# Patient Record
Sex: FEMALE | Race: WHITE | Employment: OTHER | URBAN - METROPOLITAN AREA
[De-identification: names, ages, dates, MRNs, and addresses within clinical notes are randomized per-mention and may not be internally consistent; named-entity substitution may affect disease eponyms.]

---

## 2017-04-08 ENCOUNTER — ED HISTORICAL/CONVERTED ENCOUNTER (OUTPATIENT)
Dept: OTHER | Age: 67
End: 2017-04-08

## 2017-07-03 ENCOUNTER — ED HISTORICAL/CONVERTED ENCOUNTER (OUTPATIENT)
Dept: OTHER | Age: 67
End: 2017-07-03

## 2017-07-03 ENCOUNTER — TELEPHONE (OUTPATIENT)
Dept: CARDIOLOGY CLINIC | Age: 67
End: 2017-07-03

## 2019-12-19 ENCOUNTER — IP HISTORICAL/CONVERTED ENCOUNTER (OUTPATIENT)
Dept: OTHER | Age: 69
End: 2019-12-19

## 2020-09-24 ENCOUNTER — APPOINTMENT (OUTPATIENT)
Dept: CT IMAGING | Age: 70
DRG: 871 | End: 2020-09-24
Attending: FAMILY MEDICINE
Payer: MEDICARE

## 2020-09-24 ENCOUNTER — HOSPITAL ENCOUNTER (INPATIENT)
Age: 70
LOS: 9 days | Discharge: HOME HEALTH | DRG: 871 | End: 2020-10-03
Attending: FAMILY MEDICINE | Admitting: HOSPITALIST
Payer: MEDICARE

## 2020-09-24 ENCOUNTER — APPOINTMENT (OUTPATIENT)
Dept: GENERAL RADIOLOGY | Age: 70
DRG: 871 | End: 2020-09-24
Attending: FAMILY MEDICINE
Payer: MEDICARE

## 2020-09-24 PROBLEM — N17.9 ACUTE ON CHRONIC RENAL FAILURE (HCC): Status: ACTIVE | Noted: 2020-09-24

## 2020-09-24 PROBLEM — N18.9 ACUTE ON CHRONIC RENAL FAILURE (HCC): Status: ACTIVE | Noted: 2020-09-24

## 2020-09-24 PROBLEM — J18.9 RLL PNEUMONIA: Status: ACTIVE | Noted: 2020-09-24

## 2020-09-24 PROBLEM — A41.9 SEPSIS (HCC): Status: ACTIVE | Noted: 2020-09-24

## 2020-09-24 PROBLEM — M31.30 GRANULOMATOSIS WITH POLYANGIITIS (HCC): Status: ACTIVE | Noted: 2020-09-24

## 2020-09-24 PROBLEM — D72.829 LEUKOCYTOSIS: Status: ACTIVE | Noted: 2020-09-24

## 2020-09-24 PROBLEM — E88.09 HYPOALBUMINEMIA: Status: ACTIVE | Noted: 2020-09-24

## 2020-09-24 PROBLEM — E87.1 HYPONATREMIA: Status: ACTIVE | Noted: 2020-09-24

## 2020-09-24 PROBLEM — J18.9 PNA (PNEUMONIA): Status: ACTIVE | Noted: 2020-09-24

## 2020-09-24 LAB
ALBUMIN SERPL-MCNC: 2.7 G/DL (ref 3.5–5)
ALBUMIN/GLOB SERPL: 0.7 {RATIO} (ref 1.1–2.2)
ALP SERPL-CCNC: 122 U/L (ref 45–117)
ALT SERPL-CCNC: 11 U/L (ref 12–78)
ANION GAP SERPL CALC-SCNC: 15 MMOL/L (ref 5–15)
APTT PPP: 43.7 SEC (ref 24.2–31.1)
AST SERPL W P-5'-P-CCNC: 20 U/L (ref 15–37)
ATRIAL RATE: 416 BPM
BASOPHILS # BLD: 0 K/UL (ref 0–0.1)
BASOPHILS NFR BLD: 0 % (ref 0–1)
BILIRUB SERPL-MCNC: 0.6 MG/DL (ref 0.2–1)
BUN SERPL-MCNC: 63 MG/DL (ref 6–20)
BUN/CREAT SERPL: 17 (ref 12–20)
CA-I BLD-MCNC: 9.2 MG/DL (ref 8.5–10.1)
CALCULATED R AXIS, ECG10: 34 DEGREES
CALCULATED T AXIS, ECG11: 50 DEGREES
CHLORIDE SERPL-SCNC: 96 MMOL/L (ref 97–108)
CO2 SERPL-SCNC: 21 MMOL/L (ref 21–32)
CREAT SERPL-MCNC: 3.77 MG/DL (ref 0.55–1.02)
DIAGNOSIS, 93000: NORMAL
EOSINOPHIL # BLD: 0 K/UL (ref 0–0.4)
EOSINOPHIL NFR BLD: 0 % (ref 0–7)
ERYTHROCYTE [DISTWIDTH] IN BLOOD BY AUTOMATED COUNT: 15.5 % (ref 11.5–14.5)
GLOBULIN SER CALC-MCNC: 4.1 G/DL (ref 2–4)
GLUCOSE SERPL-MCNC: 95 MG/DL (ref 65–100)
HCT VFR BLD AUTO: 40.5 % (ref 35–47)
HGB BLD-MCNC: 13.4 % (ref 11.5–16)
IMM GRANULOCYTES # BLD AUTO: 0 K/UL
IMM GRANULOCYTES NFR BLD AUTO: 0 %
INR PPP: 1.4 (ref 0.9–1.1)
LACTATE SERPL-SCNC: 1 MMOL/L (ref 0.4–2)
LYMPHOCYTES # BLD: 1.1 K/UL (ref 0.8–3.5)
LYMPHOCYTES NFR BLD: 3 % (ref 12–49)
MCH RBC QN AUTO: 31.6 PG (ref 26–34)
MCHC RBC AUTO-ENTMCNC: 33.1 G/DL (ref 30–36.5)
MCV RBC AUTO: 95.5 FL (ref 80–99)
MONOCYTES # BLD: 1.1 K/UL (ref 0–1)
MONOCYTES NFR BLD: 3 % (ref 5–13)
NEUTS BAND NFR BLD MANUAL: 10 % (ref 0–6)
NEUTS SEG # BLD: 34.5 K/UL (ref 1.8–8)
NEUTS SEG NFR BLD: 84 % (ref 32–75)
PLATELET # BLD AUTO: 276 K/UL (ref 150–400)
PLATELET COMMENTS,PCOM: ABNORMAL
PMV BLD AUTO: 11 FL (ref 8.9–12.9)
POTASSIUM SERPL-SCNC: 4 MMOL/L (ref 3.5–5.1)
PROT SERPL-MCNC: 6.8 G/DL (ref 6.4–8.2)
PROTHROMBIN TIME: 14.1 SEC (ref 9–11.1)
Q-T INTERVAL, ECG07: 368 MS
QRS DURATION, ECG06: 86 MS
QTC CALCULATION (BEZET), ECG08: 440 MS
RBC # BLD AUTO: 4.24 M/UL (ref 3.8–5.2)
RBC MORPH BLD: ABNORMAL
SODIUM SERPL-SCNC: 132 MMOL/L (ref 136–145)
THERAPEUTIC RANGE,PTTT: ABNORMAL SEC (ref 68–109)
TROPONIN I SERPL-MCNC: <0.05 NG/ML
VENTRICULAR RATE, ECG03: 86 BPM
WBC # BLD AUTO: 36.7 K/UL (ref 3.6–11)
WBC MORPH BLD: ABNORMAL

## 2020-09-24 PROCEDURE — 83605 ASSAY OF LACTIC ACID: CPT

## 2020-09-24 PROCEDURE — 65660000000 HC RM CCU STEPDOWN

## 2020-09-24 PROCEDURE — 84484 ASSAY OF TROPONIN QUANT: CPT

## 2020-09-24 PROCEDURE — 74011250636 HC RX REV CODE- 250/636: Performed by: HOSPITALIST

## 2020-09-24 PROCEDURE — 71045 X-RAY EXAM CHEST 1 VIEW: CPT

## 2020-09-24 PROCEDURE — 85610 PROTHROMBIN TIME: CPT

## 2020-09-24 PROCEDURE — 99284 EMERGENCY DEPT VISIT MOD MDM: CPT

## 2020-09-24 PROCEDURE — 82803 BLOOD GASES ANY COMBINATION: CPT

## 2020-09-24 PROCEDURE — 36415 COLL VENOUS BLD VENIPUNCTURE: CPT

## 2020-09-24 PROCEDURE — 80053 COMPREHEN METABOLIC PANEL: CPT

## 2020-09-24 PROCEDURE — 74011000258 HC RX REV CODE- 258: Performed by: FAMILY MEDICINE

## 2020-09-24 PROCEDURE — 87040 BLOOD CULTURE FOR BACTERIA: CPT

## 2020-09-24 PROCEDURE — 93005 ELECTROCARDIOGRAM TRACING: CPT

## 2020-09-24 PROCEDURE — 74011250636 HC RX REV CODE- 250/636: Performed by: FAMILY MEDICINE

## 2020-09-24 PROCEDURE — 65270000029 HC RM PRIVATE

## 2020-09-24 PROCEDURE — 85025 COMPLETE CBC W/AUTO DIFF WBC: CPT

## 2020-09-24 PROCEDURE — 74176 CT ABD & PELVIS W/O CONTRAST: CPT

## 2020-09-24 PROCEDURE — 85730 THROMBOPLASTIN TIME PARTIAL: CPT

## 2020-09-24 PROCEDURE — 74011250637 HC RX REV CODE- 250/637: Performed by: HOSPITALIST

## 2020-09-24 RX ORDER — BISMUTH SUBSALICYLATE 262 MG
1 TABLET,CHEWABLE ORAL DAILY
Status: DISCONTINUED | OUTPATIENT
Start: 2020-09-25 | End: 2020-10-03 | Stop reason: HOSPADM

## 2020-09-24 RX ORDER — POLYETHYLENE GLYCOL 3350 17 G/17G
17 POWDER, FOR SOLUTION ORAL DAILY PRN
Status: DISCONTINUED | OUTPATIENT
Start: 2020-09-24 | End: 2020-10-03 | Stop reason: HOSPADM

## 2020-09-24 RX ORDER — DIGOXIN 125 MCG
0.12 TABLET ORAL DAILY
Status: DISCONTINUED | OUTPATIENT
Start: 2020-09-25 | End: 2020-10-03 | Stop reason: HOSPADM

## 2020-09-24 RX ORDER — PROMETHAZINE HYDROCHLORIDE 25 MG/1
12.5 TABLET ORAL
Status: DISCONTINUED | OUTPATIENT
Start: 2020-09-24 | End: 2020-10-03 | Stop reason: HOSPADM

## 2020-09-24 RX ORDER — ACETAMINOPHEN 650 MG/1
650 SUPPOSITORY RECTAL
Status: DISCONTINUED | OUTPATIENT
Start: 2020-09-24 | End: 2020-10-03 | Stop reason: HOSPADM

## 2020-09-24 RX ORDER — CHOLECALCIFEROL (VITAMIN D3) 125 MCG
10 CAPSULE ORAL
Status: DISCONTINUED | OUTPATIENT
Start: 2020-09-25 | End: 2020-10-03 | Stop reason: HOSPADM

## 2020-09-24 RX ORDER — SODIUM CHLORIDE 0.9 % (FLUSH) 0.9 %
5-40 SYRINGE (ML) INJECTION EVERY 8 HOURS
Status: DISCONTINUED | OUTPATIENT
Start: 2020-09-25 | End: 2020-10-03 | Stop reason: HOSPADM

## 2020-09-24 RX ORDER — LEVOFLOXACIN 5 MG/ML
750 INJECTION, SOLUTION INTRAVENOUS
Status: COMPLETED | OUTPATIENT
Start: 2020-09-24 | End: 2020-09-24

## 2020-09-24 RX ORDER — SODIUM CHLORIDE 0.9 % (FLUSH) 0.9 %
5-40 SYRINGE (ML) INJECTION AS NEEDED
Status: DISCONTINUED | OUTPATIENT
Start: 2020-09-24 | End: 2020-10-03 | Stop reason: HOSPADM

## 2020-09-24 RX ORDER — FAMOTIDINE 20 MG/1
20 TABLET, FILM COATED ORAL 2 TIMES DAILY
Status: DISCONTINUED | OUTPATIENT
Start: 2020-09-25 | End: 2020-09-25

## 2020-09-24 RX ORDER — SODIUM CHLORIDE 9 MG/ML
125 INJECTION, SOLUTION INTRAVENOUS CONTINUOUS
Status: DISCONTINUED | OUTPATIENT
Start: 2020-09-25 | End: 2020-09-25

## 2020-09-24 RX ORDER — SODIUM CHLORIDE 0.9 % (FLUSH) 0.9 %
5-10 SYRINGE (ML) INJECTION AS NEEDED
Status: DISCONTINUED | OUTPATIENT
Start: 2020-09-24 | End: 2020-10-03 | Stop reason: HOSPADM

## 2020-09-24 RX ORDER — LISINOPRIL 10 MG/1
TABLET ORAL
COMMUNITY
Start: 2020-08-28 | End: 2020-10-03

## 2020-09-24 RX ORDER — ONDANSETRON 2 MG/ML
4 INJECTION INTRAMUSCULAR; INTRAVENOUS
Status: DISCONTINUED | OUTPATIENT
Start: 2020-09-24 | End: 2020-10-03 | Stop reason: HOSPADM

## 2020-09-24 RX ORDER — ASPIRIN 81 MG/1
81 TABLET ORAL DAILY
Status: DISCONTINUED | OUTPATIENT
Start: 2020-09-25 | End: 2020-10-03 | Stop reason: HOSPADM

## 2020-09-24 RX ORDER — PROPAFENONE HYDROCHLORIDE 225 MG/1
225 TABLET, FILM COATED ORAL EVERY 8 HOURS
Status: DISCONTINUED | OUTPATIENT
Start: 2020-09-25 | End: 2020-09-26

## 2020-09-24 RX ORDER — OMEPRAZOLE 20 MG/1
20 CAPSULE, DELAYED RELEASE ORAL DAILY
Status: DISCONTINUED | OUTPATIENT
Start: 2020-09-25 | End: 2020-09-28

## 2020-09-24 RX ORDER — ACETAMINOPHEN 325 MG/1
650 TABLET ORAL
Status: DISCONTINUED | OUTPATIENT
Start: 2020-09-24 | End: 2020-10-03 | Stop reason: HOSPADM

## 2020-09-24 RX ORDER — ATORVASTATIN CALCIUM 40 MG/1
40 TABLET, FILM COATED ORAL
Status: DISCONTINUED | OUTPATIENT
Start: 2020-09-25 | End: 2020-10-03 | Stop reason: HOSPADM

## 2020-09-24 RX ADMIN — SODIUM CHLORIDE 125 ML/HR: 9 INJECTION, SOLUTION INTRAVENOUS at 23:49

## 2020-09-24 RX ADMIN — SODIUM CHLORIDE 500 ML: 9 INJECTION, SOLUTION INTRAVENOUS at 11:00

## 2020-09-24 RX ADMIN — MELATONIN TAB 5 MG 10 MG: 5 TAB at 23:49

## 2020-09-24 RX ADMIN — SODIUM CHLORIDE 1000 ML: 9 INJECTION, SOLUTION INTRAVENOUS at 23:50

## 2020-09-24 RX ADMIN — CEFTRIAXONE SODIUM 1 G: 1 INJECTION, POWDER, FOR SOLUTION INTRAMUSCULAR; INTRAVENOUS at 17:20

## 2020-09-24 RX ADMIN — LEVOFLOXACIN 750 MG: 5 INJECTION, SOLUTION INTRAVENOUS at 17:21

## 2020-09-24 RX ADMIN — ATORVASTATIN CALCIUM 40 MG: 40 TABLET, FILM COATED ORAL at 23:49

## 2020-09-24 NOTE — Clinical Note
10ml cloudy serous pleural fluid aspirated and collected for specimen, specimens taken to lab. One step catheter removed, manual pressure held at site, bandaid applied, dry and intact.

## 2020-09-24 NOTE — ED PROVIDER NOTES
[facility] EMERGENCY CARE CENTER    HISTORY AND PHYSICAL EXAM      Date: 9/24/2020  Patient Name: Malachi Chang  Room:  520/01    History of Presenting Illness     Chief Complaint:  Shortness of Breath       History Provided By: Patient  HPI/ROS Limits: None    HPI: Shanna Sorto, 79 y.o. female presents to the ED with cc of Shortness of Breath  with initial onset 1 week ago. The patient states that her dyspnea has been worsening coupled with a nonproductive cough. The patient states that exertion exacerbates her dyspnea and routine medications have been ineffective. Currently, the patient denies F/C, hemoptysis, chest pain, palpitations, NVDC, abdominal pain, or urinary c/o. There are no other complaints, changes, or physical findings at this time. PCP: None    No current facility-administered medications on file prior to encounter. Current Outpatient Medications on File Prior to Encounter   Medication Sig Dispense Refill    atorvastatin (LIPITOR) 40 mg tablet Take  by mouth daily.  MELATONIN (MELATIN PO) 10 mg. Take 1-3 tablets every bedtime.  furosemide (LASIX) 20 mg tablet Take 1 tab by mouth daily 30 Tab 4    multivitamin (ONE A DAY) tablet Take 1 Tab by mouth daily.  rivaroxaban (XARELTO) 20 mg tab tablet Take 1 Tab by mouth daily. 90 Tab 3    digoxin (LANOXIN) 0.125 mg tablet Take 1 Tab by mouth daily. 90 Tab 3    aspirin delayed-release 81 mg tablet Take 81 mg by mouth daily.  Omeprazole delayed release (PRILOSEC D/R) 20 mg tablet Take 20 mg by mouth daily. Past History     PAST MEDICAL   has a past medical history of AF (paroxysmal atrial fibrillation) ([address] Anemia, Anxiety, Chronic obstructive pulmonary disease ([address] Frequent urination, Heart failure ([address] HTN (hypertension), Irregular heart beat, Obesity, HORACE (obstructive sleep apnea), SOB (shortness of breath) on exertion, Stress, Stroke ([address] and Wheezing.  She also has no past medical history of Chronic pain. PAST SURGICAL   has a past surgical history that includes echocardiogram (11/23/2009); nm cardiac spect w strs/rest mult (08/2006); hx gastric bypass; and ir thoracentesis cath w image (10/1/2020). SOCIAL HISTORY   reports that she has quit smoking. She has never used smokeless tobacco. She reports previous alcohol use. She reports that she does not use drugs. Allergies:  No Known Allergies    Review of Systems     Review of Systems   Constitutional: Negative. HENT: Negative. Eyes: Negative. Negative for visual disturbance. Respiratory: Positive for cough and shortness of breath. Cardiovascular: Negative for chest pain and palpitations. Gastrointestinal: Negative for abdominal pain, constipation, nausea and vomiting. Endocrine: Negative. Genitourinary: Negative for dysuria, flank pain, frequency, urgency, vaginal bleeding and vaginal discharge. Musculoskeletal: Negative for back pain and neck pain. Skin: Negative. Allergic/Immunologic: Negative. Neurological: Negative. Hematological: Negative. Psychiatric/Behavioral: Negative. Physical Exam     Vital Signs-Reviewed the patient's vital signs. Patient Vitals for the past 12 hrs:   Temp Pulse Resp BP SpO2   09/24/20 1425  82 16 110/69 96 %   09/24/20 1200  86 16 (!) 97/56    09/24/20 1110 97.6 °F (36.4 °C) 88 16 (!) 92/50 96 %     Physical Exam  Vitals signs and nursing note reviewed. Constitutional:       Appearance: Normal appearance. She is well-developed. HENT:      Head: Normocephalic and atraumatic. Mouth/Throat:      Mouth: Mucous membranes are moist.      Pharynx: Oropharynx is clear. Eyes:      Extraocular Movements: Extraocular movements intact. Conjunctiva/sclera: Conjunctivae normal.      Pupils: Pupils are equal, round, and reactive to light. Neck:      Musculoskeletal: Normal range of motion and neck supple.    Cardiovascular:      Rate and Rhythm: Normal rate and regular rhythm. Pulmonary:      Effort: Pulmonary effort is normal.      Breath sounds: Normal breath sounds. Abdominal:      General: Abdomen is flat. Palpations: Abdomen is soft. Musculoskeletal: Normal range of motion. Skin:     General: Skin is warm and dry. Neurological:      General: No focal deficit present. Mental Status: She is alert and oriented to person, place, and time. Psychiatric:         Mood and Affect: Mood normal.         Behavior: Behavior normal.       Diagnostic Study Results     Labs -     Recent Results (from the past 12 hour(s))   EKG, 12 LEAD, INITIAL    Collection Time: 09/24/20 10:49 AM   Result Value Ref Range    Ventricular Rate 86 BPM    Atrial Rate 416 BPM    QRS Duration 86 ms    Q-T Interval 368 ms    QTC Calculation (Bezet) 440 ms    Calculated R Axis 34 degrees    Calculated T Axis 50 degrees    Diagnosis       Atrial fibrillation  Abnormal ECG  No previous ECGs available  Confirmed by Jem Ruth ([id number]) on 9/24/2020 2:01:31 PM     CBC WITH AUTOMATED DIFF    Collection Time: 09/24/20 11:30 AM   Result Value Ref Range    WBC 36.7 (H) 3.6 - 11.0 K/uL    RBC 4.24 3.80 - 5.20 M/uL    HGB 13.4 11.5 - 16.0 %    HCT 40.5 35.0 - 47.0 %    MCV 95.5 80.0 - 99.0 FL    MCH 31.6 26.0 - 34.0 PG    MCHC 33.1 30.0 - 36.5 g/dL    RDW 15.5 (H) 11.5 - 14.5 %    PLATELET 512 853 - 383 K/uL    MPV 11.0 8.9 - 12.9 FL    NEUTROPHILS 84 (H) 32 - 75 %    BAND NEUTROPHILS 10 (H) 0 - 6 %    LYMPHOCYTES 3 (L) 12 - 49 %    MONOCYTES 3 (L) 5 - 13 %    EOSINOPHILS 0 0 - 7 %    BASOPHILS 0 0 - 1 %    IMMATURE GRANULOCYTES 0 %    ABS. NEUTROPHILS 34.5 (H) 1.8 - 8.0 K/UL    ABS. LYMPHOCYTES 1.1 0.8 - 3.5 K/UL    ABS. MONOCYTES 1.1 (H) 0.0 - 1.0 K/UL    ABS. EOSINOPHILS 0.0 0.0 - 0.4 K/UL    ABS. BASOPHILS 0.0 0.0 - 0.1 K/UL    ABS. IMM.  GRANS. 0.0 K/UL    PLATELET COMMENTS Large Platelets      RBC COMMENTS Basophilic Stippling  1+        RBC COMMENTS [place] cells  2+        RBC COMMENTS Target Cells  1+        RBC COMMENTS Poikilocytosis  1+        RBC COMMENTS Anisocytosis  1+        RBC COMMENTS Ovalocytes  1+        WBC COMMENTS Toxic Granulation     METABOLIC PANEL, COMPREHENSIVE    Collection Time: 09/24/20 11:41 AM   Result Value Ref Range    Sodium 132 (L) 136 - 145 mmol/L    Potassium 4.0 3.5 - 5.1 mmol/L    Chloride 96 (L) 97 - 108 mmol/L    CO2 21 21 - 32 mmol/L    Anion gap 15 5 - 15 mmol/L    Glucose 95 65 - 100 mg/dL    BUN 63 (H) 6 - 20 mg/dL    Creatinine 3.77 (H) 0.55 - 1.02 mg/dL    BUN/Creatinine ratio 17 12 - 20      GFR est AA 14 (L) >60 ml/min/1.73m2    GFR est non-AA 12 (L) >60 ml/min/1.73m2    Calcium 9.2 8.5 - 10.1 mg/dL    Bilirubin, total 0.6 0.2 - 1.0 mg/dL    AST (SGOT) 20 15 - 37 U/L    ALT (SGPT) 11 (L) 12 - 78 U/L    Alk. phosphatase 122 (H) 45 - 117 U/L    Protein, total 6.8 6.4 - 8.2 g/dL    Albumin 2.7 (L) 3.5 - 5.0 g/dL    Globulin 4.1 (H) 2.0 - 4.0 g/dL    A-G Ratio 0.7 (L) 1.1 - 2.2     TROPONIN I    Collection Time: 09/24/20 11:41 AM   Result Value Ref Range    Troponin-I, Qt. <0.05 <0.05 ng/mL   PROTHROMBIN TIME + INR    Collection Time: 09/24/20 12:45 PM   Result Value Ref Range    Prothrombin time 14.1 (H) 9.0 - 11.1 sec    INR 1.4 (H) 0.9 - 1.1     PTT    Collection Time: 09/24/20 12:45 PM   Result Value Ref Range    aPTT 43.7 (H) 24.2 - 31.1 sec    aPTT, therapeutic range   68 - 109 sec   LACTIC ACID    Collection Time: 09/24/20 12:45 PM   Result Value Ref Range    Lactic acid 1.0 0.4 - 2.0 mmol/L       Radiologic Studies -   CT ABD PELV WO CONT   Final Result   IMPRESSION:   Loculated right pleural effusion with atelectasis versus airspace disease in the   base of right lung. No acute finding the abdomen and pelvis. XR CHEST SNGL V   Final Result   IMPRESSION: Cardiomegaly. New right pleural effusion. No change in right lower   lobe granuloma. Dextroscoliosis.         CT Results  (Last 48 hours)               09/24/20 1353  CT ABD PELV WO CONT Final result    Impression:  IMPRESSION:   Loculated right pleural effusion with atelectasis versus airspace disease in the   base of right lung. No acute finding the abdomen and pelvis. Narrative:  EXAM: CT ABD PELV WO CONT       INDICATION: WBC 30+K with diarrhea       COMPARISON: None. CONTRAST: None. TECHNIQUE:    Unenhanced multislice helical CT was performed from the diaphragm to the   symphysis pubis without oral or intravenous contrast administration. Contiguous   5 mm axial images were reconstructed and lung and soft tissue windows were   generated. Coronal and sagittal reformations were generated. CT dose reduction   was achieved through use of a standardized protocol tailored for this   examination and automatic exposure control for dose modulation. FINDINGS:   Lower chest: Partially imaged loculated pleural effusion. Atelectasis versus   airspace disease in the right lung base. Left lung base is clear. Liver, biliary tree, and gallbladder: No mass. Gallbladder is within normal   limits. CBD is not dilated. Spleen: Absent. Pancreas: No mass or ductal dilatation. Kidneys and adrenals: No mass, calculus, or hydronephrosis. Unremarkable   adrenals. Bowels: No bowel dilatation or wall thickening. Peritoneum and retroperitoneum: No ascites or pneumoperitoneum. No aortic   aneurysm. Scattered subcentimeter retroperitoneal lymph nodes. Pelvis: No mass or calculus. Bones and abdominal wall: Levocurvature centered in the upper lumbar spine, with   degenerative changes. No destructive bone lesion. No abdominal mass or hernia. CXR Results  (Last 48 hours)               09/24/20 1150  XR CHEST SNGL V Final result    Impression:  IMPRESSION: Cardiomegaly. New right pleural effusion. No change in right lower   lobe granuloma. Dextroscoliosis. Narrative:  History is shortness of breath.        Comparison Is with the chest x-ray of 12/18/2019. An AP portable erect view of the chest demonstrate cardiac monitor leads. There   is now a small to moderate right pleural effusion. There is a small calcified   right lower lobe granuloma. The heart is enlarged. There is approximately 27   degrees dextroscoliosis of the thoracic spine. There is degenerative change   present in the spine as well. The results of the diagnostic studies, performed during the time frame I've seen and evaluated the patient, have been REVIEWED BY MYSELF. Procedures/Critical Care     PROCEDURES  None    Medical Decision Making   I am the first provider for this patient. I reviewed the vital signs, available nursing notes, past medical history, past surgical history, family history and social history. Records Reviewed: Nursing Notes    ED Course:   Initial assessment performed. The patients presenting problems have been discussed, and they are in agreement with the care plan formulated and outlined with them. I have encouraged them to ask questions as they arise throughout their visit. ED Course as of Oct 25 2228   Thu Sep 24, 2020   1537 XR CHEST [facility] [RH]      ED Course User Index  [RH] Alisa Baugh MD       Medications   sodium chloride 0.9 % bolus infusion 500 mL (500 mL IntraVENous New Bag 9/24/20 1100)   levoFLOXacin (LEVAQUIN) 750 mg in D5W IVPB (750 mg IntraVENous New Bag 9/24/20 1721)   cefTRIAXone (ROCEPHIN) 1 g in 0.9% sodium chloride (MBP/ADV) 50 mL MBP (1 g IntraVENous New Bag 9/24/20 1720)        Provider Notes (Medical Decision Making): The patient presented to the emergency department with complaint of worsening dyspnea. Evaluation of the patient is significant for a non-contributory physical exam but diagnostic (laboratory/radiographic) evaluation revealed a significant leukocytosis with acute on CRF & hypoalbuminemia which prompted a CXR and CT revealed a RLL CAP and confirmed .   Initial management included IV fluids & antibiotics. It was felt that it was in the best interest of the patient to manage the patient's condition in an inpatient admission status. Results of lab/radiology tests were reviewed and discussed with the patient and/or family. Diagnostic impression and plan were discussed and agreed upon with the patient and/or family. The patient has received maximum benefit from this visit and felt stable for transfer [facility]. All questions were answered and concerns addressed. Patient transferred in stable condition. Diagnosis/Plan/Follow Up     CLINICAL IMPRESSION:      ICD-10-CM ICD-9-CM   1. Leukocytosis D72.829 288.60   2. Acute on chronic renal failure (HCC) N17,9, N18.9 584.9,585.9     3. RLL Pneumonia J18.9 486   4. Hypoalbuminemia E88.09 273.8       DISPOSITION:  Admitted to Inpatient in stable condition. Chelo Oconnor M.D.   [facility]  Emergency Department Physician

## 2020-09-25 ENCOUNTER — APPOINTMENT (OUTPATIENT)
Dept: CT IMAGING | Age: 70
DRG: 871 | End: 2020-09-25
Attending: INTERNAL MEDICINE
Payer: MEDICARE

## 2020-09-25 ENCOUNTER — HOSPITAL ENCOUNTER (OUTPATIENT)
Dept: LAB | Age: 70
Discharge: HOME | End: 2020-09-25

## 2020-09-25 ENCOUNTER — APPOINTMENT (OUTPATIENT)
Dept: CT IMAGING | Age: 70
DRG: 871 | End: 2020-09-25
Attending: HOSPITALIST
Payer: MEDICARE

## 2020-09-25 LAB
ALBUMIN SERPL-MCNC: 2.4 G/DL (ref 3.5–5)
ALBUMIN/GLOB SERPL: 0.6 {RATIO} (ref 1.1–2.2)
ALP SERPL-CCNC: 152 U/L (ref 45–117)
ALT SERPL-CCNC: 15 U/L (ref 12–78)
ANION GAP SERPL CALC-SCNC: 10 MMOL/L (ref 5–15)
ANION GAP SERPL CALC-SCNC: 13 MMOL/L (ref 5–15)
APTT PPP: 46.4 SEC (ref 23–35.7)
ARTERIAL PATENCY WRIST A: POSITIVE
AST SERPL W P-5'-P-CCNC: 23 U/L (ref 15–37)
ATRIAL RATE: 89 BPM
BASE DEFICIT BLDA-SCNC: 8.2 MMOL/L (ref 0–2)
BASOPHILS # BLD: 0 K/UL (ref 0–0.1)
BASOPHILS # BLD: 0 K/UL (ref 0–0.1)
BASOPHILS NFR BLD: 0 % (ref 0–1)
BASOPHILS NFR BLD: 0 % (ref 0–1)
BDY SITE: ABNORMAL
BILIRUB SERPL-MCNC: 0.5 MG/DL (ref 0.2–1)
BUN SERPL-MCNC: 66 MG/DL (ref 6–20)
BUN SERPL-MCNC: 81 MG/DL (ref 6–20)
BUN/CREAT SERPL: 18 (ref 12–20)
BUN/CREAT SERPL: 21 (ref 12–20)
CA-I BLD-MCNC: 8.3 MG/DL (ref 8.5–10.1)
CA-I BLD-MCNC: 8.7 MG/DL (ref 8.5–10.1)
CALCULATED R AXIS, ECG10: 0 DEGREES
CALCULATED T AXIS, ECG11: 41 DEGREES
CHLORIDE SERPL-SCNC: 100 MMOL/L (ref 97–108)
CHLORIDE SERPL-SCNC: 99 MMOL/L (ref 97–108)
CO2 SERPL-SCNC: 20 MMOL/L (ref 21–32)
CO2 SERPL-SCNC: 20 MMOL/L (ref 21–32)
CREAT SERPL-MCNC: 3.69 MG/DL (ref 0.55–1.02)
CREAT SERPL-MCNC: 3.81 MG/DL (ref 0.55–1.02)
CRP SERPL HS-MCNC: >9.5 MG/L
DIAGNOSIS, 93000: NORMAL
DIFFERENTIAL METHOD BLD: ABNORMAL
DIFFERENTIAL METHOD BLD: ABNORMAL
DIGOXIN SERPL-MCNC: 1.7 NG/ML (ref 0.9–2)
EOSINOPHIL # BLD: 0 K/UL (ref 0–0.4)
EOSINOPHIL # BLD: 0 K/UL (ref 0–0.4)
EOSINOPHIL NFR BLD: 0 % (ref 0–7)
EOSINOPHIL NFR BLD: 0 % (ref 0–7)
ERYTHROCYTE [DISTWIDTH] IN BLOOD BY AUTOMATED COUNT: 16.2 % (ref 11.5–14.5)
ERYTHROCYTE [DISTWIDTH] IN BLOOD BY AUTOMATED COUNT: 16.2 % (ref 11.5–14.5)
ERYTHROCYTE [SEDIMENTATION RATE] IN BLOOD: 56 MM/HR
FIO2 ON VENT: 21 %
GLOBULIN SER CALC-MCNC: 4.2 G/DL (ref 2–4)
GLUCOSE SERPL-MCNC: 156 MG/DL (ref 65–100)
GLUCOSE SERPL-MCNC: 81 MG/DL (ref 65–100)
HCO3 BLDA-SCNC: 18 MMOL/L (ref 22–26)
HCT VFR BLD AUTO: 39.7 % (ref 35–47)
HCT VFR BLD AUTO: 39.8 % (ref 35–47)
HGB BLD-MCNC: 13.3 % (ref 11.5–16)
HGB BLD-MCNC: 13.5 % (ref 11.5–16)
IMM GRANULOCYTES # BLD AUTO: 0.4 K/UL (ref 0–0.04)
IMM GRANULOCYTES # BLD AUTO: 0.5 K/UL (ref 0–0.04)
IMM GRANULOCYTES NFR BLD AUTO: 1 % (ref 0–0.5)
IMM GRANULOCYTES NFR BLD AUTO: 2 % (ref 0–0.5)
INR PPP: 1.9 (ref 0.9–1.1)
LACTATE SERPL-SCNC: 0.8 MMOL/L (ref 0.4–2)
LACTATE SERPL-SCNC: 1.2 MMOL/L (ref 0.4–2)
LYMPHOCYTES # BLD: 1.3 K/UL (ref 0.8–3.5)
LYMPHOCYTES # BLD: 1.5 K/UL (ref 0.8–3.5)
LYMPHOCYTES NFR BLD: 4 % (ref 12–49)
LYMPHOCYTES NFR BLD: 4 % (ref 12–49)
MAGNESIUM SERPL-MCNC: 1.5 MG/DL (ref 1.6–2.4)
MCH RBC QN AUTO: 31 PG (ref 26–34)
MCH RBC QN AUTO: 31.3 PG (ref 26–34)
MCHC RBC AUTO-ENTMCNC: 33.4 G/DL (ref 30–36.5)
MCHC RBC AUTO-ENTMCNC: 34 G/DL (ref 30–36.5)
MCV RBC AUTO: 91.9 FL (ref 80–99)
MCV RBC AUTO: 92.8 FL (ref 80–99)
MONOCYTES # BLD: 1.2 K/UL (ref 0–1)
MONOCYTES # BLD: 3.6 K/UL (ref 0–1)
MONOCYTES NFR BLD: 10 % (ref 5–13)
MONOCYTES NFR BLD: 4 % (ref 5–13)
NEUTS SEG # BLD: 28.3 K/UL (ref 1.8–8)
NEUTS SEG # BLD: 30.3 K/UL (ref 1.8–8)
NEUTS SEG NFR BLD: 86 % (ref 32–75)
NEUTS SEG NFR BLD: 90 % (ref 32–75)
NRBC # BLD: 0.07 K/UL (ref 0–0.01)
NRBC # BLD: 0.07 K/UL (ref 0–0.01)
NRBC BLD-RTO: 0.2 PER 100 WBC
NRBC BLD-RTO: 0.2 PER 100 WBC
PCO2 BLDA: 40 MMHG (ref 35–45)
PH BLDA: 7.26 [PH] (ref 7.35–7.45)
PHOSPHATE SERPL-MCNC: 4.9 MG/DL (ref 2.6–4.7)
PLATELET # BLD AUTO: 286 K/UL (ref 150–400)
PLATELET # BLD AUTO: 304 K/UL (ref 150–400)
PMV BLD AUTO: 11 FL (ref 8.9–12.9)
PMV BLD AUTO: 11.4 FL (ref 8.9–12.9)
PO2 BLDA: 65 MMHG (ref 75–100)
POTASSIUM SERPL-SCNC: 4 MMOL/L (ref 3.5–5.1)
POTASSIUM SERPL-SCNC: 4.3 MMOL/L (ref 3.5–5.1)
PROT SERPL-MCNC: 6.6 G/DL (ref 6.4–8.2)
PROTHROMBIN TIME: 21.9 SEC (ref 11.9–14.7)
Q-T INTERVAL, ECG07: 374 MS
QRS DURATION, ECG06: 90 MS
QTC CALCULATION (BEZET), ECG08: 450 MS
RBC # BLD AUTO: 4.29 M/UL (ref 3.8–5.2)
RBC # BLD AUTO: 4.32 M/UL (ref 3.8–5.2)
SAO2 % BLD: 92 %
SODIUM SERPL-SCNC: 130 MMOL/L (ref 136–145)
SODIUM SERPL-SCNC: 132 MMOL/L (ref 136–145)
THERAPEUTIC RANGE,PTTT: ABNORMAL SEC (ref 68–109)
VENTRICULAR RATE, ECG03: 87 BPM
WBC # BLD AUTO: 30.8 K/UL (ref 3.6–11)
WBC # BLD AUTO: 35.4 K/UL (ref 3.6–11)

## 2020-09-25 PROCEDURE — 85652 RBC SED RATE AUTOMATED: CPT

## 2020-09-25 PROCEDURE — 97161 PT EVAL LOW COMPLEX 20 MIN: CPT

## 2020-09-25 PROCEDURE — 87804 INFLUENZA ASSAY W/OPTIC: CPT

## 2020-09-25 PROCEDURE — 74011250636 HC RX REV CODE- 250/636: Performed by: PHYSICIAN ASSISTANT

## 2020-09-25 PROCEDURE — 71250 CT THORAX DX C-: CPT

## 2020-09-25 PROCEDURE — 85025 COMPLETE CBC W/AUTO DIFF WBC: CPT

## 2020-09-25 PROCEDURE — 93005 ELECTROCARDIOGRAM TRACING: CPT

## 2020-09-25 PROCEDURE — P9047 ALBUMIN (HUMAN), 25%, 50ML: HCPCS | Performed by: INTERNAL MEDICINE

## 2020-09-25 PROCEDURE — 74011250636 HC RX REV CODE- 250/636: Performed by: INTERNAL MEDICINE

## 2020-09-25 PROCEDURE — 92610 EVALUATE SWALLOWING FUNCTION: CPT

## 2020-09-25 PROCEDURE — 36415 COLL VENOUS BLD VENIPUNCTURE: CPT

## 2020-09-25 PROCEDURE — 84100 ASSAY OF PHOSPHORUS: CPT

## 2020-09-25 PROCEDURE — 85730 THROMBOPLASTIN TIME PARTIAL: CPT

## 2020-09-25 PROCEDURE — 83735 ASSAY OF MAGNESIUM: CPT

## 2020-09-25 PROCEDURE — 97530 THERAPEUTIC ACTIVITIES: CPT

## 2020-09-25 PROCEDURE — 83520 IMMUNOASSAY QUANT NOS NONAB: CPT

## 2020-09-25 PROCEDURE — 74011000258 HC RX REV CODE- 258: Performed by: HOSPITALIST

## 2020-09-25 PROCEDURE — 97116 GAIT TRAINING THERAPY: CPT

## 2020-09-25 PROCEDURE — 74011250637 HC RX REV CODE- 250/637: Performed by: HOSPITALIST

## 2020-09-25 PROCEDURE — 74011250636 HC RX REV CODE- 250/636: Performed by: HOSPITALIST

## 2020-09-25 PROCEDURE — 85610 PROTHROMBIN TIME: CPT

## 2020-09-25 PROCEDURE — 65270000029 HC RM PRIVATE

## 2020-09-25 PROCEDURE — 77010033678 HC OXYGEN DAILY

## 2020-09-25 PROCEDURE — 94760 N-INVAS EAR/PLS OXIMETRY 1: CPT

## 2020-09-25 PROCEDURE — 74011000258 HC RX REV CODE- 258: Performed by: PHYSICIAN ASSISTANT

## 2020-09-25 PROCEDURE — 86141 C-REACTIVE PROTEIN HS: CPT

## 2020-09-25 PROCEDURE — 80053 COMPREHEN METABOLIC PANEL: CPT

## 2020-09-25 PROCEDURE — 87086 URINE CULTURE/COLONY COUNT: CPT

## 2020-09-25 PROCEDURE — 80048 BASIC METABOLIC PNL TOTAL CA: CPT

## 2020-09-25 PROCEDURE — 83605 ASSAY OF LACTIC ACID: CPT

## 2020-09-25 PROCEDURE — 80162 ASSAY OF DIGOXIN TOTAL: CPT

## 2020-09-25 PROCEDURE — 97165 OT EVAL LOW COMPLEX 30 MIN: CPT

## 2020-09-25 RX ORDER — FUROSEMIDE 10 MG/ML
40 INJECTION INTRAMUSCULAR; INTRAVENOUS DAILY
Status: DISCONTINUED | OUTPATIENT
Start: 2020-09-25 | End: 2020-09-26

## 2020-09-25 RX ORDER — IPRATROPIUM BROMIDE AND ALBUTEROL SULFATE 2.5; .5 MG/3ML; MG/3ML
3 SOLUTION RESPIRATORY (INHALATION)
Status: DISCONTINUED | OUTPATIENT
Start: 2020-09-25 | End: 2020-10-03 | Stop reason: HOSPADM

## 2020-09-25 RX ORDER — FAMOTIDINE 20 MG/1
20 TABLET, FILM COATED ORAL
Status: DISCONTINUED | OUTPATIENT
Start: 2020-09-25 | End: 2020-10-03 | Stop reason: HOSPADM

## 2020-09-25 RX ORDER — ALBUMIN HUMAN 250 G/1000ML
25 SOLUTION INTRAVENOUS ONCE
Status: COMPLETED | OUTPATIENT
Start: 2020-09-25 | End: 2020-09-25

## 2020-09-25 RX ADMIN — ASPIRIN 81 MG: 81 TABLET, COATED ORAL at 10:44

## 2020-09-25 RX ADMIN — SODIUM CHLORIDE 1750 MG: 9 INJECTION, SOLUTION INTRAVENOUS at 01:54

## 2020-09-25 RX ADMIN — DIGOXIN 0.12 MG: 125 TABLET ORAL at 10:45

## 2020-09-25 RX ADMIN — PIPERACILLIN SODIUM AND TAZOBACTAM SODIUM 3.38 G: 3; .375 INJECTION, POWDER, LYOPHILIZED, FOR SOLUTION INTRAVENOUS at 17:11

## 2020-09-25 RX ADMIN — ATORVASTATIN CALCIUM 40 MG: 40 TABLET, FILM COATED ORAL at 22:59

## 2020-09-25 RX ADMIN — Medication 10 ML: at 14:00

## 2020-09-25 RX ADMIN — SODIUM CHLORIDE 500 ML: 9 INJECTION, SOLUTION INTRAVENOUS at 01:02

## 2020-09-25 RX ADMIN — MELATONIN TAB 5 MG 10 MG: 5 TAB at 22:59

## 2020-09-25 RX ADMIN — PIPERACILLIN AND TAZOBACTAM 3.38 G: 3; .375 INJECTION, POWDER, LYOPHILIZED, FOR SOLUTION INTRAVENOUS at 12:34

## 2020-09-25 RX ADMIN — MULTIVITAMIN TABLET 1 TABLET: TABLET at 10:45

## 2020-09-25 RX ADMIN — ALBUMIN (HUMAN) 25 G: 25 SOLUTION INTRAVENOUS at 22:50

## 2020-09-25 RX ADMIN — SODIUM CHLORIDE 600 MG: 0.9 INJECTION INTRAVENOUS at 01:50

## 2020-09-25 RX ADMIN — Medication 10 ML: at 23:01

## 2020-09-25 RX ADMIN — POLYETHYLENE GLYCOL 3350 17 G: 17 POWDER, FOR SOLUTION ORAL at 10:44

## 2020-09-25 RX ADMIN — RIVAROXABAN 20 MG: 20 TABLET, FILM COATED ORAL at 10:46

## 2020-09-25 RX ADMIN — FAMOTIDINE 20 MG: 20 TABLET ORAL at 00:41

## 2020-09-25 RX ADMIN — SODIUM CHLORIDE 125 ML/HR: 9 INJECTION, SOLUTION INTRAVENOUS at 17:24

## 2020-09-25 RX ADMIN — Medication 10 ML: at 01:01

## 2020-09-25 RX ADMIN — OMEPRAZOLE 20 MG: 20 CAPSULE, DELAYED RELEASE ORAL at 10:45

## 2020-09-25 RX ADMIN — PIPERACILLIN AND TAZOBACTAM 3.38 G: 3; .375 INJECTION, POWDER, LYOPHILIZED, FOR SOLUTION INTRAVENOUS at 00:42

## 2020-09-25 NOTE — PROGRESS NOTES
SPEECH LANGUAGE PATHOLOGY BEDSIDE SWALLOW EVALUATIONS  Patient: aSrah Tipton  (57 y.o. )  Date: 9/25/2020  Primary Diagnosis: Granulomatosis with polyangiitis ([facility]   Precautions:  Aspiration    ASSESSMENT :  Patient presents w/ wfl oropharyngeal swallow. Oral phase c/b adequate mastication and A-P transit w/o evidence of oral residue. Pharyngeal phase c/b timely swallow and HLE is wfl upon palpation. Novert s/s of pen/asp. Patient was noted to have congested cough prior to PO intake. PLAN :  Recommendations and Planned Interventions:  Rec cont cardiac diet w/ strict asp/GERD precautions. No further ST intervention at this time. SUBJECTIVE:   Patient reports cough x2 weeks w/ sore throat and works as . She denies dysphagia and hx of dysphagia. OBJECTIVE:     Past Medical History:   Diagnosis Date    AF (paroxysmal atrial fibrillation) ([facility]     d/c cardioversion 2006, rythmol started 2007    Anemia     Anxiety     Chronic obstructive pulmonary disease (HCC)     Frequent urination     [facility]     HTN (hypertension)     Irregular heart beat     Obesity     gastric bypass    HORACE (obstructive sleep apnea)     non compliant with cpap    SOB (shortness of breath) on exertion     Stress     Stroke (HCC)     Wheezing        CXR Results  (Last 48 hours)               09/24/20 1150  XR CHEST SNGL V Final result    Impression:  IMPRESSION: Cardiomegaly. New right pleural effusion. No change in right lower   lobe granuloma. Dextroscoliosis. Narrative:  History is shortness of breath. Comparison Is with the chest x-ray of 12/18/2019. An AP portable erect view of the chest demonstrate cardiac monitor leads. There   is now a small to moderate right pleural effusion. There is a small calcified   right lower lobe granuloma. The heart is enlarged. There is approximately 27   degrees dextroscoliosis of the thoracic spine.  There is degenerative change present in the spine as well. Diet prior to admission: Regular (patient reports did not follow cardiac diet eats high sodium diet)  Current Diet:  DIET CARDIAC     Cognitive and Communication Status:  Neurologic State: Alert  Orientation Level: Oriented X4  Cognition: Appropriate decision making, Appropriate for age attention/concentration           Swallowing Evaluation:   Oral Assessment:  Oral Assessment  Labial: No impairment  Oral Hygiene: wfl  Lingual: No impairment  Velum: No impairment  Mandible: No impairment  P.O. Trials:  Patient Position: upright in chair  Vocal quality prior to P.O.: Hoarse  Consistency Presented: Mixed consistency; Solid; Thin liquid  How Presented: Self-fed/presented     Bolus Acceptance: No impairment  Bolus Formation/Control: No impairment     Propulsion: No impairment  Oral Residue: None  Initiation of Swallow: No impairment     Aspiration Signs/Symptoms: None       Oral Phase Severity: No impairment  Pharyngeal Phase Severity : No impairment  Voice:  Vocal Quality: Hoarse     Pain:  Pain Scale 1: Numeric (0 - 10)  Pain Intensity 1: 0     After treatment:   Patient left in no apparent distress sitting up in chair and Call bell within reach    COMMUNICATION/EDUCATION:   Patient receptive of education regarding s/s aspiration and aspiration precautions. The patient's plan of care including recommendations, planned interventions, and recommended diet changes were discussed with: Registered nurse.      Thank you for this referral.  Donavon Vazquez M.S., M.Ed., CCC-SLP  Time Calculation: 12 mins

## 2020-09-25 NOTE — H&P
Internal Medicine  History and Physical    Harshal Nuñez  [id number]  1950     PCP:None  Other Specialists:    Chief Complaint   Patient presents with    Shortness of Breath       HPI     79 y. o.female with an extensive past medical history including A. fib on Xarelto, status post cardioversion, hypertension, HORACE, COPD that presented to the ED complaining of worsening dyspnea for the last week or so. She states that she generally has some component of dyspnea chronically due to her COPD along with persistent cough is nonproductive, however she is also over the last week dyspnea continues to worsen, particularly on exertion. Symptoms have not been associated with any chest pain, palpitations, or lightheadedness. Her usual inhaler regimen has not improved her symptoms. States that prior to that she has been doing relatively well her usual state of health, denies any recent fevers, chills, chest pain, abdominal pain, nausea, vomiting, diarrhea, constipation, lightheadedness dizziness urinary symptoms headache. Past Medical History:   Diagnosis Date    AF (paroxysmal atrial fibrillation) ([facility] [address]     d/c cardioversion 2006, rythmol started 2007    Anemia     Anxiety     Chronic headaches     sometimes associated with dry heaves    Frequent urination     HTN (hypertension)     Irregular heart beat     Obesity     gastric bypass    HORACE (obstructive sleep apnea)     non compliant with cpap    SOB (shortness of breath) on exertion     Stress     Wheezing         Past Surgical History:   Procedure Laterality Date    ECHOCARDIOGRAM  11/23/2009    normal LV wall motion and EF 60-65%, LVH, left atrial enlargement, mild tricuspid regurg, RVSP 36mmhg , no change from jan 2008    HX GASTRIC BYPASS      about 30 years ago.     NM CARDIAC SPECT W STRS/REST MULT  08/2006    no fixed or reversible defects          Current Facility-Administered Medications:     [START ON 9/25/2020] aspirin delayed-release tablet 81 mg, 81 mg, Oral, DAILY, Kathy Otero MD    [START ON 9/25/2020] omeprazole (PRILOSEC) capsule 20 mg, 20 mg, Oral, DAILY, Kathy Otero MD    [START ON 9/25/2020] multivitamin (ONE A DAY) tablet 1 Tab, 1 Tab, Oral, DAILY, Kathy Otero MD    [START ON 9/25/2020] propafenone (RYTHMOL) tablet 225 mg, 225 mg, Oral, Q8H, Kathy Otero MD    [START ON 9/25/2020] digoxin (LANOXIN) tablet 0.125 mg, 0.125 mg, Oral, DAILY, Kathy Otero MD    [START ON 9/25/2020] atorvastatin (LIPITOR) tablet 40 mg, 40 mg, Oral, QHS, Kathy Otero MD    [START ON 9/25/2020] melatonin tablet 10 mg, 10 mg, Oral, QHS, Kathy Otero MD    [START ON 9/25/2020] rivaroxaban (XARELTO) tablet 20 mg, 20 mg, Oral, DAILY, Kathy Otero MD    sodium chloride (NS) flush 5-10 mL, 5-10 mL, IntraVENous, PRN, Kathy Otero MD    [START ON 9/25/2020] sodium chloride 0.9 % bolus infusion 2,586 mL, 30 mL/kg, IntraVENous, ONCE, Kathy Otero MD  [facility]  [START ON 9/25/2020] sodium chloride (NS) flush 5-40 mL, 5-40 mL, IntraVENous, Q8H, Kathy Otero MD    sodium chloride (NS) flush 5-40 mL, 5-40 mL, IntraVENous, PRN, Kathy Otero MD    acetaminophen (TYLENOL) tablet 650 mg, 650 mg, Oral, Q6H PRN **OR** acetaminophen (TYLENOL) suppository 650 mg, 650 mg, Rectal, Q6H PRN, Kathy Otero MD    polyethylene glycol (MIRALAX) packet 17 g, 17 g, Oral, DAILY PRN, Kathy Otero MD    promethazine (PHENERGAN) tablet 12.5 mg, 12.5 mg, Oral, Q6H PRN **OR** ondansetron (ZOFRAN) injection 4 mg, 4 mg, IntraVENous, Q6H PRN, Kathy Otero MD  [facility]  [START ON 9/25/2020] piperacillin-tazobactam (ZOSYN) 3.375 g in 0.9% sodium chloride (MBP/ADV) 100 mL MBP, 3.375 g, IntraVENous, Q8H, Kathy Otero MD    [START ON 9/25/2020] vancomycin (VANCOCIN) 2,155 mg in 0.9% sodium chloride 250 mL IVPB, 25 mg/kg, IntraVENous, ONCE, Kathy Otero MD    [START ON 9/25/2020] 0.9% sodium chloride infusion, 125 mL/hr, IntraVENous, Celina Mueller MD    [facility] ON 9/25/2020] sodium chloride 0.9 % bolus infusion 500 mL, 500 mL, IntraVENous, ONCE, Melody Bailey MD    [START ON 9/25/2020] famotidine (PEPCID) tablet 20 mg, 20 mg, Oral, BID, Melody Bailey MD  [address] [place] [address] Irineo  [facility] ON 9/25/2020] methylPREDNISolone (PF) (Solu-MEDROL) 600 mg in 0.9% sodium chloride 100 mL IVPB, 600 mg, IntraVENous, DAILY, Melody Bailey MD     [unfilled]    Social History     Tobacco Use    Smoking status: Never Smoker    Smokeless tobacco: Never Used   Substance Use Topics    Alcohol use: No       Family History   Problem Relation Age of Onset    Diabetes Mother     COPD Mother     Colon Cancer Father     Stroke Brother        No Known Allergies     Review of Systems :     10 point ROS performed and negative except where otherwise noted in the HPI. Physical Exam :       Patient Vitals for the past 8 hrs:   BP Temp Pulse Resp SpO2   09/24/20 2029 (!) 90/45 98.1 °F (36.7 °C) 81 16 95 %   09/24/20 1739 112/72  78 14 96 %     General: awake, alert, oriented x 3; no acute distress. HEENT: perrla, eomi, sclera anicteric, oropharynx clear without lesions, mucous membranes moist  Neck: supple, no lymphadenopathy, no thyromegaly, no JVD  Cardiovascular: regular rate and rhythm, no murmurs, rubs or gallops  Lungs: clear to auscultation bilaterally, without wheezing, rhonchi, or rales  Abdomen: soft, non-tender, non-distended; no palpable masses, normoactive bowel sounds, no rebound or guarding  Extremities: no clubbing, cyanosis, or edema  Neuro:  A+O x 3, cranial nerves grossly intact, strength 5/5 in upper and lower extremities, sensation intact  Skin: no rashes or lesions noted  Heme/Lymph - no cervical, supraclavicular LAD  Other: tissue perfusion assessment performed - no signs hypoperfusion    Results :     Laboratory:   Recent Results (from the past 24 hour(s))   EKG, 12 LEAD, INITIAL    Collection Time: 09/24/20 10:49 AM   Result Value Ref Range Ventricular Rate 86 BPM    Atrial Rate 416 BPM    QRS Duration 86 ms    Q-T Interval 368 ms    QTC Calculation (Bezet) 440 ms    Calculated R Axis 34 degrees    Calculated T Axis 50 degrees    Diagnosis       Atrial fibrillation  Abnormal ECG  No previous ECGs available  Confirmed by Tamie Chambers ([id number]) on 9/24/2020 2:01:31 PM     CBC WITH AUTOMATED DIFF    Collection Time: 09/24/20 11:30 AM   Result Value Ref Range    WBC 36.7 (H) 3.6 - 11.0 K/uL    RBC 4.24 3.80 - 5.20 M/uL    HGB 13.4 11.5 - 16.0 %    HCT 40.5 35.0 - 47.0 %    MCV 95.5 80.0 - 99.0 FL    MCH 31.6 26.0 - 34.0 PG    MCHC 33.1 30.0 - 36.5 g/dL    RDW 15.5 (H) 11.5 - 14.5 %    PLATELET 105 647 - 239 K/uL    MPV 11.0 8.9 - 12.9 FL    NEUTROPHILS 84 (H) 32 - 75 %    BAND NEUTROPHILS 10 (H) 0 - 6 %    LYMPHOCYTES 3 (L) 12 - 49 %    MONOCYTES 3 (L) 5 - 13 %    EOSINOPHILS 0 0 - 7 %    BASOPHILS 0 0 - 1 %    IMMATURE GRANULOCYTES 0 %    ABS. NEUTROPHILS 34.5 (H) 1.8 - 8.0 K/UL    ABS. LYMPHOCYTES 1.1 0.8 - 3.5 K/UL    ABS. MONOCYTES 1.1 (H) 0.0 - 1.0 K/UL    ABS. EOSINOPHILS 0.0 0.0 - 0.4 K/UL    ABS. BASOPHILS 0.0 0.0 - 0.1 K/UL    ABS. IMM.  GRANS. 0.0 K/UL    PLATELET COMMENTS Large Platelets      RBC COMMENTS Basophilic Stippling  1+        RBC COMMENTS Darien cells  2+        RBC COMMENTS Target Cells  1+        RBC COMMENTS Poikilocytosis  1+        RBC COMMENTS Anisocytosis  1+        RBC COMMENTS Ovalocytes  1+        WBC COMMENTS Toxic Granulation     METABOLIC PANEL, COMPREHENSIVE    Collection Time: 09/24/20 11:41 AM   Result Value Ref Range    Sodium 132 (L) 136 - 145 mmol/L    Potassium 4.0 3.5 - 5.1 mmol/L    Chloride 96 (L) 97 - 108 mmol/L    CO2 21 21 - 32 mmol/L    Anion gap 15 5 - 15 mmol/L    Glucose 95 65 - 100 mg/dL    BUN 63 (H) 6 - 20 mg/dL    Creatinine 3.77 (H) 0.55 - 1.02 mg/dL    BUN/Creatinine ratio 17 12 - 20      GFR est AA 14 (L) >60 ml/min/1.73m2    GFR est non-AA 12 (L) >60 ml/min/1.73m2    Calcium 9.2 8.5 - 10.1 mg/dL Bilirubin, total 0.6 0.2 - 1.0 mg/dL    AST (SGOT) 20 15 - 37 U/L    ALT (SGPT) 11 (L) 12 - 78 U/L    Alk. phosphatase 122 (H) 45 - 117 U/L    Protein, total 6.8 6.4 - 8.2 g/dL    Albumin 2.7 (L) 3.5 - 5.0 g/dL    Globulin 4.1 (H) 2.0 - 4.0 g/dL    A-G Ratio 0.7 (L) 1.1 - 2.2     TROPONIN I    Collection Time: 09/24/20 11:41 AM   Result Value Ref Range    Troponin-I, Qt. <0.05 <0.05 ng/mL   PROTHROMBIN TIME + INR    Collection Time: 09/24/20 12:45 PM   Result Value Ref Range    Prothrombin time 14.1 (H) 9.0 - 11.1 sec    INR 1.4 (H) 0.9 - 1.1     PTT    Collection Time: 09/24/20 12:45 PM   Result Value Ref Range    aPTT 43.7 (H) 24.2 - 31.1 sec    aPTT, therapeutic range   68 - 109 sec   LACTIC ACID    Collection Time: 09/24/20 12:45 PM   Result Value Ref Range    Lactic acid 1.0 0.4 - 2.0 mmol/L       Lab results reviewed and personally interpreted by myself    Imaging/Studies:  CT ABD PELV WO CONT  Narrative: EXAM: CT ABD PELV WO CONT    INDICATION: WBC 30+K with diarrhea    COMPARISON: None. CONTRAST: None. TECHNIQUE:   Unenhanced multislice helical CT was performed from the diaphragm to the  symphysis pubis without oral or intravenous contrast administration. Contiguous  5 mm axial images were reconstructed and lung and soft tissue windows were  generated. Coronal and sagittal reformations were generated. CT dose reduction  was achieved through use of a standardized protocol tailored for this  examination and automatic exposure control for dose modulation. FINDINGS:  Lower chest: Partially imaged loculated pleural effusion. Atelectasis versus  airspace disease in the right lung base. Left lung base is clear. Liver, biliary tree, and gallbladder: No mass. Gallbladder is within normal  limits. CBD is not dilated. Spleen: Absent. Pancreas: No mass or ductal dilatation. Kidneys and adrenals: No mass, calculus, or hydronephrosis. Unremarkable  adrenals.     Bowels: No bowel dilatation or wall thickening. Peritoneum and retroperitoneum: No ascites or pneumoperitoneum. No aortic  aneurysm. Scattered subcentimeter retroperitoneal lymph nodes. Pelvis: No mass or calculus. Bones and abdominal wall: Levocurvature centered in the upper lumbar spine, with  degenerative changes. No destructive bone lesion. No abdominal mass or hernia. Impression: IMPRESSION:  Loculated right pleural effusion with atelectasis versus airspace disease in the  base of right lung. No acute finding the abdomen and pelvis. XR CHEST SNGL V  Narrative: History is shortness of breath. Comparison Is with the chest x-ray of 12/18/2019. An AP portable erect view of the chest demonstrate cardiac monitor leads. There  is now a small to moderate right pleural effusion. There is a small calcified  right lower lobe granuloma. The heart is enlarged. There is approximately 27  degrees dextroscoliosis of the thoracic spine. There is degenerative change  present in the spine as well. Impression: IMPRESSION: Cardiomegaly. New right pleural effusion. No change in right lower  lobe granuloma. Dextroscoliosis.        Chest x-ray personally reviewed and interpreted by myself    Hospital Problems  Reviewed: 12/24/2015 11:26 AM by Caron Urban          Codes Priority Class Noted - Resolved POA    * (Principal) Sepsis [facility]) ICD-10-CM: A41.9  ICD-9-CM: 038.9, 995.91 1 - One  9/24/2020 - Present Yes     Entered by Dhiraj Fontanez MD    Hyponatremia ICD-10-CM: E87.1  ICD-9-CM: 276.1   9/24/2020 - Present Yes     Entered by Leslie Rosa MD    Leukocytosis ICD-10-CM: W67.544  ICD-9-CM: 288.60   9/24/2020 - Present Yes     Entered by Leslie Rosa MD    Hypoalbuminemia ICD-10-CM: I67.40  ICD-9-CM: 273.8   9/24/2020 - Present Unknown     Entered by Leslie Rosa MD    Acute on chronic renal failure [facility]) ICD-10-CM: N17.9, N18.9  ICD-9-CM: 584.9, 585.9   9/24/2020 - Present Yes     Entered by Leslie Rosa MD    RLL pneumonia ICD-10-CM: J18.9  ICD-9-CM: 438   9/24/2020 - Present Yes     Entered by Monserrat Santiago MD    PNA (pneumonia) ICD-10-CM: V90.9  ICD-9-CM: 046   9/24/2020 - Present Unknown     Entered by Fransico Ndiaye MD      Non-Hospital Problems  Reviewed: 12/24/2015 11:26 AM by Henrry No          Codes Priority Class Noted - Resolved    Hypertension, benign ICD-10-CM: I10  ICD-9-CM: 401.1   Unknown - Present     Entered by Leia Pabon    Paroxysmal atrial fibrillation ([facility] [address] ICD-10-CM: I48.0  ICD-9-CM: 427.31   Unknown - Present     Entered by Leia Pabon    Constitutional obesity ICD-10-CM: E66.8  ICD-9-CM: 278.00   Unknown - Present     Entered by Leia Pabon    Sleep apnea ICD-10-CM: G47.30  ICD-9-CM: 780.57   Unknown - Present     Entered by Leia Pabon    Encounter for long-term (current) use of other medications ICD-10-CM: Z79.899  ICD-9-CM: V58.69   Unknown - Present     Entered by Leia Pabon    Venous stasis ICD-10-CM: I87.8  ICD-9-CM: 459.81   8/11/2015 - Present     Entered by Henrry No    Post-menopausal ICD-10-CM: Z78.0  ICD-9-CM: V49.81   8/17/2015 - Present     Entered by Henrry No    Overweight (BMI 25.0-29. 9) ICD-10-CM: IYF7282  ICD-9-CM: KJL9901   8/17/2015 - Present     Entered by Henrry No    Chronic atrial fibrillation [facility]) ICD-10-CM: V26.41  ICD-9-CM: 427.31   8/18/2015 - Present     Entered by Rose Marie Quiñonez MD    RESOLVED: Mitral regurgitation ICD-10-CM: I34.0  ICD-9-CM: 424.0   Unknown - 7/23/2012     Entered by Leia Pabon     Resolved by  Tawana Kelly MD    RESOLVED: Fatigue ICD-10-CM: R53.83  ICD-9-CM: 780.79   Unknown - 7/23/2012     Entered by Leia Pabon     Resolved by  Tawana Kelly MD    RESOLVED: Irregular cardiac rhythm ICD-10-CM: I49.9  ICD-9-CM: 427.9   8/17/2015 - 8/18/2015     Entered by Henrry No     Resolved by  Khai Henson #Sepsis  Since admission, she has met sepsis criteria. Has been on the hypotensive side throughout, with systolic blood pressure in the 80s to 90s over 71B to 56Q diastolic. WBC 36,000. Potentially secondary to septicemia, however source is unclear. Lactic acid normal.  Chest x-ray and CT abdomen pelvis negative for any findings suggestive of anything infectious. Will obtain blood cultures x2, UA, urine cultures, influenza panel, started empirically on vancomycin and Zosyn for broad-spectrum coverage. #Acute on chronic hypoxic respiratory failure  Requiring 2 to 3 L of O2 via nasal cannula to maintain O2 sats greater than 90%. Potentially due to new right-sided pleural effusion with underlying calcified granuloma found on chest x-ray. CT abdomen pelvis was unremarkable plan similar pulmonary findings of a loculated pleural effusion with an underlying airspace disease area versus atelectasis. Pulmonology consulted    #MARILUZ  Cr 3.6, baseline unclear, however upon review of old charts it was relatively normal to the lower end of CKD. I suspect however that her loss of renal function is potentially secondary to glomerulonephritis although UA is still pending. Nephrology consulted    #Granulomatosis with polyangiitis  Given her constellation of symptoms, appearance of severe sepsis with with WBC count of 36K without any apparent source of obvious infection along with findings of granulomatous disease in the lung along with a loculated pleural effusion, and in addition findings of a severely decreased renal function, will point to systemic inflammatory vascular disorder such as granulomatosis with polyangiitis otherwise known as Wegener's disease. Will obtain CRP, ESR, ANCA panel. Started on high-dose IV glucocorticoids, 600 mg pulse dose Solu-Medrol. Rheumatology consulted, will potentially need to be started on Rituxan if does not improve.     DVT prophylaxis: VTE:  Xarelto    Nutrition: DIET CARDIAC Code status: Full Code     Disposition:   1) Patient will be: admitted as an inpatient. I certify that the patient requires inpatient hospital services that is expected to include at least 2 midnights due to the following diagnoses: Granulomatosis w Polyangitis,  2) Patient will be admitted to a medical/surgical unit. 3) Evaluation/management as per above. 4) Disposition will be adjusted throughout the clinical encounter pending progression of clinical symptoms and of aforementioned evaluation. Patient's care discussed with:  Patient Consultant ED Attending             Electronic Signature:     Total time spent caring for patient:     >50 minutes       Signed:  Tamie Alexander MD, MD  Service: Hospitalist Service  9/24/2020  11:33 PM

## 2020-09-25 NOTE — PROGRESS NOTES
CM met with patient at the bedside for DCP assessment. Patient states she lives alone with her cat in a 2 floor hours. Patient states she does not use the second floor, but has everything she needs on the 1st floor. Patient does not use any DME, no home oxygen, and is independent with all ADLs. Patient states she is able to drive herself. Patient states she has a written POA. Patient states her 2 nieces are her POAs:  Kay Masters [phone]  Reinaldo Hurtado [phone]    Patient gave preference for [id number] [facility] [facility] at discharge. Choice letter obtained and on the chart. CM will send referral.    Anticipate discharge to SNF when medically ready.

## 2020-09-25 NOTE — PROGRESS NOTES
Problem: Airway Clearance - Ineffective  Goal: *Patent airway  Outcome: Progressing Towards Goal  Goal: *Absence of airway secretions  Outcome: Progressing Towards Goal  Goal: *Able to cough effectively  Outcome: Progressing Towards Goal  Goal: *PALLIATIVE CARE:  Alleviation of secretions, cough and/or nasal congestion  Outcome: Progressing Towards Goal     Problem: Patient Education: Go to Patient Education Activity  Goal: Patient/Family Education  Outcome: Progressing Towards Goal

## 2020-09-25 NOTE — PROGRESS NOTES
Problem: Self Care Deficits Care Plan (Adult)  Goal: *Acute Goals and Plan of Care (Insert Text)  Description: Pt will be modified independence sup<->sit in prep for EOB ADL's  Pt will be supervision/set-up  LB dressing EOB level  Pt will be modified independence  sit EOB 10 minutes in prep for EOB ADL's  Pt will be modified independence  grooming EOB level  Pt will be modified independence  sit<-> prep for toilet transfer  Pt will be modified independence  BSC/toilet transfer with LRAD  Pt will be modified independence  toileting/cloth mgmt LRAD  Pt will be modified independence  grooming standing sink  Pt will be supervision/set-up bathing sitting/standing sink LRAD  Pt will be MI katelynn UE HEP in prep for self care tasks      Outcome: Not Met     Problem: Patient Education: Go to Patient Education Activity  Goal: Patient/Family Education  Description: Pt safe/IND w/ her self care and functional transfers  Outcome: Not Met   OCCUPATIONAL THERAPY EVALUATION  Patient: Savanna Arango (21 y.o. female)  Date: 9/25/2020  Primary Diagnosis: RLL pneumonia [J18.9]  Leukocytosis [D72.829]  Acute on chronic renal failure (HCC) [N17.9, N18.9]  Hypoalbuminemia [E88.09]  Hyponatremia [E87.1]  Sepsis ([facility] [address] [A41.9]  PNA (pneumonia) [J18.9]        Precautions:  Fall    ASSESSMENT  Based on the objective data described below, the patient presents with decreased strength, endurance, general deconditioning limiting her IND PLOF. Pt ox4, up in chair. Using 4 liters NCO2. Pt BUE AROM WFL, strength grossly 4/5. Pt IND from chair feeding, grooming and mod A socks. Pt CGA sit<>stand xfer from chair and to simulate toilet xfer. Pt declined going to toilet d/t wanting to eat lunch. Pt lives alone, was IND PTA, works as a hairdresser 4d/wk. Current Level of Function Impacting Discharge (ADLs/self-care):general decreased endurance    .   Other factors to consider for discharge living alone     Patient will benefit from skilled therapy intervention to address the above noted impairments. PLAN :  Recommendations and Planned Interventions: self care training, functional mobility training, therapeutic exercise, therapeutic activities, endurance activities, patient education, and home safety training    Frequency/Duration: Patient will be followed by occupational therapy 3 times a week to address goals. Recommendation for discharge: (in order for the patient to meet his/her long term goals)  SNF     This discharge recommendation:  Has been made in collaboration with the attending provider and/or case management           SUBJECTIVE:   Patient stated \"I was more swollen when I got here\"    OBJECTIVE DATA SUMMARY:   HISTORY:   Past Medical History:   Diagnosis Date    AF (paroxysmal atrial fibrillation) ([address]     d/c cardioversion 2006, rythmol started 2007    Anemia     Anxiety     Chronic obstructive pulmonary disease (HCC)     Frequent urination     Heart failure ([address]     HTN (hypertension)     Irregular heart beat     Obesity     gastric bypass    HORACE (obstructive sleep apnea)     non compliant with cpap    SOB (shortness of breath) on exertion     Stress     Stroke ([facility] [address]     Wheezing      Past Surgical History:   Procedure Laterality Date    ECHOCARDIOGRAM  11/23/2009    normal LV wall motion and EF 60-65%, LVH, left atrial enlargement, mild tricuspid regurg, RVSP 36mmhg , no change from jan 2008    HX GASTRIC BYPASS      about 30 years ago.     NM CARDIAC SPECT W STRS/REST MULT  08/2006    no fixed or reversible defects       Expanded or extensive additional review of patient history:     Home Situation  Home Environment: Private residence  # Steps to Enter: 3  Rails to Enter: Yes  Hand Rails : Bilateral  One/Two Story Residence: (2 story, stays first floor)  Living Alone: Yes  Support Systems: Family member(s), Friends \ neighbors  Patient Expects to be Discharged to[de-identified] Skilled nursing facility  Current DME Used/Available at Home: Cane, straight    PLOF: Pt IND for ADLS/IADLS, IND with mobility prior to admission. EXAMINATION OF PERFORMANCE DEFICITS:  Cognitive/Behavioral Status:  Neurologic State: Alert  Orientation Level: Oriented X4  Cognition: Appropriate decision making; Appropriate for age attention/concentration           Hearing: Auditory  Auditory Impairment: None                         Range of Motion:    AROM: Within functional limits               Strength:    Strength: Generally decreased, functional                  Balance:  Sitting: Intact  Standing: Intact; With support(pt up in chair when entered)    Functional Mobility and Transfers for ADLs:  Bed Mobility:       Transfers:  Sit to Stand: Contact guard assistance  Stand to Sit: Contact guard assistance  Toilet Transfer : (CGS simulated from chair transfer)    ADL Assessment:  Feeding: Independent    Oral Facial Hygiene/Grooming: Independent          ADL Intervention and task modifications:  Feeding  Feeding Assistance: Independent    Grooming  Grooming Assistance: Independent  Position Performed: Seated in chair            Lower Body Dressing Assistance  Socks:  Moderate assistance  Position Performed: Seated edge of bed  Cues: Physical assistance;Verbal cues provided;Visual cues provided                Occupational Therapy Evaluation Charge Determination   History Examination Decision-Making   LOW Complexity : Brief history review  LOW Complexity : 1-3 performance deficits relating to physical, cognitive , or psychosocial skils that result in activity limitations and / or participation restrictions  LOW Complexity : No comorbidities that affect functional and no verbal or physical assistance needed to complete eval tasks       Based on the above components, the patient evaluation is determined to be of the following complexity level: LOW   Pain Rating:  None reported    Activity Tolerance:   Fair and requires rest breaks  Please refer to the flowsheet for vital signs taken during this treatment. After treatment patient left in no apparent distress:    Sitting in chair and Call bell within reach    COMMUNICATION/EDUCATION:        Home safety education was provided and the patient/caregiver indicated understanding. and Patient/family agree to work toward stated goals and plan of care. This patients plan of care is appropriate for delegation to [facility].     Thank you for this referral.  Tomas Burden  Time Calculation: 20 mins

## 2020-09-25 NOTE — PROGRESS NOTES
Problem: Mobility Impaired (Adult and Pediatric)  Goal: *Acute Goals and Plan of Care (Insert Text)  Description: Physical Therapy Goals  Initiated 9/25/2020  1)  I with HEP in 7 days to improve overall functional mobility. 2)  Bed mobility and transfers I in 7 days to prevent skin breakdown. 3)  Pt to amb 200ft with LRAD and sup in 7 days    Pt. Goal:  Pt to be able to walk safely without falls. Outcome: Not Met  PHYSICAL THERAPY EVALUATION  Patient: Carissa Og (80 y.o. female)  Date: 9/25/2020  Primary Diagnosis: RLL pneumonia [J18.9]  Leukocytosis [D72.829]  Acute on chronic renal failure (HCC) [N17.9, N18.9]  Hypoalbuminemia [E88.09]  Hyponatremia [E87.1]  Sepsis ([facility] [address] [A41.9]  PNA (pneumonia) [J18.9]        Precautions: lives alone,fall       ASSESSMENT  Based on the objective data described below, the patient presents with decreased LE strength, difficulty with transfers requiring CGA, difficulty with gait requiring CGA for safety. Pt reports she lives alone in a 2 story home but stays on the 1st floor. Pt reports she can do ADL's independently and amb without AD but has a walking stick that she never uses. Pt reports she has not had a fall in the past 3 months. Pt was fatigued with ambulation but O2 sats were at 98% on 3 Lo2. Pt is not on O2 at home. Other factors to consider for discharge: impaired mobility, decreased endurance, family support     Patient will benefit from skilled therapy intervention to address the above noted impairments. PLAN :  Recommendations and Planned Interventions: bed mobility training, transfer training, gait training, therapeutic exercises, patient and family training/education, and therapeutic activities      Frequency/Duration: Patient will be followed by physical therapy:  5 times a week to address goals.     Recommendation for discharge: (in order for the patient to meet his/her long term goals)  SNF due to pt not having family support during the day and do not feel she would be safe on her own. This discharge recommendation:  Has been made in collaboration with the attending provider and/or case management    IF patient discharges home will need the following DME: none         SUBJECTIVE:   Patient stated I am feeling good. My nurse helped me in the chair.     OBJECTIVE DATA SUMMARY:   HISTORY:    Past Medical History:   Diagnosis Date    AF (paroxysmal atrial fibrillation) ([facility] [address]     d/c cardioversion 2006, rythmol started 2007    Anemia     Anxiety     Chronic obstructive pulmonary disease (HCC)     Frequent urination     Heart failure (HCC)     HTN (hypertension)     Irregular heart beat     Obesity     gastric bypass    HORACE (obstructive sleep apnea)     non compliant with cpap    SOB (shortness of breath) on exertion     Stress     Stroke ([facility] [address]     Wheezing      Past Surgical History:   Procedure Laterality Date    ECHOCARDIOGRAM  11/23/2009    normal LV wall motion and EF 60-65%, LVH, left atrial enlargement, mild tricuspid regurg, RVSP 36mmhg , no change from jan 2008    HX GASTRIC BYPASS      about 30 years ago. NM CARDIAC SPECT W STRS/REST MULT  08/2006    no fixed or reversible defects       Personal factors and/or comorbidities impacting plan of care: lives alone, decreased endurance    Home Situation  Home Environment: Private residence  # Steps to Enter: 3  Rails to Enter: Yes  Hand Rails : Bilateral  One/Two Story Residence: Two story, live on 1st floor  Living Alone: Yes  Support Systems: Family member(s)  Patient Expects to be Discharged to[de-identified] Skilled nursing facility  Current DME Used/Available at Home: Cane, straight, Shower chair    PLOF: Pt I for ADLS/IADLS, I with mobility without AD prior to admission.      EXAMINATION/PRESENTATION/DECISION MAKING:   Critical Behavior:  Neurologic State: Alert  Orientation Level: Oriented X4  Cognition: Appropriate decision making, Appropriate for age attention/concentration     Hearing:  Range Of Motion:  AROM: Within functional limits                       Strength:    Strength: Generally decreased, functional      Grossly 4/5 on both LE's                 Functional Mobility:  Bed Mobility:  NT due to pt being in chair              Transfers:  Sit to Stand: Contact guard assistance  Stand to Sit: Contact guard assistance                       Balance:   Sitting: Intact  Standing: Intact; With support  Ambulation/Gait Training:  Distance (ft): 45 Feet (ft)  Assistive Device: Gait belt  Ambulation - Level of Assistance: Contact guard assistance;Assist x1     Gait Description (WDL): Exceptions to WDL                 Speed/Barbara: Slow  Step Length: Right shortened;Left shortened                  Functional Measure:  [address] [place] [id number] AM-PAC 6 Clicks         Basic Mobility Inpatient Short Form  How much difficulty does the patient currently have. .. Unable A Lot A Little None   1. Turning over in bed (including adjusting bedclothes, sheets and blankets)? [] 1   [] 2   [] 3   [x] 4   2. Sitting down on and standing up from a chair with arms ( e.g., wheelchair, bedside commode, etc.)   [] 1   [] 2   [] 3   [x] 4   3. Moving from lying on back to sitting on the side of the bed? [] 1   [] 2   [] 3   [x] 4          How much help from another person does the patient currently need. .. Total A Lot A Little None   4. Moving to and from a bed to a chair (including a wheelchair)? [] 1   [] 2   [x] 3   [] 4   5. Need to walk in hospital room? [] 1   [] 2   [x] 3   [] 4   6. Climbing 3-5 steps with a railing? [] 1   [] 2   [x] 3   [] 4   © 2007, Trustees of [address] [place] [address] [address] [id number], under license to [employer]. All rights reserved     Score:  Initial: 21 Most Recent: X (Date: 09/25/20 )   Interpretation of Tool:  Represents activities that are increasingly more difficult (i.e. Bed mobility, Transfers, Gait).   Score 24 23 22-20 19-15 14-10 9-7 6   Modifier CH CI CJ CK CL CM CN           Physical Therapy Evaluation Charge Determination   History Examination Presentation Decision-Making   MEDIUM  Complexity : 1-2 comorbidities / personal factors will impact the outcome/ POC  MEDIUM Complexity : 3 Standardized tests and measures addressing body structure, function, activity limitation and / or participation in recreation  LOW Complexity : Stable, uncomplicated  LOW Complexity : FOTO score of       Based on the above components, the patient evaluation is determined to be of the following complexity level: LOW     Pain Ratin/10    Activity Tolerance:   Fair, requires rest breaks, and observed SOB with activity  Please refer to the flowsheet for vital signs taken during this treatment. After treatment patient left in no apparent distress:   Sitting in chair and Call bell within reach    COMMUNICATION/EDUCATION:   The patients plan of care was discussed with: Physical therapy assistant, Registered nurse, and Case management. Patient/family agree to work toward stated goals and plan of care.     Thank you for this referral.  Estefany Landaverde   Time Calculation: 30 mins

## 2020-09-25 NOTE — CONSULTS
PULMONARY CONSULT  [facility]    Name: Niya Castillo MRN: [id number]   : 1950 Hospital: [address] [place] [address]   Date: 2020  Admission date: 2020 Hospital Day: 2       HPI:     Hospital Problems  Date Reviewed: 2015          Codes Class Noted POA    Hyponatremia ICD-10-CM: E87.1  ICD-9-CM: 276.1  2020 Yes        Leukocytosis ICD-10-CM: D72.829  ICD-9-CM: 288.60  2020 Yes        Hypoalbuminemia ICD-10-CM: E88.09  ICD-9-CM: 273.8  2020 Unknown        Acute on chronic renal failure ([facility] ICD-10-CM: N17.9, N18.9  ICD-9-CM: 584.9, 585.9  2020 Yes        RLL pneumonia ICD-10-CM: J18.9  ICD-9-CM: 100  2020 Yes        Sepsis ([facility] ICD-10-CM: A41.9  ICD-9-CM: 038.9, 995.91  2020 Yes        PNA (pneumonia) ICD-10-CM: J18.9  ICD-9-CM: 857  2020 Yes        * (Principal) Granulomatosis with polyangiitis ([facility] ICD-10-CM: M31.30  ICD-9-CM: 446.4  2020 Yes                   [x] High complexity decision making was performed  [x] See my orders for details      Subjective/Initial History:     I was asked by Sandeep Barnes MD to see Niya Castillo  a 79 y.o.  female in consultation     Excerpts from admission 2020 or consult notes as follows:   79-year-old lady came in because of shortness of breath generalized weakness and also she was in atrial fibrillation past medical history of obstructive sleep apnea syndrome chronic A. fib cardioversion in the past hypertension in fact her blood pressure was low also she has COPD nebulizer treatment not on any oxygen at home complaining about cough without any productive sputum and she was feeling lightheaded dizzy no history of passing out so admitted and pulmonary consult was called for further evaluation.       No Known Allergies     MAR reviewed and pertinent medications noted or modified as needed     Current Facility-Administered Medications   Medication    famotidine (PEPCID) tablet 20 mg  [START ON 9/26/2020] VANCOMYCIN INFORMATION NOTE    aspirin delayed-release tablet 81 mg    omeprazole (PRILOSEC) capsule 20 mg    multivitamin (ONE A DAY) tablet 1 Tab    propafenone (RYTHMOL) tablet 225 mg    digoxin (LANOXIN) tablet 0.125 mg    atorvastatin (LIPITOR) tablet 40 mg    melatonin tablet 10 mg    rivaroxaban (XARELTO) tablet 20 mg    sodium chloride (NS) flush 5-10 mL    sodium chloride (NS) flush 5-40 mL    sodium chloride (NS) flush 5-40 mL    acetaminophen (TYLENOL) tablet 650 mg    Or    acetaminophen (TYLENOL) suppository 650 mg    polyethylene glycol (MIRALAX) packet 17 g    promethazine (PHENERGAN) tablet 12.5 mg    Or    ondansetron (ZOFRAN) injection 4 mg    0.9% sodium chloride infusion    methylPREDNISolone (PF) (Solu-MEDROL) 600 mg in 0.9% sodium chloride 100 mL IVPB    piperacillin-tazobactam (ZOSYN) 3.375 g in 0.9% sodium chloride (MBP/ADV) 100 mL MBP    VANCOMYCIN INFORMATION NOTE 1 Each      Patient PCP: None  PMH:  has a past medical history of AF (paroxysmal atrial fibrillation) ([facility] Anemia, Anxiety, Chronic obstructive pulmonary disease ([address] Frequent urination, Heart failure ([address] HTN (hypertension), Irregular heart beat, Obesity, HORACE (obstructive sleep apnea), SOB (shortness of breath) on exertion, Stress, Stroke ([address] and Wheezing. She also has no past medical history of Chronic pain. PSH:   has a past surgical history that includes echocardiogram (11/23/2009); nm cardiac spect w strs/rest mult (08/2006); and hx gastric bypass. FHX: family history includes COPD in her mother; Colon Cancer in her father; Diabetes in her mother; Stroke in her brother. SHX:  reports that she has quit smoking. She has never used smokeless tobacco. She reports previous alcohol use. She reports that she does not use drugs. ROS:    Review of Systems   Constitutional: Positive for diaphoresis and malaise/fatigue. Negative for weight loss. HENT: Negative.     Eyes: Negative. Respiratory: Positive for cough and shortness of breath. Cardiovascular: Positive for orthopnea. Gastrointestinal: Negative. Genitourinary: Negative. Musculoskeletal: Negative. Skin: Negative. Neurological: Positive for dizziness and weakness. Endo/Heme/Allergies: Negative. Psychiatric/Behavioral: Negative. Objective:     Vital Signs: Telemetry:    normal sinus rhythm Intake/Output:   Visit Vitals  /60 (BP 1 Location: Right arm)   Pulse 78   Temp 97.8 °F (36.6 °C)   Resp 18   Ht 5' 5\" (1.651 m)   Wt 86.2 kg (190 lb)   SpO2 96%   BMI 31.62 kg/m²       Temp (24hrs), Av.9 °F (36.6 °C), Min:97.6 °F (36.4 °C), Max:98.1 °F (36.7 °C)        O2 Device: Nasal cannula O2 Flow Rate (L/min): 3 l/min       Wt Readings from Last 4 Encounters:   20 86.2 kg (190 lb)   10/18/16 83.6 kg (184 lb 3.2 oz)   12/24/15 79.8 kg (176 lb)   08/18/15 79.8 kg (176 lb)        No intake or output data in the 24 hours ending 20 1058    Last shift:      No intake/output data recorded. Last 3 shifts: No intake/output data recorded. Physical Exam:     Physical Exam   Constitutional: She is oriented to person, place, and time. She appears well-developed. HENT:   Head: Normocephalic and atraumatic. Eyes: Pupils are equal, round, and reactive to light. Conjunctivae and EOM are normal.   Neck: Normal range of motion. Neck supple. Cardiovascular: Regular rhythm. irregular   Pulmonary/Chest: Breath sounds normal. She is in respiratory distress. Abdominal: Soft. Bowel sounds are normal.   Neurological: She is alert and oriented to person, place, and time. Skin: Skin is warm. Psychiatric: She has a normal mood and affect.  Her behavior is normal. Judgment and thought content normal.        Labs:    Recent Labs     20  0303 20  0001 20  1245 20  1130   WBC 35.4*  --   --  36.7*   HGB 13.3  --   --  13.4     --   --  276   INR  --  1.9* 1.4*  -- APTT  --  46.4* 43.7*  --      Recent Labs     09/25/20  0303 09/25/20  0001 09/24/20  1245 09/24/20  1141   *  --   --  132*   K 4.0  --   --  4.0   CL 99  --   --  96*   CO2 20*  --   --  21   GLU 81  --   --  95   BUN 66*  --   --  63*   CREA 3.69*  --   --  3.77*   CA 8.3*  --   --  9.2   MG  --  1.5*  --   --    PHOS  --  4.9*  --   --    LAC 0.8 1.2 1.0  --    ALB 2.4*  --   --  2.7*   ALT 15  --   --  11*     Recent Labs     09/24/20  2315   PH 7.26*   PCO2 40   PO2 65*   HCO3 18*   FIO2 21     Recent Labs     09/24/20  1141   TROIQ <0.05     No results found for: BNPP, BNP   Lab Results   Component Value Date/Time    Culture result: No growth after 18 hours 09/24/2020 12:45 PM   No results found for: TSH, TSHEXT    Imaging:    CXR Results  (Last 48 hours)               09/24/20 1150  XR CHEST SNGL V Final result    Impression:  IMPRESSION: Cardiomegaly. New right pleural effusion. No change in right lower   lobe granuloma. Dextroscoliosis. Narrative:  History is shortness of breath. Comparison Is with the chest x-ray of 12/18/2019. An AP portable erect view of the chest demonstrate cardiac monitor leads. There   is now a small to moderate right pleural effusion. There is a small calcified   right lower lobe granuloma. The heart is enlarged. There is approximately 27   degrees dextroscoliosis of the thoracic spine. There is degenerative change   present in the spine as well. Results from [place] encounter on 09/24/20   XR CHEST SNGL V    Narrative History is shortness of breath. Comparison Is with the chest x-ray of 12/18/2019. An AP portable erect view of the chest demonstrate cardiac monitor leads. There  is now a small to moderate right pleural effusion. There is a small calcified  right lower lobe granuloma. The heart is enlarged. There is approximately 27  degrees dextroscoliosis of the thoracic spine.  There is degenerative change  present in the spine as well. Impression IMPRESSION: Cardiomegaly. New right pleural effusion. No change in right lower  lobe granuloma. Dextroscoliosis. Results from [place] encounter on 09/24/20   CT ABD PELV WO CONT    Narrative EXAM: CT ABD PELV WO CONT    INDICATION: WBC 30+K with diarrhea    COMPARISON: None. CONTRAST: None. TECHNIQUE:   Unenhanced multislice helical CT was performed from the diaphragm to the  symphysis pubis without oral or intravenous contrast administration. Contiguous  5 mm axial images were reconstructed and lung and soft tissue windows were  generated. Coronal and sagittal reformations were generated. CT dose reduction  was achieved through use of a standardized protocol tailored for this  examination and automatic exposure control for dose modulation. FINDINGS:  Lower chest: Partially imaged loculated pleural effusion. Atelectasis versus  airspace disease in the right lung base. Left lung base is clear. Liver, biliary tree, and gallbladder: No mass. Gallbladder is within normal  limits. CBD is not dilated. Spleen: Absent. Pancreas: No mass or ductal dilatation. Kidneys and adrenals: No mass, calculus, or hydronephrosis. Unremarkable  adrenals. Bowels: No bowel dilatation or wall thickening. Peritoneum and retroperitoneum: No ascites or pneumoperitoneum. No aortic  aneurysm. Scattered subcentimeter retroperitoneal lymph nodes. Pelvis: No mass or calculus. Bones and abdominal wall: Levocurvature centered in the upper lumbar spine, with  degenerative changes. No destructive bone lesion. No abdominal mass or hernia. Impression IMPRESSION:  Loculated right pleural effusion with atelectasis versus airspace disease in the  base of right lung. No acute finding the abdomen and pelvis. IMPRESSION:   1. Acute hypoxic Respiratory failure  2.  Chronic Obstructive Pulmonary Disease with Severe Acute Exacerbation requiring inpatient hospitalization and management; has very poor airway clearance. Increased work of breathing  3. Body mass index is 31.62 kg/m². 4. Chronic A. Fib  5. Right lower lobe granuloma  6. Right pleural effusion  7. Obstructive sleep apnea  8. Pt is requiring Drug therapy requiring intensive monitoring for toxicity  9. Pt is unstable, unpredictable needing inpatient monitoring; is acutely ill and at high risk of sudden decline and decompensation with severe consequenses and continued end organ dysfunction and failure  10. Prognosis guarded       RECOMMENDATIONS/PLAN:     1. Patient is on oxygen via nasal cannula 2 L we will continue to wean she is not on any oxygen at home agree with IV Solu-Medrol  2. Agree with Empiric IV antibiotics pending culture results   3. Follow culture results  4. For loculated pleural effusion will get CAT scan of the chest  5. Supplemental O2 to keep sats > 93%  6. Aspiration precautions  7. Labs to follow electrolytes, renal function and and blood counts  8. Glucose monitoring and SSI  9. Bronchial hygiene with respiratory therapy techniques, bronchodilators  10. DVT, SUP prophylaxis         This care involved high complexity medical decision making: I personally:  · Reviewed the flowsheet and previous days notes  · Reviewed and summarized records or history from previous days note or discussions with staff, family  · High Risk Drug therapy requiring intensive monitoring for toxicity: eg steroids, pressors, antibiotics  · Reviewed and/or ordered Clinical lab tests  · Reviewed images and/or ordered Radiology tests  · Reviewed the patients ECG / Telemetry  · Reviewed and/or adjusted NiPPV settings  · Called and arranged for Radiologic procedures or interventions  · performed or ordered Diagnostic endoscopies with identified risk factors.   · discussed my assessment/management with : Nursing, Hospitalist and Family for coordination of care          Gala Bernal MD

## 2020-09-25 NOTE — PROGRESS NOTES
Vancomycin Note    Admission date [id number]   Attending Aviva Ulloa   Indication         Height Ht Readings from Last 1 Encounters:   09/24/20 165.1 cm (65\")       Weight Wt Readings from Last 1 Encounters:   09/24/20 86.2 kg (190 lb)      IBW Ideal body weight: 57 kg (125 lb 10.6 oz)    SCr Creatinine   Date Value Ref Range Status   09/24/2020 3.77 (H) 0.55 - 1.02 mg/dL Final      CrCl (based on IBW) 12.5 ml/min       Load/ER dose Vancomycin 1750 mg x 1 dose   Current regimen Dosing per Random level   Trough Scheduled for N/A         Current Antimicrobial Therapy (168h ago, onward)      Ordered     Start Stop    09/24/20 2335  piperacillin-tazobactam (ZOSYN) 3.375 g in 0.9% sodium chloride (MBP/ADV) 100 mL MBP  3.375 g,   IntraVENous,   EVERY 12 HOURS      09/25/20 0000 --    09/24/20 2353  VANCOMYCIN INFORMATION NOTE 1 Each  1 Each,   Other,   RX DOSING/MONITORING      09/24/20 2100 --          Give an initial Vancomycin LD of 1750 mg IV x 1 dose. Will order a random level for tomorrow and redose if level remains < 20 mcg/ml.     Submitted by: [address] [facility], [address] [facility]

## 2020-09-25 NOTE — PROGRESS NOTES
Hospitalist Progress Note    Subjective:   Daily Progress Note: 9/25/2020 1:58 PM    Shortness of breath slightly improved overnight. Complains of just generalized weakness. Denies chest pain.     Current Facility-Administered Medications   Medication Dose Route Frequency    famotidine (PEPCID) tablet 20 mg  20 mg Oral Q48H    [START ON 9/26/2020] VANCOMYCIN INFORMATION NOTE   Other ONCE    aspirin delayed-release tablet 81 mg  81 mg Oral DAILY    omeprazole (PRILOSEC) capsule 20 mg  20 mg Oral DAILY    multivitamin (ONE A DAY) tablet 1 Tab  1 Tab Oral DAILY    propafenone (RYTHMOL) tablet 225 mg  225 mg Oral Q8H    digoxin (LANOXIN) tablet 0.125 mg  0.125 mg Oral DAILY    atorvastatin (LIPITOR) tablet 40 mg  40 mg Oral QHS    melatonin tablet 10 mg  10 mg Oral QHS    rivaroxaban (XARELTO) tablet 20 mg  20 mg Oral DAILY    sodium chloride (NS) flush 5-10 mL  5-10 mL IntraVENous PRN    sodium chloride (NS) flush 5-40 mL  5-40 mL IntraVENous Q8H    sodium chloride (NS) flush 5-40 mL  5-40 mL IntraVENous PRN    acetaminophen (TYLENOL) tablet 650 mg  650 mg Oral Q6H PRN    Or    acetaminophen (TYLENOL) suppository 650 mg  650 mg Rectal Q6H PRN    polyethylene glycol (MIRALAX) packet 17 g  17 g Oral DAILY PRN    promethazine (PHENERGAN) tablet 12.5 mg  12.5 mg Oral Q6H PRN    Or    ondansetron (ZOFRAN) injection 4 mg  4 mg IntraVENous Q6H PRN    0.9% sodium chloride infusion  125 mL/hr IntraVENous CONTINUOUS    methylPREDNISolone (PF) (Solu-MEDROL) 600 mg in 0.9% sodium chloride 100 mL IVPB  600 mg IntraVENous DAILY    piperacillin-tazobactam (ZOSYN) 3.375 g in 0.9% sodium chloride (MBP/ADV) 100 mL MBP  3.375 g IntraVENous Q12H    VANCOMYCIN INFORMATION NOTE 1 Each  1 Each Other Rx Dosing/Monitoring        Review of Systems  Constitutional: negative for fevers, chills, sweats, and fatigue  Eyes: negative for redness and icterus  Ears, nose, mouth, throat, and face: negative for ear drainage, nasal congestion, sore throat, and voice change  Respiratory:+dyspnea on exertion, mild shortness of breath  Cardiovascular: negative for chest pain, dyspnea, palpitations, lower extremity edema  Gastrointestinal: negative for nausea, vomiting, diarrhea, and abdominal pain  Genitourinary:negative for frequency, dysuria, and hematuria  Integument/breast: negative for skin lesion(s) and dryness  Hematologic/lymphatic: negative for easy bruising, bleeding, and lymphadenopathy  Musculoskeletal:negative for neck pain, back pain, and muscle weakness  Neurological: negative for headaches, dizziness, and weakness  Behavioral/Psych: negative for anxiety and depression  Allergic/Immunologic: negative for urticaria and angioedema        Objective:     Visit Vitals  /60 (BP 1 Location: Right arm)   Pulse 78   Temp 97.8 °F (36.6 °C)   Resp 18   Ht 5' 5\" (1.651 m)   Wt 86.2 kg (190 lb)   SpO2 98%   BMI 31.62 kg/m²    O2 Flow Rate (L/min): 3 l/min O2 Device: Nasal cannula    Temp (24hrs), Av °F (36.7 °C), Min:97.8 °F (36.6 °C), Max:98.1 °F (36.7 °C)      No intake/output data recorded. No intake/output data recorded. PHYSICAL EXAM:  Constitutional: Alert in no acute distress   HEENT: Sclerae anicteric, The neck is supple. Cardiovascular: Regular rate and rhythm. No murmurs, gallops, or rubs. Andujar Blank Respiratory: Diminished breath sounds in the left, rhonchi on the right side  GI: Abdomen nondistended, soft, and nontender. Normal active bowel sounds. Rectal: Deferred   Musculoskeletal: No pitting edema of the lower legs. Extremities have good range of motion. Neurological:  Patient is alert and oriented. Cranial nerves II-XII intact  Psychiatric: Mood appears appropriate with judgement intact. Lymphatic: No cervical or supraclavicular adenopathy.    Skin: No rashes or breakdown of the skin    Data Review    Recent Results (from the past 24 hour(s))   BLOOD GAS, ARTERIAL    Collection Time: 20 11:15 PM   Result Value Ref Range    pH 7.26 (L) 7.35 - 7.45      PCO2 40 35 - 45 mmHg    PO2 65 (L) 75 - 100 mmHg    O2 SAT 92 (L) >95 %    BICARBONATE 18 (L) 22 - 26 mmol/L    BASE DEFICIT 8.2 (H) 0 - 2 mmol/L    FIO2 21 %    SITE Right Radial      PADMAJA'S TEST Positive     LACTIC ACID    Collection Time: 09/25/20 12:01 AM   Result Value Ref Range    Lactic acid 1.2 0.4 - 2.0 mmol/L   MAGNESIUM    Collection Time: 09/25/20 12:01 AM   Result Value Ref Range    Magnesium 1.5 (L) 1.6 - 2.4 mg/dL   PHOSPHORUS    Collection Time: 09/25/20 12:01 AM   Result Value Ref Range    Phosphorus 4.9 (H) 2.6 - 4.7 mg/dL   PROTHROMBIN TIME + INR    Collection Time: 09/25/20 12:01 AM   Result Value Ref Range    Prothrombin time 21.9 (H) 11.9 - 14.7 sec    INR 1.9 (H) 0.9 - 1.1     PTT    Collection Time: 09/25/20 12:01 AM   Result Value Ref Range    aPTT 46.4 (H) 23.0 - 35.7 sec    aPTT, therapeutic range   68 - 109 sec   LACTIC ACID    Collection Time: 09/25/20  3:03 AM   Result Value Ref Range    Lactic acid 0.8 0.4 - 2.0 mmol/L   METABOLIC PANEL, COMPREHENSIVE    Collection Time: 09/25/20  3:03 AM   Result Value Ref Range    Sodium 132 (L) 136 - 145 mmol/L    Potassium 4.0 3.5 - 5.1 mmol/L    Chloride 99 97 - 108 mmol/L    CO2 20 (L) 21 - 32 mmol/L    Anion gap 13 5 - 15 mmol/L    Glucose 81 65 - 100 mg/dL    BUN 66 (H) 6 - 20 mg/dL    Creatinine 3.69 (H) 0.55 - 1.02 mg/dL    BUN/Creatinine ratio 18 12 - 20      GFR est AA 15 (L) >60 ml/min/1.73m2    GFR est non-AA 12 (L) >60 ml/min/1.73m2    Calcium 8.3 (L) 8.5 - 10.1 mg/dL    Bilirubin, total 0.5 0.2 - 1.0 mg/dL    AST (SGOT) 23 15 - 37 U/L    ALT (SGPT) 15 12 - 78 U/L    Alk.  phosphatase 152 (H) 45 - 117 U/L    Protein, total 6.6 6.4 - 8.2 g/dL    Albumin 2.4 (L) 3.5 - 5.0 g/dL    Globulin 4.2 (H) 2.0 - 4.0 g/dL    A-G Ratio 0.6 (L) 1.1 - 2.2     CBC WITH AUTOMATED DIFF    Collection Time: 09/25/20  3:03 AM   Result Value Ref Range    WBC 35.4 (H) 3.6 - 11.0 K/uL    RBC 4.29 3.80 - 5.20 M/uL HGB 13.3 11.5 - 16.0 %    HCT 39.8 35.0 - 47.0 %    MCV 92.8 80.0 - 99.0 FL    MCH 31.0 26.0 - 34.0 PG    MCHC 33.4 30.0 - 36.5 g/dL    RDW 16.2 (H) 11.5 - 14.5 %    PLATELET 132 526 - 472 K/uL    MPV 11.4 8.9 - 12.9 FL    NRBC 0.2 (H) 0  WBC    ABSOLUTE NRBC 0.07 (H) 0.00 - 0.01 K/uL    NEUTROPHILS 86 (H) 32 - 75 %    LYMPHOCYTES 4 (L) 12 - 49 %    MONOCYTES 10 5 - 13 %    EOSINOPHILS 0 0 - 7 %    BASOPHILS 0 0 - 1 %    IMMATURE GRANULOCYTES 1 (H) 0.0 - 0.5 %    ABS. NEUTROPHILS 30.3 (H) 1.8 - 8.0 K/UL    ABS. LYMPHOCYTES 1.5 0.8 - 3.5 K/UL    ABS. MONOCYTES 3.6 (H) 0.0 - 1.0 K/UL    ABS. EOSINOPHILS 0.0 0.0 - 0.4 K/UL    ABS. BASOPHILS 0.0 0.0 - 0.1 K/UL    ABS. IMM. GRANS. 0.4 (H) 0.00 - 0.04 K/UL    DF AUTOMATED     EKG, 12 LEAD, INITIAL    Collection Time: 09/25/20  9:47 AM   Result Value Ref Range    Ventricular Rate 87 BPM    Atrial Rate 89 BPM    QRS Duration 90 ms    Q-T Interval 374 ms    QTC Calculation (Bezet) 450 ms    Calculated R Axis 0 degrees    Calculated T Axis 41 degrees    Diagnosis       Atrial fibrillation  Abnormal ECG  When compared with ECG of 24-SEP-2020 10:49,  No significant change was found  Confirmed by Earle Lesches ([id number]) on 9/25/2020 12:14:05 PM        Hospital course: This is a 58-year-old female admitted on September 24, 2020 with acute respiratory failure and findings suspicious for right lower lobe pneumonia and severe sepsis with an acute kidney injury. Patient was started on Vancomycin and Zosyn. Due to a history of granulomatosis with poly-angitis patient was started on high-dose steroids. Patient had a white count of 43,950 of uncertain etiology. Blood cultures are currently pending. Will change Rocephin to Zosyn for better anaerobic coverage. Urine culture still pending.     Assessment/Plan:     Principal Problem:    Granulomatosis with polyangiitis ([address] (9/24/2020)    Active Problems:    Hyponatremia (9/24/2020)      Leukocytosis (9/24/2020) Hypoalbuminemia (9/24/2020)      Acute on chronic renal failure ([facility] [address] (9/24/2020)      RLL pneumonia (9/24/2020)      Sepsis ([address] (9/24/2020)      PNA (pneumonia) (9/24/2020)      Impression\plan:    1. Severe sepsis suspect pulmonary source   White count 36,000 with acute kidney injury  Change Rocephin to Zosyn and vancomycin  Urine cultures pending  Blood cultures pending  Suspicious for right lower lobe pneumonia  Pulmonary consultation, Dr. Lore Diaz  Questionable COPD exacerbation  Continue high-dose steroids  Minor improvement overnight  Acute respiratory failure with hypoxia remains on 3 L of nasal cannula  Hypotensive overnight  CT of the chest pending    2. Granulomatosis with polyangiitis history  Continue high-dose steroids  Continue to monitor closely  Consult rheumatology    3. Acute kidney injury  By report patient patient states she has normal kidney function  We will continue to monitor closely  Nephrology consultation    4. COPD  Continue steroids  Albuterol nebulizer PRN  Pulmonary consultation Dr. Lore Diaz    5. History of congestive heart failure  Digoxin level pending  Echocardiogram pending  Holding Lasix due to acute kidney injury although will give PRN for worsening hypoxia    6. Atrial fibrillation with good rate control  Continue digoxin  Continue Xarelto    CODE STATUS: Full    DVT prophylaxis: Xarelto  Ulcer prophylaxis: Famotidine    Care Plan discussed with: Patient/Family    Total time spent with patient: 40 minutes.

## 2020-09-26 ENCOUNTER — HOSPITAL ENCOUNTER (OUTPATIENT)
Dept: LAB | Age: 70
Discharge: HOME | End: 2020-09-26

## 2020-09-26 LAB
ALBUMIN SERPL-MCNC: 3 G/DL (ref 3.5–5)
ANION GAP SERPL CALC-SCNC: 13 MMOL/L (ref 5–15)
APPEARANCE UR: ABNORMAL
BACTERIA URNS QL MICRO: NEGATIVE /HPF
BASOPHILS # BLD: 0 K/UL (ref 0–0.1)
BASOPHILS NFR BLD: 0 % (ref 0–1)
BILIRUB UR QL: NEGATIVE
BUN SERPL-MCNC: 79 MG/DL (ref 6–20)
BUN/CREAT SERPL: 21 (ref 12–20)
CA-I BLD-MCNC: 8.5 MG/DL (ref 8.5–10.1)
CHLORIDE SERPL-SCNC: 101 MMOL/L (ref 97–108)
CK SERPL-CCNC: 46 U/L (ref 26–192)
CO2 SERPL-SCNC: 19 MMOL/L (ref 21–32)
COLOR UR: ABNORMAL
CREAT SERPL-MCNC: 3.81 MG/DL (ref 0.55–1.02)
CRP SERPL-MCNC: 36.3 MG/DL (ref 0–0.6)
CRP SERPL-MCNC: 41.2 MG/DL (ref 0–0.6)
DIFFERENTIAL METHOD BLD: ABNORMAL
EOSINOPHIL # BLD: 0 K/UL (ref 0–0.4)
EOSINOPHIL NFR BLD: 0 % (ref 0–7)
ERYTHROCYTE [DISTWIDTH] IN BLOOD BY AUTOMATED COUNT: 16.7 % (ref 11.5–14.5)
ERYTHROCYTE [SEDIMENTATION RATE] IN BLOOD: 57 MM/HR
GLUCOSE SERPL-MCNC: 128 MG/DL (ref 65–100)
GLUCOSE UR STRIP.AUTO-MCNC: NEGATIVE MG/DL
HCT VFR BLD AUTO: 39.9 % (ref 35–47)
HGB BLD-MCNC: 13.4 % (ref 11.5–16)
HGB UR QL STRIP: ABNORMAL
HYALINE CASTS URNS QL MICRO: ABNORMAL /LPF (ref 0–5)
IMM GRANULOCYTES # BLD AUTO: 0.7 K/UL (ref 0–0.04)
IMM GRANULOCYTES NFR BLD AUTO: 2 % (ref 0–0.5)
KETONES UR QL STRIP.AUTO: NEGATIVE MG/DL
LEUKOCYTE ESTERASE UR QL STRIP.AUTO: NEGATIVE
LYMPHOCYTES # BLD: 1.5 K/UL (ref 0.8–3.5)
LYMPHOCYTES NFR BLD: 6 % (ref 12–49)
MCH RBC QN AUTO: 31.2 PG (ref 26–34)
MCHC RBC AUTO-ENTMCNC: 33.6 G/DL (ref 30–36.5)
MCV RBC AUTO: 92.8 FL (ref 80–99)
MONOCYTES # BLD: 1.3 K/UL (ref 0–1)
MONOCYTES NFR BLD: 5 % (ref 5–13)
NEUTS SEG # BLD: 24.4 K/UL (ref 1.8–8)
NEUTS SEG NFR BLD: 90 % (ref 32–75)
NITRITE UR QL STRIP.AUTO: NEGATIVE
NRBC # BLD: 0.07 K/UL (ref 0–0.01)
NRBC BLD-RTO: 0.3 PER 100 WBC
PH UR STRIP: 5 [PH] (ref 5–8)
PHOSPHATE SERPL-MCNC: 6.5 MG/DL (ref 2.6–4.7)
PLATELET # BLD AUTO: 329 K/UL (ref 150–400)
PMV BLD AUTO: 11.2 FL (ref 8.9–12.9)
POTASSIUM SERPL-SCNC: 4.4 MMOL/L (ref 3.5–5.1)
PROCALCITONIN SERPL-MCNC: 7.64 NG/ML
PROT UR STRIP-MCNC: NEGATIVE MG/DL
RBC # BLD AUTO: 4.3 M/UL (ref 3.8–5.2)
RBC #/AREA URNS HPF: ABNORMAL /HPF (ref 0–5)
SODIUM SERPL-SCNC: 133 MMOL/L (ref 136–145)
SP GR UR REFRACTOMETRY: 1.01 (ref 1–1.03)
UROBILINOGEN UR QL STRIP.AUTO: 0.1 EU/DL (ref 0.1–1)
VANCOMYCIN SERPL-MCNC: 14.7 UG/ML
WBC # BLD AUTO: 27.3 K/UL (ref 3.6–11)
WBC URNS QL MICRO: ABNORMAL /HPF (ref 0–4)

## 2020-09-26 PROCEDURE — 86235 NUCLEAR ANTIGEN ANTIBODY: CPT

## 2020-09-26 PROCEDURE — 65270000029 HC RM PRIVATE

## 2020-09-26 PROCEDURE — 74011250636 HC RX REV CODE- 250/636: Performed by: HOSPITALIST

## 2020-09-26 PROCEDURE — 86162 COMPLEMENT TOTAL (CH50): CPT

## 2020-09-26 PROCEDURE — 81001 URINALYSIS AUTO W/SCOPE: CPT

## 2020-09-26 PROCEDURE — 86480 TB TEST CELL IMMUN MEASURE: CPT

## 2020-09-26 PROCEDURE — 84145 PROCALCITONIN (PCT): CPT

## 2020-09-26 PROCEDURE — 87340 HEPATITIS B SURFACE AG IA: CPT

## 2020-09-26 PROCEDURE — 82550 ASSAY OF CK (CPK): CPT

## 2020-09-26 PROCEDURE — 83520 IMMUNOASSAY QUANT NOS NONAB: CPT

## 2020-09-26 PROCEDURE — 86038 ANTINUCLEAR ANTIBODIES: CPT

## 2020-09-26 PROCEDURE — 86160 COMPLEMENT ANTIGEN: CPT

## 2020-09-26 PROCEDURE — 85652 RBC SED RATE AUTOMATED: CPT

## 2020-09-26 PROCEDURE — 74011250636 HC RX REV CODE- 250/636: Performed by: PHYSICIAN ASSISTANT

## 2020-09-26 PROCEDURE — 80202 ASSAY OF VANCOMYCIN: CPT

## 2020-09-26 PROCEDURE — 74011250636 HC RX REV CODE- 250/636: Performed by: INTERNAL MEDICINE

## 2020-09-26 PROCEDURE — 74011250637 HC RX REV CODE- 250/637: Performed by: HOSPITALIST

## 2020-09-26 PROCEDURE — 80069 RENAL FUNCTION PANEL: CPT

## 2020-09-26 PROCEDURE — 74011000258 HC RX REV CODE- 258: Performed by: PHYSICIAN ASSISTANT

## 2020-09-26 PROCEDURE — 85025 COMPLETE CBC W/AUTO DIFF WBC: CPT

## 2020-09-26 PROCEDURE — 86705 HEP B CORE ANTIBODY IGM: CPT

## 2020-09-26 PROCEDURE — 86225 DNA ANTIBODY NATIVE: CPT

## 2020-09-26 PROCEDURE — 77010033678 HC OXYGEN DAILY

## 2020-09-26 PROCEDURE — 80074 ACUTE HEPATITIS PANEL: CPT

## 2020-09-26 PROCEDURE — 94760 N-INVAS EAR/PLS OXIMETRY 1: CPT

## 2020-09-26 PROCEDURE — 74011250637 HC RX REV CODE- 250/637: Performed by: INTERNAL MEDICINE

## 2020-09-26 PROCEDURE — P9047 ALBUMIN (HUMAN), 25%, 50ML: HCPCS | Performed by: INTERNAL MEDICINE

## 2020-09-26 PROCEDURE — 74011000258 HC RX REV CODE- 258: Performed by: HOSPITALIST

## 2020-09-26 PROCEDURE — 99222 1ST HOSP IP/OBS MODERATE 55: CPT | Performed by: INTERNAL MEDICINE

## 2020-09-26 PROCEDURE — 86140 C-REACTIVE PROTEIN: CPT

## 2020-09-26 RX ORDER — ALBUMIN HUMAN 250 G/1000ML
25 SOLUTION INTRAVENOUS ONCE
Status: COMPLETED | OUTPATIENT
Start: 2020-09-26 | End: 2020-09-26

## 2020-09-26 RX ORDER — MIDODRINE HYDROCHLORIDE 5 MG/1
10 TABLET ORAL 2 TIMES DAILY
Status: DISCONTINUED | OUTPATIENT
Start: 2020-09-26 | End: 2020-09-29

## 2020-09-26 RX ORDER — FUROSEMIDE 10 MG/ML
40 INJECTION INTRAMUSCULAR; INTRAVENOUS ONCE
Status: COMPLETED | OUTPATIENT
Start: 2020-09-26 | End: 2020-09-26

## 2020-09-26 RX ADMIN — MULTIVITAMIN TABLET 1 TABLET: TABLET at 08:57

## 2020-09-26 RX ADMIN — VANCOMYCIN HYDROCHLORIDE 1250 MG: 1.25 INJECTION, POWDER, LYOPHILIZED, FOR SOLUTION INTRAVENOUS at 14:59

## 2020-09-26 RX ADMIN — PIPERACILLIN SODIUM AND TAZOBACTAM SODIUM 3.38 G: 3; .375 INJECTION, POWDER, LYOPHILIZED, FOR SOLUTION INTRAVENOUS at 23:17

## 2020-09-26 RX ADMIN — FUROSEMIDE 40 MG: 10 INJECTION, SOLUTION INTRAMUSCULAR; INTRAVENOUS at 20:30

## 2020-09-26 RX ADMIN — OMEPRAZOLE 20 MG: 20 CAPSULE, DELAYED RELEASE ORAL at 14:02

## 2020-09-26 RX ADMIN — PIPERACILLIN SODIUM AND TAZOBACTAM SODIUM 3.38 G: 3; .375 INJECTION, POWDER, LYOPHILIZED, FOR SOLUTION INTRAVENOUS at 16:21

## 2020-09-26 RX ADMIN — Medication 10 ML: at 21:26

## 2020-09-26 RX ADMIN — PIPERACILLIN SODIUM AND TAZOBACTAM SODIUM 3.38 G: 3; .375 INJECTION, POWDER, LYOPHILIZED, FOR SOLUTION INTRAVENOUS at 01:26

## 2020-09-26 RX ADMIN — DIGOXIN 0.12 MG: 125 TABLET ORAL at 08:57

## 2020-09-26 RX ADMIN — ALBUMIN (HUMAN) 25 G: 25 SOLUTION INTRAVENOUS at 21:25

## 2020-09-26 RX ADMIN — ATORVASTATIN CALCIUM 40 MG: 40 TABLET, FILM COATED ORAL at 21:26

## 2020-09-26 RX ADMIN — ASPIRIN 81 MG: 81 TABLET, COATED ORAL at 08:57

## 2020-09-26 RX ADMIN — RIVAROXABAN 20 MG: 20 TABLET, FILM COATED ORAL at 17:59

## 2020-09-26 RX ADMIN — PIPERACILLIN SODIUM AND TAZOBACTAM SODIUM 3.38 G: 3; .375 INJECTION, POWDER, LYOPHILIZED, FOR SOLUTION INTRAVENOUS at 06:58

## 2020-09-26 RX ADMIN — FUROSEMIDE 40 MG: 10 INJECTION, SOLUTION INTRAMUSCULAR; INTRAVENOUS at 01:32

## 2020-09-26 RX ADMIN — Medication 10 ML: at 14:07

## 2020-09-26 RX ADMIN — SODIUM CHLORIDE 600 MG: 0.9 INJECTION INTRAVENOUS at 12:00

## 2020-09-26 RX ADMIN — MIDODRINE HYDROCHLORIDE 10 MG: 5 TABLET ORAL at 20:30

## 2020-09-26 RX ADMIN — MELATONIN TAB 5 MG 10 MG: 5 TAB at 21:26

## 2020-09-26 RX ADMIN — Medication 10 ML: at 07:00

## 2020-09-26 NOTE — PROGRESS NOTES
Renal Progress Note    Patient: David Phillips MRN: [id number]  SSN: [id number]    YOB: 1950  Age: 79 y.o. Sex: female      Admit Date: 9/24/2020    LOS: 2 days     Subjective:   Patient seen at bedside.  Alert and awake, sitting in a chair, feeling better, SOB improved  LE edema+  Creatinine at 3.8        Current Facility-Administered Medications   Medication Dose Route Frequency    famotidine (PEPCID) tablet 20 mg  20 mg Oral Q48H    albuterol-ipratropium (DUO-NEB) 2.5 MG-0.5 MG/3 ML  3 mL Nebulization Q4H PRN    piperacillin-tazobactam (ZOSYN) 3.375 g in 0.9% sodium chloride (MBP/ADV) 100 mL MBP  3.375 g IntraVENous Q8H    furosemide (LASIX) injection 40 mg  40 mg IntraVENous DAILY    aspirin delayed-release tablet 81 mg  81 mg Oral DAILY    omeprazole (PRILOSEC) capsule 20 mg  20 mg Oral DAILY    multivitamin (ONE A DAY) tablet 1 Tab  1 Tab Oral DAILY    digoxin (LANOXIN) tablet 0.125 mg  0.125 mg Oral DAILY    atorvastatin (LIPITOR) tablet 40 mg  40 mg Oral QHS    melatonin tablet 10 mg  10 mg Oral QHS    rivaroxaban (XARELTO) tablet 20 mg  20 mg Oral DAILY    sodium chloride (NS) flush 5-10 mL  5-10 mL IntraVENous PRN    sodium chloride (NS) flush 5-40 mL  5-40 mL IntraVENous Q8H    sodium chloride (NS) flush 5-40 mL  5-40 mL IntraVENous PRN    acetaminophen (TYLENOL) tablet 650 mg  650 mg Oral Q6H PRN    Or    acetaminophen (TYLENOL) suppository 650 mg  650 mg Rectal Q6H PRN    polyethylene glycol (MIRALAX) packet 17 g  17 g Oral DAILY PRN    promethazine (PHENERGAN) tablet 12.5 mg  12.5 mg Oral Q6H PRN    Or    ondansetron (ZOFRAN) injection 4 mg  4 mg IntraVENous Q6H PRN    methylPREDNISolone (PF) (Solu-MEDROL) 600 mg in 0.9% sodium chloride 100 mL IVPB  600 mg IntraVENous DAILY    VANCOMYCIN INFORMATION NOTE 1 Each  1 Each Other Rx Dosing/Monitoring        Vitals:    09/25/20 1709 09/25/20 2130 09/26/20 0854 09/26/20 1115   BP:  108/62 (!) 109/56    Pulse:  84 81 Resp:  20 20    Temp:  97.7 °F (36.5 °C) 97.5 °F (36.4 °C)    SpO2:  99% 99% 98%   Weight: 92.7 kg (204 lb 5.9 oz)      Height:         Objective:   General: Elderly female, alert awake well-oriented, no acute distress. HEENT: EOMI, no Icterus, no Pallor,  mucosa moist,  throat clear. Neck: Neck is supple, No JVD,   Lungs: fair air entry+ no rhonchi, no rales,  CVS: heart sounds normal, no murmurs, no rubs. GI: soft, nontender, normal BS,   Extremeties: no clubbing, no cyanosis, 1-2+ bilateral extremity edema present,  Neuro: Alert, awake, oriented x3, speech is normal   Skin: normal skin turgor,   Musculoskeletal: no acute joint swellings       Intake and Output:  Current Shift: 09/26 0701 - 09/26 1900  In: 240 [P.O.:240]  Out: -   Last three shifts: No intake/output data recorded.       Lab/Data Review:  Recent Labs     09/26/20  0536 09/25/20  1823 09/25/20  0303   WBC 27.3* 30.8* 35.4*   HGB 13.4 13.5 13.3   HCT 39.9 39.7 39.8    304 286     Recent Labs     09/26/20  0536 09/25/20  1823 09/25/20  0303 09/25/20  0001 09/24/20  1245 09/24/20  1141   * 130* 132*  --   --  132*   K 4.4 4.3 4.0  --   --  4.0    100 99  --   --  96*   CO2 19* 20* 20*  --   --  21   * 156* 81  --   --  95   BUN 79* 81* 66*  --   --  63*   CREA 3.81* 3.81* 3.69*  --   --  3.77*   CA 8.5 8.7 8.3*  --   --  9.2   MG  --   --   --  1.5*  --   --    PHOS 6.5*  --   --  4.9*  --   --    ALB 3.0*  --  2.4*  --   --  2.7*   TBILI  --   --  0.5  --   --  0.6   ALT  --   --  15  --   --  11*   INR  --   --   --  1.9* 1.4*  --      Recent Labs     09/24/20  2315   PH 7.26*   PCO2 40   PO2 65*   HCO3 18*   FIO2 21     Recent Results (from the past 24 hour(s))   CBC WITH AUTOMATED DIFF    Collection Time: 09/25/20  6:23 PM   Result Value Ref Range    WBC 30.8 (H) 3.6 - 11.0 K/uL    RBC 4.32 3.80 - 5.20 M/uL    HGB 13.5 11.5 - 16.0 %    HCT 39.7 35.0 - 47.0 %    MCV 91.9 80.0 - 99.0 FL    MCH 31.3 26.0 - 34.0 PG    MCHC 34.0 30.0 - 36.5 g/dL    RDW 16.2 (H) 11.5 - 14.5 %    PLATELET 691 094 - 855 K/uL    MPV 11.0 8.9 - 12.9 FL    NRBC 0.2 (H) 0  WBC    ABSOLUTE NRBC 0.07 (H) 0.00 - 0.01 K/uL    NEUTROPHILS 90 (H) 32 - 75 %    LYMPHOCYTES 4 (L) 12 - 49 %    MONOCYTES 4 (L) 5 - 13 %    EOSINOPHILS 0 0 - 7 %    BASOPHILS 0 0 - 1 %    IMMATURE GRANULOCYTES 2 (H) 0.0 - 0.5 %    ABS. NEUTROPHILS 28.3 (H) 1.8 - 8.0 K/UL    ABS. LYMPHOCYTES 1.3 0.8 - 3.5 K/UL    ABS. MONOCYTES 1.2 (H) 0.0 - 1.0 K/UL    ABS. EOSINOPHILS 0.0 0.0 - 0.4 K/UL    ABS. BASOPHILS 0.0 0.0 - 0.1 K/UL    ABS. IMM.  GRANS. 0.5 (H) 0.00 - 0.04 K/UL    DF AUTOMATED     METABOLIC PANEL, BASIC    Collection Time: 09/25/20  6:23 PM   Result Value Ref Range    Sodium 130 (L) 136 - 145 mmol/L    Potassium 4.3 3.5 - 5.1 mmol/L    Chloride 100 97 - 108 mmol/L    CO2 20 (L) 21 - 32 mmol/L    Anion gap 10 5 - 15 mmol/L    Glucose 156 (H) 65 - 100 mg/dL    BUN 81 (H) 6 - 20 mg/dL    Creatinine 3.81 (H) 0.55 - 1.02 mg/dL    BUN/Creatinine ratio 21 (H) 12 - 20      GFR est AA 14 (L) >60 ml/min/1.73m2    GFR est non-AA 12 (L) >60 ml/min/1.73m2    Calcium 8.7 8.5 - 10.1 mg/dL   SED RATE, AUTOMATED    Collection Time: 09/26/20 12:39 AM   Result Value Ref Range    Sed rate, automated 57 mm/hr   C REACTIVE PROTEIN, QT    Collection Time: 09/26/20 12:39 AM   Result Value Ref Range    C-Reactive protein 36.30 (H) 0.00 - 0.60 mg/dL   VANCOMYCIN, RANDOM    Collection Time: 09/26/20  5:36 AM   Result Value Ref Range    Vancomycin, random 14.7 ug/mL   CBC WITH AUTOMATED DIFF    Collection Time: 09/26/20  5:36 AM   Result Value Ref Range    WBC 27.3 (H) 3.6 - 11.0 K/uL    RBC 4.30 3.80 - 5.20 M/uL    HGB 13.4 11.5 - 16.0 %    HCT 39.9 35.0 - 47.0 %    MCV 92.8 80.0 - 99.0 FL    MCH 31.2 26.0 - 34.0 PG    MCHC 33.6 30.0 - 36.5 g/dL    RDW 16.7 (H) 11.5 - 14.5 %    PLATELET 765 233 - 173 K/uL    MPV 11.2 8.9 - 12.9 FL    NRBC 0.3 (H) 0  WBC    ABSOLUTE NRBC 0.07 (H) 0.00 - 0.01 K/uL NEUTROPHILS 90 (H) 32 - 75 %    LYMPHOCYTES 6 (L) 12 - 49 %    MONOCYTES 5 5 - 13 %    EOSINOPHILS 0 0 - 7 %    BASOPHILS 0 0 - 1 %    IMMATURE GRANULOCYTES 2 (H) 0.0 - 0.5 %    ABS. NEUTROPHILS 24.4 (H) 1.8 - 8.0 K/UL    ABS. LYMPHOCYTES 1.5 0.8 - 3.5 K/UL    ABS. MONOCYTES 1.3 (H) 0.0 - 1.0 K/UL    ABS. EOSINOPHILS 0.0 0.0 - 0.4 K/UL    ABS. BASOPHILS 0.0 0.0 - 0.1 K/UL    ABS. IMM.  GRANS. 0.7 (H) 0.00 - 0.04 K/UL    DF AUTOMATED     C REACTIVE PROTEIN, QT    Collection Time: 09/26/20  5:36 AM   Result Value Ref Range    C-Reactive protein 41.20 (H) 0.00 - 0.60 mg/dL   PROCALCITONIN    Collection Time: 09/26/20  5:36 AM   Result Value Ref Range    Procalcitonin 7.64 (H) 0 ng/mL   RENAL FUNCTION PANEL    Collection Time: 09/26/20  5:36 AM   Result Value Ref Range    Sodium 133 (L) 136 - 145 mmol/L    Potassium 4.4 3.5 - 5.1 mmol/L    Chloride 101 97 - 108 mmol/L    CO2 19 (L) 21 - 32 mmol/L    Anion gap 13 5 - 15 mmol/L    Glucose 128 (H) 65 - 100 mg/dL    BUN 79 (H) 6 - 20 mg/dL    Creatinine 3.81 (H) 0.55 - 1.02 mg/dL    BUN/Creatinine ratio 21 (H) 12 - 20      GFR est AA 14 (L) >60 ml/min/1.73m2    GFR est non-AA 12 (L) >60 ml/min/1.73m2    Calcium 8.5 8.5 - 10.1 mg/dL    Phosphorus 6.5 (H) 2.6 - 4.7 mg/dL    Albumin 3.0 (L) 3.5 - 5.0 g/dL   CK    Collection Time: 09/26/20  5:36 AM   Result Value Ref Range    CK 46 26 - 192 U/L   URINALYSIS W/MICROSCOPIC    Collection Time: 09/26/20 12:00 PM   Result Value Ref Range    Color Yellow/Straw      Appearance Turbid (A) Clear      Specific gravity 1.010 1.003 - 1.030      pH (UA) 5.0 5.0 - 8.0      Protein Negative Negative mg/dL    Glucose Negative Negative mg/dL    Ketone Negative Negative mg/dL    Bilirubin Negative Negative      Blood Small (A) Negative      Urobilinogen 0.1 0.1 - 1.0 EU/dL    Nitrites Negative Negative      Leukocyte Esterase Negative Negative      WBC 0-4 0 - 4 /hpf    RBC 0-5 0 - 5 /hpf    Bacteria Negative Negative /hpf    Hyaline cast 5-10 0 - 5 /lpf        Assessment and Plan:     1. Acute Kidney Injury on ? ?CKD: probably prerenal azotemia secondary to use of Lasix and lisinopril, borderline hypotension+  Recommend to hold lisinopril  Continue lasix 40 mg IV with another dose of IV albumin  Will monitor I/Os, renal functions and adjust diuretics as needed  Recommend to check urinalysis,urine random electrolytes, creatinine and protein. CT abdomen negative for hydro  ? ? Hx of Polyangiitis: Recommend workup for acute glomerulonephritis including VIOLETA titers,  C3, C4, ASO titers, ANCA panel,  hepatitis B and C. Profile. UA is negative for proteinuria or significant cells, unlikely to have acute GN    Chronic kidney disease: No definite history of chronic kidney disease,    will repeat renal functions and continue to monitor to assess the baseline     Acute shortness of breath/history of COPD/obstructive sleep/CHF/chronic edema  Continue bronchodilators and steroids  continue diuretics  Continue IV antibiotic as per primary service    Hypotension: Borderline low blood pressures lisinopril was discontinued , not on any antihypertensive medications   We will give 1 dose of IV albumin with Lasix today   Add midodrine 10 mg po bid  Will continue to monitor blood pressures    Lower extremity edema: probably related to congestive heart failure/pulmonary hypertension   continue diuretics  Advised to maintain p.o. fluid restriction of 3160-2068 ml per day and and salt restriction,  check I/Os, daily weights  Will monitor fluid status clinically and adjust diuretics as needed.          Signed By: Shukri Baig MD     September 26, 2020

## 2020-09-26 NOTE — PROGRESS NOTES
Vancomycin Note 09/26/2020    Admission date [id number]   Attending Karma Keys   Indication         Height Ht Readings from Last 1 Encounters:   09/24/20 165.1 cm (65\")       Weight Wt Readings from Last 1 Encounters:   09/25/20 92.7 kg (204 lb 5.9 oz)      IBW Ideal body weight: 57 kg (125 lb 10.6 oz)    SCr Creatinine   Date Value Ref Range Status   09/26/2020 3.81 (H) 0.55 - 1.02 mg/dL Final   09/25/2020 3.81 (H) 0.55 - 1.02 mg/dL Final   09/25/2020 3.69 (H) 0.55 - 1.02 mg/dL Final      CrCl (based on IBW) 12.4 ml/min       Load/ER dose 1750mg x1 @ 0354 on 09/25   Current regimen Pulse dosing - 1250mg x1 today 09/26   Trough Scheduled for 09/28 at 1000         Current Antimicrobial Therapy (168h ago, onward)      Ordered     Start Stop    09/26/20 1110  vancomycin (VANCOCIN) 1,250 mg in 0.9% sodium chloride 250 mL (VIAL-MATE)  1,250 mg,   IntraVENous,   ONCE      09/26/20 1300 09/27/20 0059    09/25/20 1438  piperacillin-tazobactam (ZOSYN) 3.375 g in 0.9% sodium chloride (MBP/ADV) 100 mL MBP  3.375 g,   IntraVENous,   EVERY 8 HOURS      09/25/20 1500 --    09/24/20 2353  VANCOMYCIN INFORMATION NOTE 1 Each  1 Each,   Other,   RX DOSING/MONITORING      09/24/20 1203 --          Patient received 1750mg IV vancomycin loading dose on 09/25 at 0354. Resulted vancomycin level 14.7 at 0536 on 09/26. Vancomycin 1250mg x1 dose ordered 09/26 at 1300. Based on Scr of 3.81 and estimated CrCl 15mL/min, vancomycin level calculated to be 16.1    Next level to be drawn Monday 09/28 at 1000    Pharmacy will follow patient daily and make adjustments based on clinical status. Recommend re-evaluation of culture and sensitivities and make appropriate adjustments based on clinical guidelines. Submitted by:  Jessica Mcclure, [facility]

## 2020-09-26 NOTE — PROGRESS NOTES
Progress Note  Date:2020       Room:[facility]  Patient Name:Kristyn Lazaro     Date of Birth:     Age:70 y.o. Subjective    Subjective:  Symptoms:  Stable. She reports shortness of breath and diarrhea. Diet:  Adequate intake. She reports  vomiting. No nausea. Activity level: Normal.    Pain:  She reports no pain. Patient seen on f/u sitting in chair at bedside  Reports shortness breath  No acute distress noted at this time  Review of Systems   Constitutional: Negative for fatigue and fever. Respiratory: Positive for shortness of breath. SOB Improving   Cardiovascular: Positive for leg swelling. Gastrointestinal: Positive for diarrhea and vomiting. Negative for nausea. Genitourinary: Positive for frequency. Frequent urination secondary to Lasix     Allergic/Immunologic: Negative. Neurological: Negative. Hematological: Negative. Psychiatric/Behavioral: Negative. Objective         Vitals Last 24 Hours:  TEMPERATURE:  Temp  Av.6 °F (36.4 °C)  Min: 97.5 °F (36.4 °C)  Max: 97.7 °F (36.5 °C)  RESPIRATIONS RANGE: Resp  Av  Min: 20  Max: 20  PULSE OXIMETRY RANGE: SpO2  Av.7 %  Min: 98 %  Max: 99 %  PULSE RANGE: Pulse  Av.3  Min: 76  Max: 84  BLOOD PRESSURE RANGE: Systolic (16KAN), Z , Min:108 , HCH:205   ; Diastolic (44YRH), BZJ:65, Min:56, Max:62    I/O (24Hr): No intake or output data in the 24 hours ending 20 0914  Objective:  General Appearance:  Comfortable and in no acute distress. Vital signs: (most recent): Blood pressure (!) 109/56, pulse 81, temperature 97.5 °F (36.4 °C), resp. rate 20, height 5' 5\" (1.651 m), weight 92.7 kg (204 lb 5.9 oz), SpO2 99 %. No fever. Output: Producing urine and producing stool. HEENT: Normal HEENT exam.    Lungs:  Normal respiratory rate. There are decreased breath sounds. (Decreased lung sounds bilat, worsen on right side.  Coarse cough)  Heart: Normal rate. Regular rhythm. S1 normal and S2 normal.    Abdomen: Abdomen is soft. Bowel sounds are normal.   There is no abdominal tenderness. Extremities: Normal range of motion. There is dependent edema. (Bilat lower extremity swelling )  Pulses: Distal pulses are intact. Neurological: Patient is alert and oriented to person, place and time. Pupils:  Pupils are equal, round, and reactive to light. Skin:  Warm and dry. Labs/Imaging/Diagnostics    Labs:  CBC:  Recent Labs     09/26/20 0536 09/25/20 1823 09/25/20  0303   WBC 27.3* 30.8* 35.4*   RBC 4.30 4.32 4.29   HGB 13.4 13.5 13.3   HCT 39.9 39.7 39.8   MCV 92.8 91.9 92.8   RDW 16.7* 16.2* 16.2*    304 286     CHEMISTRIES:  Recent Labs     09/26/20  0536 09/25/20 1823 09/25/20  0303 09/25/20  0001   * 130* 132*  --    K 4.4 4.3 4.0  --     100 99  --    CO2 19* 20* 20*  --    BUN 79* 81* 66*  --    CA 8.5 8.7 8.3*  --    PHOS 6.5*  --   --  4.9*   MG  --   --   --  1.5*   PT/INR:  Recent Labs     09/25/20  0001 09/24/20  1245   INR 1.9* 1.4*     APTT:  Recent Labs     09/25/20  0001 09/24/20  1245   APTT 46.4* 43.7*     LIVER PROFILE:  Recent Labs     09/25/20  0303 09/24/20  1141   AST 23 20   ALT 15 11*     Lab Results   Component Value Date/Time    ALT (SGPT) 15 09/25/2020 03:03 AM    AST (SGOT) 23 09/25/2020 03:03 AM    Alk. phosphatase 152 (H) 09/25/2020 03:03 AM    Bilirubin, total 0.5 09/25/2020 03:03 AM       Imaging Last 24 Hours:  Ct Chest Wo Cont    Result Date: 9/25/2020  The study is a CT evaluation of the chest dated 9/25/2020. HISTORY: History of chronic obstructive pulmonary disease. Heart failure. Recent pleural fluid collection. Technique: Performed without intravenous contrast. 3 mm axial imaging, axial MIP imaging, sagittal, and coronal reconstructed imaging sequences are submitted for evaluation. Thinner section Super Dimension imaging was also performed.  Dose Reduction Technique was employed to reduce radiation exposure - This includes reduction optimization techniques as appropriate to a performed exam with automated exposure control adjustments of the mA and/or Kv according to patient size, or use of iterative reconstruction technique. COMPARISON: Previous CT evaluation of the chest dated 12/18/2019. Recent chest radiograph dated 9/25/2019. MEDIASTINUM: There are scattered normal sized middle mediastinal lymph nodes without progression. The largest mediastinal lymph node is located within the subcarinal region and this lymph node measures 10 mm in short axis measurement which is considered within normal limits. There is an area of dystrophic calcification within the left thyroid lobe and mild heterogeneity of the left thyroid lobe. This examination is negative for large or dominant thyroid nodule or mass. LUNGS AND PLEURA: There is a moderate sized loculated right pleural effusion. This examination is negative for pleural based masses, pleural thickening, or gas within the pleural fluid collection. There is encasement of the adjacent lung with areas of passive atelectasis. There is a calcified granuloma within the right lung base. On axial maximum intensity imaging, there are several additional small scattered micronodules with 2 nodules demonstrated posteriorly within the left lower lobe (series [id number] image number 42). This examination is negative for larger pulmonary nodules or masses. The central trachea and bronchial tree are patent. There is bronchial wall thickening and discoid opacities within the lung bases consistent with reactive airways disease. CARDIOVASCULAR: There are moderate calcifications of the coronary arteries. There is moderate enlargement of the atrial chambers and milder enlargement of the ventricular chambers. There is a normal caliber to the thoracic aorta. CHEST WALL: There is multilevel spondylosis along the thoracic spine. There is a mild to moderate dextroconvex scoliosis. There is increased dorsal kyphosis of the thoracic spine. There are older healed right posterior rib fractures. OTHER: There is surgical suture material demonstrated within the gastric region consistent with previous bariatric surgery. IMPRESSION: 1. There is a moderate sized loculated right pleural effusion associated with passive atelectasis. The differential diagnosis would include recent pneumonia with a residual parapneumonic effusion. Since exam an [de-identified] is negative for pleural based masses or findings specific for a malignant effusion. 2.  There are several scattered normal size middle mediastinal lymph nodes. 3.  There are findings of prior granulomatous exposure. Please see above text for further details. Assessment//Plan       Pneumonia - noted on cxr. Continue Vanc/Zosyn IV. Currently on NC 2L to maintain sats >93%, does not use home O2. Suspected Granulomatosis with polyangiitis -  Continue Solumedrol IV, Rheumatology following. Tissue Serology results pending. IR consultation for possible CT directed thoracentesis    Hyponatremia - Na 133 from 130. Continue to trend. Monitor for signs of hyponatremia     Acute respiratory failure - secondary to above. NC to maintain sats >93% Wean as tolorated. COPD - Continue IV Lasix. NC to maintain sats >93% Continue to wean. Pulmonology consulted. Due-nebs PRN. Daily weights. Strict I & O.     MARILUZ - Creat 3.81. Nephrology following. Continue IV Lasix 40mg daily. Leukocytosis - WBC 27.3 from 30.8. Afebrile. Continue Vanc/Zosyn IV. Blood cultures neg. Monitor for fevers. Continue to trend. AFIB - Controlled. Continue Xarelto and digoxin PO.  Echo pending        Plan   IR consultation for possible CT directed thoracentesis  Echo pending  Continue Vanc/Zosyn  Continue Lasix  Wean o2 as tolerated  Daily Weights, Strict I & O's      Full Code  Xarelto dvt prophy  Home meds reviewed and reconciled    Electronically signed by Latoya Singletary NP on 9/26/2020 at 9:14 AM

## 2020-09-26 NOTE — CONSULTS
Consult Date: 9/26/2020    Consults:  Sepsis of unclear origin    Subjective      This is a 79year old female, with history of COPD, who presented to the ED with 1 week history of cough and SOB. She was afebrile but with marked leukocytosis with markedly elevated CRP, ESR and procalcitonin. CXR showed cardiomegaly new right pleural effusion with no change in right lower  lobe granuloma. CT Abdomen showed moderate sized loculated right pleural effusion associated with  passive atelectasis with findings of prior granulomatous exposure. CT Abdomen showed no acute finding the abdomen and pelvis. Blood cultures negative at 2 days. She is on IV Vancomycin and Zosyn. She has also been seen by Nephrology and Rheumatology. Patient states that she has COPD with chronic cough but her SOB worsened and did not improve with inhalers or Lasix. No fever or chills. No chest pain. No urinary symptoms. Past Medical History:   Diagnosis Date    AF (paroxysmal atrial fibrillation) ([address]     d/c cardioversion 2006, rythmol started 2007    Anemia     Anxiety     Chronic obstructive pulmonary disease (HCC)     Frequent urination     Heart failure ([address]     HTN (hypertension)     Irregular heart beat     Obesity     gastric bypass    HORACE (obstructive sleep apnea)     non compliant with cpap    SOB (shortness of breath) on exertion     Stress     Stroke ([address]     Wheezing       Past Surgical History:   Procedure Laterality Date    ECHOCARDIOGRAM  11/23/2009    normal LV wall motion and EF 60-65%, LVH, left atrial enlargement, mild tricuspid regurg, RVSP 36mmhg , no change from jan 2008    HX GASTRIC BYPASS      about 30 years ago.     NM CARDIAC SPECT W STRS/REST MULT  08/2006    no fixed or reversible defects     Family History   Problem Relation Age of Onset    Diabetes Mother     COPD Mother     Colon Cancer Father     Stroke Brother       Social History     Tobacco Use    Smoking status: Former Smoker    Smokeless tobacco: Never Used   Substance Use Topics    Alcohol use: Not Currently       Current Facility-Administered Medications   Medication Dose Route Frequency Provider Last Rate Last Dose    famotidine (PEPCID) tablet 20 mg  20 mg Oral Q48H Kathy Otero MD   20 mg at 09/25/20 0041    VANCOMYCIN INFORMATION NOTE   Other ONCE Deepak Palma MD        albuterol-ipratropium (DUO-NEB) 2.5 MG-0.5 MG/3 ML  3 mL Nebulization Q4H PRN Janett Vizcarra PA-C        piperacillin-tazobactam (ZOSYN) 3.375 g in 0.9% sodium chloride (MBP/ADV) 100 mL MBP  3.375 g IntraVENous Q8H Phyllis Up PA-C 25 mL/hr at 09/26/20 0658 3.375 g at 09/26/20 4620    furosemide (LASIX) injection 40 mg  40 mg IntraVENous DAILY Victor Manuel Mcgovern MD   40 mg at 09/26/20 0132    aspirin delayed-release tablet 81 mg  81 mg Oral DAILY Kathy Otero MD   81 mg at 09/26/20 0857    omeprazole (PRILOSEC) capsule 20 mg  20 mg Oral DAILY Kathy Otero MD   20 mg at 09/25/20 1045    multivitamin (ONE A DAY) tablet 1 Tab  1 Tab Oral DAILY Kathy Otero MD   1 Tab at 09/26/20 0857    propafenone (RYTHMOL) tablet 225 mg  225 mg Oral Q8H Kathy Otero MD   Stopped at 09/25/20 0000    digoxin (LANOXIN) tablet 0.125 mg  0.125 mg Oral DAILY Kathy Otero MD   0.125 mg at 09/26/20 0857    atorvastatin (LIPITOR) tablet 40 mg  40 mg Oral QHS Kathy Otero MD   40 mg at 09/25/20 2259    melatonin tablet 10 mg  10 mg Oral QHS Kathy Otero MD   10 mg at 09/25/20 2259    rivaroxaban (XARELTO) tablet 20 mg  20 mg Oral DAILY Kathy Otero MD   20 mg at 09/25/20 1046    sodium chloride (NS) flush 5-10 mL  5-10 mL IntraVENous PRN Kathy Otero MD        sodium chloride (NS) flush 5-40 mL  5-40 mL IntraVENous Q8H Kathy Otero MD   10 mL at 09/26/20 0700    sodium chloride (NS) flush 5-40 mL  5-40 mL IntraVENous PRN Kathy Otero MD        acetaminophen (TYLENOL) tablet 650 mg  650 mg Oral Q6H PRN Herbert Ortega MD        Or    acetaminophen (TYLENOL) suppository 650 mg  650 mg Rectal Q6H PRN Herbert Ortega MD        polyethylene glycol (MIRALAX) packet 17 g  17 g Oral DAILY PRN Herbert Ortega MD   17 g at 09/25/20 1044    promethazine (PHENERGAN) tablet 12.5 mg  12.5 mg Oral Q6H PRN Herbert Ortega MD        Or    ondansetron [facility]) injection 4 mg  4 mg IntraVENous Q6H PRN Herbert Ortega MD        methylPREDNISolone (PF) (Solu-MEDROL) 600 mg in 0.9% sodium chloride 100 mL IVPB  600 mg IntraVENous DAILY Herbert Ortega  mL/hr at 09/25/20 0150 600 mg at 09/25/20 0150    VANCOMYCIN INFORMATION NOTE 1 Each  1 Each Other Rx Dosing/Monitoring Herbert Ortega MD            Review of Systems   Constitutional: Negative. Negative for chills, diaphoresis, fatigue and fever. HENT: Negative. Eyes: Negative. Respiratory: Positive for cough and shortness of breath. Negative for wheezing. Cardiovascular: Negative for chest pain and leg swelling. Gastrointestinal: Negative. Endocrine: Negative. Genitourinary: Negative. Musculoskeletal: Negative. Skin: Negative. Allergic/Immunologic: Negative. Neurological: Negative. Hematological: Negative. Psychiatric/Behavioral: Negative. Objective     Vital signs for last 24 hours:  Visit Vitals  BP (!) 109/56   Pulse 81   Temp 97.5 °F (36.4 °C)   Resp 20   Ht 5' 5\" (1.651 m)   Wt 204 lb 5.9 oz (92.7 kg)   SpO2 99%   BMI 34.01 kg/m²       Intake/Output this shift:  Current Shift: No intake/output data recorded. Last 3 Shifts: No intake/output data recorded.     Data Review:   Recent Results (from the past 24 hour(s))   EKG, 12 LEAD, INITIAL    Collection Time: 09/25/20  9:47 AM   Result Value Ref Range    Ventricular Rate 87 BPM    Atrial Rate 89 BPM    QRS Duration 90 ms    Q-T Interval 374 ms    QTC Calculation (Bezet) 450 ms    Calculated R Axis 0 degrees    Calculated T Axis 41 degrees    Diagnosis Atrial fibrillation  Abnormal ECG  When compared with ECG of 24-SEP-2020 10:49,  No significant change was found  Confirmed by Bermontrell Edgar ([id number]) on 9/25/2020 12:14:05 PM     CBC WITH AUTOMATED DIFF    Collection Time: 09/25/20  6:23 PM   Result Value Ref Range    WBC 30.8 (H) 3.6 - 11.0 K/uL    RBC 4.32 3.80 - 5.20 M/uL    HGB 13.5 11.5 - 16.0 %    HCT 39.7 35.0 - 47.0 %    MCV 91.9 80.0 - 99.0 FL    MCH 31.3 26.0 - 34.0 PG    MCHC 34.0 30.0 - 36.5 g/dL    RDW 16.2 (H) 11.5 - 14.5 %    PLATELET 244 953 - 485 K/uL    MPV 11.0 8.9 - 12.9 FL    NRBC 0.2 (H) 0  WBC    ABSOLUTE NRBC 0.07 (H) 0.00 - 0.01 K/uL    NEUTROPHILS 90 (H) 32 - 75 %    LYMPHOCYTES 4 (L) 12 - 49 %    MONOCYTES 4 (L) 5 - 13 %    EOSINOPHILS 0 0 - 7 %    BASOPHILS 0 0 - 1 %    IMMATURE GRANULOCYTES 2 (H) 0.0 - 0.5 %    ABS. NEUTROPHILS 28.3 (H) 1.8 - 8.0 K/UL    ABS. LYMPHOCYTES 1.3 0.8 - 3.5 K/UL    ABS. MONOCYTES 1.2 (H) 0.0 - 1.0 K/UL    ABS. EOSINOPHILS 0.0 0.0 - 0.4 K/UL    ABS. BASOPHILS 0.0 0.0 - 0.1 K/UL    ABS. IMM.  GRANS. 0.5 (H) 0.00 - 0.04 K/UL    DF AUTOMATED     METABOLIC PANEL, BASIC    Collection Time: 09/25/20  6:23 PM   Result Value Ref Range    Sodium 130 (L) 136 - 145 mmol/L    Potassium 4.3 3.5 - 5.1 mmol/L    Chloride 100 97 - 108 mmol/L    CO2 20 (L) 21 - 32 mmol/L    Anion gap 10 5 - 15 mmol/L    Glucose 156 (H) 65 - 100 mg/dL    BUN 81 (H) 6 - 20 mg/dL    Creatinine 3.81 (H) 0.55 - 1.02 mg/dL    BUN/Creatinine ratio 21 (H) 12 - 20      GFR est AA 14 (L) >60 ml/min/1.73m2    GFR est non-AA 12 (L) >60 ml/min/1.73m2    Calcium 8.7 8.5 - 10.1 mg/dL   SED RATE, AUTOMATED    Collection Time: 09/26/20 12:39 AM   Result Value Ref Range    Sed rate, automated 57 mm/hr   C REACTIVE PROTEIN, QT    Collection Time: 09/26/20 12:39 AM   Result Value Ref Range    C-Reactive protein 36.30 (H) 0.00 - 0.60 mg/dL   VANCOMYCIN, RANDOM    Collection Time: 09/26/20  5:36 AM   Result Value Ref Range    Vancomycin, random 14.7 ug/mL CBC WITH AUTOMATED DIFF    Collection Time: 09/26/20  5:36 AM   Result Value Ref Range    WBC 27.3 (H) 3.6 - 11.0 K/uL    RBC 4.30 3.80 - 5.20 M/uL    HGB 13.4 11.5 - 16.0 %    HCT 39.9 35.0 - 47.0 %    MCV 92.8 80.0 - 99.0 FL    MCH 31.2 26.0 - 34.0 PG    MCHC 33.6 30.0 - 36.5 g/dL    RDW 16.7 (H) 11.5 - 14.5 %    PLATELET 433 968 - 089 K/uL    MPV 11.2 8.9 - 12.9 FL    NRBC 0.3 (H) 0  WBC    ABSOLUTE NRBC 0.07 (H) 0.00 - 0.01 K/uL    NEUTROPHILS 90 (H) 32 - 75 %    LYMPHOCYTES 6 (L) 12 - 49 %    MONOCYTES 5 5 - 13 %    EOSINOPHILS 0 0 - 7 %    BASOPHILS 0 0 - 1 %    IMMATURE GRANULOCYTES 2 (H) 0.0 - 0.5 %    ABS. NEUTROPHILS 24.4 (H) 1.8 - 8.0 K/UL    ABS. LYMPHOCYTES 1.5 0.8 - 3.5 K/UL    ABS. MONOCYTES 1.3 (H) 0.0 - 1.0 K/UL    ABS. EOSINOPHILS 0.0 0.0 - 0.4 K/UL    ABS. BASOPHILS 0.0 0.0 - 0.1 K/UL    ABS. IMM. GRANS. 0.7 (H) 0.00 - 0.04 K/UL    DF AUTOMATED     C REACTIVE PROTEIN, QT    Collection Time: 09/26/20  5:36 AM   Result Value Ref Range    C-Reactive protein 41.20 (H) 0.00 - 0.60 mg/dL   PROCALCITONIN    Collection Time: 09/26/20  5:36 AM   Result Value Ref Range    Procalcitonin 7.64 (H) 0 ng/mL   RENAL FUNCTION PANEL    Collection Time: 09/26/20  5:36 AM   Result Value Ref Range    Sodium 133 (L) 136 - 145 mmol/L    Potassium 4.4 3.5 - 5.1 mmol/L    Chloride 101 97 - 108 mmol/L    CO2 19 (L) 21 - 32 mmol/L    Anion gap 13 5 - 15 mmol/L    Glucose 128 (H) 65 - 100 mg/dL    BUN 79 (H) 6 - 20 mg/dL    Creatinine 3.81 (H) 0.55 - 1.02 mg/dL    BUN/Creatinine ratio 21 (H) 12 - 20      GFR est AA 14 (L) >60 ml/min/1.73m2    GFR est non-AA 12 (L) >60 ml/min/1.73m2    Calcium 8.5 8.5 - 10.1 mg/dL    Phosphorus 6.5 (H) 2.6 - 4.7 mg/dL    Albumin 3.0 (L) 3.5 - 5.0 g/dL   CK    Collection Time: 09/26/20  5:36 AM   Result Value Ref Range    CK 46 26 - 192 U/L       Physical Exam  Vitals signs and nursing note reviewed. Constitutional:       General: She is not in acute distress.      Appearance: She is obese. She is ill-appearing. She is not toxic-appearing or diaphoretic. HENT:      Head: Normocephalic and atraumatic. Nose:      Comments: Nasal O2     Mouth/Throat:      Mouth: Mucous membranes are dry. Pharynx: Oropharynx is clear. Eyes:      Extraocular Movements: Extraocular movements intact. Pupils: Pupils are equal, round, and reactive to light. Neck:      Musculoskeletal: Neck supple. Cardiovascular:      Rate and Rhythm: Normal rate and regular rhythm. Heart sounds: No murmur. Pulmonary:      Breath sounds: No rhonchi or rales. Comments: Diminished BS   Abdominal:      General: There is no distension. Palpations: Abdomen is soft. Tenderness: There is no abdominal tenderness. Genitourinary:     Comments: No Bustos  Musculoskeletal:         General: No tenderness. Right lower leg: No edema. Left lower leg: No edema. Skin:     Findings: Bruising and lesion (right thumb wound healing) present. Neurological:      General: No focal deficit present. Mental Status: She is alert and oriented to person, place, and time. Psychiatric:         Behavior: Behavior normal.         Thought Content: Thought content normal.         Judgment: Judgment normal.      Comments: Depressed mood     ASSESSMENT:    1. Suspected sepsis with leukocytosis, elevated ESR, procalcitonin, CRP, etiology unclear. 2. Right pleural effusion, etiology unclear, ?parapneumonic  3. Ruling out Rheumatologic disease  4. Acute kidney injury  5. COPD by history    Comment:  No urinalysis available and blood cultures negative so far. This points to the right pleural effusion as source of her sepsis. Infection is particularly supported by the elevated procalcitonin and decreasing WBC on antibiotics. 1. Continue  IV Vancomycin and Zosyn  2. Follow-up blood cultures  3. Urine culture  4. IR consultation for possible CT directed thoracentesis  5.  In am, repeat CBC, procalcitonin and CRP  6. Follow-up Rheumatologic work-up    Sánchez Oakley MD

## 2020-09-26 NOTE — CONSULTS
Consult Date: 9/25/2020    IP CONSULT TO RHEUMATOLOGY  Consult performed by: Radha Winchester MD  Consult ordered by: Sugey Ocasio MD  Assessment/Recommendations: The patient reports that she has no past history of having rheumatic diseases or rheumatic symptoms. The review of systems for GPA and SLE is negative but MPA may be a possibility in view of the fact she has renal involvoment. She denies history of mid line lesions or necrotizing disease or cavitary or hemorraghic pulmonary symptoms. I will order serologies to look for MPa and it is not unreasonable to continue moderate dose steroids until the lab test returns. Positive serologies or tissue biopsy showing vasculitis and or vasculitic infiltate in a cavitary nodule will be needed before committing the patient to the potential risk of side effects of treatment with long term Rituximab or Cytoxan and High doses of Steroids. The presentation is nor classical and did not meet the ACR criteria for GPA, but there are variable exceptions. Thank you for the consult. Subjective     Past Medical History:   Diagnosis Date    AF (paroxysmal atrial fibrillation) ([address]     d/c cardioversion 2006, rythmol started 2007    Anemia     Anxiety     Chronic obstructive pulmonary disease (HCC)     Frequent urination     Heart failure ([address]     HTN (hypertension)     Irregular heart beat     Obesity     gastric bypass    HORACE (obstructive sleep apnea)     non compliant with cpap    SOB (shortness of breath) on exertion     Stress     Stroke ([address]     Wheezing       Past Surgical History:   Procedure Laterality Date    ECHOCARDIOGRAM  11/23/2009    normal LV wall motion and EF 60-65%, LVH, left atrial enlargement, mild tricuspid regurg, RVSP 36mmhg , no change from jan 2008    HX GASTRIC BYPASS      about 30 years ago.     NM CARDIAC SPECT W STRS/REST MULT  08/2006    no fixed or reversible defects     Family History   Problem Relation Age of Onset    Diabetes Mother     COPD Mother     Colon Cancer Father     Stroke Brother       Social History     Tobacco Use    Smoking status: Former Smoker    Smokeless tobacco: Never Used   Substance Use Topics    Alcohol use: Not Currently       Current Facility-Administered Medications   Medication Dose Route Frequency Provider Last Rate Last Dose    famotidine (PEPCID) tablet 20 mg  20 mg Oral Q48H Sri Mcguire MD   20 mg at 09/25/20 0041    [START ON 9/26/2020] VANCOMYCIN INFORMATION NOTE   Other ONCE Jensen Flynn MD        albuterol-ipratropium (DUO-NEB) 2.5 MG-0.5 MG/3 ML  3 mL Nebulization Q4H PRN Trinidad Vizcarra PA-C        piperacillin-tazobactam (ZOSYN) 3.375 g in 0.9% sodium chloride (MBP/ADV) 100 mL MBP  3.375 g IntraVENous Q8H Zeke Tucker PA-C 25 mL/hr at 09/25/20 1711 3.375 g at 09/25/20 1711    aspirin delayed-release tablet 81 mg  81 mg Oral DAILY Sri Mcguire MD   81 mg at 09/25/20 1044    omeprazole (PRILOSEC) capsule 20 mg  20 mg Oral DAILY Sri Mcguire MD   20 mg at 09/25/20 1045    multivitamin (ONE A DAY) tablet 1 Tab  1 Tab Oral DAILY Sri Mcguire MD   1 Tab at 09/25/20 1045    propafenone (RYTHMOL) tablet 225 mg  225 mg Oral Q8H Sri Mcguire MD   Stopped at 09/25/20 0000    digoxin (LANOXIN) tablet 0.125 mg  0.125 mg Oral DAILY Sri Mcguire MD   0.125 mg at 09/25/20 1045    atorvastatin (LIPITOR) tablet 40 mg  40 mg Oral QHS Sri Mcguire MD   40 mg at 09/24/20 2349    melatonin tablet 10 mg  10 mg Oral QHS Sri Mcguire MD   10 mg at 09/24/20 2349    rivaroxaban (XARELTO) tablet 20 mg  20 mg Oral DAILY Sri Mcguire MD   20 mg at 09/25/20 1046    sodium chloride (NS) flush 5-10 mL  5-10 mL IntraVENous PRN Sri Mcguire MD        sodium chloride (NS) flush 5-40 mL  5-40 mL IntraVENous Q8H Sri Mcguire MD   10 mL at 09/25/20 1400    sodium chloride (NS) flush 5-40 mL  5-40 mL IntraVENous PRN Reji Phil MD RUTH        acetaminophen (TYLENOL) tablet 650 mg  650 mg Oral Q6H PRN Lety Snowden MD        Or    acetaminophen (TYLENOL) suppository 650 mg  650 mg Rectal Q6H PRN Lety Snowden MD        polyethylene glycol (MIRALAX) packet 17 g  17 g Oral DAILY PRN Lety Snowden MD   17 g at 09/25/20 1044    promethazine (PHENERGAN) tablet 12.5 mg  12.5 mg Oral Q6H PRN Lety Snowden MD        Or    ondansetron [facility]) injection 4 mg  4 mg IntraVENous Q6H PRN Lety Snowden MD        0.9% sodium chloride infusion  125 mL/hr IntraVENous CONTINUOUS Lety Snowden  mL/hr at 09/25/20 1724 125 mL/hr at 09/25/20 1724    methylPREDNISolone (PF) (Solu-MEDROL) 600 mg in 0.9% sodium chloride 100 mL IVPB  600 mg IntraVENous DAILY Lety Snowden  mL/hr at 09/25/20 0150 600 mg at 09/25/20 0150    VANCOMYCIN INFORMATION NOTE 1 Each  1 Each Other Rx Dosing/Monitoring Lety Snowden MD            Review of Systems   Constitutional: Negative. HENT: Negative for mouth sores, nosebleeds and sore throat. Eyes: Negative. Respiratory: Positive for shortness of breath. Denies Hymoptisis   Cardiovascular: Positive for leg swelling. Negative for chest pain. Gastrointestinal: Negative for blood in stool. Endocrine: Negative. Genitourinary: Negative for hematuria. Musculoskeletal: Negative for arthralgias and myalgias. Skin: Negative for color change and rash. Neurological: Negative for numbness and headaches. Psychiatric/Behavioral: Negative for behavioral problems. Objective     Vital signs for last 24 hours:  Visit Vitals  /60 (BP 1 Location: Right arm)   Pulse 76   Temp 97.8 °F (36.6 °C)   Resp 18   Ht 5' 5\" (1.651 m)   Wt 92.7 kg (204 lb 5.9 oz)   SpO2 98%   BMI 34.01 kg/m²       Intake/Output this shift:  Current Shift: No intake/output data recorded. Last 3 Shifts: No intake/output data recorded.     Data Review:   Recent Results (from the past 24 hour(s))   BLOOD GAS, ARTERIAL    Collection Time: 09/24/20 11:15 PM   Result Value Ref Range    pH 7.26 (L) 7.35 - 7.45      PCO2 40 35 - 45 mmHg    PO2 65 (L) 75 - 100 mmHg    O2 SAT 92 (L) >95 %    BICARBONATE 18 (L) 22 - 26 mmol/L    BASE DEFICIT 8.2 (H) 0 - 2 mmol/L    FIO2 21 %    SITE Right Radial      PADMAJA'S TEST Positive     LACTIC ACID    Collection Time: 09/25/20 12:01 AM   Result Value Ref Range    Lactic acid 1.2 0.4 - 2.0 mmol/L   MAGNESIUM    Collection Time: 09/25/20 12:01 AM   Result Value Ref Range    Magnesium 1.5 (L) 1.6 - 2.4 mg/dL   PHOSPHORUS    Collection Time: 09/25/20 12:01 AM   Result Value Ref Range    Phosphorus 4.9 (H) 2.6 - 4.7 mg/dL   PROTHROMBIN TIME + INR    Collection Time: 09/25/20 12:01 AM   Result Value Ref Range    Prothrombin time 21.9 (H) 11.9 - 14.7 sec    INR 1.9 (H) 0.9 - 1.1     PTT    Collection Time: 09/25/20 12:01 AM   Result Value Ref Range    aPTT 46.4 (H) 23.0 - 35.7 sec    aPTT, therapeutic range   68 - 109 sec   SED RATE, AUTOMATED    Collection Time: 09/25/20 12:01 AM   Result Value Ref Range    Sed rate, automated 56 mm/hr   CRP, HIGH SENSITIVITY    Collection Time: 09/25/20 12:01 AM   Result Value Ref Range    CRP, High sensitivity >9.5 mg/L   LACTIC ACID    Collection Time: 09/25/20  3:03 AM   Result Value Ref Range    Lactic acid 0.8 0.4 - 2.0 mmol/L   METABOLIC PANEL, COMPREHENSIVE    Collection Time: 09/25/20  3:03 AM   Result Value Ref Range    Sodium 132 (L) 136 - 145 mmol/L    Potassium 4.0 3.5 - 5.1 mmol/L    Chloride 99 97 - 108 mmol/L    CO2 20 (L) 21 - 32 mmol/L    Anion gap 13 5 - 15 mmol/L    Glucose 81 65 - 100 mg/dL    BUN 66 (H) 6 - 20 mg/dL    Creatinine 3.69 (H) 0.55 - 1.02 mg/dL    BUN/Creatinine ratio 18 12 - 20      GFR est AA 15 (L) >60 ml/min/1.73m2    GFR est non-AA 12 (L) >60 ml/min/1.73m2    Calcium 8.3 (L) 8.5 - 10.1 mg/dL    Bilirubin, total 0.5 0.2 - 1.0 mg/dL    AST (SGOT) 23 15 - 37 U/L    ALT (SGPT) 15 12 - 78 U/L    Alk.  phosphatase 152 (H) 45 - 117 U/L    Protein, total 6.6 6.4 - 8.2 g/dL    Albumin 2.4 (L) 3.5 - 5.0 g/dL    Globulin 4.2 (H) 2.0 - 4.0 g/dL    A-G Ratio 0.6 (L) 1.1 - 2.2     CBC WITH AUTOMATED DIFF    Collection Time: 09/25/20  3:03 AM   Result Value Ref Range    WBC 35.4 (H) 3.6 - 11.0 K/uL    RBC 4.29 3.80 - 5.20 M/uL    HGB 13.3 11.5 - 16.0 %    HCT 39.8 35.0 - 47.0 %    MCV 92.8 80.0 - 99.0 FL    MCH 31.0 26.0 - 34.0 PG    MCHC 33.4 30.0 - 36.5 g/dL    RDW 16.2 (H) 11.5 - 14.5 %    PLATELET 337 920 - 174 K/uL    MPV 11.4 8.9 - 12.9 FL    NRBC 0.2 (H) 0  WBC    ABSOLUTE NRBC 0.07 (H) 0.00 - 0.01 K/uL    NEUTROPHILS 86 (H) 32 - 75 %    LYMPHOCYTES 4 (L) 12 - 49 %    MONOCYTES 10 5 - 13 %    EOSINOPHILS 0 0 - 7 %    BASOPHILS 0 0 - 1 %    IMMATURE GRANULOCYTES 1 (H) 0.0 - 0.5 %    ABS. NEUTROPHILS 30.3 (H) 1.8 - 8.0 K/UL    ABS. LYMPHOCYTES 1.5 0.8 - 3.5 K/UL    ABS. MONOCYTES 3.6 (H) 0.0 - 1.0 K/UL    ABS. EOSINOPHILS 0.0 0.0 - 0.4 K/UL    ABS. BASOPHILS 0.0 0.0 - 0.1 K/UL    ABS. IMM.  GRANS. 0.4 (H) 0.00 - 0.04 K/UL    DF AUTOMATED     EKG, 12 LEAD, INITIAL    Collection Time: 09/25/20  9:47 AM   Result Value Ref Range    Ventricular Rate 87 BPM    Atrial Rate 89 BPM    QRS Duration 90 ms    Q-T Interval 374 ms    QTC Calculation (Bezet) 450 ms    Calculated R Axis 0 degrees    Calculated T Axis 41 degrees    Diagnosis       Atrial fibrillation  Abnormal ECG  When compared with ECG of 24-SEP-2020 10:49,  No significant change was found  Confirmed by Pina Mensah ([id number]) on 9/25/2020 12:14:05 PM     CBC WITH AUTOMATED DIFF    Collection Time: 09/25/20  6:23 PM   Result Value Ref Range    WBC 30.8 (H) 3.6 - 11.0 K/uL    RBC 4.32 3.80 - 5.20 M/uL    HGB 13.5 11.5 - 16.0 %    HCT 39.7 35.0 - 47.0 %    MCV 91.9 80.0 - 99.0 FL    MCH 31.3 26.0 - 34.0 PG    MCHC 34.0 30.0 - 36.5 g/dL    RDW 16.2 (H) 11.5 - 14.5 %    PLATELET 729 705 - 381 K/uL    MPV 11.0 8.9 - 12.9 FL    NRBC 0.2 (H) 0  WBC    ABSOLUTE NRBC 0.07 (H) 0.00 - 0.01 K/uL    NEUTROPHILS 90 (H) 32 - 75 %    LYMPHOCYTES 4 (L) 12 - 49 %    MONOCYTES 4 (L) 5 - 13 %    EOSINOPHILS 0 0 - 7 %    BASOPHILS 0 0 - 1 %    IMMATURE GRANULOCYTES 2 (H) 0.0 - 0.5 %    ABS. NEUTROPHILS 28.3 (H) 1.8 - 8.0 K/UL    ABS. LYMPHOCYTES 1.3 0.8 - 3.5 K/UL    ABS. MONOCYTES 1.2 (H) 0.0 - 1.0 K/UL    ABS. EOSINOPHILS 0.0 0.0 - 0.4 K/UL    ABS. BASOPHILS 0.0 0.0 - 0.1 K/UL    ABS. IMM. GRANS. 0.5 (H) 0.00 - 0.04 K/UL    DF AUTOMATED     METABOLIC PANEL, BASIC    Collection Time: 09/25/20  6:23 PM   Result Value Ref Range    Sodium 130 (L) 136 - 145 mmol/L    Potassium 4.3 3.5 - 5.1 mmol/L    Chloride 100 97 - 108 mmol/L    CO2 20 (L) 21 - 32 mmol/L    Anion gap 10 5 - 15 mmol/L    Glucose 156 (H) 65 - 100 mg/dL    BUN 81 (H) 6 - 20 mg/dL    Creatinine 3.81 (H) 0.55 - 1.02 mg/dL    BUN/Creatinine ratio 21 (H) 12 - 20      GFR est AA 14 (L) >60 ml/min/1.73m2    GFR est non-AA 12 (L) >60 ml/min/1.73m2    Calcium 8.7 8.5 - 10.1 mg/dL       Physical Exam  Constitutional:       Appearance: She is obese. HENT:      Head: Normocephalic. Nose: Nose normal.      Mouth/Throat:      Mouth: Mucous membranes are moist.      Pharynx: Oropharynx is clear. Comments: Moist mucosa and tongue  Eyes:      Extraocular Movements: Extraocular movements intact. Neck:      Musculoskeletal: Normal range of motion and neck supple. Cardiovascular:      Rate and Rhythm: Normal rate. Pulmonary:      Comments: Diminished breath sound and rhonchi and no wheezes   Abdominal:      Palpations: Abdomen is soft. Musculoskeletal:         General: Swelling present. No tenderness. Left lower leg: Edema present. Comments: No joint pains on exam   Skin:     General: Skin is dry. Neurological:      Mental Status: She is alert.       Comments: Normal motor strength with hand testing   Psychiatric:         Mood and Affect: Mood normal.

## 2020-09-26 NOTE — PROGRESS NOTES
Progress Note  Date:2020       Room:[facility]/  Patient Name:Kristyn Keith     Date of Birth:     Age:70 y.o. Subjective    Subjective:  Symptoms:  Improved. No shortness of breath, malaise, cough, chest pain or weakness. (The patient has no complaints and serologies and complements are pending. The Procalcitoin is elevated. The CT and X ray are not classical for GPA/MPA. I will await definitive findings (tissure histology /serology) before confirming this DX and escalating the treatment. The Pleural effusion should be defined which may require instrumentation and analysis of the fluid. I would consider starting immune suppressive therapy if the result return as positive. ). Pain:  She reports no pain. Review of Systems   Constitutional: Negative. HENT: Negative. Eyes: Negative. Respiratory: Negative for cough and shortness of breath. Cardiovascular: Negative. Negative for chest pain. Gastrointestinal: Negative. Endocrine: Negative. Genitourinary: Negative. Musculoskeletal: Negative. Allergic/Immunologic: Negative. Neurological: Negative. Negative for weakness. Psychiatric/Behavioral: Negative. Objective         Vitals Last 24 Hours:  TEMPERATURE:  Temp  Av.6 °F (36.4 °C)  Min: 97.5 °F (36.4 °C)  Max: 97.7 °F (36.5 °C)  RESPIRATIONS RANGE: Resp  Av  Min: 20  Max: 20  PULSE OXIMETRY RANGE: SpO2  Av.7 %  Min: 98 %  Max: 99 %  PULSE RANGE: Pulse  Av.3  Min: 76  Max: 84  BLOOD PRESSURE RANGE: Systolic (95EGB), EVM:350 , Min:108 , KXH:292   ; Diastolic (88MOD), YGQ:61, Min:56, Max:62    I/O (24Hr): No intake or output data in the 24 hours ending 20 1109  Objective:  General Appearance:  Comfortable and in no acute distress. Vital signs: (most recent): Blood pressure (!) 109/56, pulse 81, temperature 97.5 °F (36.4 °C), resp.  rate 20, height 5' 5\" (1.651 m), weight 92.7 kg (204 lb 5.9 oz), SpO2 99 %. Lungs:  (No wheezes but has diminished breath sounds )  Heart: Normal rate. Abdomen: Abdomen is soft. Extremities: Normal range of motion. (No joint pains)  Neurological: Patient is alert and oriented to person, place and time. Normal strength. Pupils:  Pupils are equal, round, and reactive to light. Skin:  Dry. Labs/Imaging/Diagnostics    Labs:  CBC:  Recent Labs     09/26/20  0536 09/25/20 1823 09/25/20  0303   WBC 27.3* 30.8* 35.4*   RBC 4.30 4.32 4.29   HGB 13.4 13.5 13.3   HCT 39.9 39.7 39.8   MCV 92.8 91.9 92.8   RDW 16.7* 16.2* 16.2*    304 286     CHEMISTRIES:  Recent Labs     09/26/20  0536 09/25/20 1823 09/25/20  0303 09/25/20  0001   * 130* 132*  --    K 4.4 4.3 4.0  --     100 99  --    CO2 19* 20* 20*  --    BUN 79* 81* 66*  --    CA 8.5 8.7 8.3*  --    PHOS 6.5*  --   --  4.9*   MG  --   --   --  1.5*   PT/INR:  Recent Labs     09/25/20  0001 09/24/20  1245   INR 1.9* 1.4*     APTT:  Recent Labs     09/25/20  0001 09/24/20  1245   APTT 46.4* 43.7*     LIVER PROFILE:  Recent Labs     09/25/20  0303 09/24/20  1141   AST 23 20   ALT 15 11*     Lab Results   Component Value Date/Time    ALT (SGPT) 15 09/25/2020 03:03 AM    AST (SGOT) 23 09/25/2020 03:03 AM    Alk. phosphatase 152 (H) 09/25/2020 03:03 AM    Bilirubin, total 0.5 09/25/2020 03:03 AM       Imaging Last 24 Hours:  Ct Chest Wo Cont    Result Date: 9/25/2020  The study is a CT evaluation of the chest dated 9/25/2020. HISTORY: History of chronic obstructive pulmonary disease. Heart failure. Recent pleural fluid collection. Technique: Performed without intravenous contrast. 3 mm axial imaging, axial MIP imaging, sagittal, and coronal reconstructed imaging sequences are submitted for evaluation. Thinner section Super Dimension imaging was also performed.  Dose Reduction Technique was employed to reduce radiation exposure - This includes reduction optimization techniques as appropriate to a performed exam with automated exposure control adjustments of the mA and/or Kv according to patient size, or use of iterative reconstruction technique. COMPARISON: Previous CT evaluation of the chest dated 12/18/2019. Recent chest radiograph dated 9/25/2019. MEDIASTINUM: There are scattered normal sized middle mediastinal lymph nodes without progression. The largest mediastinal lymph node is located within the subcarinal region and this lymph node measures 10 mm in short axis measurement which is considered within normal limits. There is an area of dystrophic calcification within the left thyroid lobe and mild heterogeneity of the left thyroid lobe. This examination is negative for large or dominant thyroid nodule or mass. LUNGS AND PLEURA: There is a moderate sized loculated right pleural effusion. This examination is negative for pleural based masses, pleural thickening, or gas within the pleural fluid collection. There is encasement of the adjacent lung with areas of passive atelectasis. There is a calcified granuloma within the right lung base. On axial maximum intensity imaging, there are several additional small scattered micronodules with 2 nodules demonstrated posteriorly within the left lower lobe (series 10413 image number 42). This examination is negative for larger pulmonary nodules or masses. The central trachea and bronchial tree are patent. There is bronchial wall thickening and discoid opacities within the lung bases consistent with reactive airways disease. CARDIOVASCULAR: There are moderate calcifications of the coronary arteries. There is moderate enlargement of the atrial chambers and milder enlargement of the ventricular chambers. There is a normal caliber to the thoracic aorta. CHEST WALL: There is multilevel spondylosis along the thoracic spine. There is a mild to moderate dextroconvex scoliosis. There is increased dorsal kyphosis of the thoracic spine.  There are older healed right posterior rib fractures. OTHER: There is surgical suture material demonstrated within the gastric region consistent with previous bariatric surgery. IMPRESSION: 1. There is a moderate sized loculated right pleural effusion associated with passive atelectasis. The differential diagnosis would include recent pneumonia with a residual parapneumonic effusion. Since exam an [de-identified] is negative for pleural based masses or findings specific for a malignant effusion. 2.  There are several scattered normal size middle mediastinal lymph nodes. 3.  There are findings of prior granulomatous exposure. Please see above text for further details. Assessment//Plan   Active Problems:    Hyponatremia (9/24/2020)      Leukocytosis (9/24/2020)      Hypoalbuminemia (9/24/2020)      Acute on chronic renal failure (HCC) (9/24/2020)      RLL pneumonia (9/24/2020)      Sepsis ([address] (9/24/2020)      PNA (pneumonia) (9/24/2020)      Assessment:  (Subjective: The patient has no new complaints and serologies and complements are pending. The Procalcitoin is elevated. The CT and X ray are not classical for GPA/MPA. I will await definitive findings (tissure histology /serology) before confirming this DX and escalating the treatment. The Pleural effusion should be defined which may require instrumentation and analysis of the fluid. I would consider starting immune suppressive therapy if the result return as positive. ).        Electronically signed by Julio Munroe MD on 9/26/2020 at 11:09 AM

## 2020-09-26 NOTE — CONSULTS
NAME:  Cassius Williamson   :      MRN:   [id number]     ATTENDING: Marquita Kelly MD  PCP:  None    Date/Time:  2020 8:24 PM      Subjective:   REQUESTING Dana Mathew MD  REASON FOR CONSULT:  MARILUZ       Ms. Dary Foster is a 51-year-old female with past medical history of atrial fibrillation, hypertension   Presented to the emergency room with complaints of shortness of breath. She has history of COPD and obstructive sleep apnea, she also has a history of chronic cough and developed worsening over last few days associated with swelling in her lower extremity and presented to the emergency room. Initial labs showed a BUN of 66 and creatinine of 3.69, nephrology consultation was requested for further evaluation and management of acute kidney injury. Patient seen at bedside, awake and alert, not in any acute disease, answering simple questions appropriately. She is not aware of any prior kidney disease review of old labs showed normal renal functions in 2019. Review of her home medications showed that she was on lisinopril 10 mg a day, Lasix 20 mg daily,  no history of NSAID use. No new voiding difficulties, she admits to some increased p.o. fluid intake. She has complaints of swelling in her lower extremity.        Past Medical History:   Diagnosis Date    AF (paroxysmal atrial fibrillation) ([address]     d/c cardioversion , rythmol started     Anemia     Anxiety     Chronic obstructive pulmonary disease (HCC)     Frequent urination     Heart failure ([address]     HTN (hypertension)     Irregular heart beat     Obesity     gastric bypass    HORACE (obstructive sleep apnea)     non compliant with cpap    SOB (shortness of breath) on exertion     Stress     Stroke (HCC)     Wheezing       Past Surgical History:   Procedure Laterality Date    ECHOCARDIOGRAM  2009    normal LV wall motion and EF 60-65%, LVH, left atrial enlargement, mild tricuspid regurg, RVSP 36mmhg , no change from jan 2008    HX GASTRIC BYPASS      about 30 years ago.  NM CARDIAC SPECT W STRS/REST MULT  08/2006    no fixed or reversible defects     Social History     Tobacco Use    Smoking status: Former Smoker    Smokeless tobacco: Never Used   Substance Use Topics    Alcohol use: Not Currently      Family History   Problem Relation Age of Onset    Diabetes Mother     COPD Mother     Colon Cancer Father     Stroke Brother        No Known Allergies   Prior to Admission medications    Medication Sig Start Date End Date Taking? Authorizing Provider   lisinopriL (PRINIVIL, ZESTRIL) 10 mg tablet  8/28/20  Yes Other, MD Klaudia   atorvastatin (LIPITOR) 40 mg tablet Take  by mouth daily. Yes Provider, Historical   MELATONIN (MELATIN PO) 10 mg. Take 1-3 tablets every bedtime. Yes Provider, Historical   metoprolol succinate (TOPROL-XL) 50 mg XL tablet Take 1 Tab by mouth daily. 8/24/16  Yes Zander Keller MD   furosemide (LASIX) 20 mg tablet Take 1 tab by mouth daily 8/24/16  Yes Zander Keller MD   propafenone (RYTHMOL) 225 mg tablet Take 1 Tab by mouth every eight (8) hours. 8/24/16  Yes Cornelio Asencio MD   multivitamin (ONE A DAY) tablet Take 1 Tab by mouth daily. Yes Provider, Historical   rivaroxaban (XARELTO) 20 mg tab tablet Take 1 Tab by mouth daily. 2/27/18   Zander Keller MD   digoxin (LANOXIN) 0.125 mg tablet Take 1 Tab by mouth daily. 8/24/16   Zander Keller MD   aspirin delayed-release 81 mg tablet Take 81 mg by mouth daily. Provider, Historical   Omeprazole delayed release (PRILOSEC D/R) 20 mg tablet Take 20 mg by mouth daily.     Provider, Historical       REVIEW OF SYSTEMS:     Total of 12 systems reviewed   With complaints as mentioned in the history of presenting illness, she has morbid obese, nonambulatory, decreased exercise tolerance, on and off joint pains present, no other significant complaints on computer system  Objective:   VITALS:    Visit Vitals  /60 (BP 1 Location: Right arm)   Pulse 76   Temp 97.8 °F (36.6 °C)   Resp 18   Ht 5' 5\" (1.651 m)   Wt 92.7 kg (204 lb 5.9 oz)   SpO2 98%   BMI 34.01 kg/m²     Temp (24hrs), Av °F (36.7 °C), Min:97.8 °F (36.6 °C), Max:98.1 °F (36.7 °C)      PHYSICAL EXAM:   General: Elderly  female, alert awake well-oriented, no acute distress. HEENT: EOMI, no Icterus, no Pallor, pupils reactive, mucosa moist,  throat clear. Neck: Neck is supple,  JVD present, no thyromegaly,   Lungs: Decreased  breathsounds at the bases,  no rhonchi, no rales,  CVS: heart sounds normal, no murmurs, no rubs. GI: soft, nontender, normal BS, no palpable organomegaly, no renal angle tenderness.   Extremeties: no clubbing, no cyanosis, 1-2+ bilateral extremity edema present,  Neuro: Alert, awake, oriented x3, speech is normal general debility present  Skin: normal skin turgor,   Musculoskeletal: no acute joint swellings    LAB DATA REVIEWED:    Recent Results (from the past 24 hour(s))   BLOOD GAS, ARTERIAL    Collection Time: 20 11:15 PM   Result Value Ref Range    pH 7.26 (L) 7.35 - 7.45      PCO2 40 35 - 45 mmHg    PO2 65 (L) 75 - 100 mmHg    O2 SAT 92 (L) >95 %    BICARBONATE 18 (L) 22 - 26 mmol/L    BASE DEFICIT 8.2 (H) 0 - 2 mmol/L    FIO2 21 %    SITE Right Radial      PADMAJA'S TEST Positive     LACTIC ACID    Collection Time: 20 12:01 AM   Result Value Ref Range    Lactic acid 1.2 0.4 - 2.0 mmol/L   MAGNESIUM    Collection Time: 20 12:01 AM   Result Value Ref Range    Magnesium 1.5 (L) 1.6 - 2.4 mg/dL   PHOSPHORUS    Collection Time: 20 12:01 AM   Result Value Ref Range    Phosphorus 4.9 (H) 2.6 - 4.7 mg/dL   PROTHROMBIN TIME + INR    Collection Time: 20 12:01 AM   Result Value Ref Range    Prothrombin time 21.9 (H) 11.9 - 14.7 sec    INR 1.9 (H) 0.9 - 1.1     PTT    Collection Time: 20 12:01 AM   Result Value Ref Range    aPTT 46.4 (H) 23.0 - 35.7 sec    aPTT, therapeutic range 68 - 109 sec   SED RATE, AUTOMATED    Collection Time: 09/25/20 12:01 AM   Result Value Ref Range    Sed rate, automated 56 mm/hr   CRP, HIGH SENSITIVITY    Collection Time: 09/25/20 12:01 AM   Result Value Ref Range    CRP, High sensitivity >9.5 mg/L   LACTIC ACID    Collection Time: 09/25/20  3:03 AM   Result Value Ref Range    Lactic acid 0.8 0.4 - 2.0 mmol/L   METABOLIC PANEL, COMPREHENSIVE    Collection Time: 09/25/20  3:03 AM   Result Value Ref Range    Sodium 132 (L) 136 - 145 mmol/L    Potassium 4.0 3.5 - 5.1 mmol/L    Chloride 99 97 - 108 mmol/L    CO2 20 (L) 21 - 32 mmol/L    Anion gap 13 5 - 15 mmol/L    Glucose 81 65 - 100 mg/dL    BUN 66 (H) 6 - 20 mg/dL    Creatinine 3.69 (H) 0.55 - 1.02 mg/dL    BUN/Creatinine ratio 18 12 - 20      GFR est AA 15 (L) >60 ml/min/1.73m2    GFR est non-AA 12 (L) >60 ml/min/1.73m2    Calcium 8.3 (L) 8.5 - 10.1 mg/dL    Bilirubin, total 0.5 0.2 - 1.0 mg/dL    AST (SGOT) 23 15 - 37 U/L    ALT (SGPT) 15 12 - 78 U/L    Alk. phosphatase 152 (H) 45 - 117 U/L    Protein, total 6.6 6.4 - 8.2 g/dL    Albumin 2.4 (L) 3.5 - 5.0 g/dL    Globulin 4.2 (H) 2.0 - 4.0 g/dL    A-G Ratio 0.6 (L) 1.1 - 2.2     CBC WITH AUTOMATED DIFF    Collection Time: 09/25/20  3:03 AM   Result Value Ref Range    WBC 35.4 (H) 3.6 - 11.0 K/uL    RBC 4.29 3.80 - 5.20 M/uL    HGB 13.3 11.5 - 16.0 %    HCT 39.8 35.0 - 47.0 %    MCV 92.8 80.0 - 99.0 FL    MCH 31.0 26.0 - 34.0 PG    MCHC 33.4 30.0 - 36.5 g/dL    RDW 16.2 (H) 11.5 - 14.5 %    PLATELET 439 680 - 772 K/uL    MPV 11.4 8.9 - 12.9 FL    NRBC 0.2 (H) 0  WBC    ABSOLUTE NRBC 0.07 (H) 0.00 - 0.01 K/uL    NEUTROPHILS 86 (H) 32 - 75 %    LYMPHOCYTES 4 (L) 12 - 49 %    MONOCYTES 10 5 - 13 %    EOSINOPHILS 0 0 - 7 %    BASOPHILS 0 0 - 1 %    IMMATURE GRANULOCYTES 1 (H) 0.0 - 0.5 %    ABS. NEUTROPHILS 30.3 (H) 1.8 - 8.0 K/UL    ABS. LYMPHOCYTES 1.5 0.8 - 3.5 K/UL    ABS. MONOCYTES 3.6 (H) 0.0 - 1.0 K/UL    ABS. EOSINOPHILS 0.0 0.0 - 0.4 K/UL    ABS. BASOPHILS 0.0 0.0 - 0.1 K/UL    ABS. IMM. GRANS. 0.4 (H) 0.00 - 0.04 K/UL    DF AUTOMATED     EKG, 12 LEAD, INITIAL    Collection Time: 09/25/20  9:47 AM   Result Value Ref Range    Ventricular Rate 87 BPM    Atrial Rate 89 BPM    QRS Duration 90 ms    Q-T Interval 374 ms    QTC Calculation (Bezet) 450 ms    Calculated R Axis 0 degrees    Calculated T Axis 41 degrees    Diagnosis       Atrial fibrillation  Abnormal ECG  When compared with ECG of 24-SEP-2020 10:49,  No significant change was found  Confirmed by Karrie Sim ([id number]) on 9/25/2020 12:14:05 PM     CBC WITH AUTOMATED DIFF    Collection Time: 09/25/20  6:23 PM   Result Value Ref Range    WBC 30.8 (H) 3.6 - 11.0 K/uL    RBC 4.32 3.80 - 5.20 M/uL    HGB 13.5 11.5 - 16.0 %    HCT 39.7 35.0 - 47.0 %    MCV 91.9 80.0 - 99.0 FL    MCH 31.3 26.0 - 34.0 PG    MCHC 34.0 30.0 - 36.5 g/dL    RDW 16.2 (H) 11.5 - 14.5 %    PLATELET 236 418 - 973 K/uL    MPV 11.0 8.9 - 12.9 FL    NRBC 0.2 (H) 0  WBC    ABSOLUTE NRBC 0.07 (H) 0.00 - 0.01 K/uL    NEUTROPHILS 90 (H) 32 - 75 %    LYMPHOCYTES 4 (L) 12 - 49 %    MONOCYTES 4 (L) 5 - 13 %    EOSINOPHILS 0 0 - 7 %    BASOPHILS 0 0 - 1 %    IMMATURE GRANULOCYTES 2 (H) 0.0 - 0.5 %    ABS. NEUTROPHILS 28.3 (H) 1.8 - 8.0 K/UL    ABS. LYMPHOCYTES 1.3 0.8 - 3.5 K/UL    ABS. MONOCYTES 1.2 (H) 0.0 - 1.0 K/UL    ABS. EOSINOPHILS 0.0 0.0 - 0.4 K/UL    ABS. BASOPHILS 0.0 0.0 - 0.1 K/UL    ABS. IMM.  GRANS. 0.5 (H) 0.00 - 0.04 K/UL    DF AUTOMATED     METABOLIC PANEL, BASIC    Collection Time: 09/25/20  6:23 PM   Result Value Ref Range    Sodium 130 (L) 136 - 145 mmol/L    Potassium 4.3 3.5 - 5.1 mmol/L    Chloride 100 97 - 108 mmol/L    CO2 20 (L) 21 - 32 mmol/L    Anion gap 10 5 - 15 mmol/L    Glucose 156 (H) 65 - 100 mg/dL    BUN 81 (H) 6 - 20 mg/dL    Creatinine 3.81 (H) 0.55 - 1.02 mg/dL    BUN/Creatinine ratio 21 (H) 12 - 20      GFR est AA 14 (L) >60 ml/min/1.73m2    GFR est non-AA 12 (L) >60 ml/min/1.73m2    Calcium 8.7 8.5 - 10.1 mg/dL       Assessment and plan   1. Acute Kidney Injury: probably prerenal azotemia secondary to use of Lasix and lisinopril  Recommend to hold lisinopril  Dc IVF for now  Will add lasix 40 mg IV daily  Will monitor I/Os, renal functions and adjust diuretics as needed  Recommend to check urinalysis,urine random electrolytes, creatinine and protein. heck CPK levels to rule out rhabdomyolysis   Will get an ultrasound of kidneys and bladder to rule out obstruction   ? ? Hx of Polyangiitis: Recommend workup for acute glomerulonephritis including VIOLETA titers,  C3, C4, ASO titers, ANCA panel,  hepatitis B and C. profile. Chronic kidney disease: No definite history of chronic kidney disease,    will repeat renal functions and continue to monitor to assess the baseline    Acute shortness of breath/history of COPD/obstructive sleep/CHF/chronic edema  Continue bronchodilators and steroids   will add diuretics  Continue IV antibiotic as per primary service    Hypotension: Borderline low blood pressures lisinopril was discontinued , not on any antihypertensive medications   We will give 1 dose of IV albumin with Lasix today   Will continue to monitor blood pressures    Lower extremity edema: probably related to congestive heart failure/pulmonary hypertension   continue diuretics  Advised to maintain p.o. fluid restriction of 8737-6500 ml per day and and salt restriction,  check I/Os, daily weights  Will monitor fluid status clinically and adjust diuretics as needed.      Signed: Steph Lord MD

## 2020-09-26 NOTE — CONSULTS
PULMONARY NOTE  [facility]    Name: David Phillips MRN: [id number]   : 1950 Hospital: [address] [place] [address]   Date: 2020  Admission date: 2020 Hospital Day: 3       HPI:     Hospital Problems  Date Reviewed: 2015          Codes Class Noted POA    Hyponatremia ICD-10-CM: E87.1  ICD-9-CM: 276.1  2020 Yes        Leukocytosis ICD-10-CM: D72.829  ICD-9-CM: 288.60  2020 Yes        Hypoalbuminemia ICD-10-CM: E88.09  ICD-9-CM: 273.8  2020 Unknown        Acute on chronic renal failure ([facility] ICD-10-CM: N17.9, N18.9  ICD-9-CM: 584.9, 585.9  2020 Yes        RLL pneumonia ICD-10-CM: J18.9  ICD-9-CM: 633  2020 Yes        Sepsis ([facility] ICD-10-CM: A41.9  ICD-9-CM: 038.9, 995.91  2020 Yes        PNA (pneumonia) ICD-10-CM: J18.9  ICD-9-CM: 977  2020 Yes                   [x] High complexity decision making was performed  [x] See my orders for details      Subjective/Initial History:     I was asked by Chandu Arias MD to see David Phillips  a 79 y.o.  female in consultation     Excerpts from admission 2020 or consult notes as follows:   25-year-old lady came in because of shortness of breath generalized weakness and also she was in atrial fibrillation past medical history of obstructive sleep apnea syndrome chronic A. fib cardioversion in the past hypertension in fact her blood pressure was low also she has COPD nebulizer treatment not on any oxygen at home complaining about cough without any productive sputum and she was feeling lightheaded dizzy no history of passing out so admitted and pulmonary consult was called for further evaluation.       No Known Allergies     MAR reviewed and pertinent medications noted or modified as needed     Current Facility-Administered Medications   Medication    vancomycin (VANCOCIN) 1,250 mg in 0.9% sodium chloride 250 mL (VIAL-MATE)    famotidine (PEPCID) tablet 20 mg    albuterol-ipratropium (DUO-NEB) 2.5 MG-0.5 MG/3 ML    piperacillin-tazobactam (ZOSYN) 3.375 g in 0.9% sodium chloride (MBP/ADV) 100 mL MBP    furosemide (LASIX) injection 40 mg    aspirin delayed-release tablet 81 mg    omeprazole (PRILOSEC) capsule 20 mg    multivitamin (ONE A DAY) tablet 1 Tab    propafenone (RYTHMOL) tablet 225 mg    digoxin (LANOXIN) tablet 0.125 mg    atorvastatin (LIPITOR) tablet 40 mg    melatonin tablet 10 mg    rivaroxaban (XARELTO) tablet 20 mg    sodium chloride (NS) flush 5-10 mL    sodium chloride (NS) flush 5-40 mL    sodium chloride (NS) flush 5-40 mL    acetaminophen (TYLENOL) tablet 650 mg    Or    acetaminophen (TYLENOL) suppository 650 mg    polyethylene glycol (MIRALAX) packet 17 g    promethazine (PHENERGAN) tablet 12.5 mg    Or    ondansetron (ZOFRAN) injection 4 mg    methylPREDNISolone (PF) (Solu-MEDROL) 600 mg in 0.9% sodium chloride 100 mL IVPB    VANCOMYCIN INFORMATION NOTE 1 Each      Patient PCP: None  PMH:  has a past medical history of AF (paroxysmal atrial fibrillation) (HCC), Anemia, Anxiety, Chronic obstructive pulmonary disease ([address] Frequent urination, Heart failure (HCC), HTN (hypertension), Irregular heart beat, Obesity, HORACE (obstructive sleep apnea), SOB (shortness of breath) on exertion, Stress, Stroke ([address] and Wheezing. She also has no past medical history of Chronic pain. PSH:   has a past surgical history that includes echocardiogram (11/23/2009); nm cardiac spect w strs/rest mult (08/2006); and hx gastric bypass. FHX: family history includes COPD in her mother; Colon Cancer in her father; Diabetes in her mother; Stroke in her brother. SHX:  reports that she has quit smoking. She has never used smokeless tobacco. She reports previous alcohol use. She reports that she does not use drugs. ROS:    Review of Systems   Constitutional: Positive for diaphoresis and malaise/fatigue. Negative for weight loss. HENT: Negative. Eyes: Negative.     Respiratory: Positive for cough and shortness of breath. Cardiovascular: Positive for orthopnea. Gastrointestinal: Negative. Genitourinary: Negative. Musculoskeletal: Negative. Skin: Negative. Neurological: Positive for dizziness and weakness. Endo/Heme/Allergies: Negative. Psychiatric/Behavioral: Negative. Objective:     Vital Signs: Telemetry:    normal sinus rhythm Intake/Output:   Visit Vitals  BP (!) 109/56   Pulse 81   Temp 97.5 °F (36.4 °C)   Resp 20   Ht 5' 5\" (1.651 m)   Wt 92.7 kg (204 lb 5.9 oz)   SpO2 98%   BMI 34.01 kg/m²       Temp (24hrs), Av.6 °F (36.4 °C), Min:97.5 °F (36.4 °C), Max:97.7 °F (36.5 °C)        O2 Device: Nasal cannula O2 Flow Rate (L/min): 2 l/min       Wt Readings from Last 4 Encounters:   20 92.7 kg (204 lb 5.9 oz)   10/18/16 83.6 kg (184 lb 3.2 oz)   12/24/15 79.8 kg (176 lb)   08/18/15 79.8 kg (176 lb)          Intake/Output Summary (Last 24 hours) at 2020 1314  Last data filed at 2020 0854  Gross per 24 hour   Intake 240 ml   Output    Net 240 ml       Last shift:       0701 -  1900  In: 240 [P.O.:240]  Out: -   Last 3 shifts: No intake/output data recorded. Physical Exam:     Physical Exam   Constitutional: She is oriented to person, place, and time. She appears well-developed. HENT:   Head: Normocephalic and atraumatic. Eyes: Pupils are equal, round, and reactive to light. Conjunctivae and EOM are normal.   Neck: Normal range of motion. Neck supple. Cardiovascular: Regular rhythm. irregular   Pulmonary/Chest: Breath sounds normal. She is in respiratory distress. Abdominal: Soft. Bowel sounds are normal.   Neurological: She is alert and oriented to person, place, and time. Skin: Skin is warm. Psychiatric: She has a normal mood and affect.  Her behavior is normal. Judgment and thought content normal.        Labs:    Recent Labs     20  0536 20  1823 20  0303 20  0001 20  1245   WBC 27.3* 30.8* 35.4*  --   --    HGB 13.4 13.5 13.3  --   --     304 286  --   --    INR  --   --   --  1.9* 1.4*   APTT  --   --   --  46.4* 43.7*     Recent Labs     09/26/20  0536 09/25/20  1823 09/25/20  0303 09/25/20  0001 09/24/20  1245 09/24/20  1141   * 130* 132*  --   --  132*   K 4.4 4.3 4.0  --   --  4.0    100 99  --   --  96*   CO2 19* 20* 20*  --   --  21   * 156* 81  --   --  95   BUN 79* 81* 66*  --   --  63*   CREA 3.81* 3.81* 3.69*  --   --  3.77*   CA 8.5 8.7 8.3*  --   --  9.2   MG  --   --   --  1.5*  --   --    PHOS 6.5*  --   --  4.9*  --   --    LAC  --   --  0.8 1.2 1.0  --    ALB 3.0*  --  2.4*  --   --  2.7*   ALT  --   --  15  --   --  11*     Recent Labs     09/24/20  2315   PH 7.26*   PCO2 40   PO2 65*   HCO3 18*   FIO2 21     Recent Labs     09/26/20  0536 09/24/20  1141   CPK 46  --    TROIQ  --  <0.05     No results found for: BNPP, BNP   Lab Results   Component Value Date/Time    Culture result: No growth 2 days 09/24/2020 12:45 PM   No results found for: TSH, TSHEXT, TSHEXT    Imaging:    CXR Results  (Last 48 hours)    None        Results from Hospital Encounter encounter on 09/24/20   XR CHEST SNGL V    Narrative History is shortness of breath. Comparison Is with the chest x-ray of 12/18/2019. An AP portable erect view of the chest demonstrate cardiac monitor leads. There  is now a small to moderate right pleural effusion. There is a small calcified  right lower lobe granuloma. The heart is enlarged. There is approximately 27  degrees dextroscoliosis of the thoracic spine. There is degenerative change  present in the spine as well. Impression IMPRESSION: Cardiomegaly. New right pleural effusion. No change in right lower  lobe granuloma. Dextroscoliosis. Results from [place] encounter on 09/24/20   CT CHEST WO CONT    Narrative The study is a CT evaluation of the chest dated 9/25/2020.     HISTORY: History of chronic obstructive pulmonary disease. Heart failure. Recent  pleural fluid collection. Technique: Performed without intravenous contrast. 3 mm axial imaging, axial MIP  imaging, sagittal, and coronal reconstructed imaging sequences are submitted for  evaluation. Thinner section Super Dimension imaging was also performed. Dose Reduction Technique was employed to reduce radiation exposure - This  includes reduction optimization techniques as appropriate to a performed exam  with automated exposure control adjustments of the mA and/or Kv according to  patient size, or use of iterative reconstruction technique. COMPARISON: Previous CT evaluation of the chest dated 12/18/2019. Recent chest  radiograph dated 9/25/2019. MEDIASTINUM: There are scattered normal sized middle mediastinal lymph nodes  without progression. The largest mediastinal lymph node is located within the  subcarinal region and this lymph node measures 10 mm in short axis measurement  which is considered within normal limits. There is an area of dystrophic calcification within the left thyroid lobe and  mild heterogeneity of the left thyroid lobe. This examination is negative for  large or dominant thyroid nodule or mass. LUNGS AND PLEURA: There is a moderate sized loculated right pleural effusion. This examination is negative for pleural based masses, pleural thickening, or  gas within the pleural fluid collection. There is encasement of the adjacent  lung with areas of passive atelectasis. There is a calcified granuloma within the right lung base. On axial maximum  intensity imaging, there are several additional small scattered micronodules  with 2 nodules demonstrated posteriorly within the left lower lobe (series [id number]  image number 42). This examination is negative for larger pulmonary nodules or  masses. The central trachea and bronchial tree are patent.  There is bronchial  wall thickening and discoid opacities within the lung bases consistent with  reactive airways disease. CARDIOVASCULAR: There are moderate calcifications of the coronary arteries. There is moderate enlargement of the atrial chambers and milder enlargement of  the ventricular chambers. There is a normal caliber to the thoracic aorta. CHEST WALL: There is multilevel spondylosis along the thoracic spine. There is a  mild to moderate dextroconvex scoliosis. There is increased dorsal kyphosis of  the thoracic spine. There are older healed right posterior rib fractures. OTHER: There is surgical suture material demonstrated within the gastric region  consistent with previous bariatric surgery. Impression IMPRESSION:  1. There is a moderate sized loculated right pleural effusion associated with  passive atelectasis. The differential diagnosis would include recent pneumonia  with a residual parapneumonic effusion. Since exam an [de-identified] is negative for pleural  based masses or findings specific for a malignant effusion. 2.  There are several scattered normal size middle mediastinal lymph nodes. 3.  There are findings of prior granulomatous exposure. Please see above text  for further details. IMPRESSION:   1. Acute hypoxic Respiratory failure  2. Chronic Obstructive Pulmonary Disease with Severe Acute Exacerbation requiring inpatient hospitalization and management; has very poor airway clearance. Increased work of breathing  Body mass index is 34.01 kg/m². 3. Chronic A. Fib  4. Right lower lobe granuloma  5. Right pleural effusion possible parapneumonic effusion which is loculated  6. Obstructive sleep apnea  7. Pt is requiring Drug therapy requiring intensive monitoring for toxicity  8. Pt is unstable, unpredictable needing inpatient monitoring; is acutely ill and at high risk of sudden decline and decompensation with severe consequenses and continued end organ dysfunction and failure  9. Prognosis guarded       RECOMMENDATIONS/PLAN:     1.  Patient is on oxygen via nasal cannula 2 L we will continue to wean she is not on any oxygen at home agree with IV Solu-Medrol  2. Interventional radiology for CT-guided thoracentesis for loculated right pleural effusion  3. Agree with Empiric IV antibiotics pending culture results   4. Follow culture results  5. For loculated pleural effusion will get CAT scan of the chest  6. Supplemental O2 to keep sats > 93%  7. Aspiration precautions  8. Labs to follow electrolytes, renal function and and blood counts  9. Glucose monitoring and SSI  10. Bronchial hygiene with respiratory therapy techniques, bronchodilators  11. DVT, SUP prophylaxis         This care involved high complexity medical decision making: I personally:  · Reviewed the flowsheet and previous days notes  · Reviewed and summarized records or history from previous days note or discussions with staff, family  · High Risk Drug therapy requiring intensive monitoring for toxicity: eg steroids, pressors, antibiotics  · Reviewed and/or ordered Clinical lab tests  · Reviewed images and/or ordered Radiology tests  · Reviewed the patients ECG / Telemetry  · Reviewed and/or adjusted NiPPV settings  · Called and arranged for Radiologic procedures or interventions  · performed or ordered Diagnostic endoscopies with identified risk factors.   · discussed my assessment/management with : Nursing, Hospitalist and Family for coordination of care          Blaine Morales MD

## 2020-09-26 NOTE — PROGRESS NOTES
Notified provider that patient refused propafenone dose. Patient states she was taken of this as an outpatient and has not been on this at home. Provider stated to discontinue order.

## 2020-09-27 ENCOUNTER — APPOINTMENT (OUTPATIENT)
Dept: NON INVASIVE DIAGNOSTICS | Age: 70
DRG: 871 | End: 2020-09-27
Attending: PHYSICIAN ASSISTANT
Payer: MEDICARE

## 2020-09-27 LAB
ALBUMIN SERPL-MCNC: 3.3 G/DL (ref 3.5–5)
ANION GAP SERPL CALC-SCNC: 11 MMOL/L (ref 5–15)
BASOPHILS # BLD: 0 K/UL (ref 0–0.1)
BASOPHILS NFR BLD: 0 % (ref 0–1)
BUN SERPL-MCNC: 79 MG/DL (ref 6–20)
BUN/CREAT SERPL: 31 (ref 12–20)
CA-I BLD-MCNC: 8.8 MG/DL (ref 8.5–10.1)
CHLORIDE SERPL-SCNC: 105 MMOL/L (ref 97–108)
CO2 SERPL-SCNC: 23 MMOL/L (ref 21–32)
CREAT SERPL-MCNC: 2.59 MG/DL (ref 0.55–1.02)
CRP SERPL-MCNC: 16.7 MG/DL (ref 0–0.6)
DIFFERENTIAL METHOD BLD: ABNORMAL
ECHO AO ROOT DIAM: 3.1 CM
ECHO AV PEAK GRADIENT: 13 MMHG
ECHO EST RA PRESSURE: 15 MMHG
ECHO LV EJECTION FRACTION BIPLANE: 66.5 % (ref 55–100)
ECHO LV INTERNAL DIMENSION DIASTOLIC: 4.16 CM (ref 3.9–5.3)
ECHO LV INTERNAL DIMENSION SYSTOLIC: 2.89 CM
ECHO LV IVSD: 1.2 CM (ref 0.6–0.9)
ECHO LV MASS 2D: 176.7 G (ref 67–162)
ECHO LV MASS INDEX 2D: 88.8 G/M2 (ref 43–95)
ECHO LV POSTERIOR WALL DIASTOLIC: 1.21 CM (ref 0.6–0.9)
ECHO LVOT PEAK GRADIENT: 7 MMHG
ECHO MV E DECELERATION TIME (DT): 0.17 S
ECHO MV E VELOCITY: 125 CM/S
ECHO PV PEAK INSTANTANEOUS GRADIENT SYSTOLIC: 6 MMHG
ECHO PV REGURGITANT MAX VELOCITY: 119 CM/S
ECHO PV REGURGITANT MAX VELOCITY: 131 CM/S
ECHO PV REGURGITANT MAX VELOCITY: 183 CM/S
ECHO RIGHT VENTRICULAR SYSTOLIC PRESSURE (RVSP): 67 MMHG
ECHO RV INTERNAL DIMENSION: 4.15 CM
ECHO TV MAX VELOCITY: 361 CM/S
ECHO TV REGURGITANT PEAK GRADIENT: 52 MMHG
EOSINOPHIL # BLD: 0 K/UL (ref 0–0.4)
EOSINOPHIL NFR BLD: 0 % (ref 0–7)
ERYTHROCYTE [DISTWIDTH] IN BLOOD BY AUTOMATED COUNT: 16.9 % (ref 11.5–14.5)
GLUCOSE SERPL-MCNC: 130 MG/DL (ref 65–100)
HCT VFR BLD AUTO: 37.4 % (ref 35–47)
HGB BLD-MCNC: 12.6 % (ref 11.5–16)
IMM GRANULOCYTES # BLD AUTO: 0.7 K/UL (ref 0–0.04)
IMM GRANULOCYTES NFR BLD AUTO: 4 % (ref 0–0.5)
LYMPHOCYTES # BLD: 0.9 K/UL (ref 0.8–3.5)
LYMPHOCYTES NFR BLD: 6 % (ref 12–49)
MCH RBC QN AUTO: 31 PG (ref 26–34)
MCHC RBC AUTO-ENTMCNC: 33.7 G/DL (ref 30–36.5)
MCV RBC AUTO: 91.9 FL (ref 80–99)
MONOCYTES # BLD: 1.2 K/UL (ref 0–1)
MONOCYTES NFR BLD: 7 % (ref 5–13)
NEUTS SEG # BLD: 14.2 K/UL (ref 1.8–8)
NEUTS SEG NFR BLD: 87 % (ref 32–75)
PHOSPHATE SERPL-MCNC: 5.1 MG/DL (ref 2.6–4.7)
PLATELET # BLD AUTO: 385 K/UL (ref 150–400)
PMV BLD AUTO: 10.5 FL (ref 8.9–12.9)
POTASSIUM SERPL-SCNC: 3.8 MMOL/L (ref 3.5–5.1)
PROCALCITONIN SERPL-MCNC: 2.37 NG/ML
RBC # BLD AUTO: 4.07 M/UL (ref 3.8–5.2)
SODIUM SERPL-SCNC: 139 MMOL/L (ref 136–145)
WBC # BLD AUTO: 16.4 K/UL (ref 3.6–11)

## 2020-09-27 PROCEDURE — 77010033678 HC OXYGEN DAILY

## 2020-09-27 PROCEDURE — 74011250636 HC RX REV CODE- 250/636: Performed by: PHYSICIAN ASSISTANT

## 2020-09-27 PROCEDURE — 36415 COLL VENOUS BLD VENIPUNCTURE: CPT

## 2020-09-27 PROCEDURE — 94760 N-INVAS EAR/PLS OXIMETRY 1: CPT

## 2020-09-27 PROCEDURE — 84145 PROCALCITONIN (PCT): CPT

## 2020-09-27 PROCEDURE — 94640 AIRWAY INHALATION TREATMENT: CPT

## 2020-09-27 PROCEDURE — 74011000258 HC RX REV CODE- 258: Performed by: PHYSICIAN ASSISTANT

## 2020-09-27 PROCEDURE — 80069 RENAL FUNCTION PANEL: CPT

## 2020-09-27 PROCEDURE — 85025 COMPLETE CBC W/AUTO DIFF WBC: CPT

## 2020-09-27 PROCEDURE — 74011250637 HC RX REV CODE- 250/637: Performed by: HOSPITALIST

## 2020-09-27 PROCEDURE — 93306 TTE W/DOPPLER COMPLETE: CPT

## 2020-09-27 PROCEDURE — 86140 C-REACTIVE PROTEIN: CPT

## 2020-09-27 PROCEDURE — 74011250637 HC RX REV CODE- 250/637: Performed by: INTERNAL MEDICINE

## 2020-09-27 PROCEDURE — 65270000029 HC RM PRIVATE

## 2020-09-27 PROCEDURE — 74011250636 HC RX REV CODE- 250/636: Performed by: HOSPITALIST

## 2020-09-27 PROCEDURE — 74011000258 HC RX REV CODE- 258: Performed by: HOSPITALIST

## 2020-09-27 PROCEDURE — 74011250637 HC RX REV CODE- 250/637: Performed by: NURSE PRACTITIONER

## 2020-09-27 PROCEDURE — 99232 SBSQ HOSP IP/OBS MODERATE 35: CPT | Performed by: INTERNAL MEDICINE

## 2020-09-27 PROCEDURE — 94660 CPAP INITIATION&MGMT: CPT

## 2020-09-27 RX ORDER — FUROSEMIDE 40 MG/1
40 TABLET ORAL DAILY
Status: DISCONTINUED | OUTPATIENT
Start: 2020-09-27 | End: 2020-09-29

## 2020-09-27 RX ORDER — PREDNISONE 20 MG/1
20 TABLET ORAL
Status: DISCONTINUED | OUTPATIENT
Start: 2020-09-28 | End: 2020-10-03 | Stop reason: HOSPADM

## 2020-09-27 RX ORDER — ALBUTEROL SULFATE 90 UG/1
2 AEROSOL, METERED RESPIRATORY (INHALATION)
Status: DISCONTINUED | OUTPATIENT
Start: 2020-09-27 | End: 2020-10-03 | Stop reason: HOSPADM

## 2020-09-27 RX ADMIN — PIPERACILLIN SODIUM AND TAZOBACTAM SODIUM 3.38 G: 3; .375 INJECTION, POWDER, LYOPHILIZED, FOR SOLUTION INTRAVENOUS at 06:03

## 2020-09-27 RX ADMIN — OMEPRAZOLE 20 MG: 20 CAPSULE, DELAYED RELEASE ORAL at 12:04

## 2020-09-27 RX ADMIN — Medication 10 ML: at 18:39

## 2020-09-27 RX ADMIN — ATORVASTATIN CALCIUM 40 MG: 40 TABLET, FILM COATED ORAL at 21:48

## 2020-09-27 RX ADMIN — RIVAROXABAN 20 MG: 20 TABLET, FILM COATED ORAL at 09:55

## 2020-09-27 RX ADMIN — ONDANSETRON 4 MG: 2 INJECTION INTRAMUSCULAR; INTRAVENOUS at 00:29

## 2020-09-27 RX ADMIN — SODIUM CHLORIDE 600 MG: 0.9 INJECTION INTRAVENOUS at 12:04

## 2020-09-27 RX ADMIN — PIPERACILLIN SODIUM AND TAZOBACTAM SODIUM 3.38 G: 3; .375 INJECTION, POWDER, LYOPHILIZED, FOR SOLUTION INTRAVENOUS at 18:38

## 2020-09-27 RX ADMIN — Medication 10 ML: at 06:03

## 2020-09-27 RX ADMIN — DIGOXIN 0.12 MG: 125 TABLET ORAL at 09:55

## 2020-09-27 RX ADMIN — MIDODRINE HYDROCHLORIDE 10 MG: 5 TABLET ORAL at 09:51

## 2020-09-27 RX ADMIN — ALBUTEROL SULFATE 2 PUFF: 108 AEROSOL, METERED RESPIRATORY (INHALATION) at 20:24

## 2020-09-27 RX ADMIN — ASPIRIN 81 MG: 81 TABLET, COATED ORAL at 09:54

## 2020-09-27 RX ADMIN — MIDODRINE HYDROCHLORIDE 10 MG: 5 TABLET ORAL at 21:48

## 2020-09-27 RX ADMIN — MELATONIN TAB 5 MG 10 MG: 5 TAB at 21:47

## 2020-09-27 RX ADMIN — FAMOTIDINE 20 MG: 20 TABLET ORAL at 00:29

## 2020-09-27 RX ADMIN — ALBUTEROL SULFATE 2 PUFF: 108 AEROSOL, METERED RESPIRATORY (INHALATION) at 13:38

## 2020-09-27 RX ADMIN — Medication 10 ML: at 21:48

## 2020-09-27 RX ADMIN — MULTIVITAMIN TABLET 1 TABLET: TABLET at 09:55

## 2020-09-27 NOTE — PROGRESS NOTES
Renal Progress Note    Patient: Savanna Arango MRN: [id number]  SSN: [id number]    YOB: 1950  Age: 79 y.o. Sex: female      Admit Date: 9/24/2020    LOS: 3 days     Subjective:   Patient seen at bedside.  Alert and awake, sitting in a chair, feeling better, SOB improved  LE edema+ but improving  Creatinine improved to 2.59 today        Current Facility-Administered Medications   Medication Dose Route Frequency    albuterol (PROVENTIL HFA, VENTOLIN HFA, PROAIR HFA) inhaler 2 Puff  2 Puff Inhalation Q6H RT    [START ON 9/28/2020] predniSONE (DELTASONE) tablet 20 mg  20 mg Oral DAILY WITH BREAKFAST    midodrine (PROAMATINE) tablet 10 mg  10 mg Oral BID    famotidine (PEPCID) tablet 20 mg  20 mg Oral Q48H    albuterol-ipratropium (DUO-NEB) 2.5 MG-0.5 MG/3 ML  3 mL Nebulization Q4H PRN    piperacillin-tazobactam (ZOSYN) 3.375 g in 0.9% sodium chloride (MBP/ADV) 100 mL MBP  3.375 g IntraVENous Q8H    aspirin delayed-release tablet 81 mg  81 mg Oral DAILY    omeprazole (PRILOSEC) capsule 20 mg  20 mg Oral DAILY    multivitamin (ONE A DAY) tablet 1 Tab  1 Tab Oral DAILY    digoxin (LANOXIN) tablet 0.125 mg  0.125 mg Oral DAILY    atorvastatin (LIPITOR) tablet 40 mg  40 mg Oral QHS    melatonin tablet 10 mg  10 mg Oral QHS    rivaroxaban (XARELTO) tablet 20 mg  20 mg Oral DAILY    sodium chloride (NS) flush 5-10 mL  5-10 mL IntraVENous PRN    sodium chloride (NS) flush 5-40 mL  5-40 mL IntraVENous Q8H    sodium chloride (NS) flush 5-40 mL  5-40 mL IntraVENous PRN    acetaminophen (TYLENOL) tablet 650 mg  650 mg Oral Q6H PRN    Or    acetaminophen (TYLENOL) suppository 650 mg  650 mg Rectal Q6H PRN    polyethylene glycol (MIRALAX) packet 17 g  17 g Oral DAILY PRN    promethazine (PHENERGAN) tablet 12.5 mg  12.5 mg Oral Q6H PRN    Or    ondansetron (ZOFRAN) injection 4 mg  4 mg IntraVENous Q6H PRN    VANCOMYCIN INFORMATION NOTE 1 Each  1 Each Other Rx Dosing/Monitoring        Vitals: 09/27/20 0951 09/27/20 0954 09/27/20 1338 09/27/20 1600   BP: 134/78      Pulse: 82 82  75   Resp: 16   18   Temp: 97.4 °F (36.3 °C)   98 °F (36.7 °C)   SpO2: 97%  97% 98%   Weight:       Height:         Objective:   General: Elderly female, alert awake well-oriented, no acute distress. HEENT: EOMI, no Icterus, no Pallor,  mucosa moist,  throat clear. Neck: Neck is supple, No JVD,   Lungs: fair air entry+ no rhonchi, no rales,  CVS: heart sounds normal, no murmurs, no rubs. GI: soft, nontender, normal BS,   Extremeties: no clubbing, no cyanosis, 1-2+ bilateral extremity edema present,  Neuro: Alert, awake, oriented x3, speech is normal   Skin: normal skin turgor,   Musculoskeletal: no acute joint swellings       Intake and Output:  Current Shift: No intake/output data recorded. Last three shifts: 09/25 1901 - 09/27 0700  In: 640 [P.O.:240; I.V.:400]  Out: -       Lab/Data Review:  Recent Labs     09/27/20  1205 09/26/20  0536 09/25/20  1823   WBC 16.4* 27.3* 30.8*   HGB 12.6 13.4 13.5   HCT 37.4 39.9 39.7    329 304     Recent Labs     09/27/20  1205 09/26/20  0536 09/25/20  1823 09/25/20  0303  09/25/20  0001    133* 130* 132*   < >  --    K 3.8 4.4 4.3 4.0   < >  --     101 100 99   < >  --    CO2 23 19* 20* 20*   < >  --    * 128* 156* 81   < >  --    BUN 79* 79* 81* 66*   < >  --    CREA 2.59* 3.81* 3.81* 3.69*   < >  --    CA 8.8 8.5 8.7 8.3*   < >  --    MG  --   --   --   --   --  1.5*   PHOS 5.1* 6.5*  --   --   --  4.9*   ALB 3.3* 3.0*  --  2.4*  --   --    TBILI  --   --   --  0.5  --   --    ALT  --   --   --  15  --   --    INR  --   --   --   --   --  1.9*    < > = values in this interval not displayed.      Recent Labs     09/24/20  0045   PH 7.26*   PCO2 40   PO2 65*   HCO3 18*   FIO2 21     Recent Results (from the past 24 hour(s))   ECHO ADULT COMPLETE    Collection Time: 09/27/20  9:07 AM   Result Value Ref Range    Pulmonic Regurgitant End Max Velocity 183.00 cm/s AoV PG 13.00 mmHg    Ao Root D 3.10 cm    IVSd 1.20 (A) 0.6 - 0.9 cm    LVIDd 4.16 3.9 - 5.3 cm    LVIDs 2.89 cm    Pulmonic Regurgitant End Max Velocity 131.00 cm/s    LVOT Peak Gradient 7.00 mmHg    LVPWd 1.21 (A) 0.6 - 0.9 cm    BP EF 66.5 55 - 100 %    Mitral Valve E Wave Deceleration Time 0.17 s    MV E [place] 125.00 cm/s    Pulmonic Regurgitant End Max Velocity 119.00 cm/s    Pulmonic Valve Systolic Peak Instantaneous Gradient 6.00 mmHg    Est. RA Pressure 15.00 mmHg    RVIDd 4.15 cm    RVSP 67.00 mmHg    Tricuspid Valve Max Velocity 361.00 cm/s    Triscuspid Valve Regurgitation Peak Gradient 52.00 mmHg    LV Mass .7 67 - 162 g    LV Mass AL Index 88.8 43 - 95 g/m2   CBC WITH AUTOMATED DIFF    Collection Time: 09/27/20 12:05 PM   Result Value Ref Range    WBC 16.4 (H) 3.6 - 11.0 K/uL    RBC 4.07 3.80 - 5.20 M/uL    HGB 12.6 11.5 - 16.0 %    HCT 37.4 35.0 - 47.0 %    MCV 91.9 80.0 - 99.0 FL    MCH 31.0 26.0 - 34.0 PG    MCHC 33.7 30.0 - 36.5 g/dL    RDW 16.9 (H) 11.5 - 14.5 %    PLATELET 371 002 - 955 K/uL    MPV 10.5 8.9 - 12.9 FL    NEUTROPHILS 87 (H) 32 - 75 %    LYMPHOCYTES 6 (L) 12 - 49 %    MONOCYTES 7 5 - 13 %    EOSINOPHILS 0 0 - 7 %    BASOPHILS 0 0 - 1 %    IMMATURE GRANULOCYTES 4 (H) 0.0 - 0.5 %    ABS. NEUTROPHILS 14.2 (H) 1.8 - 8.0 K/UL    ABS. LYMPHOCYTES 0.9 0.8 - 3.5 K/UL    ABS. MONOCYTES 1.2 (H) 0.0 - 1.0 K/UL    ABS. EOSINOPHILS 0.0 0.0 - 0.4 K/UL    ABS. BASOPHILS 0.0 0.0 - 0.1 K/UL    ABS. IMM.  GRANS. 0.7 (H) 0.00 - 0.04 K/UL    DF AUTOMATED     C REACTIVE PROTEIN, QT    Collection Time: 09/27/20 12:05 PM   Result Value Ref Range    C-Reactive protein 16.70 (H) 0.00 - 0.60 mg/dL   PROCALCITONIN    Collection Time: 09/27/20 12:05 PM   Result Value Ref Range    Procalcitonin 2.37 (H) 0 ng/mL   RENAL FUNCTION PANEL    Collection Time: 09/27/20 12:05 PM   Result Value Ref Range    Sodium 139 136 - 145 mmol/L    Potassium 3.8 3.5 - 5.1 mmol/L    Chloride 105 97 - 108 mmol/L    CO2 23 21 - 32 mmol/L    Anion gap 11 5 - 15 mmol/L    Glucose 130 (H) 65 - 100 mg/dL    BUN 79 (H) 6 - 20 mg/dL    Creatinine 2.59 (H) 0.55 - 1.02 mg/dL    BUN/Creatinine ratio 31 (H) 12 - 20      GFR est AA 22 (L) >60 ml/min/1.73m2    GFR est non-AA 18 (L) >60 ml/min/1.73m2    Calcium 8.8 8.5 - 10.1 mg/dL    Phosphorus 5.1 (H) 2.6 - 4.7 mg/dL    Albumin 3.3 (L) 3.5 - 5.0 g/dL        Assessment and Plan:     1. Acute Kidney Injury on ? ?CKD: probably prerenal azotemia secondary to use of Lasix and lisinopril, borderline hypotension+  lisinopril on hold  Continue lasix 40 mg po daily  Will monitor I/Os, renal functions and adjust diuretics as needed  Recommend to check urinalysis,urine random electrolytes, creatinine and protein( not done as ordered so far)   CT abdomen negative for hydro  ? ? Hx of Polyangiitis: Recommend workup for acute glomerulonephritis including VIOLETA titers,  C3, C4, ASO titers, ANCA panel,  hepatitis B and C. Profile.   UA is negative for proteinuria or significant cells, unlikely to have acute GN    Chronic kidney disease: No definite history of chronic kidney disease,    will repeat renal functions and continue to monitor to assess the baseline     Acute shortness of breath/history of COPD/obstructive sleep/CHF/chronic edema  Improved clinically with diuretics  No wheezing or SOB now  Continue bronchodilators and steroids  continue diuretics  Continue IV antibiotic as per primary service    Hypotension: Borderline low blood pressures lisinopril was discontinued , not on any antihypertensive medications   Improved Bps and improved renal status with adding midodrine( may dc if SBPs are >140)   continue to monitor blood pressures    Lower extremity edema: probably related to congestive heart failure/pulmonary hypertension   continue diuretics with lasix 40 po daily  Advised to maintain p.o. fluid restriction of 7084-6290 ml per day and and salt restriction,  Will monitor fluid status clinically and adjust diuretics as needed.          Signed By: Sultana Del Real MD     September 27, 2020

## 2020-09-27 NOTE — PROGRESS NOTES
Progress Note    Patient: Mirta Pate MRN: [id number]  SSN: [id number]    YOB: 1950  Age: 79 y.o. Sex: female      Admit Date: 9/24/2020    LOS: 3 days     Subjective:   Patient followed for suspected sepsis but no obvious source of infection, though with suspicious right pleural effusion, possibly parapneumonic. Blood culture has been negative. WBC has been decreasing. Procalcitonin and CRP markedly elevated. She is currently on IV Vancomycin and Zosyn. Patient resting comfortably. States that she feels that her infection is coming from her teeth. Objective:     Vitals:    09/26/20 1115 09/26/20 2024 09/27/20 0034 09/27/20 0741   BP:  (!) 110/59 114/63    Pulse:  74 82    Resp:  18 16    Temp:  97.6 °F (36.4 °C) 97.6 °F (36.4 °C)    SpO2: 98% 98% 98% 100%   Weight:  202 lb 8 oz (91.9 kg)     Height:            Intake and Output:  Current Shift: No intake/output data recorded. Last three shifts: 09/25 1901 - 09/27 0700  In: 640 [P.O.:240; I.V.:400]  Out: -     Physical Exam:    Vitals signs and nursing note reviewed. Constitutional:       General: She is not in acute distress. Appearance: She is obese. She is ill-appearing. She is not toxic-appearing or diaphoretic. HENT:         Comments: Nasal O2     Mouth/Throat:      Mouth: Mucous membranes are dry. Pharynx: Oropharynx is clear. Cardiovascular:      Rate and Rhythm: Normal rate and regular rhythm. Heart sounds: No murmur. Pulmonary:      Breath sounds: No rhonchi or rales. Comments: Diminished BS   Genitourinary:     Comments: No Bustos  Skin:     Findings: Bruising and lesion (right thumb wound healing) present. Neurological:      General: No focal deficit present. Mental Status: She is alert and oriented to person, place, and time. Psychiatric:         Behavior: Behavior normal.         Thought Content:  Thought content normal.         Judgment: Judgment normal.      Comments: Depressed mood Lab/Data Review:  WBC Pending  Procalcitonin Pending (7.64)  CRP Pending (41.20)    Blood cultures (9/22) No growth at 3 days  Assessment:       1. Suspected sepsis with leukocytosis, elevated ESR, procalcitonin, CRP, etiology unclear. 2. Right pleural effusion, etiology unclear, ?parapneumonic  3. Ruling out Rheumatologic disease  4. Acute kidney injury  5. COPD by history     Comment:  Still no clear source of infection, but high index of suspicion for infected pleural effusion. Plan:      1. Continue  IV Vancomycin and Zosyn  2. Follow-up blood cultures and urine culture  3. Pending IR consultation for possible CT directed thoracentesis  4. In am, repeat CBC, procalcitonin and CRP, done  5. Indium scan  6.  Pending Rheumatologic work-up      Signed By: Alexander Peoples MD     September 27, 2020

## 2020-09-27 NOTE — CONSULTS
PULMONARY NOTE  [facility]    Name: Mirta Pate MRN: [id number]   : 1950 Hospital: [address] [place] [address]   Date: 2020  Admission date: 2020 Hospital Day: 4       HPI:     Hospital Problems  Date Reviewed: 2015          Codes Class Noted POA    Hyponatremia ICD-10-CM: E87.1  ICD-9-CM: 276.1  2020 Yes        Leukocytosis ICD-10-CM: D72.829  ICD-9-CM: 288.60  2020 Yes        Hypoalbuminemia ICD-10-CM: E88.09  ICD-9-CM: 273.8  2020 Unknown        Acute on chronic renal failure ([facility] ICD-10-CM: N17.9, N18.9  ICD-9-CM: 584.9, 585.9  2020 Yes        RLL pneumonia ICD-10-CM: J18.9  ICD-9-CM: 117  2020 Yes        Sepsis ([facility] ICD-10-CM: A41.9  ICD-9-CM: 038.9, 995.91  2020 Yes        PNA (pneumonia) ICD-10-CM: J18.9  ICD-9-CM: 954  2020 Yes                   [x] High complexity decision making was performed  [x] See my orders for details      Subjective/Initial History:     I was asked by Alma Gray MD to see Mirta Pate  a 79 y.o.  female in consultation     Excerpts from admission 2020 or consult notes as follows:   22-year-old lady came in because of shortness of breath generalized weakness and also she was in atrial fibrillation past medical history of obstructive sleep apnea syndrome chronic A. fib cardioversion in the past hypertension in fact her blood pressure was low also she has COPD nebulizer treatment not on any oxygen at home complaining about cough without any productive sputum and she was feeling lightheaded dizzy no history of passing out so admitted and pulmonary consult was called for further evaluation.       No Known Allergies     MAR reviewed and pertinent medications noted or modified as needed     Current Facility-Administered Medications   Medication    albuterol (PROVENTIL HFA, VENTOLIN HFA, PROAIR HFA) inhaler 2 Puff    midodrine (PROAMATINE) tablet 10 mg    famotidine (PEPCID) tablet 20 mg  albuterol-ipratropium (DUO-NEB) 2.5 MG-0.5 MG/3 ML    piperacillin-tazobactam (ZOSYN) 3.375 g in 0.9% sodium chloride (MBP/ADV) 100 mL MBP    aspirin delayed-release tablet 81 mg    omeprazole (PRILOSEC) capsule 20 mg    multivitamin (ONE A DAY) tablet 1 Tab    digoxin (LANOXIN) tablet 0.125 mg    atorvastatin (LIPITOR) tablet 40 mg    melatonin tablet 10 mg    rivaroxaban (XARELTO) tablet 20 mg    sodium chloride (NS) flush 5-10 mL    sodium chloride (NS) flush 5-40 mL    sodium chloride (NS) flush 5-40 mL    acetaminophen (TYLENOL) tablet 650 mg    Or    acetaminophen (TYLENOL) suppository 650 mg    polyethylene glycol (MIRALAX) packet 17 g    promethazine (PHENERGAN) tablet 12.5 mg    Or    ondansetron (ZOFRAN) injection 4 mg    methylPREDNISolone (PF) (Solu-MEDROL) 600 mg in 0.9% sodium chloride 100 mL IVPB    VANCOMYCIN INFORMATION NOTE 1 Each      Patient PCP: None  PMH:  has a past medical history of AF (paroxysmal atrial fibrillation) ([facility]), Anemia, Anxiety, Chronic obstructive pulmonary disease ([address] Frequent urination, Heart failure ([address] HTN (hypertension), Irregular heart beat, Obesity, HORACE (obstructive sleep apnea), SOB (shortness of breath) on exertion, Stress, Stroke ([address] and Wheezing. She also has no past medical history of Chronic pain. PSH:   has a past surgical history that includes echocardiogram (11/23/2009); nm cardiac spect w strs/rest mult (08/2006); and hx gastric bypass. FHX: family history includes COPD in her mother; Colon Cancer in her father; Diabetes in her mother; Stroke in her brother. SHX:  reports that she has quit smoking. She has never used smokeless tobacco. She reports previous alcohol use. She reports that she does not use drugs. ROS:    Review of Systems   Constitutional: Positive for diaphoresis and malaise/fatigue. Negative for weight loss. HENT: Negative. Eyes: Negative. Respiratory: Positive for cough and shortness of breath. Cardiovascular: Positive for orthopnea. Gastrointestinal: Negative. Genitourinary: Negative. Musculoskeletal: Negative. Skin: Negative. Neurological: Positive for dizziness and weakness. Endo/Heme/Allergies: Negative. Psychiatric/Behavioral: Negative. Objective:     Vital Signs: Telemetry:    normal sinus rhythm Intake/Output:   Visit Vitals  /78   Pulse 82 Comment: apical   Temp 97.4 °F (36.3 °C)   Resp 16   Ht 5' 5\" (1.651 m)   Wt 91.9 kg (202 lb 8 oz)   SpO2 97%   BMI 33.70 kg/m²       Temp (24hrs), Av.5 °F (36.4 °C), Min:97.4 °F (36.3 °C), Max:97.6 °F (36.4 °C)        O2 Device: Nasal cannula O2 Flow Rate (L/min): 2 l/min(wean o2 to 1lpm nc per o2 protocol)       Wt Readings from Last 4 Encounters:   20 91.9 kg (202 lb 8 oz)   10/18/16 83.6 kg (184 lb 3.2 oz)   12/24/15 79.8 kg (176 lb)   08/18/15 79.8 kg (176 lb)          Intake/Output Summary (Last 24 hours) at 2020 1147  Last data filed at 2020 1923  Gross per 24 hour   Intake 400 ml   Output    Net 400 ml       Last shift:      No intake/output data recorded. Last 3 shifts:  1901 -  0700  In: 640 [P.O.:240; I.V.:400]  Out: -        Physical Exam:     Physical Exam   Constitutional: She is oriented to person, place, and time. She appears well-developed. HENT:   Head: Normocephalic and atraumatic. Eyes: Pupils are equal, round, and reactive to light. Conjunctivae and EOM are normal.   Neck: Normal range of motion. Neck supple. Cardiovascular: Regular rhythm. irregular   Pulmonary/Chest: Breath sounds normal. She is in respiratory distress. Abdominal: Soft. Bowel sounds are normal.   Neurological: She is alert and oriented to person, place, and time. Skin: Skin is warm. Psychiatric: She has a normal mood and affect.  Her behavior is normal. Judgment and thought content normal.        Labs:    Recent Labs     20  0536 20  1823 20  0303 20  0001 20  1245 WBC 27.3* 30.8* 35.4*  --   --    HGB 13.4 13.5 13.3  --   --     304 286  --   --    INR  --   --   --  1.9* 1.4*   APTT  --   --   --  46.4* 43.7*     Recent Labs     09/26/20  0536 09/25/20  1823 09/25/20  0303 09/25/20  0001 09/24/20  1245   * 130* 132*  --   --    K 4.4 4.3 4.0  --   --     100 99  --   --    CO2 19* 20* 20*  --   --    * 156* 81  --   --    BUN 79* 81* 66*  --   --    CREA 3.81* 3.81* 3.69*  --   --    CA 8.5 8.7 8.3*  --   --    MG  --   --   --  1.5*  --    PHOS 6.5*  --   --  4.9*  --    LAC  --   --  0.8 1.2 1.0   ALB 3.0*  --  2.4*  --   --    ALT  --   --  15  --   --      Recent Labs     09/24/20  2315   PH 7.26*   PCO2 40   PO2 65*   HCO3 18*   FIO2 21     Recent Labs     09/26/20  0536   CPK 46     No results found for: BNPP, BNP   Lab Results   Component Value Date/Time    Culture result: No growth 3 days 09/24/2020 12:45 PM   No results found for: TSH, TSHEXT, TSHEXT    Imaging:    CXR Results  (Last 48 hours)    None        Results from Hospital Encounter encounter on 09/24/20   XR CHEST SNGL V    Narrative History is shortness of breath. Comparison Is with the chest x-ray of 12/18/2019. An AP portable erect view of the chest demonstrate cardiac monitor leads. There  is now a small to moderate right pleural effusion. There is a small calcified  right lower lobe granuloma. The heart is enlarged. There is approximately 27  degrees dextroscoliosis of the thoracic spine. There is degenerative change  present in the spine as well. Impression IMPRESSION: Cardiomegaly. New right pleural effusion. No change in right lower  lobe granuloma. Dextroscoliosis. Results from [place] encounter on 09/24/20   CT CHEST WO CONT    Narrative The study is a CT evaluation of the chest dated 9/25/2020. HISTORY: History of chronic obstructive pulmonary disease. Heart failure. Recent  pleural fluid collection.     Technique: Performed without intravenous contrast. 3 mm axial imaging, axial MIP  imaging, sagittal, and coronal reconstructed imaging sequences are submitted for  evaluation. Thinner section Super Dimension imaging was also performed. Dose Reduction Technique was employed to reduce radiation exposure - This  includes reduction optimization techniques as appropriate to a performed exam  with automated exposure control adjustments of the mA and/or Kv according to  patient size, or use of iterative reconstruction technique. COMPARISON: Previous CT evaluation of the chest dated 12/18/2019. Recent chest  radiograph dated 9/25/2019. MEDIASTINUM: There are scattered normal sized middle mediastinal lymph nodes  without progression. The largest mediastinal lymph node is located within the  subcarinal region and this lymph node measures 10 mm in short axis measurement  which is considered within normal limits. There is an area of dystrophic calcification within the left thyroid lobe and  mild heterogeneity of the left thyroid lobe. This examination is negative for  large or dominant thyroid nodule or mass. LUNGS AND PLEURA: There is a moderate sized loculated right pleural effusion. This examination is negative for pleural based masses, pleural thickening, or  gas within the pleural fluid collection. There is encasement of the adjacent  lung with areas of passive atelectasis. There is a calcified granuloma within the right lung base. On axial maximum  intensity imaging, there are several additional small scattered micronodules  with 2 nodules demonstrated posteriorly within the left lower lobe (series [id number]  image number 42). This examination is negative for larger pulmonary nodules or  masses. The central trachea and bronchial tree are patent. There is bronchial  wall thickening and discoid opacities within the lung bases consistent with  reactive airways disease.     CARDIOVASCULAR: There are moderate calcifications of the coronary arteries. There is moderate enlargement of the atrial chambers and milder enlargement of  the ventricular chambers. There is a normal caliber to the thoracic aorta. CHEST WALL: There is multilevel spondylosis along the thoracic spine. There is a  mild to moderate dextroconvex scoliosis. There is increased dorsal kyphosis of  the thoracic spine. There are older healed right posterior rib fractures. OTHER: There is surgical suture material demonstrated within the gastric region  consistent with previous bariatric surgery. Impression IMPRESSION:  1. There is a moderate sized loculated right pleural effusion associated with  passive atelectasis. The differential diagnosis would include recent pneumonia  with a residual parapneumonic effusion. Since exam an [de-identified] is negative for pleural  based masses or findings specific for a malignant effusion. 2.  There are several scattered normal size middle mediastinal lymph nodes. 3.  There are findings of prior granulomatous exposure. Please see above text  for further details. IMPRESSION:   1. Acute hypoxic Respiratory failure  2. Chronic Obstructive Pulmonary Disease with Severe Acute Exacerbation requiring inpatient hospitalization and management; has very poor airway clearance. Increased work of breathing  Body mass index is 33.7 kg/m². 3. Chronic A. Fib  4. Leukocytosis  5. Right lower lobe granuloma  6. Right pleural effusion possible parapneumonic effusion which is loculated  7. Obstructive sleep apnea  8. Pt is requiring Drug therapy requiring intensive monitoring for toxicity  9. Pt is unstable, unpredictable needing inpatient monitoring; is acutely ill and at high risk of sudden decline and decompensation with severe consequenses and continued end organ dysfunction and failure  10. Prognosis guarded       RECOMMENDATIONS/PLAN:     1.  Patient is on oxygen via nasal cannula 2 L we will continue to wean she is not on any oxygen at home agree with IV Solu-Medrol continue Solu-Medrol start patient on prednisone  2. Interventional radiology for CT-guided thoracentesis for loculated right pleural effusion  3. Agree with Empiric IV antibiotics pending culture results   4. Follow culture results  5. She has sleep apnea but she does not wear her CPAP machine we will start her on CPAP tonight  6. For loculated pleural effusion will get CAT scan of the chest  7. Supplemental O2 to keep sats > 93%  8. Aspiration precautions  9. Labs to follow electrolytes, renal function and and blood counts  10. Glucose monitoring and SSI  11. Bronchial hygiene with respiratory therapy techniques, bronchodilators  12. DVT, SUP prophylaxis         This care involved high complexity medical decision making: I personally:  · Reviewed the flowsheet and previous days notes  · Reviewed and summarized records or history from previous days note or discussions with staff, family  · High Risk Drug therapy requiring intensive monitoring for toxicity: eg steroids, pressors, antibiotics  · Reviewed and/or ordered Clinical lab tests  · Reviewed images and/or ordered Radiology tests  · Reviewed the patients ECG / Telemetry  · Reviewed and/or adjusted NiPPV settings  · Called and arranged for Radiologic procedures or interventions  · performed or ordered Diagnostic endoscopies with identified risk factors.   · discussed my assessment/management with : Nursing, Hospitalist and Family for coordination of care          Blaine Morales MD

## 2020-09-27 NOTE — PROGRESS NOTES
Progress Note  Date:2020       Room:[facility]  Patient Name:Kristyn Polk     Date of Birth:     Age:70 y.o. Subjective    Subjective:  Symptoms:  Stable. She reports shortness of breath and diarrhea. Diet:  Adequate intake. She reports  vomiting. No nausea. Activity level: Normal.    Pain:  She reports no pain. Patient seen on f/u sitting in chair at bedside  Reports shortness breath  No acute distress noted at this time  Review of Systems   Constitutional: Negative for fatigue and fever. Respiratory: Positive for shortness of breath. SOB Improving   Cardiovascular: Positive for leg swelling. Gastrointestinal: Positive for diarrhea and vomiting. Negative for nausea. Genitourinary: Positive for frequency. Frequent urination secondary to Lasix     Allergic/Immunologic: Negative. Neurological: Negative. Hematological: Negative. Psychiatric/Behavioral: Negative. Objective         Vitals Last 24 Hours:  TEMPERATURE:  Temp  Av.6 °F (36.4 °C)  Min: 97.5 °F (36.4 °C)  Max: 97.6 °F (36.4 °C)  RESPIRATIONS RANGE: Resp  Av  Min: 16  Max: 20  PULSE OXIMETRY RANGE: SpO2  Av.6 %  Min: 98 %  Max: 100 %  PULSE RANGE: Pulse  Av  Min: 74  Max: 82  BLOOD PRESSURE RANGE: Systolic (25IGV), RSE:757 , Min:109 , NCH:746   ; Diastolic (04ZFX), TTE:07, Min:56, Max:63    I/O (24Hr): Intake/Output Summary (Last 24 hours) at 2020 0843  Last data filed at 2020 1923  Gross per 24 hour   Intake 640 ml   Output    Net 640 ml     Objective:  General Appearance:  Comfortable and in no acute distress. Vital signs: (most recent): Blood pressure 114/63, pulse 82, temperature 97.6 °F (36.4 °C), resp. rate 16, height 5' 5\" (1.651 m), weight 91.9 kg (202 lb 8 oz), SpO2 100 %. No fever. Output: Producing urine and producing stool. HEENT: Normal HEENT exam.    Lungs:  Normal respiratory rate.   There are decreased breath sounds. (Decreased lung sounds bilat, worsen on right side. Coarse cough, scattered rhonchi, 2L NC)  Heart: Normal rate. Regular rhythm. S1 normal and S2 normal.    Abdomen: Abdomen is soft. Bowel sounds are normal.   There is no abdominal tenderness. Extremities: Normal range of motion. There is dependent edema. (+3 Bilat lower extremity edema)  Pulses: Distal pulses are intact. Neurological: Patient is alert and oriented to person, place and time. Pupils:  Pupils are equal, round, and reactive to light. Skin:  Warm and dry. Labs/Imaging/Diagnostics    Labs:  CBC:  Recent Labs     09/26/20  0536 09/25/20 1823 09/25/20  0303   WBC 27.3* 30.8* 35.4*   RBC 4.30 4.32 4.29   HGB 13.4 13.5 13.3   HCT 39.9 39.7 39.8   MCV 92.8 91.9 92.8   RDW 16.7* 16.2* 16.2*    304 286     CHEMISTRIES:  Recent Labs     09/26/20  0536 09/25/20 1823 09/25/20 0303 09/25/20  0001   * 130* 132*  --    K 4.4 4.3 4.0  --     100 99  --    CO2 19* 20* 20*  --    BUN 79* 81* 66*  --    CA 8.5 8.7 8.3*  --    PHOS 6.5*  --   --  4.9*   MG  --   --   --  1.5*   PT/INR:  Recent Labs     09/25/20  0001 09/24/20  1245   INR 1.9* 1.4*     APTT:  Recent Labs     09/25/20  0001 09/24/20  1245   APTT 46.4* 43.7*     LIVER PROFILE:  Recent Labs     09/25/20  0303 09/24/20  1141   AST 23 20   ALT 15 11*     Lab Results   Component Value Date/Time    ALT (SGPT) 15 09/25/2020 03:03 AM    AST (SGOT) 23 09/25/2020 03:03 AM    Alk. phosphatase 152 (H) 09/25/2020 03:03 AM    Bilirubin, total 0.5 09/25/2020 03:03 AM       Imaging Last 24 Hours:  No results found. Assessment//Plan     Sepsis: elevated WBC 35.4, Procal 7.64, elevated ESR, and pneumonia. Continue IV vancomycin and zosyn. Blood cx no growth x 3 days. Infectious disease following. Pneumonia - noted on cxr. Continue Vanc/Zosyn IV. Currently on NC 2L to maintain sats >93%, does not use home O2.  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 Tissue Serology results pending. IR consultation for possible CT directed thoracentesis    Right pleural effusion: Noted on chest ct. IR consulted for CT-guided thoracentesis     Acute respiratory failure - secondary to above. NC to maintain sats >93% Wean as tolorated. COPD - Continue IV Lasix. NC to maintain sats >93% Continue to wean. Pulmonology consulted. Due-nebs PRN. Daily weights. Strict I & O. MARILUZ vs CKD- Creat 3.81. Nephrology following. Continue IV Lasix 40mg daily. Acute glomerulonephritis workup pending: VIOLETA titers, C3, C4, ASO titers, ANCA panel,  hepatitis B and C profile pending    Hyponatremia - Na 133 from 130. Continue to trend. Monitor for signs of hyponatremia     Leukocytosis - WBC 27.3 from 30.8. Afebrile. Continue Vanc/Zosyn IV. Blood cultures neg. Monitor for fevers. Continue to trend. AFIB - Controlled. Continue Xarelto and digoxin PO. Echo pending        Plan   IR consultation for possible CT directed thoracentesis  VIOLETA titers,  C3, C4, ASO titers, ANCA panel,  hepatitis B and C profile pending  Echo pending, supplemental O2  Continue Vanc/Zosyn, follow blood cx  Continue Lasix, Daily Weights, Strict I & O's  Wean o2 as tolerated, add albuterol    Above treatment plan reviewed and discussed with patient in detail at bedside, all questions answered.       Full Code  Xarelto dvt prophy  Home meds reviewed and reconciled    Electronically signed by Jak Jernigan NP on 9/27/2020 at 9:14 AM

## 2020-09-27 NOTE — PROGRESS NOTES
Problem: Airway Clearance - Ineffective  Goal: *Patent airway  Outcome: Progressing Towards Goal  Goal: *Able to cough effectively  Outcome: Progressing Towards Goal  Note: Patient has a good strong cough     Problem: Falls - Risk of  Goal: *Absence of Falls  Description: Document Bandar Fall Risk and appropriate interventions in the flowsheet. Outcome: Progressing Towards Goal  Note: Fall Risk Interventions:  Bed is in the lowest position and wheels are locked, call bell is within reach, bathroom light is on during evening hours, gripper socks are on and patient has been instructed to call out for assistance if needed. As of now, patient is free from falls and will continue to be monitored. Bedside shift change report given to Eloisa Boxer, RN (oncoming nurse) by Caleb Sibley RN (offgoing nurse). Report included the following information SBAR, Kardex, Intake/Output, MAR, Accordion and Cardiac Rhythm a-Fib.

## 2020-09-28 LAB
ALBUMIN SERPL-MCNC: 3 G/DL (ref 3.5–5)
ANION GAP SERPL CALC-SCNC: 6 MMOL/L (ref 5–15)
BASOPHILS # BLD: 0 K/UL (ref 0–0.1)
BASOPHILS NFR BLD: 0 % (ref 0–1)
BUN SERPL-MCNC: 73 MG/DL (ref 6–20)
BUN/CREAT SERPL: 42 (ref 12–20)
C-ANCA TITR SER IF: NORMAL TITER
CA-I BLD-MCNC: 9.7 MG/DL (ref 8.5–10.1)
CHLORIDE SERPL-SCNC: 110 MMOL/L (ref 97–108)
CO2 SERPL-SCNC: 25 MMOL/L (ref 21–32)
CREAT SERPL-MCNC: 1.75 MG/DL (ref 0.55–1.02)
CRP SERPL-MCNC: 12.8 MG/DL (ref 0–0.6)
CULTURE,CULT: NORMAL
DATE LAST DOSE: NORMAL
DIFFERENTIAL METHOD BLD: ABNORMAL
EOSINOPHIL # BLD: 0 K/UL (ref 0–0.4)
EOSINOPHIL NFR BLD: 0 % (ref 0–7)
ERYTHROCYTE [DISTWIDTH] IN BLOOD BY AUTOMATED COUNT: 16.9 % (ref 11.5–14.5)
ERYTHROCYTE [SEDIMENTATION RATE] IN BLOOD: 52 MM/HR
GLUCOSE SERPL-MCNC: 174 MG/DL (ref 65–100)
HCT VFR BLD AUTO: 35.4 % (ref 35–47)
HGB BLD-MCNC: 12 % (ref 11.5–16)
IMM GRANULOCYTES # BLD AUTO: 0 K/UL
IMM GRANULOCYTES NFR BLD AUTO: 0 %
INR PPP: 1.6 (ref 0.9–1.1)
LYMPHOCYTES # BLD: 1.2 K/UL (ref 0.8–3.5)
LYMPHOCYTES NFR BLD: 9 % (ref 12–49)
MCH RBC QN AUTO: 30.8 PG (ref 26–34)
MCHC RBC AUTO-ENTMCNC: 33.9 G/DL (ref 30–36.5)
MCV RBC AUTO: 90.8 FL (ref 80–99)
METAMYELOCYTES NFR BLD MANUAL: 1 %
MONOCYTES # BLD: 1 K/UL (ref 0–1)
MONOCYTES NFR BLD: 8 % (ref 5–13)
MYELOCYTES NFR BLD MANUAL: 6 %
MYELOPEROXIDASE AB SER IA-ACNC: <9
NEUTS SEG # BLD: 10 K/UL (ref 1.8–8)
NEUTS SEG NFR BLD: 76 % (ref 32–75)
NRBC # BLD: 0.05 K/UL (ref 0–0.01)
NRBC BLD-RTO: 0.4 PER 100 WBC
P-ANCA ATYPICAL TITR SER IF: NORMAL TITER
P-ANCA TITR SER IF: NORMAL TITER
PHOSPHATE SERPL-MCNC: 3.6 MG/DL (ref 2.6–4.7)
PLATELET # BLD AUTO: 383 K/UL (ref 150–400)
PLATELET COMMENTS,PCOM: ABNORMAL
PMV BLD AUTO: 10.2 FL (ref 8.9–12.9)
POTASSIUM SERPL-SCNC: 3.6 MMOL/L (ref 3.5–5.1)
PROCALCITONIN SERPL-MCNC: 0.98 NG/ML
PROTEINASE3 AB SER IA-ACNC: <3.5 U/ML
PROTHROMBIN TIME: 18.9 SEC (ref 11.9–14.7)
RBC # BLD AUTO: 3.9 M/UL (ref 3.8–5.2)
RBC MORPH BLD: ABNORMAL
REPORTED DOSE,DOSE: NORMAL UNITS
SARS-COV-2, COV2: NORMAL
SARS-COV-2, COV2: NOT DETECTED
SODIUM SERPL-SCNC: 141 MMOL/L (ref 136–145)
SPECIAL REQUESTS,SREQ: NORMAL
VANCOMYCIN SERPL-MCNC: 7 UG/ML
WBC # BLD AUTO: 13.1 K/UL (ref 3.6–11)

## 2020-09-28 PROCEDURE — 80202 ASSAY OF VANCOMYCIN: CPT

## 2020-09-28 PROCEDURE — 74011250637 HC RX REV CODE- 250/637: Performed by: INTERNAL MEDICINE

## 2020-09-28 PROCEDURE — 94640 AIRWAY INHALATION TREATMENT: CPT

## 2020-09-28 PROCEDURE — 36415 COLL VENOUS BLD VENIPUNCTURE: CPT

## 2020-09-28 PROCEDURE — 85025 COMPLETE CBC W/AUTO DIFF WBC: CPT

## 2020-09-28 PROCEDURE — 74011000258 HC RX REV CODE- 258: Performed by: PHYSICIAN ASSISTANT

## 2020-09-28 PROCEDURE — 74011250637 HC RX REV CODE- 250/637: Performed by: NURSE PRACTITIONER

## 2020-09-28 PROCEDURE — 85652 RBC SED RATE AUTOMATED: CPT

## 2020-09-28 PROCEDURE — 99232 SBSQ HOSP IP/OBS MODERATE 35: CPT | Performed by: INTERNAL MEDICINE

## 2020-09-28 PROCEDURE — 65270000029 HC RM PRIVATE

## 2020-09-28 PROCEDURE — 74011250637 HC RX REV CODE- 250/637: Performed by: HOSPITALIST

## 2020-09-28 PROCEDURE — 80069 RENAL FUNCTION PANEL: CPT

## 2020-09-28 PROCEDURE — 74011250637 HC RX REV CODE- 250/637: Performed by: PHYSICIAN ASSISTANT

## 2020-09-28 PROCEDURE — 74011250636 HC RX REV CODE- 250/636: Performed by: PHYSICIAN ASSISTANT

## 2020-09-28 PROCEDURE — 87635 SARS-COV-2 COVID-19 AMP PRB: CPT

## 2020-09-28 PROCEDURE — 85610 PROTHROMBIN TIME: CPT

## 2020-09-28 PROCEDURE — 86140 C-REACTIVE PROTEIN: CPT

## 2020-09-28 PROCEDURE — 84145 PROCALCITONIN (PCT): CPT

## 2020-09-28 PROCEDURE — 74011636637 HC RX REV CODE- 636/637: Performed by: INTERNAL MEDICINE

## 2020-09-28 RX ORDER — FAMOTIDINE 20 MG/1
20 TABLET, FILM COATED ORAL
Status: CANCELLED | OUTPATIENT
Start: 2020-09-29

## 2020-09-28 RX ORDER — PANTOPRAZOLE SODIUM 40 MG/1
40 TABLET, DELAYED RELEASE ORAL
Status: DISCONTINUED | OUTPATIENT
Start: 2020-09-28 | End: 2020-10-03 | Stop reason: HOSPADM

## 2020-09-28 RX ADMIN — Medication 10 ML: at 14:35

## 2020-09-28 RX ADMIN — ALBUTEROL SULFATE 2 PUFF: 108 AEROSOL, METERED RESPIRATORY (INHALATION) at 13:33

## 2020-09-28 RX ADMIN — RIVAROXABAN 20 MG: 20 TABLET, FILM COATED ORAL at 10:05

## 2020-09-28 RX ADMIN — ALBUTEROL SULFATE 2 PUFF: 108 AEROSOL, METERED RESPIRATORY (INHALATION) at 01:30

## 2020-09-28 RX ADMIN — MULTIVITAMIN TABLET 1 TABLET: TABLET at 10:05

## 2020-09-28 RX ADMIN — PIPERACILLIN SODIUM AND TAZOBACTAM SODIUM 3.38 G: 3; .375 INJECTION, POWDER, LYOPHILIZED, FOR SOLUTION INTRAVENOUS at 21:18

## 2020-09-28 RX ADMIN — ALBUTEROL SULFATE 2 PUFF: 108 AEROSOL, METERED RESPIRATORY (INHALATION) at 08:59

## 2020-09-28 RX ADMIN — ASPIRIN 81 MG: 81 TABLET, COATED ORAL at 10:05

## 2020-09-28 RX ADMIN — PREDNISONE 20 MG: 20 TABLET ORAL at 10:05

## 2020-09-28 RX ADMIN — ALBUTEROL SULFATE 2 PUFF: 108 AEROSOL, METERED RESPIRATORY (INHALATION) at 21:06

## 2020-09-28 RX ADMIN — Medication 5 ML: at 22:00

## 2020-09-28 RX ADMIN — ATORVASTATIN CALCIUM 40 MG: 40 TABLET, FILM COATED ORAL at 21:18

## 2020-09-28 RX ADMIN — MIDODRINE HYDROCHLORIDE 10 MG: 5 TABLET ORAL at 10:05

## 2020-09-28 RX ADMIN — MELATONIN TAB 5 MG 10 MG: 5 TAB at 21:18

## 2020-09-28 RX ADMIN — Medication 10 ML: at 05:24

## 2020-09-28 RX ADMIN — DIGOXIN 0.12 MG: 125 TABLET ORAL at 10:05

## 2020-09-28 RX ADMIN — PIPERACILLIN SODIUM AND TAZOBACTAM SODIUM 3.38 G: 3; .375 INJECTION, POWDER, LYOPHILIZED, FOR SOLUTION INTRAVENOUS at 02:00

## 2020-09-28 RX ADMIN — PIPERACILLIN SODIUM AND TAZOBACTAM SODIUM 3.38 G: 3; .375 INJECTION, POWDER, LYOPHILIZED, FOR SOLUTION INTRAVENOUS at 12:30

## 2020-09-28 RX ADMIN — PANTOPRAZOLE SODIUM 40 MG: 40 TABLET, DELAYED RELEASE ORAL at 14:35

## 2020-09-28 RX ADMIN — FUROSEMIDE 40 MG: 40 TABLET ORAL at 10:05

## 2020-09-28 NOTE — PROGRESS NOTES
Pharmacy Dosing Services: Vancomycin 09/28/2020    Ht Readings from Last 1 Encounters:   09/24/20 165.1 cm (65\")        Wt Readings from Last 1 Encounters:   09/28/20 90.7 kg (200 lb)        Previous Regimen Pulse dosing   Last Level 7.0 drawn 09/28 @ 1736   Other Current Antibiotics Zosyn   Significant Cultures    Serum Creatinine Lab Results   Component Value Date/Time    Creatinine 1.75 (H) 09/28/2020 09:14 AM      Creatinine Clearance Estimated Creatinine Clearance: 33.3 mL/min (A) (based on SCr of 1.75 mg/dL (H)). BUN Lab Results   Component Value Date/Time    BUN 73 (H) 09/28/2020 09:14 AM      WBC Lab Results   Component Value Date/Time    WBC 13.1 (H) 09/28/2020 04:00 AM      H/H Lab Results   Component Value Date/Time    HGB 12.0 09/28/2020 04:00 AM      Platelets Lab Results   Component Value Date/Time    PLATELET 648 29/81/5131 04:00 AM      Temp 98 °F (36.7 °C)     New regimen: 1000mg every 24 hours @ 2000    Dose calculated to approximate a therapeutic trough of 17 mcg/mL    Will order next vancomycin level to be drawn 09/30 at 1800. Pharmacy to follow daily and will make changes to dose and/or frequency based on clinical status.     Pharmacist Mercedes Pate, [facility]

## 2020-09-28 NOTE — PROGRESS NOTES
Progress Note    Patient: Rox Valenzuela MRN: [id number]  SSN: [id number]    YOB: 1950  Age: 79 y.o. Sex: female      Admit Date: 9/24/2020    LOS: 4 days     Subjective:   Patient followed for suspected sepsis but no obvious source of infection, though with suspicious right pleural effusion, possibly parapneumonic. Blood culture has been negative. WBC has been decreasing along with procalcitonin and CRP. She is currently on IV Vancomycin and Zosyn. She apparenty was taken down to Radiology for thoracentesis, but adamantly refused. Patient resting comfortably. Indium scan is pending. Objective:     Vitals:    09/27/20 2334 09/28/20 0130 09/28/20 0400 09/28/20 0859   BP:       Pulse:       Resp:       Temp:       SpO2: 96% 99%  92%   Weight:   200 lb (90.7 kg)    Height:            Intake and Output:  Current Shift: No intake/output data recorded. Last three shifts: 09/26 1901 - 09/28 0700  In: 400 [I.V.:400]  Out: -     Physical Exam:    Vitals signs and nursing note reviewed. Constitutional:       General: She is not in acute distress. Appearance: She is obese. She is ill-appearing. She is not toxic-appearing or diaphoretic. HENT:         Comments: Nasal O2     Mouth/Throat:      Mouth: Mucous membranes are dry. Pharynx: Oropharynx is clear. Cardiovascular:      Rate and Rhythm: Normal rate and regular rhythm. Heart sounds: No murmur. Pulmonary:      Breath sounds: No rhonchi or rales. Comments: Diminished BS   Genitourinary:     Comments: No Bustos  Skin:     Findings: Bruising and lesion (right thumb wound healing) present. Neurological:      General: No focal deficit present. Mental Status: She is alert and oriented to person, place, and time. Psychiatric:         Behavior: Behavior normal.         Thought Content:  Thought content normal.         Judgment: Judgment normal.      Comments: Depressed mood     Lab/Data Review:  WBC 13,100  Procalcitonin 0.98 (2.37 (7.64)  CRP 12,800 916.70 (41.20)    Blood cultures (9/22) No growth at 3 days  Urine culture (9/25) No growth FINAL  Assessment:       1. Suspected sepsis with leukocytosis, elevated ESR, procalcitonin, CRP, etiology unclear. 2. Right pleural effusion, etiology unclear, ?parapneumonic  3. Ruling out Rheumatologic disease  4. Acute kidney injury  5. COPD by history     Comment:  Still no clear source of infection, but high index of suspicion for infected pleural effusion. Plan:      1. Continue  IV Vancomycin and Zosyn  2. Follow-up blood cultures and urine culture  3. In am, repeat CBC, procalcitonin and CRP, done  4. Check on Indium scan  5.  Hold off thoracentesis pending Indium scan      Signed By: Juliane Woods MD     September 28, 2020

## 2020-09-28 NOTE — PROGRESS NOTES
Progress Note  Date:2020       Room:520/  Patient Name:Kristyn Crews     Date of Birth:     Age:70 y.o. Subjective    Subjective:  Symptoms:  No shortness of breath, cough, chest pain or weakness. (Patient denies Hymoptysis, shortness of breath, cough, or weakness. (The patient has no complaints and serologies and complements are pending. Patient on oral steroids. I will await definitive findings (tissure histology /serology) before confirming this DX and escalating the treatment. The Pleural effusion and pulmonary nodule managed by Pulmonary. I would consider starting immune suppressive therapy if the result return as positive and discussion for tissue will be discussed at that time). Review of Systems   Constitutional: Negative. HENT: Negative for nosebleeds. Eyes: Negative. Respiratory: Negative for cough and shortness of breath. Cardiovascular: Negative. Negative for chest pain. Gastrointestinal: Negative. Endocrine: Negative. Genitourinary: Negative. Musculoskeletal: Negative. Neurological: Negative. Negative for weakness. Psychiatric/Behavioral: Negative. Objective         Vitals Last 24 Hours:  TEMPERATURE:  Temp  Av.7 °F (36.5 °C)  Min: 97.4 °F (36.3 °C)  Max: 98 °F (36.7 °C)  RESPIRATIONS RANGE: Resp  Av  Min: 16  Max: 18  PULSE OXIMETRY RANGE: SpO2  Av.3 %  Min: 96 %  Max: 99 %  PULSE RANGE: Pulse  Av.7  Min: 75  Max: 82  BLOOD PRESSURE RANGE: Systolic (35TJX), EFO:821 , Min:134 , CUH:687   ; Diastolic (07MCI), YVL:26, Min:78, Max:78    I/O (24Hr): No intake or output data in the 24 hours ending 20 0759  Objective:  General Appearance:  Comfortable and in no acute distress. Vital signs: (most recent): Blood pressure 134/78, pulse 75, temperature 98 °F (36.7 °C), resp. rate 18, height 5' 5\" (1.651 m), weight 90.7 kg (200 lb), SpO2 99 %.     HEENT: Normal HEENT exam.    Lungs: Normal effort. Heart: Normal rate. Extremities: Normal range of motion. (No joint pains or deformity)  Neurological: Patient is alert and oriented to person, place and time. Normal strength. Skin:  (No rash)    Labs/Imaging/Diagnostics    Labs:  CBC:  Recent Labs     09/27/20  1205 09/26/20  0536 09/25/20  1823   WBC 16.4* 27.3* 30.8*   RBC 4.07 4.30 4.32   HGB 12.6 13.4 13.5   HCT 37.4 39.9 39.7   MCV 91.9 92.8 91.9   RDW 16.9* 16.7* 16.2*    329 304     CHEMISTRIES:  Recent Labs     09/27/20  1205 09/26/20  0536 09/25/20  1823    133* 130*   K 3.8 4.4 4.3    101 100   CO2 23 19* 20*   BUN 79* 79* 81*   CA 8.8 8.5 8.7   PHOS 5.1* 6.5*  --    PT/INR:No results for input(s): INR, INREXT in the last 72 hours. No lab exists for component: PROTIME  APTT:No results for input(s): APTT in the last 72 hours. LIVER PROFILE:No results for input(s): AST, ALT in the last 72 hours. No lab exists for component: Rosangela Sneed ALKPHOS  Lab Results   Component Value Date/Time    ALT (SGPT) 15 09/25/2020 03:03 AM    AST (SGOT) 23 09/25/2020 03:03 AM    Alk. phosphatase 152 (H) 09/25/2020 03:03 AM    Bilirubin, total 0.5 09/25/2020 03:03 AM       Imaging Last 24 Hours:  No results found. Assessment//Plan   Active Problems:    Hyponatremia (9/24/2020)      Leukocytosis (9/24/2020)      Hypoalbuminemia (9/24/2020)      Acute on chronic renal failure ([address] (9/24/2020)      RLL pneumonia (9/24/2020)      Sepsis ([address] (9/24/2020)      PNA (pneumonia) (9/24/2020)      Assessment:  (No clinical evidence of vasculitis. I will await definitive findings (tissure histology /serology) before confirming this DX and escalating the treatment. The Pleural effusion and pulmonary nodule managed by Pulmonary. I would consider starting immune suppressive therapy if the result return as positive and the discussion for tissue will be discussed at that time  ).        Electronically signed by Elisa Esparza MD on 9/28/2020 at 7:59 AM

## 2020-09-28 NOTE — PROGRESS NOTES
Progress Note  Date:2020       Room:[facility]  Patient Name:Kristyn Luevano     Date of Birth:     Age:70 y.o. Subjective    Subjective:  Symptoms:  Stable. She reports shortness of breath and diarrhea. Diet:  Adequate intake. She reports  vomiting. No nausea. Activity level: Normal.    Pain:  She reports no pain. Patient seen on f/u sitting in chair at bedside  Reports shortness breath  No acute distress noted at this time  Review of Systems   Constitutional: Negative for fatigue and fever. Respiratory: Positive for shortness of breath. SOB Improving   Cardiovascular: Positive for leg swelling. Gastrointestinal: Positive for diarrhea and vomiting. Negative for nausea. Genitourinary: Positive for frequency. Frequent urination secondary to Lasix     Allergic/Immunologic: Negative. Neurological: Negative. Hematological: Negative. Psychiatric/Behavioral: Negative. Objective         Vitals Last 24 Hours:  TEMPERATURE:  Temp  Av.7 °F (36.5 °C)  Min: 97.4 °F (36.3 °C)  Max: 98 °F (36.7 °C)  RESPIRATIONS RANGE: Resp  Av  Min: 16  Max: 18  PULSE OXIMETRY RANGE: SpO2  Av.3 %  Min: 96 %  Max: 99 %  PULSE RANGE: Pulse  Av.7  Min: 75  Max: 82  BLOOD PRESSURE RANGE: Systolic (01ERJ), GUU:927 , Min:134 , RZ   ; Diastolic (21SRN), GYS:71, Min:78, Max:78    I/O (24Hr): No intake or output data in the 24 hours ending 20 0757  Objective:  General Appearance:  Comfortable and in no acute distress. Vital signs: (most recent): Blood pressure 134/78, pulse 75, temperature 98 °F (36.7 °C), resp. rate 18, height 5' 5\" (1.651 m), weight 90.7 kg (200 lb), SpO2 99 %. No fever. Output: Producing urine and producing stool. HEENT: Normal HEENT exam.    Lungs:  Normal respiratory rate. There are decreased breath sounds. (Decreased lung sounds bilat, worsen on right side.  Coarse cough, scattered rhonchi )  Heart: Normal rate. Regular rhythm. S1 normal and S2 normal.    Abdomen: Abdomen is soft. Bowel sounds are normal.   There is no abdominal tenderness. Extremities: Normal range of motion. There is dependent edema. (+3 Bilat lower extremity edema)  Pulses: Distal pulses are intact. Neurological: Patient is alert and oriented to person, place and time. Pupils:  Pupils are equal, round, and reactive to light. Skin:  Warm and dry. Labs/Imaging/Diagnostics    Labs:  CBC:  Recent Labs     09/27/20  1205 09/26/20  0536 09/25/20  1823   WBC 16.4* 27.3* 30.8*   RBC 4.07 4.30 4.32   HGB 12.6 13.4 13.5   HCT 37.4 39.9 39.7   MCV 91.9 92.8 91.9   RDW 16.9* 16.7* 16.2*    329 304     CHEMISTRIES:  Recent Labs     09/27/20  1205 09/26/20  0536 09/25/20  1823    133* 130*   K 3.8 4.4 4.3    101 100   CO2 23 19* 20*   BUN 79* 79* 81*   CA 8.8 8.5 8.7   PHOS 5.1* 6.5*  --    PT/INR:  No results for input(s): INR, INREXT, INREXT in the last 72 hours. No lab exists for component: PROTIME  APTT:  No results for input(s): APTT in the last 72 hours. LIVER PROFILE:  No results for input(s): AST, ALT in the last 72 hours. No lab exists for component: DEO Black  Lab Results   Component Value Date/Time    ALT (SGPT) 15 09/25/2020 03:03 AM    AST (SGOT) 23 09/25/2020 03:03 AM    Alk. phosphatase 152 (H) 09/25/2020 03:03 AM    Bilirubin, total 0.5 09/25/2020 03:03 AM       Imaging Last 24 Hours:    Echo: LVEF is 55 - 60%. Normal cavity size, systolic function (ejection fraction normal) and diastolic function. Mild concentric hypertrophy. Wall motion: normal.    Assessment//Plan     Sepsis: elevated WBC 35.4, Procal 7.64, elevated ESR, and pneumonia. Continue IV vancomycin and zosyn. Blood cx no growth x 3 days. Infectious disease following. WBC 16.4 previously 27.3  Procal 2.37 previously 7.64  CRP 16.7 previously 41.2  9/24 blood cx: no growth    Pneumonia - noted on cxr. Continue Vanc/Zosyn IV. Currently on RA to maintaining sats at 93-96%, does not use home O2. Continue albuterol inhaler    Suspected Granulomatosis with polyangiitis -  Continue Solumedrol IV, Rheumatology following. Tissue Serology results pending. IR consultation for possible CT directed thoracentesis    Right pleural effusion: Noted on chest ct. IR consulted for CT-guided thoracentesis     HORACE: bipap at night, further workup outpatient    Acute respiratory failure - secondary to above. NC to maintain sats >93% Wean as tolorated. COPD - Continue IV Lasix. NC to maintain sats >93% Continue to wean. Pulmonology consulted. Due-nebs PRN. Daily weights. Strict I & O. MARILUZ vs CKD- Creat 2.59. Nephrology following. Continue IV Lasix 40mg daily. Acute glomerulonephritis workup pending: VIOLETA titers, C3, C4, ASO titers, ANCA panel,  hepatitis B and C profile pending    Leukocytosis - WBC 16.4 from 30.8. Afebrile. Continue Vanc/Zosyn IV. Blood cultures neg x 3 days. Monitor for fevers. Continue to trend. AFIB - Controlled. Continue Xarelto and digoxin PO. Echo shows  LVEF is 55 - 60%. normal cavity size, systolic function (ejection fraction normal) and diastolic function. Mild concentric hypertrophy. Plan   IR consultation for possible CT directed thoracentesis  VIOLETA titers,  C3, C4, ASO titers, ANCA panel,  hepatitis B and C profile pending  Continue Vanc/Zosyn, follow blood cx  Continue Lasix, Daily Weights, Strict I & O's  CPAP at night, prn albuterol  Elevate bilateral lower extremities  AM labs pending    Above treatment plan reviewed and discussed with patient in detail at bedside, all questions answered. Diagnostic results and treatment plan reviewed with Pulmonary and Rheumatology.     Full Code  Xarelto dvt prophy  Home meds reviewed and reconciled    Electronically signed by Dionisio Huff NP on 9/28/2020 at 9:14 AM

## 2020-09-28 NOTE — CONSULTS
BRIEF INTERIM SUMMARY    NAME: Harshal Nuñez   :  1950   MRN:  [id number]     Date/Time:  2020 11:20 AM    Interim Hospital Summary: Harshal Nuñez is a 79 y.o., who p/w acute resp. failure with hypoxia, w/ CT on 2020 showing small loculated right pleural effusion.    - When we brought patient to our [address] [place] [address], she adamantly refused the thoracentesis given what happened to her friend, even after we thoroughly explained to her the risk and benefit of the procedure  - We will hold off for now unless she changes her mind    - Please call [phone] with any questions. - Thanks for involving [facility] in patient's care.     Natasha Lee MD PhD

## 2020-09-28 NOTE — PROGRESS NOTES
Renal Progress Note    Patient: Emily Marie MRN: [id number]  SSN: [id number]    YOB: 1950  Age: 79 y.o. Sex: female      Admit Date: 9/24/2020    LOS: 4 days     Subjective:   Patient seen at bedside. Alert and awake, sitting in a chair, feeling better, SOB improved  LE edema+ but improving. On Lasix 40 mg daily.   Blood pressure is better with midodrine  Creatinine improved to 1.7 today        Current Facility-Administered Medications   Medication Dose Route Frequency    pantoprazole (PROTONIX) tablet 40 mg  40 mg Oral ACB    albuterol (PROVENTIL HFA, VENTOLIN HFA, PROAIR HFA) inhaler 2 Puff  2 Puff Inhalation Q6H RT    predniSONE (DELTASONE) tablet 20 mg  20 mg Oral DAILY WITH BREAKFAST    furosemide (LASIX) tablet 40 mg  40 mg Oral DAILY    midodrine (PROAMATINE) tablet 10 mg  10 mg Oral BID    famotidine (PEPCID) tablet 20 mg  20 mg Oral Q48H    albuterol-ipratropium (DUO-NEB) 2.5 MG-0.5 MG/3 ML  3 mL Nebulization Q4H PRN    piperacillin-tazobactam (ZOSYN) 3.375 g in 0.9% sodium chloride (MBP/ADV) 100 mL MBP  3.375 g IntraVENous Q8H    aspirin delayed-release tablet 81 mg  81 mg Oral DAILY    multivitamin (ONE A DAY) tablet 1 Tab  1 Tab Oral DAILY    digoxin (LANOXIN) tablet 0.125 mg  0.125 mg Oral DAILY    atorvastatin (LIPITOR) tablet 40 mg  40 mg Oral QHS    melatonin tablet 10 mg  10 mg Oral QHS    rivaroxaban (XARELTO) tablet 20 mg  20 mg Oral DAILY    sodium chloride (NS) flush 5-10 mL  5-10 mL IntraVENous PRN    sodium chloride (NS) flush 5-40 mL  5-40 mL IntraVENous Q8H    sodium chloride (NS) flush 5-40 mL  5-40 mL IntraVENous PRN    acetaminophen (TYLENOL) tablet 650 mg  650 mg Oral Q6H PRN    Or    acetaminophen (TYLENOL) suppository 650 mg  650 mg Rectal Q6H PRN    polyethylene glycol (MIRALAX) packet 17 g  17 g Oral DAILY PRN    promethazine (PHENERGAN) tablet 12.5 mg  12.5 mg Oral Q6H PRN    Or    ondansetron (ZOFRAN) injection 4 mg  4 mg IntraVENous Q6H PRN    VANCOMYCIN INFORMATION NOTE 1 Each  1 Each Other Rx Dosing/Monitoring        Vitals:    09/27/20 2334 09/28/20 0130 09/28/20 0400 09/28/20 0859   BP:       Pulse:       Resp:       Temp:       SpO2: 96% 99%  92%   Weight:   90.7 kg (200 lb)    Height:         Objective:   General: Elderly female, alert awake well-oriented, no acute distress. HEENT: EOMI, no Icterus, no Pallor,  mucosa moist,  throat clear. Neck: Neck is supple, No JVD,   Lungs: fair air entry+ no rhonchi, no rales,  CVS: heart sounds normal, no murmurs, no rubs. GI: soft, nontender, normal BS,   Extremeties: no clubbing, no cyanosis, 1-2+ bilateral extremity edema present,  Neuro: Alert, awake, oriented x3, speech is normal   Skin: normal skin turgor,   Musculoskeletal: no acute joint swellings       Intake and Output:  Current Shift: No intake/output data recorded. Last three shifts: 09/26 1901 - 09/28 0700  In: 400 [I.V.:400]  Out: -       Lab/Data Review:  Recent Labs     09/27/20  1205 09/26/20  0536 09/25/20  1823   WBC 16.4* 27.3* 30.8*   HGB 12.6 13.4 13.5   HCT 37.4 39.9 39.7    329 304     Recent Labs     09/28/20  0914 09/27/20  1205 09/26/20  0536    139 133*   K 3.6 3.8 4.4   * 105 101   CO2 25 23 19*   * 130* 128*   BUN 73* 79* 79*   CREA 1.75* 2.59* 3.81*   CA 9.7 8.8 8.5   PHOS 3.6 5.1* 6.5*   ALB 3.0* 3.3* 3.0*     No results for input(s): PH, PCO2, PO2, HCO3, FIO2 in the last 72 hours.   Recent Results (from the past 24 hour(s))   C REACTIVE PROTEIN, QT    Collection Time: 09/28/20  9:14 AM   Result Value Ref Range    C-Reactive protein 12.80 (H) 0.00 - 0.60 mg/dL   RENAL FUNCTION PANEL    Collection Time: 09/28/20  9:14 AM   Result Value Ref Range    Sodium 141 136 - 145 mmol/L    Potassium 3.6 3.5 - 5.1 mmol/L    Chloride 110 (H) 97 - 108 mmol/L    CO2 25 21 - 32 mmol/L    Anion gap 6 5 - 15 mmol/L    Glucose 174 (H) 65 - 100 mg/dL    BUN 73 (H) 6 - 20 mg/dL    Creatinine 1.75 (H) 0.55 - 1.02 mg/dL    BUN/Creatinine ratio 42 (H) 12 - 20      GFR est AA 35 (L) >60 ml/min/1.73m2    GFR est non-AA 29 (L) >60 ml/min/1.73m2    Calcium 9.7 8.5 - 10.1 mg/dL    Phosphorus 3.6 2.6 - 4.7 mg/dL    Albumin 3.0 (L) 3.5 - 5.0 g/dL        Assessment and Plan:     1. Acute Kidney Injury on ? ?CKD: probably prerenal azotemia secondary to use of Lasix and lisinopril, in the presence of borderline hypotension+  lisinopril on hold  Continue lasix 40 mg po daily  Will monitor I/Os, renal functions and adjust diuretics as needed   urine analysis is bland   Creatinine is improving. It is improved to 3.8-->2.8-->1.7 today . Baseline creatinine is unknown. He herself denies any history of CKD   CT abdomen negative for hydro  ? ? Hx of Polyangiitis: Recommend workup for acute glomerulonephritis including VIOLETA titers,  C3, C4, ASO titers, ANCA panel,  hepatitis B and C. Profile. UA is negative for proteinuria or significant cells, unlikely to have acute GN     Acute shortness of breath/history of COPD/obstructive sleep/CHF/chronic edema  Shows normal EF  Improved clinically with diuretics  No wheezing or SOB now  Continue bronchodilators and steroids  continue Lasix 40 mg daily  Continue IV antibiotic as per primary service    Hypotension: Borderline low blood pressures lisinopril was discontinued , not on any antihypertensive medications   Improved Bps and improved renal status with adding midodrine. we will give parameters to hold midodrine  continue to monitor blood pressures    Lower extremity edema: probably related to congestive heart failure/pulmonary hypertension   continue diuretics with lasix 40 po daily  Advised to maintain p.o. fluid restriction of 4502-0837 ml per day and and salt restriction,  Will monitor fluid status clinically and adjust diuretics as needed.          Signed By: Solitario Dee MD     September 28, 2020

## 2020-09-28 NOTE — CONSULTS
PULMONARY NOTE  [facility]    Name: Danelle Adamson MRN: [id number]   : 1950 Hospital: [address] [place] [address]   Date: 2020  Admission date: 2020 Hospital Day: 5       HPI:     Hospital Problems  Date Reviewed: 2015          Codes Class Noted POA    Hyponatremia ICD-10-CM: E87.1  ICD-9-CM: 276.1  2020 Yes        Leukocytosis ICD-10-CM: D72.829  ICD-9-CM: 288.60  2020 Yes        Hypoalbuminemia ICD-10-CM: E88.09  ICD-9-CM: 273.8  2020 Unknown        Acute on chronic renal failure ([facility] ICD-10-CM: N17.9, N18.9  ICD-9-CM: 584.9, 585.9  2020 Yes        RLL pneumonia ICD-10-CM: J18.9  ICD-9-CM: 199  2020 Yes        Sepsis ([facility] ICD-10-CM: A41.9  ICD-9-CM: 038.9, 995.91  2020 Yes        PNA (pneumonia) ICD-10-CM: J18.9  ICD-9-CM: 427  2020 Yes                   [x] High complexity decision making was performed  [x] See my orders for details      Subjective/Initial History:     I was asked by Lenka Breen MD to see Danelle Adamson  a 79 y.o.  female in consultation     Excerpts from admission 2020 or consult notes as follows:   70-year-old lady came in because of shortness of breath generalized weakness and also she was in atrial fibrillation past medical history of obstructive sleep apnea syndrome chronic A. fib cardioversion in the past hypertension in fact her blood pressure was low also she has COPD nebulizer treatment not on any oxygen at home complaining about cough without any productive sputum and she was feeling lightheaded dizzy no history of passing out so admitted and pulmonary consult was called for further evaluation.       No Known Allergies     MAR reviewed and pertinent medications noted or modified as needed     Current Facility-Administered Medications   Medication    albuterol (PROVENTIL HFA, VENTOLIN HFA, PROAIR HFA) inhaler 2 Puff    predniSONE (DELTASONE) tablet 20 mg    furosemide (LASIX) tablet 40 mg    midodrine (PROAMATINE) tablet 10 mg    famotidine (PEPCID) tablet 20 mg    albuterol-ipratropium (DUO-NEB) 2.5 MG-0.5 MG/3 ML    piperacillin-tazobactam (ZOSYN) 3.375 g in 0.9% sodium chloride (MBP/ADV) 100 mL MBP    aspirin delayed-release tablet 81 mg    omeprazole (PRILOSEC) capsule 20 mg    multivitamin (ONE A DAY) tablet 1 Tab    digoxin (LANOXIN) tablet 0.125 mg    atorvastatin (LIPITOR) tablet 40 mg    melatonin tablet 10 mg    rivaroxaban (XARELTO) tablet 20 mg    sodium chloride (NS) flush 5-10 mL    sodium chloride (NS) flush 5-40 mL    sodium chloride (NS) flush 5-40 mL    acetaminophen (TYLENOL) tablet 650 mg    Or    acetaminophen (TYLENOL) suppository 650 mg    polyethylene glycol (MIRALAX) packet 17 g    promethazine (PHENERGAN) tablet 12.5 mg    Or    ondansetron (ZOFRAN) injection 4 mg    VANCOMYCIN INFORMATION NOTE 1 Each      Patient PCP: None  PMH:  has a past medical history of AF (paroxysmal atrial fibrillation) ([address] Anemia, Anxiety, Chronic obstructive pulmonary disease ([address] Frequent urination, Heart failure ([address] HTN (hypertension), Irregular heart beat, Obesity, HORACE (obstructive sleep apnea), SOB (shortness of breath) on exertion, Stress, Stroke ([address] and Wheezing. She also has no past medical history of Chronic pain. PSH:   has a past surgical history that includes echocardiogram (11/23/2009); nm cardiac spect w strs/rest mult (08/2006); and hx gastric bypass. FHX: family history includes COPD in her mother; Colon Cancer in her father; Diabetes in her mother; Stroke in her brother. SHX:  reports that she has quit smoking. She has never used smokeless tobacco. She reports previous alcohol use. She reports that she does not use drugs. ROS:    Review of Systems   Constitutional: Positive for diaphoresis and malaise/fatigue. Negative for weight loss. HENT: Negative. Eyes: Negative. Respiratory: Positive for cough and shortness of breath. Cardiovascular: Positive for orthopnea. Gastrointestinal: Negative. Genitourinary: Negative. Musculoskeletal: Negative. Skin: Negative. Neurological: Positive for dizziness and weakness. Endo/Heme/Allergies: Negative. Psychiatric/Behavioral: Negative. Objective:     Vital Signs: Telemetry:    normal sinus rhythm Intake/Output:   Visit Vitals  /78   Pulse 75   Temp 98 °F (36.7 °C)   Resp 18   Ht 5' 5\" (1.651 m)   Wt 90.7 kg (200 lb)   SpO2 92%   BMI 33.28 kg/m²       Temp (24hrs), Av °F (36.7 °C), Min:98 °F (36.7 °C), Max:98 °F (36.7 °C)        O2 Device: Room air O2 Flow Rate (L/min): 1 l/min       Wt Readings from Last 4 Encounters:   20 90.7 kg (200 lb)   10/18/16 83.6 kg (184 lb 3.2 oz)   12/24/15 79.8 kg (176 lb)   08/18/15 79.8 kg (176 lb)        No intake or output data in the 24 hours ending 20 1020    Last shift:      No intake/output data recorded. Last 3 shifts:  1901 -  0700  In: 400 [I.V.:400]  Out: -        Physical Exam:     Physical Exam   Constitutional: She is oriented to person, place, and time. She appears well-developed. HENT:   Head: Normocephalic and atraumatic. Eyes: Pupils are equal, round, and reactive to light. Conjunctivae and EOM are normal.   Neck: Normal range of motion. Neck supple. Cardiovascular: Regular rhythm. irregular   Pulmonary/Chest: Breath sounds normal. She is in respiratory distress. Abdominal: Soft. Bowel sounds are normal.   Neurological: She is alert and oriented to person, place, and time. Skin: Skin is warm. Psychiatric: She has a normal mood and affect.  Her behavior is normal. Judgment and thought content normal.        Labs:    Recent Labs     20  1205 20  0536 20  1823   WBC 16.4* 27.3* 30.8*   HGB 12.6 13.4 13.5    329 304     Recent Labs     20  1205 20  0536 20  1823    133* 130*   K 3.8 4.4 4.3    101 100   CO2 23 19* 20*   * 128* 156*   BUN 79* 79* 81*   CREA 2.59* 3.81* 3.81*   CA 8.8 8.5 8.7   PHOS 5.1* 6.5*  --    ALB 3.3* 3.0*  --      No results for input(s): PH, PCO2, PO2, HCO3, FIO2 in the last 72 hours. Recent Labs     09/26/20  0536   CPK 46     No results found for: BNPP, BNP   Lab Results   Component Value Date/Time    Culture result: No growth 3 days 09/24/2020 12:45 PM   No results found for: TSH, TSHEXT, TSHEXT    Imaging:    CXR Results  (Last 48 hours)    None        Results from [place] encounter on 09/24/20   XR CHEST SNGL V    Narrative History is shortness of breath. Comparison Is with the chest x-ray of 12/18/2019. An AP portable erect view of the chest demonstrate cardiac monitor leads. There  is now a small to moderate right pleural effusion. There is a small calcified  right lower lobe granuloma. The heart is enlarged. There is approximately 27  degrees dextroscoliosis of the thoracic spine. There is degenerative change  present in the spine as well. Impression IMPRESSION: Cardiomegaly. New right pleural effusion. No change in right lower  lobe granuloma. Dextroscoliosis. Results from [place] encounter on 09/24/20   CT CHEST WO CONT    Narrative The study is a CT evaluation of the chest dated 9/25/2020. HISTORY: History of chronic obstructive pulmonary disease. Heart failure. Recent  pleural fluid collection. Technique: Performed without intravenous contrast. 3 mm axial imaging, axial MIP  imaging, sagittal, and coronal reconstructed imaging sequences are submitted for  evaluation. Thinner section Super Dimension imaging was also performed. Dose Reduction Technique was employed to reduce radiation exposure - This  includes reduction optimization techniques as appropriate to a performed exam  with automated exposure control adjustments of the mA and/or Kv according to  patient size, or use of iterative reconstruction technique.     COMPARISON: Previous CT evaluation of the chest dated 12/18/2019. Recent chest  radiograph dated 9/25/2019. MEDIASTINUM: There are scattered normal sized middle mediastinal lymph nodes  without progression. The largest mediastinal lymph node is located within the  subcarinal region and this lymph node measures 10 mm in short axis measurement  which is considered within normal limits. There is an area of dystrophic calcification within the left thyroid lobe and  mild heterogeneity of the left thyroid lobe. This examination is negative for  large or dominant thyroid nodule or mass. LUNGS AND PLEURA: There is a moderate sized loculated right pleural effusion. This examination is negative for pleural based masses, pleural thickening, or  gas within the pleural fluid collection. There is encasement of the adjacent  lung with areas of passive atelectasis. There is a calcified granuloma within the right lung base. On axial maximum  intensity imaging, there are several additional small scattered micronodules  with 2 nodules demonstrated posteriorly within the left lower lobe (series [id number]  image number 42). This examination is negative for larger pulmonary nodules or  masses. The central trachea and bronchial tree are patent. There is bronchial  wall thickening and discoid opacities within the lung bases consistent with  reactive airways disease. CARDIOVASCULAR: There are moderate calcifications of the coronary arteries. There is moderate enlargement of the atrial chambers and milder enlargement of  the ventricular chambers. There is a normal caliber to the thoracic aorta. CHEST WALL: There is multilevel spondylosis along the thoracic spine. There is a  mild to moderate dextroconvex scoliosis. There is increased dorsal kyphosis of  the thoracic spine. There are older healed right posterior rib fractures. OTHER: There is surgical suture material demonstrated within the gastric region  consistent with previous bariatric surgery. Impression IMPRESSION:  1. There is a moderate sized loculated right pleural effusion associated with  passive atelectasis. The differential diagnosis would include recent pneumonia  with a residual parapneumonic effusion. Since exam an [de-identified] is negative for pleural  based masses or findings specific for a malignant effusion. 2.  There are several scattered normal size middle mediastinal lymph nodes. 3.  There are findings of prior granulomatous exposure. Please see above text  for further details. IMPRESSION:   1. Acute hypoxic Respiratory failure  2. Chronic Obstructive Pulmonary Disease  3. Chronic A. Fib  4. Leukocytosis  5. Right lower lobe granuloma  6. Right pleural effusion possible parapneumonic effusion which is loculated  7. Obstructive sleep apnea  8. Pt is requiring Drug therapy requiring intensive monitoring for toxicity  9. Pt is unstable, unpredictable needing inpatient monitoring; is acutely ill and at high risk of sudden decline and decompensation with severe consequenses and continued end organ dysfunction and failure  10. Prognosis guarded       RECOMMENDATIONS/PLAN:     1. Patient is on oxygen via nasal cannula 2 L we will continue to wean she is not on any oxygen at home agree now on prednisone  2. Interventional radiology for CT-guided thoracentesis for loculated right pleural effusion  3. Agree with Empiric IV antibiotics pending culture results   4. Follow culture results  5. She has sleep apnea but she does not wear her CPAP machine we will start her on CPAP tonight  6. Supplemental O2 to keep sats > 93%  7. Aspiration precautions  8. Labs to follow electrolytes, renal function and and blood counts  9. Glucose monitoring and SSI  10. Bronchial hygiene with respiratory therapy techniques, bronchodilators  11.  DVT, SUP prophylaxis         This care involved high complexity medical decision making: I personally:  · Reviewed the flowsheet and previous days notes  · Reviewed and summarized records or history from previous days note or discussions with staff, family  · High Risk Drug therapy requiring intensive monitoring for toxicity: eg steroids, pressors, antibiotics  · Reviewed and/or ordered Clinical lab tests  · Reviewed images and/or ordered Radiology tests  · Reviewed the patients ECG / Telemetry  · Reviewed and/or adjusted NiPPV settings  · Called and arranged for Radiologic procedures or interventions  · performed or ordered Diagnostic endoscopies with identified risk factors.   · discussed my assessment/management with : Nursing, Hospitalist and Family for coordination of care          Emiliano Ewing MD

## 2020-09-28 NOTE — PROGRESS NOTES
Vancomycin random level scheduled for 1000 today was not drawn.   I have entered an order for a stat vanc level

## 2020-09-28 NOTE — PROGRESS NOTES
Physician Progress Note      PATIENTRhae Fleischer  CSN #:                  [id number]  :                       1950  ADMIT DATE:       2020 10:35 AM  DISCH DATE:  RESPONDING  PROVIDER #:        Bobby Blank MD          QUERY TEXT:    Pt admitted with shortness of breath and has CHF documented. If possible, please document in progress notes and discharge summary further specificity regarding the type and acuity of CHF:    The medical record reflects the following:  Risk Factors: History of CHF with acute on chronic hypoxic respiratory failure, sepsis  Clinical Indicators: Documentation of CHF,  ECHO: Estimated LVEF is 55 - 60%. Normal cavity size, systolic function (ejection fraction normal) and diastolic function. Mild concentric hypertrophy. Wall motion: normal., *no BNP ordered  Treatment: Lasix 40 mg oral daily    Thank you,  Brandee Felix RN, CCDS, CPC  Options provided:  -- Acute on Chronic Systolic CHF/HFrEF  -- Acute on Chronic Diastolic CHF/HFpEF  -- Acute on Chronic Systolic and Diastolic CHF  -- Acute Systolic CHF/HFrEF  -- Acute Diastolic CHF/HFpEF  -- Acute Systolic and Diastolic CHF  -- Chronic Systolic CHF/HFrEF  -- Chronic Diastolic CHF/HFpEF  -- Chronic Systolic and Diastolic CHF  -- Other - I will add my own diagnosis  -- Disagree - Not applicable / Not valid  -- Disagree - Clinically unable to determine / Unknown  -- Refer to Clinical Documentation Reviewer    PROVIDER RESPONSE TEXT:    This patient has chronic diastolic CHF/HFpEF.     Query created by: Douglas Perez on 2020 2:00 PM      Electronically signed by:  Bobby Blank MD 2020 2:03 PM

## 2020-09-29 ENCOUNTER — APPOINTMENT (OUTPATIENT)
Dept: NUCLEAR MEDICINE | Age: 70
DRG: 871 | End: 2020-09-29
Attending: INTERNAL MEDICINE
Payer: MEDICARE

## 2020-09-29 LAB
ALBUMIN SERPL-MCNC: 2.8 G/DL (ref 3.5–5)
ANA TITR SER IF: NEGATIVE {TITER}
ANION GAP SERPL CALC-SCNC: 8 MMOL/L (ref 5–15)
BASOPHILS # BLD: 0 K/UL (ref 0–0.1)
BASOPHILS NFR BLD: 0 % (ref 0–1)
BUN SERPL-MCNC: 65 MG/DL (ref 6–20)
BUN/CREAT SERPL: 52 (ref 12–20)
C3 SERPL-MCNC: 125 MG/DL
C4 SERPL-MCNC: 25 MG/DL
CA-I BLD-MCNC: 9.8 MG/DL (ref 8.5–10.1)
CHLORIDE SERPL-SCNC: 112 MMOL/L (ref 97–108)
CO2 SERPL-SCNC: 24 MMOL/L (ref 21–32)
CREAT SERPL-MCNC: 1.24 MG/DL (ref 0.55–1.02)
CRP SERPL-MCNC: 8.99 MG/DL (ref 0–0.6)
DIFFERENTIAL METHOD BLD: ABNORMAL
ENA RNP AB SER-ACNC: 0.5 AI
ENA SM AB SER-ACNC: <0.2 AI
ENA SS-A AB SER-ACNC: <0.2 AI
ENA SS-B AB SER-ACNC: <0.2 AI
EOSINOPHIL # BLD: 0 K/UL (ref 0–0.4)
EOSINOPHIL NFR BLD: 0 % (ref 0–7)
ERYTHROCYTE [DISTWIDTH] IN BLOOD BY AUTOMATED COUNT: 16.8 % (ref 11.5–14.5)
GLUCOSE SERPL-MCNC: 110 MG/DL (ref 65–100)
HCT VFR BLD AUTO: 34.3 % (ref 35–47)
HGB BLD-MCNC: 11.7 % (ref 11.5–16)
IMM GRANULOCYTES # BLD AUTO: 0 K/UL
IMM GRANULOCYTES NFR BLD AUTO: 0 %
LYMPHOCYTES # BLD: 2 K/UL (ref 0.8–3.5)
LYMPHOCYTES NFR BLD: 13 % (ref 12–49)
MCH RBC QN AUTO: 30.6 PG (ref 26–34)
MCHC RBC AUTO-ENTMCNC: 34.1 G/DL (ref 30–36.5)
MCV RBC AUTO: 89.8 FL (ref 80–99)
METAMYELOCYTES NFR BLD MANUAL: 1 %
MONOCYTES # BLD: 2 K/UL (ref 0–1)
MONOCYTES NFR BLD: 13 % (ref 5–13)
MYELOCYTES NFR BLD MANUAL: 3 %
NEUTS SEG # BLD: 10.6 K/UL (ref 1.8–8)
NEUTS SEG NFR BLD: 70 % (ref 32–75)
PHOSPHATE SERPL-MCNC: 2.4 MG/DL (ref 2.6–4.7)
PLATELET # BLD AUTO: 395 K/UL (ref 150–400)
PMV BLD AUTO: 10.1 FL (ref 8.9–12.9)
POTASSIUM SERPL-SCNC: 3.3 MMOL/L (ref 3.5–5.1)
PROCALCITONIN SERPL-MCNC: 0.39 NG/ML
RBC # BLD AUTO: 3.82 M/UL (ref 3.8–5.2)
RBC MORPH BLD: ABNORMAL
SODIUM SERPL-SCNC: 144 MMOL/L (ref 136–145)
WBC # BLD AUTO: 15.1 K/UL (ref 3.6–11)

## 2020-09-29 PROCEDURE — 74011250637 HC RX REV CODE- 250/637: Performed by: NURSE PRACTITIONER

## 2020-09-29 PROCEDURE — 74011250636 HC RX REV CODE- 250/636: Performed by: PHYSICIAN ASSISTANT

## 2020-09-29 PROCEDURE — 74011250637 HC RX REV CODE- 250/637: Performed by: PHYSICIAN ASSISTANT

## 2020-09-29 PROCEDURE — 85025 COMPLETE CBC W/AUTO DIFF WBC: CPT

## 2020-09-29 PROCEDURE — 74011000258 HC RX REV CODE- 258: Performed by: PHYSICIAN ASSISTANT

## 2020-09-29 PROCEDURE — 74011250637 HC RX REV CODE- 250/637: Performed by: HOSPITALIST

## 2020-09-29 PROCEDURE — 94760 N-INVAS EAR/PLS OXIMETRY 1: CPT

## 2020-09-29 PROCEDURE — 74011250637 HC RX REV CODE- 250/637: Performed by: INTERNAL MEDICINE

## 2020-09-29 PROCEDURE — 86140 C-REACTIVE PROTEIN: CPT

## 2020-09-29 PROCEDURE — 65270000029 HC RM PRIVATE

## 2020-09-29 PROCEDURE — 83970 ASSAY OF PARATHORMONE: CPT

## 2020-09-29 PROCEDURE — 74011636637 HC RX REV CODE- 636/637: Performed by: INTERNAL MEDICINE

## 2020-09-29 PROCEDURE — 94640 AIRWAY INHALATION TREATMENT: CPT

## 2020-09-29 PROCEDURE — 78804 RP LOCLZJ TUM WHBDY 2+D IMG: CPT

## 2020-09-29 PROCEDURE — 99232 SBSQ HOSP IP/OBS MODERATE 35: CPT | Performed by: INTERNAL MEDICINE

## 2020-09-29 PROCEDURE — 36415 COLL VENOUS BLD VENIPUNCTURE: CPT

## 2020-09-29 PROCEDURE — 74011250636 HC RX REV CODE- 250/636: Performed by: HOSPITALIST

## 2020-09-29 PROCEDURE — 84145 PROCALCITONIN (PCT): CPT

## 2020-09-29 PROCEDURE — 74011250636 HC RX REV CODE- 250/636: Performed by: INTERNAL MEDICINE

## 2020-09-29 PROCEDURE — 80069 RENAL FUNCTION PANEL: CPT

## 2020-09-29 RX ORDER — LANOLIN ALCOHOL/MO/W.PET/CERES
400 CREAM (GRAM) TOPICAL ONCE
Status: COMPLETED | OUTPATIENT
Start: 2020-09-29 | End: 2020-09-29

## 2020-09-29 RX ORDER — POTASSIUM CHLORIDE 20 MEQ/1
40 TABLET, EXTENDED RELEASE ORAL DAILY
Status: DISCONTINUED | OUTPATIENT
Start: 2020-09-30 | End: 2020-10-03 | Stop reason: HOSPADM

## 2020-09-29 RX ORDER — POTASSIUM CHLORIDE 20 MEQ/1
40 TABLET, EXTENDED RELEASE ORAL
Status: COMPLETED | OUTPATIENT
Start: 2020-09-29 | End: 2020-09-29

## 2020-09-29 RX ORDER — TRAZODONE HYDROCHLORIDE 50 MG/1
25 TABLET ORAL ONCE
Status: COMPLETED | OUTPATIENT
Start: 2020-09-29 | End: 2020-09-29

## 2020-09-29 RX ORDER — FUROSEMIDE 40 MG/1
40 TABLET ORAL DAILY
Status: DISCONTINUED | OUTPATIENT
Start: 2020-09-30 | End: 2020-09-30

## 2020-09-29 RX ORDER — MIDODRINE HYDROCHLORIDE 5 MG/1
5 TABLET ORAL
Status: DISCONTINUED | OUTPATIENT
Start: 2020-09-29 | End: 2020-09-29

## 2020-09-29 RX ORDER — MIDODRINE HYDROCHLORIDE 5 MG/1
2.5 TABLET ORAL
Status: DISCONTINUED | OUTPATIENT
Start: 2020-09-29 | End: 2020-09-30

## 2020-09-29 RX ORDER — FUROSEMIDE 10 MG/ML
40 INJECTION INTRAMUSCULAR; INTRAVENOUS 2 TIMES DAILY
Status: DISCONTINUED | OUTPATIENT
Start: 2020-09-29 | End: 2020-09-29

## 2020-09-29 RX ORDER — POTASSIUM CHLORIDE 20 MEQ/1
40 TABLET, EXTENDED RELEASE ORAL ONCE
Status: COMPLETED | OUTPATIENT
Start: 2020-09-29 | End: 2020-09-29

## 2020-09-29 RX ADMIN — FUROSEMIDE 40 MG: 40 TABLET ORAL at 09:04

## 2020-09-29 RX ADMIN — RIVAROXABAN 20 MG: 20 TABLET, FILM COATED ORAL at 09:04

## 2020-09-29 RX ADMIN — PREDNISONE 20 MG: 20 TABLET ORAL at 09:04

## 2020-09-29 RX ADMIN — SODIUM CHLORIDE 1000 MG: 9 INJECTION, SOLUTION INTRAVENOUS at 01:00

## 2020-09-29 RX ADMIN — PIPERACILLIN SODIUM AND TAZOBACTAM SODIUM 3.38 G: 3; .375 INJECTION, POWDER, LYOPHILIZED, FOR SOLUTION INTRAVENOUS at 04:04

## 2020-09-29 RX ADMIN — FAMOTIDINE 20 MG: 20 TABLET ORAL at 04:04

## 2020-09-29 RX ADMIN — PIPERACILLIN SODIUM AND TAZOBACTAM SODIUM 3.38 G: 3; .375 INJECTION, POWDER, LYOPHILIZED, FOR SOLUTION INTRAVENOUS at 12:11

## 2020-09-29 RX ADMIN — ATORVASTATIN CALCIUM 40 MG: 40 TABLET, FILM COATED ORAL at 22:00

## 2020-09-29 RX ADMIN — MULTIVITAMIN TABLET 1 TABLET: TABLET at 09:04

## 2020-09-29 RX ADMIN — ASPIRIN 81 MG: 81 TABLET, COATED ORAL at 09:04

## 2020-09-29 RX ADMIN — MIDODRINE HYDROCHLORIDE 10 MG: 5 TABLET ORAL at 09:04

## 2020-09-29 RX ADMIN — PANTOPRAZOLE SODIUM 40 MG: 40 TABLET, DELAYED RELEASE ORAL at 09:04

## 2020-09-29 RX ADMIN — ALBUTEROL SULFATE 2 PUFF: 108 AEROSOL, METERED RESPIRATORY (INHALATION) at 13:42

## 2020-09-29 RX ADMIN — ONDANSETRON 4 MG: 2 INJECTION INTRAMUSCULAR; INTRAVENOUS at 20:41

## 2020-09-29 RX ADMIN — ALBUTEROL SULFATE 2 PUFF: 108 AEROSOL, METERED RESPIRATORY (INHALATION) at 01:24

## 2020-09-29 RX ADMIN — MELATONIN TAB 5 MG 10 MG: 5 TAB at 22:00

## 2020-09-29 RX ADMIN — POTASSIUM CHLORIDE 40 MEQ: 1500 TABLET, EXTENDED RELEASE ORAL at 17:37

## 2020-09-29 RX ADMIN — POTASSIUM CHLORIDE 40 MEQ: 1500 TABLET, EXTENDED RELEASE ORAL at 12:11

## 2020-09-29 RX ADMIN — TRAZODONE HYDROCHLORIDE 25 MG: 50 TABLET ORAL at 22:44

## 2020-09-29 RX ADMIN — ALBUTEROL SULFATE 2 PUFF: 108 AEROSOL, METERED RESPIRATORY (INHALATION) at 07:37

## 2020-09-29 RX ADMIN — PIPERACILLIN SODIUM AND TAZOBACTAM SODIUM 3.38 G: 3; .375 INJECTION, POWDER, LYOPHILIZED, FOR SOLUTION INTRAVENOUS at 20:34

## 2020-09-29 RX ADMIN — DIGOXIN 0.12 MG: 125 TABLET ORAL at 09:04

## 2020-09-29 RX ADMIN — Medication 5 ML: at 22:00

## 2020-09-29 RX ADMIN — MIDODRINE HYDROCHLORIDE 2.5 MG: 5 TABLET ORAL at 17:37

## 2020-09-29 RX ADMIN — MAGNESIUM OXIDE TAB 400 MG (241.3 MG ELEMENTAL MG) 400 MG: 400 (241.3 MG) TAB at 12:11

## 2020-09-29 RX ADMIN — MIDODRINE HYDROCHLORIDE 2.5 MG: 5 TABLET ORAL at 12:12

## 2020-09-29 RX ADMIN — ALBUTEROL SULFATE 2 PUFF: 108 AEROSOL, METERED RESPIRATORY (INHALATION) at 21:34

## 2020-09-29 RX ADMIN — Medication 10 ML: at 14:00

## 2020-09-29 NOTE — PROGRESS NOTES
Progress Note    Patient: Dony Ferraro MRN: [id number]  SSN: [id number]    YOB: 1950  Age: 79 y.o. Sex: female      Admit Date: 9/24/2020    LOS: 5 days     Subjective:   Patient followed for suspected sepsis but no obvious source of infection, though with suspicious right pleural effusion, possibly parapneumonic. Blood culture has been negative. WBC is now increasing but she is on Prednisone. Procalcitonin and CRP decreasing. .  She remains on IV Vancomycin and Zosyn. Patient resting comfortably. Indium scan is in process. Objective:     Vitals:    09/28/20 2108 09/28/20 2115 09/29/20 0735 09/29/20 0800   BP:  (!) 149/75  (!) 156/73   Pulse:    (!) 58   Resp:  18  18   Temp:  99.7 °F (37.6 °C)  97.7 °F (36.5 °C)   SpO2: 94% 96% 99% 99%   Weight:       Height:            Intake and Output:  Current Shift: No intake/output data recorded. Last three shifts: 09/27 1901 - 09/29 0700  In: 1600 [P.O.:1500; I.V.:100]  Out: -     Physical Exam:    Vitals signs and nursing note reviewed. Constitutional:       General: She is not in acute distress. Appearance: She is obese. She is ill-appearing. She is not toxic-appearing or diaphoretic. HENT:         Comments: Nasal O2     Mouth/Throat:      Mouth: Mucous membranes are dry. Pharynx: Oropharynx is clear. Cardiovascular:      Rate and Rhythm: Normal rate and regular rhythm. Heart sounds: No murmur. Pulmonary:      Breath sounds: No rhonchi or rales. Comments: Diminished BS   Genitourinary:     Comments: No Bustos  Skin:     Findings: Bruising and lesion (right thumb wound healing) present. Neurological:      General: No focal deficit present. Mental Status: She is alert and oriented to person, place, and time. Psychiatric:         Behavior: Behavior normal.         Thought Content:  Thought content normal.         Judgment: Judgment normal.      Comments: Depressed mood     Lab/Data Review:  WBC 15,100  Procalcitonin 0.39 (0.98 (2.37 (7.64)  CRP 8.99 (12.800 (16.70 (41.20)  ESR 52    Blood cultures (9/22) No growth at 5 days  Urine culture (9/25) No growth FINAL  Assessment:       1. Suspected sepsis with leukocytosis, elevated ESR, procalcitonin, CRP, etiology unclear, resolving. 2. Right pleural effusion, etiology unclear, ?parapneumonic  3. Ruling out Rheumatologic disease  4. Acute kidney injury, resolving  5. COPD by history     Comment:  Still no clear source of infection, but high index of suspicion for infected pleural effusion. CRP and procalciton decreasing. Leukocytosis likely related Prednisone. Plan:      1. Continue  IV Vancomycin and Zosyn  2. Follow-up blood cultures  3. In am, repeat CBC, procalcitonin and CRP, done  4. Follow-up Indium scan  5.  Hold off thoracentesis pending Indium scan      Signed By: Molly Burgess MD     September 29, 2020

## 2020-09-29 NOTE — CONSULTS
PULMONARY NOTE  [facility]    Name: Maurice Johnson MRN: [id number]   : 1950 Hospital: [address] [place] [address]   Date: 2020  Admission date: 2020 Hospital Day: 6       HPI:     Hospital Problems  Date Reviewed: 2015          Codes Class Noted POA    Hyponatremia ICD-10-CM: E87.1  ICD-9-CM: 276.1  2020 Yes        Leukocytosis ICD-10-CM: D72.829  ICD-9-CM: 288.60  2020 Yes        Hypoalbuminemia ICD-10-CM: E88.09  ICD-9-CM: 273.8  2020 Unknown        Acute on chronic renal failure ([facility] ICD-10-CM: N17.9, N18.9  ICD-9-CM: 584.9, 585.9  2020 Yes        RLL pneumonia ICD-10-CM: J18.9  ICD-9-CM: 624  2020 Yes        Sepsis ([facility] ICD-10-CM: A41.9  ICD-9-CM: 038.9, 995.91  2020 Yes        PNA (pneumonia) ICD-10-CM: J18.9  ICD-9-CM: 662  2020 Yes                   [x] High complexity decision making was performed  [x] See my orders for details      Subjective/Initial History:     I was asked by Helio Amaya MD to see Maurice Johnson  a 79 y.o.  female in consultation     Excerpts from admission 2020 or consult notes as follows:   80-year-old lady came in because of shortness of breath generalized weakness and also she was in atrial fibrillation past medical history of obstructive sleep apnea syndrome chronic A. fib cardioversion in the past hypertension in fact her blood pressure was low also she has COPD nebulizer treatment not on any oxygen at home complaining about cough without any productive sputum and she was feeling lightheaded dizzy no history of passing out so admitted and pulmonary consult was called for further evaluation.       No Known Allergies     MAR reviewed and pertinent medications noted or modified as needed     Current Facility-Administered Medications   Medication    potassium chloride (K-DUR, KLOR-CON) SR tablet 40 mEq    magnesium oxide (MAG-OX) tablet 400 mg    indium-111 oxine wbc (WBC) injection 500 micro Curie    pantoprazole (PROTONIX) tablet 40 mg    vancomycin (VANCOCIN) 1,000 mg in 0.9% sodium chloride 250 mL (VIAL-MATE)    albuterol (PROVENTIL HFA, VENTOLIN HFA, PROAIR HFA) inhaler 2 Puff    predniSONE (DELTASONE) tablet 20 mg    furosemide (LASIX) tablet 40 mg    famotidine (PEPCID) tablet 20 mg    albuterol-ipratropium (DUO-NEB) 2.5 MG-0.5 MG/3 ML    piperacillin-tazobactam (ZOSYN) 3.375 g in 0.9% sodium chloride (MBP/ADV) 100 mL MBP    aspirin delayed-release tablet 81 mg    multivitamin (ONE A DAY) tablet 1 Tab    digoxin (LANOXIN) tablet 0.125 mg    atorvastatin (LIPITOR) tablet 40 mg    melatonin tablet 10 mg    rivaroxaban (XARELTO) tablet 20 mg    sodium chloride (NS) flush 5-10 mL    sodium chloride (NS) flush 5-40 mL    sodium chloride (NS) flush 5-40 mL    acetaminophen (TYLENOL) tablet 650 mg    Or    acetaminophen (TYLENOL) suppository 650 mg    polyethylene glycol (MIRALAX) packet 17 g    promethazine (PHENERGAN) tablet 12.5 mg    Or    ondansetron (ZOFRAN) injection 4 mg    VANCOMYCIN INFORMATION NOTE 1 Each      Patient PCP: None  PMH:  has a past medical history of AF (paroxysmal atrial fibrillation) (HCC), Anemia, Anxiety, Chronic obstructive pulmonary disease ([address] Frequent urination, Heart failure (HCC), HTN (hypertension), Irregular heart beat, Obesity, HORACE (obstructive sleep apnea), SOB (shortness of breath) on exertion, Stress, Stroke ([address] and Wheezing. She also has no past medical history of Chronic pain. PSH:   has a past surgical history that includes echocardiogram (11/23/2009); nm cardiac spect w strs/rest mult (08/2006); and hx gastric bypass. FHX: family history includes COPD in her mother; Colon Cancer in her father; Diabetes in her mother; Stroke in her brother. SHX:  reports that she has quit smoking. She has never used smokeless tobacco. She reports previous alcohol use. She reports that she does not use drugs.      ROS:    Review of Systems Constitutional: Positive for diaphoresis and malaise/fatigue. Negative for weight loss. HENT: Negative. Eyes: Negative. Respiratory: Positive for cough and shortness of breath. Cardiovascular: Positive for orthopnea. Gastrointestinal: Negative. Genitourinary: Negative. Musculoskeletal: Negative. Skin: Negative. Neurological: Positive for dizziness and weakness. Endo/Heme/Allergies: Negative. Psychiatric/Behavioral: Negative. Objective:     Vital Signs: Telemetry:    normal sinus rhythm Intake/Output:   Visit Vitals  BP (!) 156/73   Pulse (!) 58   Temp 97.7 °F (36.5 °C)   Resp 18   Ht 5' 5\" (1.651 m)   Wt 90.7 kg (200 lb)   SpO2 99%   BMI 33.28 kg/m²       Temp (24hrs), Av.6 °F (37 °C), Min:97.7 °F (36.5 °C), Max:99.7 °F (37.6 °C)        O2 Device: Room air O2 Flow Rate (L/min): 1 l/min       Wt Readings from Last 4 Encounters:   20 90.7 kg (200 lb)   10/18/16 83.6 kg (184 lb 3.2 oz)   12/24/15 79.8 kg (176 lb)   08/18/15 79.8 kg (176 lb)          Intake/Output Summary (Last 24 hours) at 2020 1050  Last data filed at 2020 2136  Gross per 24 hour   Intake 1600 ml   Output    Net 1600 ml       Last shift:      No intake/output data recorded. Last 3 shifts:  1901 -  0700  In: 1600 [P.O.:1500; I.V.:100]  Out: -        Physical Exam:     Physical Exam   Constitutional: She is oriented to person, place, and time. She appears well-developed. HENT:   Head: Normocephalic and atraumatic. Eyes: Pupils are equal, round, and reactive to light. Conjunctivae and EOM are normal.   Neck: Normal range of motion. Neck supple. Cardiovascular: Regular rhythm. irregular   Pulmonary/Chest: Breath sounds normal. She is in respiratory distress. Abdominal: Soft. Bowel sounds are normal.   Neurological: She is alert and oriented to person, place, and time. Skin: Skin is warm. Psychiatric: She has a normal mood and affect.  Her behavior is normal. Judgment and thought content normal.        Labs:    Recent Labs     09/29/20  0636 09/28/20  1114 09/28/20  0400 09/27/20  1205   WBC 15.1*  --  13.1* 16.4*   HGB 11.7  --  12.0 12.6     --  383 385   INR  --  1.6*  --   --      Recent Labs     09/29/20  0636 09/28/20  0914 09/27/20  1205    141 139   K 3.3* 3.6 3.8   * 110* 105   CO2 24 25 23   * 174* 130*   BUN 65* 73* 79*   CREA 1.24* 1.75* 2.59*   CA 9.8 9.7 8.8   PHOS 2.4* 3.6 5.1*   ALB 2.8* 3.0* 3.3*     No results for input(s): PH, PCO2, PO2, HCO3, FIO2 in the last 72 hours. No results for input(s): CPK, CKNDX, TROIQ in the last 72 hours. No lab exists for component: CPKMB  No results found for: BNPP, BNP   Lab Results   Component Value Date/Time    Culture result: No Growth (<1000 cfu/mL) 09/25/2020 02:15 PM    Culture result: No growth 5 days 09/24/2020 12:45 PM   No results found for: TSH, TSHEXT, TSHEXT    Imaging:    CXR Results  (Last 48 hours)    None        Results from [place] encounter on 09/24/20   XR CHEST SNGL V    Narrative History is shortness of breath. Comparison Is with the chest x-ray of 12/18/2019. An AP portable erect view of the chest demonstrate cardiac monitor leads. There  is now a small to moderate right pleural effusion. There is a small calcified  right lower lobe granuloma. The heart is enlarged. There is approximately 27  degrees dextroscoliosis of the thoracic spine. There is degenerative change  present in the spine as well. Impression IMPRESSION: Cardiomegaly. New right pleural effusion. No change in right lower  lobe granuloma. Dextroscoliosis. Results from [place] encounter on 09/24/20   CT CHEST WO CONT    Narrative The study is a CT evaluation of the chest dated 9/25/2020. HISTORY: History of chronic obstructive pulmonary disease. Heart failure. Recent  pleural fluid collection.     Technique: Performed without intravenous contrast. 3 mm axial imaging, axial MIP  imaging, sagittal, and coronal reconstructed imaging sequences are submitted for  evaluation. Thinner section Super Dimension imaging was also performed. Dose Reduction Technique was employed to reduce radiation exposure - This  includes reduction optimization techniques as appropriate to a performed exam  with automated exposure control adjustments of the mA and/or Kv according to  patient size, or use of iterative reconstruction technique. COMPARISON: Previous CT evaluation of the chest dated 12/18/2019. Recent chest  radiograph dated 9/25/2019. MEDIASTINUM: There are scattered normal sized middle mediastinal lymph nodes  without progression. The largest mediastinal lymph node is located within the  subcarinal region and this lymph node measures 10 mm in short axis measurement  which is considered within normal limits. There is an area of dystrophic calcification within the left thyroid lobe and  mild heterogeneity of the left thyroid lobe. This examination is negative for  large or dominant thyroid nodule or mass. LUNGS AND PLEURA: There is a moderate sized loculated right pleural effusion. This examination is negative for pleural based masses, pleural thickening, or  gas within the pleural fluid collection. There is encasement of the adjacent  lung with areas of passive atelectasis. There is a calcified granuloma within the right lung base. On axial maximum  intensity imaging, there are several additional small scattered micronodules  with 2 nodules demonstrated posteriorly within the left lower lobe (series [id number]  image number [id number]). This examination is negative for larger pulmonary nodules or  masses. The central trachea and bronchial tree are patent. There is bronchial  wall thickening and discoid opacities within the lung bases consistent with  reactive airways disease. CARDIOVASCULAR: There are moderate calcifications of the coronary arteries.   There is moderate enlargement of the atrial chambers and milder enlargement of  the ventricular chambers. There is a normal caliber to the thoracic aorta. CHEST WALL: There is multilevel spondylosis along the thoracic spine. There is a  mild to moderate dextroconvex scoliosis. There is increased dorsal kyphosis of  the thoracic spine. There are older healed right posterior rib fractures. OTHER: There is surgical suture material demonstrated within the gastric region  consistent with previous bariatric surgery. Impression IMPRESSION:  1. There is a moderate sized loculated right pleural effusion associated with  passive atelectasis. The differential diagnosis would include recent pneumonia  with a residual parapneumonic effusion. Since exam an [de-identified] is negative for pleural  based masses or findings specific for a malignant effusion. 2.  There are several scattered normal size middle mediastinal lymph nodes. 3.  There are findings of prior granulomatous exposure. Please see above text  for further details. IMPRESSION:   1. Acute hypoxic Respiratory failure  2. Chronic Obstructive Pulmonary Disease  3. Chronic A. Fib  4. Leukocytosis  5. Right lower lobe granuloma  6. Right pleural effusion possible parapneumonic effusion which is loculated  7. Obstructive sleep apnea      RECOMMENDATIONS/PLAN:     1. Patient is on oxygen via nasal cannula 2 L we will continue to wean she is not on any oxygen at home agree now on prednisone  2. Patient refused to get thoracentesis done  3. Agree with [facility] IV antibiotics pending culture results   4. Follow culture results  5. She has sleep apnea but she does not wear her CPAP machine we will start her on CPAP tonight  6.  Supplemental O2 to keep sats > 93%  Rheumatology work-up negative         Patient is cleared for discharge from respiratory standpoint        Ramez Castro MD

## 2020-09-29 NOTE — PROGRESS NOTES
Renal Progress Note    Patient: Rox Valenzuela MRN: [id number]  SSN: [id number]    YOB: 1950  Age: 79 y.o. Sex: female      Admit Date: 9/24/2020    LOS: 5 days     Subjective:   Patient seen at bedside. She is complaining of feeling short of breath today. Has agreed to thoracentesis now. Also complaining of worsening of her leg swelling.   Renal function is improved with creatinine of 1.2 today        Current Facility-Administered Medications   Medication Dose Route Frequency    indium-111 oxine wbc (WBC) injection 500 micro Curie  500 micro Curie IntraVENous RAD ONCE    midodrine (PROAMATINE) tablet 2.5 mg  2.5 mg Oral TID WITH MEALS    pantoprazole (PROTONIX) tablet 40 mg  40 mg Oral ACB    vancomycin (VANCOCIN) 1,000 mg in 0.9% sodium chloride 250 mL (VIAL-MATE)  1,000 mg IntraVENous Q24H    albuterol (PROVENTIL HFA, VENTOLIN HFA, PROAIR HFA) inhaler 2 Puff  2 Puff Inhalation Q6H RT    predniSONE (DELTASONE) tablet 20 mg  20 mg Oral DAILY WITH BREAKFAST    furosemide (LASIX) tablet 40 mg  40 mg Oral DAILY    famotidine (PEPCID) tablet 20 mg  20 mg Oral Q48H    albuterol-ipratropium (DUO-NEB) 2.5 MG-0.5 MG/3 ML  3 mL Nebulization Q4H PRN    piperacillin-tazobactam (ZOSYN) 3.375 g in 0.9% sodium chloride (MBP/ADV) 100 mL MBP  3.375 g IntraVENous Q8H    aspirin delayed-release tablet 81 mg  81 mg Oral DAILY    multivitamin (ONE A DAY) tablet 1 Tab  1 Tab Oral DAILY    digoxin (LANOXIN) tablet 0.125 mg  0.125 mg Oral DAILY    atorvastatin (LIPITOR) tablet 40 mg  40 mg Oral QHS    melatonin tablet 10 mg  10 mg Oral QHS    rivaroxaban (XARELTO) tablet 20 mg  20 mg Oral DAILY    sodium chloride (NS) flush 5-10 mL  5-10 mL IntraVENous PRN    sodium chloride (NS) flush 5-40 mL  5-40 mL IntraVENous Q8H    sodium chloride (NS) flush 5-40 mL  5-40 mL IntraVENous PRN    acetaminophen (TYLENOL) tablet 650 mg  650 mg Oral Q6H PRN    Or    acetaminophen (TYLENOL) suppository 650 mg  650 mg Rectal Q6H PRN    polyethylene glycol (MIRALAX) packet 17 g  17 g Oral DAILY PRN    promethazine (PHENERGAN) tablet 12.5 mg  12.5 mg Oral Q6H PRN    Or    ondansetron (ZOFRAN) injection 4 mg  4 mg IntraVENous Q6H PRN    VANCOMYCIN INFORMATION NOTE 1 Each  1 Each Other Rx Dosing/Monitoring        Vitals:    09/28/20 2108 09/28/20 2115 09/29/20 0735 09/29/20 0800   BP:  (!) 149/75  (!) 156/73   Pulse:    (!) 58   Resp:  18  18   Temp:  99.7 °F (37.6 °C)  97.7 °F (36.5 °C)   SpO2: 94% 96% 99% 99%   Weight:       Height:         Objective:   General: Elderly female, alert awake well-oriented, no acute distress. HEENT: EOMI, no Icterus, no Pallor,  mucosa moist,  throat clear. Neck: Neck is supple, No JVD,   Lungs: fair air entry+ no rhonchi, no rales,  CVS: heart sounds normal, no murmurs, no rubs. GI: soft, nontender, normal BS,   Extremeties: no clubbing, no cyanosis, 1-2+ bilateral extremity edema present,  Neuro: Alert, awake, oriented x3, speech is normal   Skin: normal skin turgor,   Musculoskeletal: no acute joint swellings       Intake and Output:  Current Shift: No intake/output data recorded. Last three shifts: 09/27 1901 - 09/29 0700  In: 1600 [P.O.:1500; I.V.:100]  Out: -       Lab/Data Review:  Recent Labs     09/29/20  0636 09/28/20  0400 09/27/20  1205   WBC 15.1* 13.1* 16.4*   HGB 11.7 12.0 12.6   HCT 34.3* 35.4 37.4    383 385     Recent Labs     09/29/20  0636 09/28/20  1114 09/28/20  0914 09/27/20  1205     --  141 139   K 3.3*  --  3.6 3.8   *  --  110* 105   CO2 24  --  25 23   *  --  174* 130*   BUN 65*  --  73* 79*   CREA 1.24*  --  1.75* 2.59*   CA 9.8  --  9.7 8.8   PHOS 2.4*  --  3.6 5.1*   ALB 2.8*  --  3.0* 3.3*   INR  --  1.6*  --   --      No results for input(s): PH, PCO2, PO2, HCO3, FIO2 in the last 72 hours.   Recent Results (from the past 24 hour(s))   VANCOMYCIN, RANDOM    Collection Time: 09/28/20  5:36 PM   Result Value Ref Range    Vancomycin, random 7.0 ug/mL    Reported dose date Not provided      Reported dose: Not provided Units   CBC WITH AUTOMATED DIFF    Collection Time: 09/29/20  6:36 AM   Result Value Ref Range    WBC 15.1 (H) 3.6 - 11.0 K/uL    RBC 3.82 3.80 - 5.20 M/uL    HGB 11.7 11.5 - 16.0 %    HCT 34.3 (L) 35.0 - 47.0 %    MCV 89.8 80.0 - 99.0 FL    MCH 30.6 26.0 - 34.0 PG    MCHC 34.1 30.0 - 36.5 g/dL    RDW 16.8 (H) 11.5 - 14.5 %    PLATELET 458 051 - 232 K/uL    MPV 10.1 8.9 - 12.9 FL    NEUTROPHILS 70 32 - 75 %    LYMPHOCYTES 13 12 - 49 %    MONOCYTES 13 5 - 13 %    EOSINOPHILS 0 0 - 7 %    BASOPHILS 0 0 - 1 %    METAMYELOCYTES 1 (H) 0 %    MYELOCYTES 3 (H) 0 %    IMMATURE GRANULOCYTES 0 %    ABS. NEUTROPHILS 10.6 (H) 1.8 - 8.0 K/UL    ABS. LYMPHOCYTES 2.0 0.8 - 3.5 K/UL    ABS. MONOCYTES 2.0 (H) 0.0 - 1.0 K/UL    ABS. EOSINOPHILS 0.0 0.0 - 0.4 K/UL    ABS. BASOPHILS 0.0 0.0 - 0.1 K/UL    ABS. IMM. GRANS. 0.0 K/UL    DF AUTOMATED      RBC COMMENTS Anisocytosis  1+        RBC COMMENTS Target Cells  1+        RBC COMMENTS Schistocytes  1+       RENAL FUNCTION PANEL    Collection Time: 09/29/20  6:36 AM   Result Value Ref Range    Sodium 144 136 - 145 mmol/L    Potassium 3.3 (L) 3.5 - 5.1 mmol/L    Chloride 112 (H) 97 - 108 mmol/L    CO2 24 21 - 32 mmol/L    Anion gap 8 5 - 15 mmol/L    Glucose 110 (H) 65 - 100 mg/dL    BUN 65 (H) 6 - 20 mg/dL    Creatinine 1.24 (H) 0.55 - 1.02 mg/dL    BUN/Creatinine ratio 52 (H) 12 - 20      GFR est AA 52 (L) >60 ml/min/1.73m2    GFR est non-AA 43 (L) >60 ml/min/1.73m2    Calcium 9.8 8.5 - 10.1 mg/dL    Phosphorus 2.4 (L) 2.6 - 4.7 mg/dL    Albumin 2.8 (L) 3.5 - 5.0 g/dL   C REACTIVE PROTEIN, QT    Collection Time: 09/29/20  6:36 AM   Result Value Ref Range    C-Reactive protein 8.99 (H) 0.00 - 0.60 mg/dL   PROCALCITONIN    Collection Time: 09/29/20  6:36 AM   Result Value Ref Range    Procalcitonin 0.39 (H) 0 ng/mL        Assessment and Plan:     1. Acute Kidney Injury on ? ?CKD: probably prerenal azotemia secondary to use of Lasix and lisinopril, in the presence of borderline hypotension+  lisinopril on hold  We will increase Lasix to 40 twice daily because of significant leg swelling  Will monitor I/Os, renal functions and adjust diuretics as needed   urine analysis is bland   Creatinine is improving. It is improved to 3.8-->2.8-->1.2 today . Baseline creatinine is unknown.   herself denies any history of CKD   CT abdomen negative for hydro  UA is negative for proteinuria or significant cells, unlikely to have acute GN     Acute shortness of breath/history of COPD/obstructive sleep/CHF/chronic edema  Shows normal EF  Improved clinically with diuretics  No wheezing or SOB now  Continue bronchodilators and steroids  continue Lasix 40 mg daily  Continue IV antibiotic as per primary service    Hypotension: Borderline low blood pressures lisinopril was discontinued , not on any antihypertensive medications   Improved Bps and improved renal status with adding midodrine. we will give parameters to hold midodrine  continue to monitor blood pressures    Lower extremity edema: probably related to congestive heart failure/pulmonary hypertension    increase Lasix to 40 twice daily   advised to maintain p.o. fluid restriction of 3320-4603 ml per day and and salt restriction,  Will monitor fluid status clinically and adjust diuretics as needed.          Signed By: Anne Mccarthy MD     September 29, 2020

## 2020-09-29 NOTE — PROGRESS NOTES
Progress Note  Date:2020       Room:520/01  Patient Name:Kristyn Mosher [facility]     Date of Birth:     Age:70 y.o. Subjective    Subjective:  Symptoms:  No shortness of breath, cough, chest pain or weakness. (Patient denies Hymoptysis, shortness of breath, cough, or weakness. (The P and C ANCA were negative and the Myeloperoxidase and Anti Proteinase 3 were negative and she has no features of vasculitis. The patient has no complaints and the VIOLETA serologies and complements are pending. Patient still on oral steroids. We will sign off the case if the remaining serologies return as normal.  The Pleural effusion and pulmonary nodule managed by Pulmonary. ). Review of Systems   Respiratory: Negative for cough and shortness of breath. Cardiovascular: Negative for chest pain. Neurological: Negative for weakness. Objective         Vitals Last 24 Hours:  TEMPERATURE:  Temp  Av.8 °F (37.1 °C)  Min: 98.4 °F (36.9 °C)  Max: 99.7 °F (37.6 °C)  RESPIRATIONS RANGE: Resp  Av  Min: 18  Max: 18  PULSE OXIMETRY RANGE: SpO2  Av.7 %  Min: 92 %  Max: 100 %  PULSE RANGE: Pulse  Av  Min: 71  Max: 75  BLOOD PRESSURE RANGE: Systolic (09XUT), IAC:789 , Min:149 , CDO:765   ; Diastolic (64XSO), HBT:11, Min:67, Max:88    I/O (24Hr): Intake/Output Summary (Last 24 hours) at 2020 0805  Last data filed at 2020 2136  Gross per 24 hour   Intake 1600 ml   Output    Net 1600 ml     Objective:  General Appearance:  Comfortable, in no acute distress and not in pain. Vital signs: (most recent): Blood pressure (!) 149/75, pulse 71, temperature 99.7 °F (37.6 °C), resp. rate 18, height 5' 5\" (1.651 m), weight 90.7 kg (200 lb), SpO2 99 %. HEENT: Normal HEENT exam.    Lungs:  Normal effort. Heart: Normal rate. Extremities: Normal range of motion. (No pain in extremities on exam)  Neurological: Patient is alert and oriented to person, place and time. Normal strength. Skin:  Warm. No rash. Labs/Imaging/Diagnostics    Labs:  CBC:  Recent Labs     09/29/20  0636 09/28/20  0400 09/27/20  1205   WBC 15.1* 13.1* 16.4*   RBC 3.82 3.90 4.07   HGB 11.7 12.0 12.6   HCT 34.3* 35.4 37.4   MCV 89.8 90.8 91.9   RDW 16.8* 16.9* 16.9*    383 385     CHEMISTRIES:  Recent Labs     09/28/20  0914 09/27/20  1205    139   K 3.6 3.8   * 105   CO2 25 23   BUN 73* 79*   CA 9.7 8.8   PHOS 3.6 5.1*   PT/INR:  Recent Labs     09/28/20  1114   INR 1.6*     APTT:No results for input(s): APTT in the last 72 hours. LIVER PROFILE:No results for input(s): AST, ALT in the last 72 hours. No lab exists for component: DEO Law  Lab Results   Component Value Date/Time    ALT (SGPT) 15 09/25/2020 03:03 AM    AST (SGOT) 23 09/25/2020 03:03 AM    Alk. phosphatase 152 (H) 09/25/2020 03:03 AM    Bilirubin, total 0.5 09/25/2020 03:03 AM       Imaging Last 24 Hours:  No results found. Assessment//Plan   Active Problems:          Assessment:  (The P and C ANCA and Myeloperioxidase and anti Proteinase 3 were all negative. There is no clinical evidence of vasculitis. We will sign off the case if the VIOLETA and the serology subset that are pending return as normal.  The Pleural effusion and pulmonary nodule managed by Pulmonary. ).        Electronically signed by Selena Islas MD on 9/29/2020 at 8:05 AM

## 2020-09-29 NOTE — PROGRESS NOTES
Hospitalist Progress Note    Subjective:   Daily Progress Note: 9/29/2020 4:47 PM    Hospital Course:  79 y. o.female with an extensive past medical history including A. fib on Xarelto, status post cardioversion, hypertension, HORACE, COPD that presented to the ED complaining of worsening dyspnea for the last week or so. She states that she generally has some component of dyspnea chronically due to her COPD along with persistent cough is nonproductive, however she is also over the last week dyspnea continues to worsen, particularly on exertion. Symptoms have not been associated with any chest pain, palpitations, or lightheadedness. Her usual inhaler regimen has not improved her symptoms. States that prior to that she has been doing relatively well her usual state of health, denies any recent fevers, chills, chest pain, abdominal pain, nausea, vomiting, diarrhea, constipation, lightheadedness dizziness urinary symptoms headache. Subjective: Pt seen in room, complaints of general malaise, c/o of cough, occasional sputum, no chest pain.      Current Facility-Administered Medications   Medication Dose Route Frequency    midodrine (PROAMATINE) tablet 2.5 mg  2.5 mg Oral TID WITH MEALS    furosemide (LASIX) injection 40 mg  40 mg IntraVENous BID    potassium chloride (K-DUR, KLOR-CON) SR tablet 40 mEq  40 mEq Oral NOW    pantoprazole (PROTONIX) tablet 40 mg  40 mg Oral ACB    vancomycin (VANCOCIN) 1,000 mg in 0.9% sodium chloride 250 mL (VIAL-MATE)  1,000 mg IntraVENous Q24H    albuterol (PROVENTIL HFA, VENTOLIN HFA, PROAIR HFA) inhaler 2 Puff  2 Puff Inhalation Q6H RT    predniSONE (DELTASONE) tablet 20 mg  20 mg Oral DAILY WITH BREAKFAST    famotidine (PEPCID) tablet 20 mg  20 mg Oral Q48H    albuterol-ipratropium (DUO-NEB) 2.5 MG-0.5 MG/3 ML  3 mL Nebulization Q4H PRN    piperacillin-tazobactam (ZOSYN) 3.375 g in 0.9% sodium chloride (MBP/ADV) 100 mL MBP  3.375 g IntraVENous Q8H    aspirin delayed-release tablet 81 mg  81 mg Oral DAILY    multivitamin (ONE A DAY) tablet 1 Tab  1 Tab Oral DAILY    digoxin (LANOXIN) tablet 0.125 mg  0.125 mg Oral DAILY    atorvastatin (LIPITOR) tablet 40 mg  40 mg Oral QHS    melatonin tablet 10 mg  10 mg Oral QHS    rivaroxaban (XARELTO) tablet 20 mg  20 mg Oral DAILY    sodium chloride (NS) flush 5-10 mL  5-10 mL IntraVENous PRN    sodium chloride (NS) flush 5-40 mL  5-40 mL IntraVENous Q8H    sodium chloride (NS) flush 5-40 mL  5-40 mL IntraVENous PRN    acetaminophen (TYLENOL) tablet 650 mg  650 mg Oral Q6H PRN    Or    acetaminophen (TYLENOL) suppository 650 mg  650 mg Rectal Q6H PRN    polyethylene glycol (MIRALAX) packet 17 g  17 g Oral DAILY PRN    promethazine (PHENERGAN) tablet 12.5 mg  12.5 mg Oral Q6H PRN    Or    ondansetron (ZOFRAN) injection 4 mg  4 mg IntraVENous Q6H PRN    VANCOMYCIN INFORMATION NOTE 1 Each  1 Each Other Rx Dosing/Monitoring        Review of Systems:    Review of Systems   Constitutional: Positive for malaise/fatigue. Negative for fever. HENT: Negative. Respiratory: Positive for cough, sputum production and shortness of breath. Negative for wheezing. Cardiovascular: Negative for chest pain, palpitations and leg swelling. Gastrointestinal: Negative for abdominal pain, heartburn, nausea and vomiting. Genitourinary: Negative for dysuria and frequency. Skin: Negative. Neurological: Negative for dizziness, weakness and headaches. Objective:     Visit Vitals  BP (!) 157/83   Pulse 64   Temp 97.8 °F (36.6 °C)   Resp 18   Ht 5' 5\" (1.651 m)   Wt 91.4 kg (201 lb 8 oz)   SpO2 98%   BMI 33.53 kg/m²    O2 Flow Rate (L/min): 1 l/min O2 Device: Room air    Temp (24hrs), Av.4 °F (36.9 °C), Min:97.7 °F (36.5 °C), Max:99.7 °F (37.6 °C)      No intake/output data recorded.  1901 -  0700  In: 1600 [P.O.:1500;  I.V.:100]  Out: -     PHYSICAL EXAM:    Physical Exam   Constitutional: She is oriented to person, place, and time. She appears well-developed. No distress. Neck: Normal range of motion. Neck supple. Cardiovascular: Normal rate, regular rhythm and intact distal pulses. Murmur heard. Pulmonary/Chest: She has rales. Diminished in right base > left base   Abdominal: Soft. Bowel sounds are normal.   Musculoskeletal: Normal range of motion. General: Edema present. Neurological: She is alert and oriented to person, place, and time. Skin: Skin is warm and dry. Psychiatric: She has a normal mood and affect. Her behavior is normal.          Data Review    Recent Results (from the past 24 hour(s))   VANCOMYCIN, RANDOM    Collection Time: 09/28/20  5:36 PM   Result Value Ref Range    Vancomycin, random 7.0 ug/mL    Reported dose date Not provided      Reported dose: Not provided Units   CBC WITH AUTOMATED DIFF    Collection Time: 09/29/20  6:36 AM   Result Value Ref Range    WBC 15.1 (H) 3.6 - 11.0 K/uL    RBC 3.82 3.80 - 5.20 M/uL    HGB 11.7 11.5 - 16.0 %    HCT 34.3 (L) 35.0 - 47.0 %    MCV 89.8 80.0 - 99.0 FL    MCH 30.6 26.0 - 34.0 PG    MCHC 34.1 30.0 - 36.5 g/dL    RDW 16.8 (H) 11.5 - 14.5 %    PLATELET 510 185 - 699 K/uL    MPV 10.1 8.9 - 12.9 FL    NEUTROPHILS 70 32 - 75 %    LYMPHOCYTES 13 12 - 49 %    MONOCYTES 13 5 - 13 %    EOSINOPHILS 0 0 - 7 %    BASOPHILS 0 0 - 1 %    METAMYELOCYTES 1 (H) 0 %    MYELOCYTES 3 (H) 0 %    IMMATURE GRANULOCYTES 0 %    ABS. NEUTROPHILS 10.6 (H) 1.8 - 8.0 K/UL    ABS. LYMPHOCYTES 2.0 0.8 - 3.5 K/UL    ABS. MONOCYTES 2.0 (H) 0.0 - 1.0 K/UL    ABS. EOSINOPHILS 0.0 0.0 - 0.4 K/UL    ABS. BASOPHILS 0.0 0.0 - 0.1 K/UL    ABS. IMM.  GRANS. 0.0 K/UL    DF AUTOMATED      RBC COMMENTS Anisocytosis  1+        RBC COMMENTS Target Cells  1+        RBC COMMENTS Schistocytes  1+       RENAL FUNCTION PANEL    Collection Time: 09/29/20  6:36 AM   Result Value Ref Range    Sodium 144 136 - 145 mmol/L    Potassium 3.3 (L) 3.5 - 5.1 mmol/L    Chloride 112 (H) 97 - 108 mmol/L    CO2 24 21 - 32 mmol/L    Anion gap 8 5 - 15 mmol/L    Glucose 110 (H) 65 - 100 mg/dL    BUN 65 (H) 6 - 20 mg/dL    Creatinine 1.24 (H) 0.55 - 1.02 mg/dL    BUN/Creatinine ratio 52 (H) 12 - 20      GFR est AA 52 (L) >60 ml/min/1.73m2    GFR est non-AA 43 (L) >60 ml/min/1.73m2    Calcium 9.8 8.5 - 10.1 mg/dL    Phosphorus 2.4 (L) 2.6 - 4.7 mg/dL    Albumin 2.8 (L) 3.5 - 5.0 g/dL   C REACTIVE PROTEIN, QT    Collection Time: 09/29/20  6:36 AM   Result Value Ref Range    C-Reactive protein 8.99 (H) 0.00 - 0.60 mg/dL   PROCALCITONIN    Collection Time: 09/29/20  6:36 AM   Result Value Ref Range    Procalcitonin 0.39 (H) 0 ng/mL       CT CHEST WO CONT   Final Result   IMPRESSION:   1. There is a moderate sized loculated right pleural effusion associated with   passive atelectasis. The differential diagnosis would include recent pneumonia   with a residual parapneumonic effusion. Since exam an [de-identified] is negative for pleural   based masses or findings specific for a malignant effusion. 2.  There are several scattered normal size middle mediastinal lymph nodes. 3.  There are findings of prior granulomatous exposure. Please see above text   for further details. CT ABD PELV WO CONT   Final Result   IMPRESSION:   Loculated right pleural effusion with atelectasis versus airspace disease in the   base of right lung. No acute finding the abdomen and pelvis. XR CHEST SNGL V   Final Result   IMPRESSION: Cardiomegaly. New right pleural effusion. No change in right lower   lobe granuloma. Dextroscoliosis. NM INFLAM WB MULTI    (Results Pending)   IR THORACENTESIS NDL PUNC ASP W IMAGE    (Results Pending)       Active Problems:    Hyponatremia (9/24/2020)      Leukocytosis (9/24/2020)      Hypoalbuminemia (9/24/2020)      Acute on chronic renal failure (HCC) (9/24/2020)      RLL pneumonia (9/24/2020)      Sepsis ([address] (9/24/2020)      PNA (pneumonia) (9/24/2020)        Assessment/Plan:     1.  Acute respiratory failure with hypoxia- wean O2 as tolerated,  Ambulating pulse oximetry is above 90%, will check overnight pulse ox prior to   Discharge. 2. Right pleural effusion- loculated, with atelectasis, pulmonary following,  Pt refused thoracentesis , idium scan ordered, treat for pneumonia, WBC trending down,   IV vancomycin, zosyn, ID following    3. COPD-  On prednisone, pulmonary following. 4. Atrial fibrillation- on digoxin, xarelto, controlled rate. 5. Code status- pt wishes to be DNR, signed form for chart. 6. Acute on chronic CKD- creatinine improving.        DVT Prophylaxis:on xarelto  Code Status:  DNR  POA:    Care Plan discussed with:   ____patient anad staff nurse___________________________________________________________    Hoang Torres NP

## 2020-09-29 NOTE — PROGRESS NOTES
Two RN skin assessment completed with Jeannette JEAN. Skin dry and intact. Pt is ambulatory and continent. VSS. No acute distress.

## 2020-09-30 ENCOUNTER — APPOINTMENT (OUTPATIENT)
Dept: NUCLEAR MEDICINE | Age: 70
DRG: 871 | End: 2020-09-30
Attending: INTERNAL MEDICINE
Payer: MEDICARE

## 2020-09-30 ENCOUNTER — APPOINTMENT (OUTPATIENT)
Dept: INTERVENTIONAL RADIOLOGY/VASCULAR | Age: 70
DRG: 871 | End: 2020-09-30
Attending: INTERNAL MEDICINE
Payer: MEDICARE

## 2020-09-30 LAB
ALBUMIN SERPL-MCNC: 2.7 G/DL (ref 3.5–5)
ANION GAP SERPL CALC-SCNC: 4 MMOL/L (ref 5–15)
ANION GAP SERPL CALC-SCNC: 6 MMOL/L (ref 5–15)
BASOPHILS # BLD: 0 K/UL (ref 0–0.1)
BASOPHILS NFR BLD: 0 % (ref 0–1)
BUN SERPL-MCNC: 51 MG/DL (ref 6–20)
BUN SERPL-MCNC: 52 MG/DL (ref 6–20)
BUN/CREAT SERPL: 54 (ref 12–20)
BUN/CREAT SERPL: 55 (ref 12–20)
CA-I BLD-MCNC: 9.6 MG/DL (ref 8.5–10.1)
CA-I BLD-MCNC: 9.6 MG/DL (ref 8.5–10.1)
CA-I BLD-MCNC: 9.7 MG/DL (ref 8.5–10.1)
CH50 SERPL-ACNC: >60 U/ML
CHLORIDE SERPL-SCNC: 113 MMOL/L (ref 97–108)
CHLORIDE SERPL-SCNC: 113 MMOL/L (ref 97–108)
CO2 SERPL-SCNC: 28 MMOL/L (ref 21–32)
CO2 SERPL-SCNC: 28 MMOL/L (ref 21–32)
CREAT SERPL-MCNC: 0.94 MG/DL (ref 0.55–1.02)
CREAT SERPL-MCNC: 0.95 MG/DL (ref 0.55–1.02)
CULTURE,CULT: NORMAL
DATE LAST DOSE: ABNORMAL
DIFFERENTIAL METHOD BLD: ABNORMAL
DSDNA AB SER-ACNC: <1 IU/ML
EOSINOPHIL # BLD: 0 K/UL (ref 0–0.4)
EOSINOPHIL NFR BLD: 0 % (ref 0–7)
ERYTHROCYTE [DISTWIDTH] IN BLOOD BY AUTOMATED COUNT: 16.9 % (ref 11.5–14.5)
GLUCOSE SERPL-MCNC: 110 MG/DL (ref 65–100)
GLUCOSE SERPL-MCNC: 113 MG/DL (ref 65–100)
HAV AB SER QL IA: NEGATIVE
HAV IGM SERPL QL IA: NEGATIVE
HBV CORE AB SERPL QL IA: NEGATIVE
HBV CORE IGM SERPL QL IA: NEGATIVE
HBV SURFACE AB SER QL: NON REACTIVE INDEX VALUE
HBV SURFACE AG SERPL QL IA: NEGATIVE
HCT VFR BLD AUTO: 35.5 % (ref 35–47)
HCV AB S/CO SERPL IA: <0.1 S/CO RATIO
HGB BLD-MCNC: 11.9 % (ref 11.5–16)
HISTONE IGG SER IA-ACNC: 0.4 UNITS
IMM GRANULOCYTES # BLD AUTO: 0 K/UL
IMM GRANULOCYTES NFR BLD AUTO: 0 %
LYMPHOCYTES # BLD: 2.5 K/UL (ref 0.8–3.5)
LYMPHOCYTES NFR BLD: 14 % (ref 12–49)
MAGNESIUM SERPL-MCNC: 1.4 MG/DL (ref 1.6–2.4)
MCH RBC QN AUTO: 30.5 PG (ref 26–34)
MCHC RBC AUTO-ENTMCNC: 33.5 G/DL (ref 30–36.5)
MCV RBC AUTO: 91 FL (ref 80–99)
MONOCYTES # BLD: 1.1 K/UL (ref 0–1)
MONOCYTES NFR BLD: 6 % (ref 5–13)
MYELOPEROXIDASE AB SER IA-ACNC: <9
NEUTS BAND NFR BLD MANUAL: 1 % (ref 0–6)
NEUTS SEG # BLD: 14.1 K/UL (ref 1.8–8)
NEUTS SEG NFR BLD: 79 % (ref 32–75)
NRBC # BLD: 0 K/UL (ref 0–0.01)
NRBC BLD-RTO: 0 PER 100 WBC
PHOSPHATE SERPL-MCNC: 2.7 MG/DL (ref 2.6–4.7)
PLATELET # BLD AUTO: 395 K/UL (ref 150–400)
PLATELET COMMENTS,PCOM: ABNORMAL
PMV BLD AUTO: 10.3 FL (ref 8.9–12.9)
POTASSIUM SERPL-SCNC: 3.5 MMOL/L (ref 3.5–5.1)
POTASSIUM SERPL-SCNC: 3.6 MMOL/L (ref 3.5–5.1)
PROTEINASE3 AB SER IA-ACNC: <3.5 U/ML
PTH-INTACT SERPL-MCNC: 66 PG/ML (ref 18.4–88)
RBC # BLD AUTO: 3.9 M/UL (ref 3.8–5.2)
RBC MORPH BLD: ABNORMAL
RBC MORPH BLD: ABNORMAL
REPORTED DOSE,DOSE: ABNORMAL UNITS
SODIUM SERPL-SCNC: 145 MMOL/L (ref 136–145)
SODIUM SERPL-SCNC: 147 MMOL/L (ref 136–145)
SPECIAL REQUESTS,SREQ: NORMAL
VANCOMYCIN TROUGH SERPL-MCNC: 11 UG/ML (ref 5–10)
WBC # BLD AUTO: 17.7 K/UL (ref 3.6–11)

## 2020-09-30 PROCEDURE — 94760 N-INVAS EAR/PLS OXIMETRY 1: CPT

## 2020-09-30 PROCEDURE — 74011250637 HC RX REV CODE- 250/637: Performed by: HOSPITALIST

## 2020-09-30 PROCEDURE — 74011250637 HC RX REV CODE- 250/637: Performed by: NURSE PRACTITIONER

## 2020-09-30 PROCEDURE — 74011250636 HC RX REV CODE- 250/636: Performed by: INTERNAL MEDICINE

## 2020-09-30 PROCEDURE — 83735 ASSAY OF MAGNESIUM: CPT

## 2020-09-30 PROCEDURE — 36415 COLL VENOUS BLD VENIPUNCTURE: CPT

## 2020-09-30 PROCEDURE — 74011636637 HC RX REV CODE- 636/637: Performed by: INTERNAL MEDICINE

## 2020-09-30 PROCEDURE — 94640 AIRWAY INHALATION TREATMENT: CPT

## 2020-09-30 PROCEDURE — 65270000029 HC RM PRIVATE

## 2020-09-30 PROCEDURE — 74011250637 HC RX REV CODE- 250/637: Performed by: INTERNAL MEDICINE

## 2020-09-30 PROCEDURE — 85025 COMPLETE CBC W/AUTO DIFF WBC: CPT

## 2020-09-30 PROCEDURE — 74011000258 HC RX REV CODE- 258: Performed by: PHYSICIAN ASSISTANT

## 2020-09-30 PROCEDURE — 80048 BASIC METABOLIC PNL TOTAL CA: CPT

## 2020-09-30 PROCEDURE — 99232 SBSQ HOSP IP/OBS MODERATE 35: CPT | Performed by: INTERNAL MEDICINE

## 2020-09-30 PROCEDURE — 80202 ASSAY OF VANCOMYCIN: CPT

## 2020-09-30 PROCEDURE — 77010033678 HC OXYGEN DAILY

## 2020-09-30 PROCEDURE — 74011250636 HC RX REV CODE- 250/636: Performed by: PHYSICIAN ASSISTANT

## 2020-09-30 PROCEDURE — 80069 RENAL FUNCTION PANEL: CPT

## 2020-09-30 PROCEDURE — 74011250637 HC RX REV CODE- 250/637: Performed by: PHYSICIAN ASSISTANT

## 2020-09-30 RX ORDER — FUROSEMIDE 10 MG/ML
40 INJECTION INTRAMUSCULAR; INTRAVENOUS 2 TIMES DAILY
Status: DISCONTINUED | OUTPATIENT
Start: 2020-09-30 | End: 2020-10-03

## 2020-09-30 RX ORDER — LANOLIN ALCOHOL/MO/W.PET/CERES
400 CREAM (GRAM) TOPICAL 2 TIMES DAILY
Status: COMPLETED | OUTPATIENT
Start: 2020-09-30 | End: 2020-10-03

## 2020-09-30 RX ADMIN — ALBUTEROL SULFATE 2 PUFF: 108 AEROSOL, METERED RESPIRATORY (INHALATION) at 08:24

## 2020-09-30 RX ADMIN — PIPERACILLIN SODIUM AND TAZOBACTAM SODIUM 3.38 G: 3; .375 INJECTION, POWDER, LYOPHILIZED, FOR SOLUTION INTRAVENOUS at 20:25

## 2020-09-30 RX ADMIN — SODIUM CHLORIDE 1000 MG: 9 INJECTION, SOLUTION INTRAVENOUS at 02:27

## 2020-09-30 RX ADMIN — DIGOXIN 0.12 MG: 125 TABLET ORAL at 11:09

## 2020-09-30 RX ADMIN — PANTOPRAZOLE SODIUM 40 MG: 40 TABLET, DELAYED RELEASE ORAL at 11:11

## 2020-09-30 RX ADMIN — Medication 10 ML: at 05:58

## 2020-09-30 RX ADMIN — FUROSEMIDE 40 MG: 10 INJECTION, SOLUTION INTRAMUSCULAR; INTRAVENOUS at 20:24

## 2020-09-30 RX ADMIN — FUROSEMIDE 40 MG: 40 TABLET ORAL at 11:11

## 2020-09-30 RX ADMIN — PIPERACILLIN SODIUM AND TAZOBACTAM SODIUM 3.38 G: 3; .375 INJECTION, POWDER, LYOPHILIZED, FOR SOLUTION INTRAVENOUS at 04:17

## 2020-09-30 RX ADMIN — ASPIRIN 81 MG: 81 TABLET, COATED ORAL at 11:11

## 2020-09-30 RX ADMIN — PIPERACILLIN SODIUM AND TAZOBACTAM SODIUM 3.38 G: 3; .375 INJECTION, POWDER, LYOPHILIZED, FOR SOLUTION INTRAVENOUS at 11:11

## 2020-09-30 RX ADMIN — RIVAROXABAN 20 MG: 20 TABLET, FILM COATED ORAL at 11:11

## 2020-09-30 RX ADMIN — ALBUTEROL SULFATE 2 PUFF: 108 AEROSOL, METERED RESPIRATORY (INHALATION) at 15:29

## 2020-09-30 RX ADMIN — ATORVASTATIN CALCIUM 40 MG: 40 TABLET, FILM COATED ORAL at 22:20

## 2020-09-30 RX ADMIN — PREDNISONE 20 MG: 20 TABLET ORAL at 11:10

## 2020-09-30 RX ADMIN — ALBUTEROL SULFATE 2 PUFF: 108 AEROSOL, METERED RESPIRATORY (INHALATION) at 19:54

## 2020-09-30 RX ADMIN — POTASSIUM CHLORIDE 40 MEQ: 1500 TABLET, EXTENDED RELEASE ORAL at 11:09

## 2020-09-30 RX ADMIN — MAGNESIUM OXIDE TAB 400 MG (241.3 MG ELEMENTAL MG) 400 MG: 400 (241.3 MG) TAB at 20:24

## 2020-09-30 RX ADMIN — MULTIVITAMIN TABLET 1 TABLET: TABLET at 11:08

## 2020-09-30 NOTE — PROGRESS NOTES
Renal Progress Note    Patient: Adrien Dubin MRN: [id number]  SSN: [id number]    YOB: 1950  Age: 79 y.o. Sex: female      Admit Date: 9/24/2020    LOS: 6 days     Subjective:   Patient seen at bedside. She is complaining of feeling short of breath today. Has agreed to thoracentesis now. Also complaining of worsening of her leg swelling.   Renal function is improved       Current Facility-Administered Medications   Medication Dose Route Frequency    furosemide (LASIX) tablet 40 mg  40 mg Oral DAILY    potassium chloride (K-DUR, KLOR-CON) SR tablet 40 mEq  40 mEq Oral DAILY    pantoprazole (PROTONIX) tablet 40 mg  40 mg Oral ACB    vancomycin (VANCOCIN) 1,000 mg in 0.9% sodium chloride 250 mL (VIAL-MATE)  1,000 mg IntraVENous Q24H    albuterol (PROVENTIL HFA, VENTOLIN HFA, PROAIR HFA) inhaler 2 Puff  2 Puff Inhalation Q6H RT    predniSONE (DELTASONE) tablet 20 mg  20 mg Oral DAILY WITH BREAKFAST    famotidine (PEPCID) tablet 20 mg  20 mg Oral Q48H    albuterol-ipratropium (DUO-NEB) 2.5 MG-0.5 MG/3 ML  3 mL Nebulization Q4H PRN    piperacillin-tazobactam (ZOSYN) 3.375 g in 0.9% sodium chloride (MBP/ADV) 100 mL MBP  3.375 g IntraVENous Q8H    aspirin delayed-release tablet 81 mg  81 mg Oral DAILY    multivitamin (ONE A DAY) tablet 1 Tab  1 Tab Oral DAILY    digoxin (LANOXIN) tablet 0.125 mg  0.125 mg Oral DAILY    atorvastatin (LIPITOR) tablet 40 mg  40 mg Oral QHS    melatonin tablet 10 mg  10 mg Oral QHS    rivaroxaban (XARELTO) tablet 20 mg  20 mg Oral DAILY    sodium chloride (NS) flush 5-10 mL  5-10 mL IntraVENous PRN    sodium chloride (NS) flush 5-40 mL  5-40 mL IntraVENous Q8H    sodium chloride (NS) flush 5-40 mL  5-40 mL IntraVENous PRN    acetaminophen (TYLENOL) tablet 650 mg  650 mg Oral Q6H PRN    Or    acetaminophen (TYLENOL) suppository 650 mg  650 mg Rectal Q6H PRN    polyethylene glycol (MIRALAX) packet 17 g  17 g Oral DAILY PRN    promethazine (PHENERGAN) tablet 12.5 mg  12.5 mg Oral Q6H PRN    Or    ondansetron (ZOFRAN) injection 4 mg  4 mg IntraVENous Q6H PRN    VANCOMYCIN INFORMATION NOTE 1 Each  1 Each Other Rx Dosing/Monitoring        Vitals:    09/30/20 1145 09/30/20 1504 09/30/20 1532 09/30/20 1535   BP:  (!) 159/92     Pulse:  70     Resp:  18     Temp:  97.7 °F (36.5 °C)     SpO2: 100% 97% 99% 98%   Weight:       Height:         Objective:   General: Elderly female, alert awake well-oriented, no acute distress. HEENT: EOMI, no Icterus, no Pallor,  mucosa moist,  throat clear. Neck: Neck is supple, No JVD,   Lungs: fair air entry+ no rhonchi, no rales,  CVS: heart sounds normal, no murmurs, no rubs. GI: soft, nontender, normal BS,   Extremeties: no clubbing, no cyanosis, 1-2+ bilateral extremity edema present,  Neuro: Alert, awake, oriented x3, speech is normal   Skin: normal skin turgor,   Musculoskeletal: no acute joint swellings       Intake and Output:  Current Shift: No intake/output data recorded. Last three shifts: 09/28 1901 - 09/30 0700  In: 980 [P.O.:880; I.V.:100]  Out: 600 [Urine:600]      Lab/Data Review:  Recent Labs     09/30/20  0715 09/29/20  0636 09/28/20  0400   WBC 17.7* 15.1* 13.1*   HGB 11.9 11.7 12.0   HCT 35.5 34.3* 35.4    395 383     Recent Labs     09/30/20  0715 09/29/20  0636 09/28/20  1114 09/28/20  0914     147* 144  --  141   K 3.5  3.6 3.3*  --  3.6   *  113* 112*  --  110*   CO2 28  28 24  --  25   *  110* 110*  --  174*   BUN 51*  52* 65*  --  73*   CREA 0.95  0.94 1.24*  --  1.75*   CA 9.7  9.6 9.6  9.8  --  9.7   MG 1.4*  --   --   --    PHOS 2.7 2.4*  --  3.6   ALB 2.7* 2.8*  --  3.0*   INR  --   --  1.6*  --      No results for input(s): PH, PCO2, PO2, HCO3, FIO2 in the last 72 hours.   Recent Results (from the past 24 hour(s))   CBC WITH AUTOMATED DIFF    Collection Time: 09/30/20  7:15 AM   Result Value Ref Range    WBC 17.7 (H) 3.6 - 11.0 K/uL    RBC 3.90 3.80 - 5.20 M/uL    HGB 11.9 11.5 - 16.0 %    HCT 35.5 35.0 - 47.0 %    MCV 91.0 80.0 - 99.0 FL    MCH 30.5 26.0 - 34.0 PG    MCHC 33.5 30.0 - 36.5 g/dL    RDW 16.9 (H) 11.5 - 14.5 %    PLATELET 671 112 - 988 K/uL    MPV 10.3 8.9 - 12.9 FL    NRBC 0.0 0  WBC    ABSOLUTE NRBC 0.00 0.00 - 0.01 K/uL    NEUTROPHILS 79 (H) 32 - 75 %    BAND NEUTROPHILS 1 0 - 6 %    LYMPHOCYTES 14 12 - 49 %    MONOCYTES 6 5 - 13 %    EOSINOPHILS 0 0 - 7 %    BASOPHILS 0 0 - 1 %    IMMATURE GRANULOCYTES 0 %    ABS. NEUTROPHILS 14.1 (H) 1.8 - 8.0 K/UL    ABS. LYMPHOCYTES 2.5 0.8 - 3.5 K/UL    ABS. MONOCYTES 1.1 (H) 0.0 - 1.0 K/UL    ABS. EOSINOPHILS 0.0 0.0 - 0.4 K/UL    ABS. BASOPHILS 0.0 0.0 - 0.1 K/UL    ABS. IMM.  GRANS. 0.0 K/UL    DF AUTOMATED      PLATELET COMMENTS Large Platelets      RBC COMMENTS Anisocytosis  1+        RBC COMMENTS [place] cells  1+       METABOLIC PANEL, BASIC    Collection Time: 09/30/20  7:15 AM   Result Value Ref Range    Sodium 145 136 - 145 mmol/L    Potassium 3.5 3.5 - 5.1 mmol/L    Chloride 113 (H) 97 - 108 mmol/L    CO2 28 21 - 32 mmol/L    Anion gap 4 (L) 5 - 15 mmol/L    Glucose 113 (H) 65 - 100 mg/dL    BUN 51 (H) 6 - 20 mg/dL    Creatinine 0.95 0.55 - 1.02 mg/dL    BUN/Creatinine ratio 54 (H) 12 - 20      GFR est AA >60 >60 ml/min/1.73m2    GFR est non-AA 58 (L) >60 ml/min/1.73m2    Calcium 9.7 8.5 - 10.1 mg/dL   RENAL FUNCTION PANEL    Collection Time: 09/30/20  7:15 AM   Result Value Ref Range    Sodium 147 (H) 136 - 145 mmol/L    Potassium 3.6 3.5 - 5.1 mmol/L    Chloride 113 (H) 97 - 108 mmol/L    CO2 28 21 - 32 mmol/L    Anion gap 6 5 - 15 mmol/L    Glucose 110 (H) 65 - 100 mg/dL    BUN 52 (H) 6 - 20 mg/dL    Creatinine 0.94 0.55 - 1.02 mg/dL    BUN/Creatinine ratio 55 (H) 12 - 20      GFR est AA >60 >60 ml/min/1.73m2    GFR est non-AA 59 (L) >60 ml/min/1.73m2    Calcium 9.6 8.5 - 10.1 mg/dL    Phosphorus 2.7 2.6 - 4.7 mg/dL    Albumin 2.7 (L) 3.5 - 5.0 g/dL   MAGNESIUM    Collection Time: 09/30/20  7:15 AM   Result Value Ref Range    Magnesium 1.4 (L) 1.6 - 2.4 mg/dL        Assessment and Plan:     1. Acute Kidney Injury on ? ?CKD: probably prerenal azotemia secondary to use of Lasix and lisinopril, in the presence of borderline hypotension+  lisinopril on hold  Lasix was increased yesterday to 40 twice daily 2/2 significant leg swelling. Continue same dose  Will monitor I/Os, renal functions and adjust diuretics as needed   urine analysis is bland   Creatinine is improving. It is improved to 3.8-->2.8-->1.2-->0.9 today . Baseline creatinine is unknown.   herself denies any history of CKD   CT abdomen negative for hydro  UA is negative for proteinuria or significant cells, unlikely to have acute GN     2. Acute shortness of breath/history of COPD/obstructive sleep/CHF/chronic edema  Shows normal EF  Improved clinically with diuretics  No wheezing or SOB now  Continue bronchodilators and steroids  continue Lasix 40 mg BID iv  Continue IV antibiotic as per primary service    3. Hypertension  Patient is now elevated. I will discontinue midodrine. Restart BP meds if still elevated    4. Lower extremity edema: probably related to congestive heart failure/pulmonary hypertension    increase Lasix to 40 twice daily   advised to maintain p.o. fluid restriction of 8179-2987 ml per day and and salt restriction,  Will monitor fluid status clinically and adjust diuretics as needed.          Signed By: Torie Reynaga MD     September 30, 2020

## 2020-09-30 NOTE — PROGRESS NOTES
Paged/spoke with MD on-call with two concerns:    Patient requesting Trazodone to help her sleep. Per MD, Trazadone 25mg PO, Once. Patient's blood pressure elevated. Per MD, continue to monitor and let effects of Midodrine fall.

## 2020-09-30 NOTE — CONSULTS
PULMONARY NOTE  [facility]    Name: Rox Valenzuela MRN: [id number]   : 1950 Hospital: [address] [place] [address]   Date: 2020  Admission date: 2020 Hospital Day: 7       HPI:     Hospital Problems  Date Reviewed: 2015          Codes Class Noted POA    Hyponatremia ICD-10-CM: E87.1  ICD-9-CM: 276.1  2020 Yes        Leukocytosis ICD-10-CM: D72.829  ICD-9-CM: 288.60  2020 Yes        Hypoalbuminemia ICD-10-CM: E88.09  ICD-9-CM: 273.8  2020 Unknown        Acute on chronic renal failure ([facility] ICD-10-CM: N17.9, N18.9  ICD-9-CM: 584.9, 585.9  2020 Yes        RLL pneumonia ICD-10-CM: J18.9  ICD-9-CM: 966  2020 Yes        Sepsis ([facility] ICD-10-CM: A41.9  ICD-9-CM: 038.9, 995.91  2020 Yes        PNA (pneumonia) ICD-10-CM: J18.9  ICD-9-CM: 771  2020 Yes                   [x] High complexity decision making was performed  [x] See my orders for details      Subjective/Initial History:     I was asked by Tamie Alexander MD to see Rox Valenzuela  a 79 y.o.  female in consultation     Excerpts from admission 2020 or consult notes as follows:   72-year-old lady came in because of shortness of breath generalized weakness and also she was in atrial fibrillation past medical history of obstructive sleep apnea syndrome chronic A. fib cardioversion in the past hypertension in fact her blood pressure was low also she has COPD nebulizer treatment not on any oxygen at home complaining about cough without any productive sputum and she was feeling lightheaded dizzy no history of passing out so admitted and pulmonary consult was called for further evaluation.       No Known Allergies     MAR reviewed and pertinent medications noted or modified as needed     Current Facility-Administered Medications   Medication    midodrine (PROAMATINE) tablet 2.5 mg    furosemide (LASIX) tablet 40 mg    potassium chloride (K-DUR, KLOR-CON) SR tablet 40 mEq    pantoprazole (PROTONIX) tablet 40 mg    vancomycin (VANCOCIN) 1,000 mg in 0.9% sodium chloride 250 mL (VIAL-MATE)    albuterol (PROVENTIL HFA, VENTOLIN HFA, PROAIR HFA) inhaler 2 Puff    predniSONE (DELTASONE) tablet 20 mg    famotidine (PEPCID) tablet 20 mg    albuterol-ipratropium (DUO-NEB) 2.5 MG-0.5 MG/3 ML    piperacillin-tazobactam (ZOSYN) 3.375 g in 0.9% sodium chloride (MBP/ADV) 100 mL MBP    aspirin delayed-release tablet 81 mg    multivitamin (ONE A DAY) tablet 1 Tab    digoxin (LANOXIN) tablet 0.125 mg    atorvastatin (LIPITOR) tablet 40 mg    melatonin tablet 10 mg    rivaroxaban (XARELTO) tablet 20 mg    sodium chloride (NS) flush 5-10 mL    sodium chloride (NS) flush 5-40 mL    sodium chloride (NS) flush 5-40 mL    acetaminophen (TYLENOL) tablet 650 mg    Or    acetaminophen (TYLENOL) suppository 650 mg    polyethylene glycol (MIRALAX) packet 17 g    promethazine (PHENERGAN) tablet 12.5 mg    Or    ondansetron (ZOFRAN) injection 4 mg    VANCOMYCIN INFORMATION NOTE 1 Each      Patient PCP: None  PMH:  has a past medical history of AF (paroxysmal atrial fibrillation) ([facility]), Anemia, Anxiety, Chronic obstructive pulmonary disease ([address] Frequent urination, Heart failure ([address] HTN (hypertension), Irregular heart beat, Obesity, HORACE (obstructive sleep apnea), SOB (shortness of breath) on exertion, Stress, Stroke ([address] and Wheezing. She also has no past medical history of Chronic pain. PSH:   has a past surgical history that includes echocardiogram (11/23/2009); nm cardiac spect w strs/rest mult (08/2006); and hx gastric bypass. FHX: family history includes COPD in her mother; Colon Cancer in her father; Diabetes in her mother; Stroke in her brother. SHX:  reports that she has quit smoking. She has never used smokeless tobacco. She reports previous alcohol use. She reports that she does not use drugs.      ROS:    Review of Systems   Constitutional: Positive for diaphoresis and malaise/fatigue. Negative for weight loss. HENT: Negative. Eyes: Negative. Respiratory: Positive for cough and shortness of breath. Cardiovascular: Positive for orthopnea. Gastrointestinal: Negative. Genitourinary: Negative. Musculoskeletal: Negative. Skin: Negative. Neurological: Positive for dizziness and weakness. Endo/Heme/Allergies: Negative. Psychiatric/Behavioral: Negative. Objective:     Vital Signs: Telemetry:    normal sinus rhythm Intake/Output:   Visit Vitals  BP (!) 161/86   Pulse 70   Temp 97.8 °F (36.6 °C)   Resp 18   Ht 5' 5\" (1.651 m)   Wt 91.4 kg (201 lb 8 oz)   SpO2 100%   BMI 33.53 kg/m²       Temp (24hrs), Av.3 °F (36.8 °C), Min:97.8 °F (36.6 °C), Max:99.2 °F (37.3 °C)        O2 Device: Nasal cannula O2 Flow Rate (L/min): 2 l/min       Wt Readings from Last 4 Encounters:   20 91.4 kg (201 lb 8 oz)   10/18/16 83.6 kg (184 lb 3.2 oz)   12/24/15 79.8 kg (176 lb)   08/18/15 79.8 kg (176 lb)          Intake/Output Summary (Last 24 hours) at 2020 1018  Last data filed at 2020 1810  Gross per 24 hour   Intake 100 ml   Output 600 ml   Net -500 ml       Last shift:      No intake/output data recorded. Last 3 shifts:  1901 -  0700  In: 980 [P.O.:880; I.V.:100]  Out: 600 [Urine:600]       Physical Exam:     Physical Exam   Constitutional: She is oriented to person, place, and time. She appears well-developed. HENT:   Head: Normocephalic and atraumatic. Eyes: Pupils are equal, round, and reactive to light. Conjunctivae and EOM are normal.   Neck: Normal range of motion. Neck supple. Cardiovascular: Regular rhythm. irregular   Pulmonary/Chest: Breath sounds normal. She is in respiratory distress. Abdominal: Soft. Bowel sounds are normal.   Neurological: She is alert and oriented to person, place, and time. Skin: Skin is warm. Psychiatric: She has a normal mood and affect.  Her behavior is normal. Judgment and thought content normal.        Labs:    Recent Labs     09/30/20  0715 09/29/20  0636 09/28/20  1114 09/28/20  0400   WBC 17.7* 15.1*  --  13.1*   HGB 11.9 11.7  --  12.0    395  --  383   INR  --   --  1.6*  --      Recent Labs     09/30/20  0715 09/29/20  0636 09/28/20  0914     147* 144 141   K 3.5  3.6 3.3* 3.6   *  113* 112* 110*   CO2 28 28 24 25   *  110* 110* 174*   BUN 51*  52* 65* 73*   CREA 0.95  0.94 1.24* 1.75*   CA 9.7  9.6 9.6  9.8 9.7   MG 1.4*  --   --    PHOS 2.7 2.4* 3.6   ALB 2.7* 2.8* 3.0*     No results for input(s): PH, PCO2, PO2, HCO3, FIO2 in the last 72 hours. No results for input(s): CPK, CKNDX, TROIQ in the last 72 hours. No lab exists for component: CPKMB  No results found for: BNPP, BNP   Lab Results   Component Value Date/Time    Culture result: No Growth (<1000 cfu/mL) 09/25/2020 02:15 PM    Culture result: No growth 6 days 09/24/2020 12:45 PM   No results found for: TSH, TSHEXT, TSHEXT    Imaging:    CXR Results  (Last 48 hours)    None        Results from [place] encounter on 09/24/20   XR CHEST SNGL V    Narrative History is shortness of breath. Comparison Is with the chest x-ray of 12/18/2019. An AP portable erect view of the chest demonstrate cardiac monitor leads. There  is now a small to moderate right pleural effusion. There is a small calcified  right lower lobe granuloma. The heart is enlarged. There is approximately 27  degrees dextroscoliosis of the thoracic spine. There is degenerative change  present in the spine as well. Impression IMPRESSION: Cardiomegaly. New right pleural effusion. No change in right lower  lobe granuloma. Dextroscoliosis. Results from [place] encounter on 09/24/20   CT CHEST WO CONT    Narrative The study is a CT evaluation of the chest dated 9/25/2020. HISTORY: History of chronic obstructive pulmonary disease. Heart failure. Recent  pleural fluid collection.     Technique: Performed without intravenous contrast. 3 mm axial imaging, axial MIP  imaging, sagittal, and coronal reconstructed imaging sequences are submitted for  evaluation. Thinner section Super Dimension imaging was also performed. Dose Reduction Technique was employed to reduce radiation exposure - This  includes reduction optimization techniques as appropriate to a performed exam  with automated exposure control adjustments of the mA and/or Kv according to  patient size, or use of iterative reconstruction technique. COMPARISON: Previous CT evaluation of the chest dated 12/18/2019. Recent chest  radiograph dated 9/25/2019. MEDIASTINUM: There are scattered normal sized middle mediastinal lymph nodes  without progression. The largest mediastinal lymph node is located within the  subcarinal region and this lymph node measures 10 mm in short axis measurement  which is considered within normal limits. There is an area of dystrophic calcification within the left thyroid lobe and  mild heterogeneity of the left thyroid lobe. This examination is negative for  large or dominant thyroid nodule or mass. LUNGS AND PLEURA: There is a moderate sized loculated right pleural effusion. This examination is negative for pleural based masses, pleural thickening, or  gas within the pleural fluid collection. There is encasement of the adjacent  lung with areas of passive atelectasis. There is a calcified granuloma within the right lung base. On axial maximum  intensity imaging, there are several additional small scattered micronodules  with 2 nodules demonstrated posteriorly within the left lower lobe (series [id number]  image number [id number]). This examination is negative for larger pulmonary nodules or  masses. The central trachea and bronchial tree are patent. There is bronchial  wall thickening and discoid opacities within the lung bases consistent with  reactive airways disease.     CARDIOVASCULAR: There are moderate calcifications of the coronary arteries. There is moderate enlargement of the atrial chambers and milder enlargement of  the ventricular chambers. There is a normal caliber to the thoracic aorta. CHEST WALL: There is multilevel spondylosis along the thoracic spine. There is a  mild to moderate dextroconvex scoliosis. There is increased dorsal kyphosis of  the thoracic spine. There are older healed right posterior rib fractures. OTHER: There is surgical suture material demonstrated within the gastric region  consistent with previous bariatric surgery. Impression IMPRESSION:  1. There is a moderate sized loculated right pleural effusion associated with  passive atelectasis. The differential diagnosis would include recent pneumonia  with a residual parapneumonic effusion. Since exam an [de-identified] is negative for pleural  based masses or findings specific for a malignant effusion. 2.  There are several scattered normal size middle mediastinal lymph nodes. 3.  There are findings of prior granulomatous exposure. Please see above text  for further details. IMPRESSION:   1. Acute hypoxic Respiratory failure  2. Chronic Obstructive Pulmonary Disease  3. Chronic A. Fib  4. Leukocytosis  5. Right lower lobe granuloma  6. Right pleural effusion possible parapneumonic effusion which is loculated  7. Obstructive sleep apnea      RECOMMENDATIONS/PLAN:     1. Patient is on oxygen via nasal cannula 2 L we will continue to wean she is not on any oxygen at home agree now on prednisone  2. Patient refused to get thoracentesis done  3. Agree with [facility] IV antibiotics pending culture results   4. Follow culture results  5. She has sleep apnea but she does not wear her CPAP machine we will start her on CPAP tonight  6. Supplemental O2 to keep sats > 93%  Rheumatology work-up negative  Pulse ox room air and ambulation  Pt on room air SpO2=96%. Pt during ambulation on room air SpO2=92%.        Patient is cleared for discharge from respiratory standpoint        Leandro Hancock MD

## 2020-09-30 NOTE — PROGRESS NOTES
Hospitalist Progress Note    Subjective:   Daily Progress Note: 9/30/2020 2:28 PM    Hospital Course:  79 y. o.female with an extensive past medical history including A. fib on Xarelto, status post cardioversion, hypertension, HORACE, COPD that presented to the ED complaining of worsening dyspnea for the last week or so.  She states that she generally has some component of dyspnea chronically due to her COPD along with persistent cough is nonproductive, however she is also over the last week dyspnea continues to worsen, particularly on exertion.  Symptoms have not been associated with any chest pain, palpitations, or lightheadedness.  Her usual inhaler regimen has not improved her symptoms.  States that prior to that she has been doing relatively well her usual state of health, denies any recent fevers, chills, chest pain, abdominal pain, nausea, vomiting, diarrhea, constipation, lightheadedness dizziness urinary symptoms headache.     Subjective: Pt seen in room, waiting for Idium scan to be done,  No complaints of pain or shortness of breath.    Ambulating pulse oximetry is 92%    Current Facility-Administered Medications   Medication Dose Route Frequency    furosemide (LASIX) tablet 40 mg  40 mg Oral DAILY    potassium chloride (K-DUR, KLOR-CON) SR tablet 40 mEq  40 mEq Oral DAILY    pantoprazole (PROTONIX) tablet 40 mg  40 mg Oral ACB    vancomycin (VANCOCIN) 1,000 mg in 0.9% sodium chloride 250 mL (VIAL-MATE)  1,000 mg IntraVENous Q24H    albuterol (PROVENTIL HFA, VENTOLIN HFA, PROAIR HFA) inhaler 2 Puff  2 Puff Inhalation Q6H RT    predniSONE (DELTASONE) tablet 20 mg  20 mg Oral DAILY WITH BREAKFAST    famotidine (PEPCID) tablet 20 mg  20 mg Oral Q48H    albuterol-ipratropium (DUO-NEB) 2.5 MG-0.5 MG/3 ML  3 mL Nebulization Q4H PRN    piperacillin-tazobactam (ZOSYN) 3.375 g in 0.9% sodium chloride (MBP/ADV) 100 mL MBP  3.375 g IntraVENous Q8H    aspirin delayed-release tablet 81 mg  81 mg Oral DAILY    multivitamin (ONE A DAY) tablet 1 Tab  1 Tab Oral DAILY    digoxin (LANOXIN) tablet 0.125 mg  0.125 mg Oral DAILY    atorvastatin (LIPITOR) tablet 40 mg  40 mg Oral QHS    melatonin tablet 10 mg  10 mg Oral QHS    rivaroxaban (XARELTO) tablet 20 mg  20 mg Oral DAILY    sodium chloride (NS) flush 5-10 mL  5-10 mL IntraVENous PRN    sodium chloride (NS) flush 5-40 mL  5-40 mL IntraVENous Q8H    sodium chloride (NS) flush 5-40 mL  5-40 mL IntraVENous PRN    acetaminophen (TYLENOL) tablet 650 mg  650 mg Oral Q6H PRN    Or    acetaminophen (TYLENOL) suppository 650 mg  650 mg Rectal Q6H PRN    polyethylene glycol (MIRALAX) packet 17 g  17 g Oral DAILY PRN    promethazine (PHENERGAN) tablet 12.5 mg  12.5 mg Oral Q6H PRN    Or    ondansetron (ZOFRAN) injection 4 mg  4 mg IntraVENous Q6H PRN    VANCOMYCIN INFORMATION NOTE 1 Each  1 Each Other Rx Dosing/Monitoring        Review of Systems:    Review of Systems   Constitutional: Negative for fever and malaise/fatigue. Respiratory: Positive for cough and sputum production. Negative for shortness of breath. Cardiovascular: Negative for chest pain, palpitations and orthopnea. Gastrointestinal: Negative for abdominal pain, heartburn, nausea and vomiting. Genitourinary: Negative for dysuria and frequency. Musculoskeletal: Negative. Neurological: Negative for dizziness, tingling and headaches. Objective:     Visit Vitals  BP (!) 162/78   Pulse 65   Temp 97.8 °F (36.6 °C)   Resp 18   Ht 5' 5\" (1.651 m)   Wt 91.4 kg (201 lb 8 oz)   SpO2 100%   BMI 33.53 kg/m²    O2 Flow Rate (L/min): 2 l/min O2 Device: Nasal cannula    Temp (24hrs), Av.3 °F (36.8 °C), Min:97.8 °F (36.6 °C), Max:99.2 °F (37.3 °C)      No intake/output data recorded.  1901 -  0700  In: 200 [P.O.:880; I.V.:100]  Out: 600 [Urine:600]    PHYSICAL EXAM:    Physical Exam   Constitutional: She is oriented to person, place, and time. She appears well-developed. No distress. Cardiovascular: Normal rate and regular rhythm. Exam reveals no gallop and no friction rub. Murmur heard. Pulmonary/Chest: Effort normal. No respiratory distress. She has no wheezes. She has no rales. Few scattered rales, diminished on right base   Musculoskeletal: Normal range of motion. Neurological: She is alert and oriented to person, place, and time. Skin: Skin is warm and dry. Psychiatric: She has a normal mood and affect. Her behavior is normal.          Data Review    Recent Results (from the past 24 hour(s))   CBC WITH AUTOMATED DIFF    Collection Time: 09/30/20  7:15 AM   Result Value Ref Range    WBC 17.7 (H) 3.6 - 11.0 K/uL    RBC 3.90 3.80 - 5.20 M/uL    HGB 11.9 11.5 - 16.0 %    HCT 35.5 35.0 - 47.0 %    MCV 91.0 80.0 - 99.0 FL    MCH 30.5 26.0 - 34.0 PG    MCHC 33.5 30.0 - 36.5 g/dL    RDW 16.9 (H) 11.5 - 14.5 %    PLATELET 819 261 - 414 K/uL    MPV 10.3 8.9 - 12.9 FL    NRBC 0.0 0  WBC    ABSOLUTE NRBC 0.00 0.00 - 0.01 K/uL    NEUTROPHILS 79 (H) 32 - 75 %    BAND NEUTROPHILS 1 0 - 6 %    LYMPHOCYTES 14 12 - 49 %    MONOCYTES 6 5 - 13 %    EOSINOPHILS 0 0 - 7 %    BASOPHILS 0 0 - 1 %    IMMATURE GRANULOCYTES 0 %    ABS. NEUTROPHILS 14.1 (H) 1.8 - 8.0 K/UL    ABS. LYMPHOCYTES 2.5 0.8 - 3.5 K/UL    ABS. MONOCYTES 1.1 (H) 0.0 - 1.0 K/UL    ABS. EOSINOPHILS 0.0 0.0 - 0.4 K/UL    ABS. BASOPHILS 0.0 0.0 - 0.1 K/UL    ABS. IMM.  GRANS. 0.0 K/UL    DF AUTOMATED      PLATELET COMMENTS Large Platelets      RBC COMMENTS Anisocytosis  1+        RBC COMMENTS Worthing cells  1+       METABOLIC PANEL, BASIC    Collection Time: 09/30/20  7:15 AM   Result Value Ref Range    Sodium 145 136 - 145 mmol/L    Potassium 3.5 3.5 - 5.1 mmol/L    Chloride 113 (H) 97 - 108 mmol/L    CO2 28 21 - 32 mmol/L    Anion gap 4 (L) 5 - 15 mmol/L    Glucose 113 (H) 65 - 100 mg/dL    BUN 51 (H) 6 - 20 mg/dL    Creatinine 0.95 0.55 - 1.02 mg/dL    BUN/Creatinine ratio 54 (H) 12 - 20      GFR est AA >60 >60 ml/min/1.73m2 GFR est non-AA 58 (L) >60 ml/min/1.73m2    Calcium 9.7 8.5 - 10.1 mg/dL   RENAL FUNCTION PANEL    Collection Time: 09/30/20  7:15 AM   Result Value Ref Range    Sodium 147 (H) 136 - 145 mmol/L    Potassium 3.6 3.5 - 5.1 mmol/L    Chloride 113 (H) 97 - 108 mmol/L    CO2 28 21 - 32 mmol/L    Anion gap 6 5 - 15 mmol/L    Glucose 110 (H) 65 - 100 mg/dL    BUN 52 (H) 6 - 20 mg/dL    Creatinine 0.94 0.55 - 1.02 mg/dL    BUN/Creatinine ratio 55 (H) 12 - 20      GFR est AA >60 >60 ml/min/1.73m2    GFR est non-AA 59 (L) >60 ml/min/1.73m2    Calcium 9.6 8.5 - 10.1 mg/dL    Phosphorus 2.7 2.6 - 4.7 mg/dL    Albumin 2.7 (L) 3.5 - 5.0 g/dL   MAGNESIUM    Collection Time: 09/30/20  7:15 AM   Result Value Ref Range    Magnesium 1.4 (L) 1.6 - 2.4 mg/dL       CT CHEST WO CONT   Final Result   IMPRESSION:   1. There is a moderate sized loculated right pleural effusion associated with   passive atelectasis. The differential diagnosis would include recent pneumonia   with a residual parapneumonic effusion. Since exam an [de-identified] is negative for pleural   based masses or findings specific for a malignant effusion. 2.  There are several scattered normal size middle mediastinal lymph nodes. 3.  There are findings of prior granulomatous exposure. Please see above text   for further details. CT ABD PELV WO CONT   Final Result   IMPRESSION:   Loculated right pleural effusion with atelectasis versus airspace disease in the   base of right lung. No acute finding the abdomen and pelvis. XR CHEST SNGL V   Final Result   IMPRESSION: Cardiomegaly. New right pleural effusion. No change in right lower   lobe granuloma. Dextroscoliosis.       NM INFLAM WB MULTI    (Results Pending)   IR THORACENTESIS NDL PUNC ASP W IMAGE    (Results Pending)       Active Problems:    Hyponatremia (9/24/2020)      Leukocytosis (9/24/2020)      Hypoalbuminemia (9/24/2020)      Acute on chronic renal failure (HCC) (9/24/2020)      RLL pneumonia (9/24/2020)      Sepsis ([address] (9/24/2020)      PNA (pneumonia) (9/24/2020)        Assessment/Plan:       1. Acute respiratory failure with hypoxia- wean O2 as tolerated,  Ambulating pulse oximetry is above 90%, will check overnight pulse ox prior to   Discharge.     2. Right pleural effusion- loculated, with atelectasis, pulmonary following,   consider thoracentesis before d/c, on hold for idium scan per ID recs,   IV vancomycin, zosyn.      3. COPD-  On prednisone, pulmonary following.      4. Atrial fibrillation- on digoxin, xarelto, rate controlled.      5. MARILUZ- resolved, creatinine normal.      6. Discharge- accepted at SNF, possible next 24 hrs.      DVT Prophylaxis: on xarelto  Code Status:  DNR  POA:    Care Plan discussed with:   _____patient , staff nurse, __________________________________________________________    Watt Gosselin, NP

## 2020-09-30 NOTE — PROGRESS NOTES
CM met with patient at the bedside. CM informed patient she had been accepted to [facility]. Patient confirmed she still wants to go to [facility] at discharge. CM will continue to follow.

## 2020-09-30 NOTE — PROGRESS NOTES
Progress Note    Patient: Candy Neal MRN: [id number]  SSN: [id number]    YOB: 1950  Age: 79 y.o. Sex: female      Admit Date: 9/24/2020    LOS: 6 days     Subjective:   Patient followed for suspected sepsis but no obvious source of infection, though with suspicious right pleural effusion, possibly parapneumonic. Blood culture has been negative. WBC is now increasing but she is on Prednisone. Procalcitonin and CRP decreasing. She remains on IV Vancomycin and Zosyn. Patient sitting up eating dinner with no complaints. Indium scan is in process. Objective:     Vitals:    09/30/20 1145 09/30/20 1504 09/30/20 1532 09/30/20 1535   BP:  (!) 159/92     Pulse:  70     Resp:  18     Temp:  97.7 °F (36.5 °C)     SpO2: 100% 97% 99% 98%   Weight:       Height:            Intake and Output:  Current Shift: No intake/output data recorded. Last three shifts: 09/28 1901 - 09/30 0700  In: 980 [P.O.:880; I.V.:100]  Out: 600 [Urine:600]    Physical Exam:    Vitals signs and nursing note reviewed. Constitutional:       General: She is not in acute distress. Appearance: She is obese. She is ill-appearing. She is not toxic-appearing or diaphoretic. HENT:         Comments: Nasal O2     Mouth/Throat:      Mouth: Mucous membranes are dry. Pharynx: Oropharynx is clear. Cardiovascular:      Rate and Rhythm: Normal rate and regular rhythm. Heart sounds: No murmur. Pulmonary:      Breath sounds: No rhonchi or rales. Comments: Diminished BS   Genitourinary:     Comments: No Bustos  Skin:     Findings: Bruising and lesion (right thumb wound healing) present. Neurological:      General: No focal deficit present. Mental Status: She is alert and oriented to person, place, and time. Psychiatric:         Behavior: Behavior normal.         Thought Content:  Thought content normal.         Judgment: Judgment normal.      Comments: Depressed mood     Lab/Data Review:  WBC 17,700  Procalcitonin Pending (0.39 (0.98 (2.37 (7.64)  CRP Pending (8.99 (12.800 (16.70 (41.20)  ESR 52    Blood cultures (9/22) No growth at 6 days  Urine culture (9/25) No growth FINAL  Assessment:       1. Suspected sepsis with leukocytosis, elevated ESR, procalcitonin, CRP, etiology unclear, resolving. 2. Right pleural effusion, etiology unclear, ?parapneumonic  3. Ruling out Rheumatologic disease  4. Acute kidney injury, resolving  5. COPD by history     Comment:  Still no clear source of infection, but high index of suspicion for infected pleural effusion. CRP and procalciton decreasing. Leukocytosis likely related Prednisone. Plan:      1. Continue  IV Vancomycin and Zosyn  2. Follow-up blood cultures  3. In am, repeat procalcitonin and CRP, done  4. Follow-up Indium scan  5.  Hold off thoracentesis pending Indium scan      Signed By: Luis Miguel Mac MD     September 30, 2020

## 2020-09-30 NOTE — PROGRESS NOTES
Pt chart reviewed at PT treatment session attempted at 1503. Pt refused PT services despite max encouragement and education on importance of mobility. Pt stating \"I am just so tired, I'll work with you tomorrow. \" Will continue to monitor and attempt at later date.  Thank you

## 2020-10-01 ENCOUNTER — APPOINTMENT (OUTPATIENT)
Dept: INTERVENTIONAL RADIOLOGY/VASCULAR | Age: 70
DRG: 871 | End: 2020-10-01
Attending: INTERNAL MEDICINE
Payer: MEDICARE

## 2020-10-01 ENCOUNTER — APPOINTMENT (OUTPATIENT)
Dept: NUCLEAR MEDICINE | Age: 70
DRG: 871 | End: 2020-10-01
Attending: INTERNAL MEDICINE
Payer: MEDICARE

## 2020-10-01 LAB
ANION GAP SERPL CALC-SCNC: 6 MMOL/L (ref 5–15)
BASOPHILS # BLD: 0 K/UL (ref 0–0.1)
BASOPHILS NFR BLD: 0 % (ref 0–1)
BUN SERPL-MCNC: 31 MG/DL (ref 6–20)
BUN/CREAT SERPL: 32 (ref 12–20)
CA-I BLD-MCNC: 9.1 MG/DL (ref 8.5–10.1)
CHLORIDE SERPL-SCNC: 104 MMOL/L (ref 97–108)
CO2 SERPL-SCNC: 34 MMOL/L (ref 21–32)
CREAT SERPL-MCNC: 0.97 MG/DL (ref 0.55–1.02)
CRP SERPL-MCNC: 5.73 MG/DL (ref 0–0.6)
DIFFERENTIAL METHOD BLD: ABNORMAL
EOSINOPHIL # BLD: 0 K/UL (ref 0–0.4)
EOSINOPHIL NFR BLD: 0 % (ref 0–7)
ERYTHROCYTE [DISTWIDTH] IN BLOOD BY AUTOMATED COUNT: 16.9 % (ref 11.5–14.5)
GLUCOSE SERPL-MCNC: 141 MG/DL (ref 65–100)
HCT VFR BLD AUTO: 37.3 % (ref 35–47)
HCV AB SERPL QL IA: NORMAL
HGB BLD-MCNC: 12.6 % (ref 11.5–16)
IMM GRANULOCYTES # BLD AUTO: 0.4 K/UL (ref 0–0.04)
IMM GRANULOCYTES NFR BLD AUTO: 3 % (ref 0–0.5)
LYMPHOCYTES # BLD: 1.9 K/UL (ref 0.8–3.5)
LYMPHOCYTES NFR BLD: 12 % (ref 12–49)
MCH RBC QN AUTO: 31.3 PG (ref 26–34)
MCHC RBC AUTO-ENTMCNC: 33.8 G/DL (ref 30–36.5)
MCV RBC AUTO: 92.6 FL (ref 80–99)
MONOCYTES # BLD: 0.8 K/UL (ref 0–1)
MONOCYTES NFR BLD: 5 % (ref 5–13)
NEUTS SEG # BLD: 12.8 K/UL (ref 1.8–8)
NEUTS SEG NFR BLD: 83 % (ref 32–75)
PLATELET # BLD AUTO: 423 K/UL (ref 150–400)
PMV BLD AUTO: 10.4 FL (ref 8.9–12.9)
POTASSIUM SERPL-SCNC: 3.4 MMOL/L (ref 3.5–5.1)
PROCALCITONIN SERPL-MCNC: 1.14 NG/ML
RBC # BLD AUTO: 4.03 M/UL (ref 3.8–5.2)
SODIUM SERPL-SCNC: 144 MMOL/L (ref 136–145)
WBC # BLD AUTO: 15.5 K/UL (ref 3.6–11)

## 2020-10-01 PROCEDURE — 74011636637 HC RX REV CODE- 636/637: Performed by: INTERNAL MEDICINE

## 2020-10-01 PROCEDURE — 65270000029 HC RM PRIVATE

## 2020-10-01 PROCEDURE — 80048 BASIC METABOLIC PNL TOTAL CA: CPT

## 2020-10-01 PROCEDURE — 74011250637 HC RX REV CODE- 250/637: Performed by: HOSPITALIST

## 2020-10-01 PROCEDURE — 74011250637 HC RX REV CODE- 250/637: Performed by: PHYSICIAN ASSISTANT

## 2020-10-01 PROCEDURE — 74011250637 HC RX REV CODE- 250/637: Performed by: INTERNAL MEDICINE

## 2020-10-01 PROCEDURE — 74011000258 HC RX REV CODE- 258: Performed by: PHYSICIAN ASSISTANT

## 2020-10-01 PROCEDURE — 77010033678 HC OXYGEN DAILY

## 2020-10-01 PROCEDURE — 85025 COMPLETE CBC W/AUTO DIFF WBC: CPT

## 2020-10-01 PROCEDURE — 0W993ZZ DRAINAGE OF RIGHT PLEURAL CAVITY, PERCUTANEOUS APPROACH: ICD-10-PCS | Performed by: RADIOLOGY

## 2020-10-01 PROCEDURE — 74011250637 HC RX REV CODE- 250/637: Performed by: NURSE PRACTITIONER

## 2020-10-01 PROCEDURE — 87205 SMEAR GRAM STAIN: CPT

## 2020-10-01 PROCEDURE — 74011250636 HC RX REV CODE- 250/636: Performed by: INTERNAL MEDICINE

## 2020-10-01 PROCEDURE — 88108 CYTOPATH CONCENTRATE TECH: CPT

## 2020-10-01 PROCEDURE — 32555 ASPIRATE PLEURA W/ IMAGING: CPT

## 2020-10-01 PROCEDURE — 86140 C-REACTIVE PROTEIN: CPT

## 2020-10-01 PROCEDURE — 97116 GAIT TRAINING THERAPY: CPT

## 2020-10-01 PROCEDURE — 94640 AIRWAY INHALATION TREATMENT: CPT

## 2020-10-01 PROCEDURE — 94760 N-INVAS EAR/PLS OXIMETRY 1: CPT

## 2020-10-01 PROCEDURE — 74011250636 HC RX REV CODE- 250/636: Performed by: PHYSICIAN ASSISTANT

## 2020-10-01 PROCEDURE — 97530 THERAPEUTIC ACTIVITIES: CPT

## 2020-10-01 PROCEDURE — 36415 COLL VENOUS BLD VENIPUNCTURE: CPT

## 2020-10-01 PROCEDURE — 84145 PROCALCITONIN (PCT): CPT

## 2020-10-01 PROCEDURE — 99232 SBSQ HOSP IP/OBS MODERATE 35: CPT | Performed by: INTERNAL MEDICINE

## 2020-10-01 RX ORDER — METOPROLOL SUCCINATE 50 MG/1
50 TABLET, EXTENDED RELEASE ORAL DAILY
Status: DISCONTINUED | OUTPATIENT
Start: 2020-10-01 | End: 2020-10-03

## 2020-10-01 RX ORDER — METOPROLOL TARTRATE 5 MG/5ML
5 INJECTION INTRAVENOUS
Status: DISCONTINUED | OUTPATIENT
Start: 2020-10-01 | End: 2020-10-03 | Stop reason: HOSPADM

## 2020-10-01 RX ORDER — LISINOPRIL 5 MG/1
5 TABLET ORAL DAILY
Status: DISCONTINUED | OUTPATIENT
Start: 2020-10-01 | End: 2020-10-03 | Stop reason: HOSPADM

## 2020-10-01 RX ADMIN — MAGNESIUM OXIDE TAB 400 MG (241.3 MG ELEMENTAL MG) 400 MG: 400 (241.3 MG) TAB at 21:44

## 2020-10-01 RX ADMIN — ATORVASTATIN CALCIUM 40 MG: 40 TABLET, FILM COATED ORAL at 21:44

## 2020-10-01 RX ADMIN — FUROSEMIDE 40 MG: 10 INJECTION, SOLUTION INTRAMUSCULAR; INTRAVENOUS at 21:44

## 2020-10-01 RX ADMIN — DIGOXIN 0.12 MG: 125 TABLET ORAL at 08:37

## 2020-10-01 RX ADMIN — ALBUTEROL SULFATE: 108 AEROSOL, METERED RESPIRATORY (INHALATION) at 13:08

## 2020-10-01 RX ADMIN — FAMOTIDINE 20 MG: 20 TABLET ORAL at 02:57

## 2020-10-01 RX ADMIN — Medication 10 ML: at 22:29

## 2020-10-01 RX ADMIN — PIPERACILLIN SODIUM AND TAZOBACTAM SODIUM 3.38 G: 3; .375 INJECTION, POWDER, LYOPHILIZED, FOR SOLUTION INTRAVENOUS at 12:20

## 2020-10-01 RX ADMIN — Medication 10 ML: at 16:10

## 2020-10-01 RX ADMIN — FUROSEMIDE 40 MG: 10 INJECTION, SOLUTION INTRAMUSCULAR; INTRAVENOUS at 08:36

## 2020-10-01 RX ADMIN — MELATONIN TAB 5 MG 10 MG: 5 TAB at 21:44

## 2020-10-01 RX ADMIN — MAGNESIUM OXIDE TAB 400 MG (241.3 MG ELEMENTAL MG) 400 MG: 400 (241.3 MG) TAB at 08:36

## 2020-10-01 RX ADMIN — ASPIRIN 81 MG: 81 TABLET, COATED ORAL at 08:37

## 2020-10-01 RX ADMIN — MULTIVITAMIN TABLET 1 TABLET: TABLET at 08:37

## 2020-10-01 RX ADMIN — LISINOPRIL 5 MG: 5 TABLET ORAL at 12:20

## 2020-10-01 RX ADMIN — PIPERACILLIN SODIUM AND TAZOBACTAM SODIUM 3.38 G: 3; .375 INJECTION, POWDER, LYOPHILIZED, FOR SOLUTION INTRAVENOUS at 03:00

## 2020-10-01 RX ADMIN — PANTOPRAZOLE SODIUM 40 MG: 40 TABLET, DELAYED RELEASE ORAL at 08:37

## 2020-10-01 RX ADMIN — METOPROLOL SUCCINATE 50 MG: 50 TABLET, EXTENDED RELEASE ORAL at 12:20

## 2020-10-01 RX ADMIN — RIVAROXABAN 20 MG: 20 TABLET, FILM COATED ORAL at 08:37

## 2020-10-01 RX ADMIN — ALBUTEROL SULFATE 2 PUFF: 108 AEROSOL, METERED RESPIRATORY (INHALATION) at 20:26

## 2020-10-01 RX ADMIN — ALBUTEROL SULFATE: 108 AEROSOL, METERED RESPIRATORY (INHALATION) at 07:40

## 2020-10-01 RX ADMIN — PIPERACILLIN SODIUM AND TAZOBACTAM SODIUM 3.38 G: 3; .375 INJECTION, POWDER, LYOPHILIZED, FOR SOLUTION INTRAVENOUS at 21:44

## 2020-10-01 RX ADMIN — VANCOMYCIN HYDROCHLORIDE 1500 MG: 1.5 INJECTION, POWDER, LYOPHILIZED, FOR SOLUTION INTRAVENOUS at 08:36

## 2020-10-01 RX ADMIN — POTASSIUM CHLORIDE 40 MEQ: 1500 TABLET, EXTENDED RELEASE ORAL at 08:36

## 2020-10-01 RX ADMIN — PREDNISONE 20 MG: 20 TABLET ORAL at 08:37

## 2020-10-01 RX ADMIN — ALBUTEROL SULFATE 2 PUFF: 108 AEROSOL, METERED RESPIRATORY (INHALATION) at 01:42

## 2020-10-01 NOTE — PROGRESS NOTES
Hospitalist Progress Note    Subjective:   Daily Progress Note: 10/1/2020 3:05 PM    Hospital Course:  79 y. o.female with an extensive past medical history including A. fib on Xarelto, status post cardioversion, hypertension, HORACE, COPD that presented to the ED complaining of worsening dyspnea for the last week or so.  She states that she generally has some component of dyspnea chronically due to her COPD along with persistent cough is nonproductive, however she is also over the last week dyspnea continues to worsen, particularly on exertion.  Symptoms have not been associated with any chest pain, palpitations, or lightheadedness.  Her usual inhaler regimen has not improved her symptoms.  States that prior to that she has been doing relatively well her usual state of health, denies any recent fevers, chills, chest pain, abdominal pain, nausea, vomiting, diarrhea, constipation, lightheadedness dizziness urinary symptoms headache. Subjective: Pt seen in room, idium scan in progress, no complaints, no shortness of breath.     Current Facility-Administered Medications   Medication Dose Route Frequency    metoprolol succinate (TOPROL-XL) XL tablet 50 mg  50 mg Oral DAILY    lisinopriL (PRINIVIL, ZESTRIL) tablet 5 mg  5 mg Oral DAILY    metoprolol (LOPRESSOR) injection 5 mg  5 mg IntraVENous Q6H PRN    furosemide (LASIX) injection 40 mg  40 mg IntraVENous BID    magnesium oxide (MAG-OX) tablet 400 mg  400 mg Oral BID    potassium chloride (K-DUR, KLOR-CON) SR tablet 40 mEq  40 mEq Oral DAILY    pantoprazole (PROTONIX) tablet 40 mg  40 mg Oral ACB    albuterol (PROVENTIL HFA, VENTOLIN HFA, PROAIR HFA) inhaler 2 Puff  2 Puff Inhalation Q6H RT    predniSONE (DELTASONE) tablet 20 mg  20 mg Oral DAILY WITH BREAKFAST    famotidine (PEPCID) tablet 20 mg  20 mg Oral Q48H    albuterol-ipratropium (DUO-NEB) 2.5 MG-0.5 MG/3 ML  3 mL Nebulization Q4H PRN    piperacillin-tazobactam (ZOSYN) 3.375 g in 0.9% sodium chloride (MBP/ADV) 100 mL MBP  3.375 g IntraVENous Q8H    aspirin delayed-release tablet 81 mg  81 mg Oral DAILY    multivitamin (ONE A DAY) tablet 1 Tab  1 Tab Oral DAILY    digoxin (LANOXIN) tablet 0.125 mg  0.125 mg Oral DAILY    atorvastatin (LIPITOR) tablet 40 mg  40 mg Oral QHS    melatonin tablet 10 mg  10 mg Oral QHS    rivaroxaban (XARELTO) tablet 20 mg  20 mg Oral DAILY    sodium chloride (NS) flush 5-10 mL  5-10 mL IntraVENous PRN    sodium chloride (NS) flush 5-40 mL  5-40 mL IntraVENous Q8H    sodium chloride (NS) flush 5-40 mL  5-40 mL IntraVENous PRN    acetaminophen (TYLENOL) tablet 650 mg  650 mg Oral Q6H PRN    Or    acetaminophen (TYLENOL) suppository 650 mg  650 mg Rectal Q6H PRN    polyethylene glycol (MIRALAX) packet 17 g  17 g Oral DAILY PRN    promethazine (PHENERGAN) tablet 12.5 mg  12.5 mg Oral Q6H PRN    Or    ondansetron (ZOFRAN) injection 4 mg  4 mg IntraVENous Q6H PRN        Review of Systems:    ROS     Review of Systems   Constitutional: Negative for fever and malaise/fatigue. Respiratory: Positive for cough. Negative for shortness of breath. Cardiovascular: Negative for chest pain, palpitations and orthopnea. Gastrointestinal: Negative for abdominal pain, heartburn, nausea and vomiting. Genitourinary: Negative for dysuria and frequency. Musculoskeletal: Negative. Neurological: Negative for dizziness, tingling and headaches  Objective:     Visit Vitals  BP (!) 164/80 (BP 1 Location: Left arm, BP Patient Position: At rest)   Pulse 80   Temp 97.6 °F (36.4 °C)   Resp 20   Ht 5' 5\" (1.651 m)   Wt 84.3 kg (185 lb 13.6 oz)   SpO2 97%   BMI 30.93 kg/m²    O2 Flow Rate (L/min): 2 l/min O2 Device: Nasal cannula    Temp (24hrs), Av.6 °F (36.4 °C), Min:97.6 °F (36.4 °C), Max:97.6 °F (36.4 °C)      No intake/output data recorded. No intake/output data recorded. PHYSICAL EXAM:    Physical Exam     Constitutional: She is oriented to person, place, and time.  She appears well-developed. No distress. Cardiovascular: Normal rate and regular rhythm. Exam reveals no gallop and no friction rub. Murmur heard. Pulmonary/Chest: Effort normal. No respiratory distress. She has no wheezes. She has no rales. Few scattered rales, diminished on right base   Musculoskeletal: Normal range of motion. Neurological: She is alert and oriented to person, place, and time. Skin: Skin is warm and dry. Psychiatric: She has a normal mood and affect. Her behavior is normal.        Data Review    Recent Results (from the past 24 hour(s))   VANCOMYCIN, TROUGH    Collection Time: 09/30/20  6:42 PM   Result Value Ref Range    Vancomycin,trough 11.0 (H) 5.0 - 10.0 ug/mL    Reported dose date Not provided      Reported dose: Not provided Units       CT CHEST WO CONT   Final Result   IMPRESSION:   1. There is a moderate sized loculated right pleural effusion associated with   passive atelectasis. The differential diagnosis would include recent pneumonia   with a residual parapneumonic effusion. Since exam an [de-identified] is negative for pleural   based masses or findings specific for a malignant effusion. 2.  There are several scattered normal size middle mediastinal lymph nodes. 3.  There are findings of prior granulomatous exposure. Please see above text   for further details. CT ABD PELV WO CONT   Final Result   IMPRESSION:   Loculated right pleural effusion with atelectasis versus airspace disease in the   base of right lung. No acute finding the abdomen and pelvis. XR CHEST SNGL V   Final Result   IMPRESSION: Cardiomegaly. New right pleural effusion. No change in right lower   lobe granuloma. Dextroscoliosis.       NM INFLAM WB MULTI    (Results Pending)   IR THORACENTESIS NDL PUNC ASP W IMAGE    (Results Pending)       Active Problems:    Hyponatremia (9/24/2020)      Leukocytosis (9/24/2020)      Hypoalbuminemia (9/24/2020)      Acute on chronic renal failure ([address] (9/24/2020) RLL pneumonia (9/24/2020)      Sepsis ([address] (9/24/2020)      PNA (pneumonia) (9/24/2020)        Assessment/Plan:        1. Acute respiratory failure with hypoxia- wean O2 as tolerated,  Ambulating pulse oximetry is above 90%, will check overnight pulse ox prior to   Discharge.     2. Right pleural effusion- loculated, with atelectasis, pulmonary following   idium scan pending results, ID following, IV vancomycin d/c'd, continue IV zosyn,   trending inflammatory markers, if improvement ID recommends PO abx for discharge.     3. COPD-  On prednisone     4. Atrial fibrillation- on digoxin, xarelto, controlled rate.       5. MARILUZ- resolved, creatinine normal.       6.  Discharge- accepted at SNF, when medically ready for discharge.          DVT Prophylaxis:xarelto  Code Status:  DNR  POA:    Care Plan discussed with:   ____________patient and staff nurse___________________________________________________    Watt Gosselin, NP

## 2020-10-01 NOTE — PROGRESS NOTES
Renal Progress Note    Patient: Karmen Mcneal MRN: [id number]  SSN: [id number]    YOB: 1950  Age: 79 y.o. Sex: female      Admit Date: 9/24/2020    LOS: 7 days     Subjective:   Patient seen at bedside. She is complaining of feeling short of breath today. She had indium scan this morning. Lasix dose was increased to 40 twice daily IV yesterday.   Swelling is improving    Current Facility-Administered Medications   Medication Dose Route Frequency    metoprolol succinate (TOPROL-XL) XL tablet 50 mg  50 mg Oral DAILY    lisinopriL (PRINIVIL, ZESTRIL) tablet 5 mg  5 mg Oral DAILY    metoprolol (LOPRESSOR) injection 5 mg  5 mg IntraVENous Q6H PRN    furosemide (LASIX) injection 40 mg  40 mg IntraVENous BID    magnesium oxide (MAG-OX) tablet 400 mg  400 mg Oral BID    potassium chloride (K-DUR, KLOR-CON) SR tablet 40 mEq  40 mEq Oral DAILY    pantoprazole (PROTONIX) tablet 40 mg  40 mg Oral ACB    albuterol (PROVENTIL HFA, VENTOLIN HFA, PROAIR HFA) inhaler 2 Puff  2 Puff Inhalation Q6H RT    predniSONE (DELTASONE) tablet 20 mg  20 mg Oral DAILY WITH BREAKFAST    famotidine (PEPCID) tablet 20 mg  20 mg Oral Q48H    albuterol-ipratropium (DUO-NEB) 2.5 MG-0.5 MG/3 ML  3 mL Nebulization Q4H PRN    piperacillin-tazobactam (ZOSYN) 3.375 g in 0.9% sodium chloride (MBP/ADV) 100 mL MBP  3.375 g IntraVENous Q8H    aspirin delayed-release tablet 81 mg  81 mg Oral DAILY    multivitamin (ONE A DAY) tablet 1 Tab  1 Tab Oral DAILY    digoxin (LANOXIN) tablet 0.125 mg  0.125 mg Oral DAILY    atorvastatin (LIPITOR) tablet 40 mg  40 mg Oral QHS    melatonin tablet 10 mg  10 mg Oral QHS    rivaroxaban (XARELTO) tablet 20 mg  20 mg Oral DAILY    sodium chloride (NS) flush 5-10 mL  5-10 mL IntraVENous PRN    sodium chloride (NS) flush 5-40 mL  5-40 mL IntraVENous Q8H    sodium chloride (NS) flush 5-40 mL  5-40 mL IntraVENous PRN    acetaminophen (TYLENOL) tablet 650 mg  650 mg Oral Q6H PRN    Or    acetaminophen (TYLENOL) suppository 650 mg  650 mg Rectal Q6H PRN    polyethylene glycol (MIRALAX) packet 17 g  17 g Oral DAILY PRN    promethazine (PHENERGAN) tablet 12.5 mg  12.5 mg Oral Q6H PRN    Or    ondansetron (ZOFRAN) injection 4 mg  4 mg IntraVENous Q6H PRN        Vitals:    10/01/20 0835 10/01/20 1002 10/01/20 1157 10/01/20 1309   BP:   (!) 164/80    Pulse:   80    Resp:   20    Temp:       SpO2:   95% 97%   Weight: 84.3 kg (185 lb 13.6 oz)      Height:  5' 5\" (1.651 m)       Objective:   General: Elderly female, alert awake well-oriented, no acute distress. HEENT: EOMI, no Icterus, no Pallor,  mucosa moist,  throat clear. Neck: Neck is supple, No JVD,   Lungs: fair air entry+ no rhonchi, no rales,  CVS: heart sounds normal, no murmurs, no rubs. GI: soft, nontender, normal BS,   Extremeties: no clubbing, no cyanosis, 1-2+ bilateral extremity edema present,  Neuro: Alert, awake, oriented x3, speech is normal   Skin: normal skin turgor,   Musculoskeletal: no acute joint swellings       Intake and Output:  Current Shift: No intake/output data recorded. Last three shifts: No intake/output data recorded. Lab/Data Review:  Recent Labs     09/30/20  0715 09/29/20  0636   WBC 17.7* 15.1*   HGB 11.9 11.7   HCT 35.5 34.3*    395     Recent Labs     09/30/20  0715 09/29/20  0636     147* 144   K 3.5  3.6 3.3*   *  113* 112*   CO2 28  28 24   *  110* 110*   BUN 51*  52* 65*   CREA 0.95  0.94 1.24*   CA 9.7  9.6 9.6  9.8   MG 1.4*  --    PHOS 2.7 2.4*   ALB 2.7* 2.8*     No results for input(s): PH, PCO2, PO2, HCO3, FIO2 in the last 72 hours. Recent Results (from the past 24 hour(s))   VANCOMYCIN, TROUGH    Collection Time: 09/30/20  6:42 PM   Result Value Ref Range    Vancomycin,trough 11.0 (H) 5.0 - 10.0 ug/mL    Reported dose date Not provided      Reported dose: Not provided Units        Assessment and Plan:     1. Acute Kidney Injury on ? ?CKD: probably prerenal azotemia secondary to use of Lasix and lisinopril, in the presence of borderline hypotension+  lisinopril on hold  Lasix was increased yesterday to iv 40 twice daily 2/2 significant leg swelling. Continue same dose  Will monitor I/Os, renal functions and adjust diuretics as needed   urine analysis is bland   Creatinine is improving. It is improved to 3.8-->2.8-->1.2-->0. 9 . Today's labs are pending  Baseline creatinine is unknown.   herself denies any history of CKD   CT abdomen negative for hydro  UA is negative for proteinuria or significant cells, unlikely to have acute GN     2. Acute shortness of breath/history of COPD/obstructive sleep/CHF/chronic edema  Shows normal EF  Improved clinically with diuretics  No wheezing or SOB now  Continue bronchodilators and steroids  continue Lasix 40 mg BID iv  Continue IV antibiotic as per primary service    3. Hypertension  Patient is now elevated. I will discontinue midodrine. Restart BP meds if still elevated    4. Lower extremity edema: probably related to congestive heart failure/pulmonary hypertension    increase Lasix to 40 twice daily   advised to maintain p.o. fluid restriction of 9024-9034 ml per day and and salt restriction,  Will monitor fluid status clinically and adjust diuretics as needed.          Signed By: Jackie Wright MD     October 1, 2020

## 2020-10-01 NOTE — PROGRESS NOTES
OCCUPATIONAL THERAPY TREATMENT  Patient: Jeannette Barragan (18 y.o. female)  Date: 10/1/2020  Diagnosis: RLL pneumonia [J18.9]  Leukocytosis [D72.829]  Acute on chronic renal failure (HCC) [N17.9, N18.9]  Hypoalbuminemia [E88.09]  Hyponatremia [E87.1]  Sepsis ([facility] [address] [A41.9]  PNA (pneumonia) [J18.9]   <principal problem not specified>       Precautions: Fall  Chart, occupational therapy assessment, plan of care, and goals were reviewed. ASSESSMENT  Patient continues with skilled OT services and is progressing towards goals. Pt. Performed sit-> stand  CGA from chair, pt performed functional ambulation from chair -> commode with CGA with pt holding/ pushing IV pole for stability. Pt. Performed toilet hygiene IND. Pt. Able to urbano/doff under garments sit/standing while on commode; increase time to perform task. Grooming at sink with SBA. UE therex performed seated in chair with 2 LB resistive weight to increase endurance. LB dressing ( socks) seated in chair IND. Current Level of Function Impacting Discharge (ADLs):  General decreased endurance             PLAN :  Patient continues to benefit from skilled intervention to address the above impairments. Continue treatment per established plan of care. to address goals.   Recommend next OT session: self care training, functional mobility training, therapeutic exercise, therapeutic activities, endurance activities, patient education, and home safety training    Recommendation for discharge: (in order for the patient to meet his/her long term goals)  Therapy up to 5 days/week in SNF setting    This discharge recommendation:  Has been made in collaboration with the attending provider and/or case management    IF patient discharges home will need the following DME: walker: rolling       SUBJECTIVE:   Patient stated  I'm able to put my socks on.    OBJECTIVE DATA SUMMARY:   Cognitive/Behavioral Status:  Neurologic State: Alert     Cognition: Appropriate for age attention/concentration    Functional Mobility and Transfers for ADLs:    Transfers:  Sit to Stand: Contact guard assistance  Functional Transfers  Bathroom Mobility: Contact guard assistance  Toilet Transfer : Contact guard assistance       Balance:  Sitting: Intact; Without support  Standing: Intact; Without support    ADL Intervention:    Grooming  Washing Hands: Stand-by assistance    Lower Body Dressing Assistance  Socks: Supervision    Toileting  Bladder Hygiene: Independent  Clothing Management: Supervision    Therapeutic Exercises:   4 sets of 10 reps to increase endurance and strength in katelynn UE's; with rest breaks in between sets    Activity Tolerance:   Good  Please refer to the flowsheet for vital signs taken during this treatment.     After treatment patient left in no apparent distress:   Sitting in chair and Call bell within reach    COMMUNICATION/COLLABORATION:   The patients plan of care was discussed with: Occupational therapist.     Marlena Kirk  Time Calculation: 32 mins    Problem: Self Care Deficits Care Plan (Adult)  Goal: *Acute Goals and Plan of Care (Insert Text)  Description: Pt will be modified independence sup<->sit in prep for EOB ADL's  Pt will be supervision/set-up  LB dressing EOB level  Pt will be modified independence  sit EOB 10 minutes in prep for EOB ADL's  Pt will be modified independence  grooming EOB level  Pt will be modified independence  sit<-> prep for toilet transfer  Pt will be modified independence  BSC/toilet transfer with LRAD  Pt will be modified independence  toileting/cloth mgmt LRAD  Pt will be modified independence  grooming standing sink  Pt will be supervision/set-up bathing sitting/standing sink LRAD  Pt will be MI katelynn UE HEP in prep for self care tasks      Outcome: Progressing Towards Goal

## 2020-10-01 NOTE — PROGRESS NOTES
Comprehensive Nutrition Assessment    Type and Reason for Visit: RD nutrition re-screen/LOS    Nutrition Recommendations/Plan:     Continue Cardiac diet  FR per MD    Allow snacks as desired    Nursing to document %meal and supplement intakes in I/Os  Obtain updated measured wt as pt diuresed and edema improved    Nutrition Assessment:  Admitted for SOB, weakness, suspected sepsis of unknown etiology. Ordered Cardiac diet + 1500mL FR. Appetite documented as poor, no intakes recorded. Pt reported appetite was also poor, however upon interview, appears pt missed some meals d/t NPO status for procedure. Endorsed eating well at dinner last night (>75% of pasta, zucchini, fruit, tea) and eagerly awaiting lunch at time of visit. Seemed a little confused during interview but denied issues with nutrition. Malnutrition Assessment:  Malnutrition Status:  No malnutrition      Nutrition Related Findings:  Appeared well nourished. 2+pitting bilat LE edema. Pt denied c/s difficulty, n/v or c/d. Last BM 9/29. Wounds:    None       Current Nutrition Therapies:  DIET CARDIAC Regular; FR 1500ML    Anthropometric Measures:  · Height:  5' 5\" (165.1 cm)  · Current Body Wt:  84.3 kg (185 lb 13.6 oz)   · Admission Body Wt:  185 lb 13.6 oz    · Ideal Body Wt:  125 lbs:  148.7 %   · BMI Category:  Obese class 1 (BMI 30.0-34. 9)       Nutrition Diagnosis:   No nutrition diagnosis at this time     Nutrition Interventions:   Food and/or Nutrient Delivery: Continue current diet  Nutrition Education and Counseling: No recommendations at this time  Coordination of Nutrition Care: Continued inpatient monitoring    Nutrition Monitoring and Evaluation:   Behavioral-Environmental Outcomes:  N/A  Food/Nutrient Intake Outcomes: Food and nutrient intake  Physical Signs/Symptoms Outcomes:  N/A    Discharge Planning:    No discharge needs at this time     Electronically signed by Kira Kaba on 10/1/2020 at 2:50 PM    Contact: [phone]

## 2020-10-01 NOTE — PROGRESS NOTES
Problem: Mobility Impaired (Adult and Pediatric)  Goal: *Acute Goals and Plan of Care (Insert Text)  Description: Physical Therapy Goals  Initiated 9/25/2020  1)  I with HEP in 7 days to improve overall functional mobility. 2)  Bed mobility and transfers I in 7 days to prevent skin breakdown. 3)  Pt to amb 200ft with LRAD and sup in 7 days    Pt. Goal:  Pt to be able to walk safely without falls. Outcome: Progressing Towards Goal     Problem: Patient Education: Go to Patient Education Activity  Goal: Patient/Family Education  Outcome: Progressing Towards Goal     PHYSICAL THERAPY TREATMENT  Patient: Shanna Sorto (68 y.o. female)  Date: 10/1/2020  Diagnosis: RLL pneumonia [J18.9]  Leukocytosis [D72.829]  Acute on chronic renal failure ([facility] [address] [N17.9, N18.9]  Hypoalbuminemia [E88.09]  Hyponatremia [E87.1]  Sepsis ([facility] [address] [A41.9]  PNA (pneumonia) [J18.9]   <principal problem not specified>       Precautions: Fall  Chart, physical therapy assessment, plan of care and goals were reviewed. ASSESSMENT  Patient continues with skilled PT services and is progressing towards goals. Pt presents with decr ROM, strength, bed mobility, transfers, gait, balance, activity tolerance, safety awareness, and functional mobility. Pt agreeable to PT treatment. Pt A&Ox4. Pt received sitting in bedside chair. Pt transfers with SBA, and amb approx 200 ft with no AD, pulling portable O2 and SBA. Pt completed sitting LE therex for ROM, strengthening, and functional mobility. Pt has fair activity tolerance. Pt would benefit from continued skilled PT services to address above deficits and progress towards goals. PT d/c recc HHPT when medically appropriate. .     Other factors to consider for discharge: activity tolerance         PLAN :  Patient continues to benefit from skilled intervention to address the above impairments. Continue treatment per established plan of care. to address goals.     Recommendation for discharge: (in order for the patient to meet his/her long term goals)  PT d/c recc HHPT when medically appropriate. .     This discharge recommendation:  Has been made in collaboration with the attending provider and/or case management    IF patient discharges home will need the following DME: to be determined (TBD)       SUBJECTIVE:   Patient stated I feel better today.     OBJECTIVE DATA SUMMARY:   Critical Behavior:  Neurologic State: Alert, Appropriate for age  Orientation Level: Oriented X4  Cognition: Appropriate safety awareness, Appropriate for age attention/concentration, Appropriate decision making, Follows commands     Functional Mobility Training:  Bed Mobility:     Pt received sitting in bedside chair. Transfers:  Sit to Stand: Stand-by assistance  Stand to Sit: Stand-by assistance                  Pt transfers with SBA, req min cues for safety with line management. Pt denies dizziness with change in position. Pt educated on activity pacing and safe home negotiation. Pt has good sitting and standing balance, performing dynamic activities req no assist.            Balance:  Sitting: Intact; Without support  Standing: Intact; Without support  Ambulation/Gait Training:  Distance (ft): 200 Feet (ft)  Assistive Device: Gait belt  Ambulation - Level of Assistance: Stand-by assistance   Gait Description (WDL): Exceptions to WDL   Speed/Barbara: Pace decreased (<100 feet/min)  Step Length: Right shortened;Left shortened      Pt amb approx 200 ft with no AD, pulling portable O2 and SBA; req assist with IV pole and cues for activity pacing, breathing technique, and safety to prevent falls. Pt presents with slow, steady gait, req 3 standing rest breaks. Pt progressively req hand assist with railing in hallway due to fatigue. PT Sat O2 96-98% while on 2L O2 following amb session. Pt recovered in sitting, reporting mild fatigue and SOB during activity 3/10. Pt has fair activity tolerance. Stairs:               Therapeutic Exercises:    Pt completed sitting LE therex for ROM, strengthening, and functional mobility. Pt req min cues for proper form and muscle activation. EXERCISE   Sets   Reps   Active Active Assist   Passive Self ROM   Comments   Heel/Toe raises  10 [x] [] [] []    Quad Sets/Glut Sets   [] [] [] []    Hamstring Sets   [] [] [] []    Short Arc Quads   [] [] [] []    Heel Slides   [] [] [] []    Straight Leg Raises   [] [] [] []    Hip abd/add  10 [x] [] [] []    Long Arc Quads  10 [x] [] [] []    Marching  10 [x] [] [] []       [] [] [] []       Pain Ratin/10    Activity Tolerance:   Good, desaturates with exertion and requires oxygen, requires rest breaks, and observed SOB with activity  Please refer to the flowsheet for vital signs taken during this treatment. After treatment patient left in no apparent distress:   Sitting in chair and Call bell within reach, table in front    COMMUNICATION/COLLABORATION:   The patients plan of care was discussed with: Registered nurse.      Kevin Gomez, PT   Time Calculation: 21 mins

## 2020-10-01 NOTE — CONSULTS
PULMONARY NOTE  [facility]    Name: Ricardo Watkins MRN: [id number]   : 1950 Hospital: [address] [place] [address]   Date: 10/1/2020  Admission date: 2020 Hospital Day: 8       HPI:     Hospital Problems  Date Reviewed: 2015          Codes Class Noted POA    Hyponatremia ICD-10-CM: E87.1  ICD-9-CM: 276.1  2020 Yes        Leukocytosis ICD-10-CM: D72.829  ICD-9-CM: 288.60  2020 Yes        Hypoalbuminemia ICD-10-CM: E88.09  ICD-9-CM: 273.8  2020 Unknown        Acute on chronic renal failure ([facility] ICD-10-CM: N17.9, N18.9  ICD-9-CM: 584.9, 585.9  2020 Yes        RLL pneumonia ICD-10-CM: J18.9  ICD-9-CM: 673  2020 Yes        Sepsis ([facility] ICD-10-CM: A41.9  ICD-9-CM: 038.9, 995.91  2020 Yes        PNA (pneumonia) ICD-10-CM: J18.9  ICD-9-CM: 568  2020 Yes                   [x] High complexity decision making was performed  [x] See my orders for details      Subjective/Initial History:     I was asked by Charity Israel MD to see Ricardo Watkins  a 79 y.o.  female in consultation     Excerpts from admission 2020 or consult notes as follows:   80-year-old lady came in because of shortness of breath generalized weakness and also she was in atrial fibrillation past medical history of obstructive sleep apnea syndrome chronic A. fib cardioversion in the past hypertension in fact her blood pressure was low also she has COPD nebulizer treatment not on any oxygen at home complaining about cough without any productive sputum and she was feeling lightheaded dizzy no history of passing out so admitted and pulmonary consult was called for further evaluation.       No Known Allergies     MAR reviewed and pertinent medications noted or modified as needed     Current Facility-Administered Medications   Medication    metoprolol succinate (TOPROL-XL) XL tablet 50 mg    lisinopriL (PRINIVIL, ZESTRIL) tablet 5 mg    metoprolol (LOPRESSOR) injection 5 mg    furosemide (LASIX) injection 40 mg    magnesium oxide (MAG-OX) tablet 400 mg    vancomycin (VANCOCIN) 1,500 mg in 0.9% sodium chloride 250 mL (VIAL-MATE)_    potassium chloride (K-DUR, KLOR-CON) SR tablet 40 mEq    pantoprazole (PROTONIX) tablet 40 mg    albuterol (PROVENTIL HFA, VENTOLIN HFA, PROAIR HFA) inhaler 2 Puff    predniSONE (DELTASONE) tablet 20 mg    famotidine (PEPCID) tablet 20 mg    albuterol-ipratropium (DUO-NEB) 2.5 MG-0.5 MG/3 ML    piperacillin-tazobactam (ZOSYN) 3.375 g in 0.9% sodium chloride (MBP/ADV) 100 mL MBP    aspirin delayed-release tablet 81 mg    multivitamin (ONE A DAY) tablet 1 Tab    digoxin (LANOXIN) tablet 0.125 mg    atorvastatin (LIPITOR) tablet 40 mg    melatonin tablet 10 mg    rivaroxaban (XARELTO) tablet 20 mg    sodium chloride (NS) flush 5-10 mL    sodium chloride (NS) flush 5-40 mL    sodium chloride (NS) flush 5-40 mL    acetaminophen (TYLENOL) tablet 650 mg    Or    acetaminophen (TYLENOL) suppository 650 mg    polyethylene glycol (MIRALAX) packet 17 g    promethazine (PHENERGAN) tablet 12.5 mg    Or    ondansetron (ZOFRAN) injection 4 mg    VANCOMYCIN INFORMATION NOTE 1 Each      Patient PCP: None  PMH:  has a past medical history of AF (paroxysmal atrial fibrillation) (HCC), Anemia, Anxiety, Chronic obstructive pulmonary disease ([address] Frequent urination, Heart failure (HCC), HTN (hypertension), Irregular heart beat, Obesity, HORACE (obstructive sleep apnea), SOB (shortness of breath) on exertion, Stress, Stroke ([address] and Wheezing. She also has no past medical history of Chronic pain. PSH:   has a past surgical history that includes echocardiogram (11/23/2009); nm cardiac spect w strs/rest mult (08/2006); and hx gastric bypass. FHX: family history includes COPD in her mother; Colon Cancer in her father; Diabetes in her mother; Stroke in her brother. SHX:  reports that she has quit smoking.  She has never used smokeless tobacco. She reports previous alcohol use. She reports that she does not use drugs. ROS:    Review of Systems   Constitutional: Positive for diaphoresis and malaise/fatigue. Negative for weight loss. HENT: Negative. Eyes: Negative. Respiratory: Positive for cough and shortness of breath. Cardiovascular: Positive for orthopnea. Gastrointestinal: Negative. Genitourinary: Negative. Musculoskeletal: Negative. Skin: Negative. Neurological: Positive for dizziness and weakness. Endo/Heme/Allergies: Negative. Psychiatric/Behavioral: Negative. Objective:     Vital Signs: Telemetry:    normal sinus rhythm Intake/Output:   Visit Vitals  BP (!) 182/84 (BP 1 Location: Right arm, BP Patient Position: At rest)   Pulse 63   Temp 97.6 °F (36.4 °C)   Resp 20   Ht 5' 5\" (1.651 m)   Wt 84.3 kg (185 lb 13.6 oz)   SpO2 100%   BMI 30.93 kg/m²       Temp (24hrs), Av.7 °F (36.5 °C), Min:97.6 °F (36.4 °C), Max:97.7 °F (36.5 °C)        O2 Device: Nasal cannula O2 Flow Rate (L/min): 2 l/min       Wt Readings from Last 4 Encounters:   10/01/20 84.3 kg (185 lb 13.6 oz)   10/18/16 83.6 kg (184 lb 3.2 oz)   12/24/15 79.8 kg (176 lb)   08/18/15 79.8 kg (176 lb)        No intake or output data in the 24 hours ending 10/01/20 0957    Last shift:      No intake/output data recorded. Last 3 shifts: No intake/output data recorded. Physical Exam:     Physical Exam   Constitutional: She is oriented to person, place, and time. She appears well-developed. HENT:   Head: Normocephalic and atraumatic. Eyes: Pupils are equal, round, and reactive to light. Conjunctivae and EOM are normal.   Neck: Normal range of motion. Neck supple. Cardiovascular: Regular rhythm. irregular   Pulmonary/Chest: Breath sounds normal. She is in respiratory distress. Abdominal: Soft. Bowel sounds are normal.   Neurological: She is alert and oriented to person, place, and time. Skin: Skin is warm.    Psychiatric: She has a normal mood and affect. Her behavior is normal. Judgment and thought content normal.        Labs:    Recent Labs     09/30/20  0715 09/29/20  0636 09/28/20  1114   WBC 17.7* 15.1*  --    HGB 11.9 11.7  --     395  --    INR  --   --  1.6*     Recent Labs     09/30/20  0715 09/29/20  0636     147* 144   K 3.5  3.6 3.3*   *  113* 112*   CO2 28 28 24   *  110* 110*   BUN 51*  52* 65*   CREA 0.95  0.94 1.24*   CA 9.7  9.6 9.6  9.8   MG 1.4*  --    PHOS 2.7 2.4*   ALB 2.7* 2.8*     No results for input(s): PH, PCO2, PO2, HCO3, FIO2 in the last 72 hours. No results for input(s): CPK, CKNDX, TROIQ in the last 72 hours. No lab exists for component: CPKMB  No results found for: BNPP, BNP   Lab Results   Component Value Date/Time    Culture result: No Growth (<1000 cfu/mL) 09/25/2020 02:15 PM    Culture result: No growth 6 days 09/24/2020 12:45 PM   No results found for: TSH, TSHEXT, TSHEXT    Imaging:    CXR Results  (Last 48 hours)    None        Results from [place] encounter on 09/24/20   XR CHEST SNGL V    Narrative History is shortness of breath. Comparison Is with the chest x-ray of 12/18/2019. An AP portable erect view of the chest demonstrate cardiac monitor leads. There  is now a small to moderate right pleural effusion. There is a small calcified  right lower lobe granuloma. The heart is enlarged. There is approximately 27  degrees dextroscoliosis of the thoracic spine. There is degenerative change  present in the spine as well. Impression IMPRESSION: Cardiomegaly. New right pleural effusion. No change in right lower  lobe granuloma. Dextroscoliosis. Results from [place] encounter on 09/24/20   CT CHEST WO CONT    Narrative The study is a CT evaluation of the chest dated 9/25/2020. HISTORY: History of chronic obstructive pulmonary disease. Heart failure. Recent  pleural fluid collection.     Technique: Performed without intravenous contrast. 3 mm axial imaging, axial MIP  imaging, sagittal, and coronal reconstructed imaging sequences are submitted for  evaluation. Thinner section Super Dimension imaging was also performed. Dose Reduction Technique was employed to reduce radiation exposure - This  includes reduction optimization techniques as appropriate to a performed exam  with automated exposure control adjustments of the mA and/or Kv according to  patient size, or use of iterative reconstruction technique. COMPARISON: Previous CT evaluation of the chest dated 12/18/2019. Recent chest  radiograph dated 9/25/2019. MEDIASTINUM: There are scattered normal sized middle mediastinal lymph nodes  without progression. The largest mediastinal lymph node is located within the  subcarinal region and this lymph node measures 10 mm in short axis measurement  which is considered within normal limits. There is an area of dystrophic calcification within the left thyroid lobe and  mild heterogeneity of the left thyroid lobe. This examination is negative for  large or dominant thyroid nodule or mass. LUNGS AND PLEURA: There is a moderate sized loculated right pleural effusion. This examination is negative for pleural based masses, pleural thickening, or  gas within the pleural fluid collection. There is encasement of the adjacent  lung with areas of passive atelectasis. There is a calcified granuloma within the right lung base. On axial maximum  intensity imaging, there are several additional small scattered micronodules  with 2 nodules demonstrated posteriorly within the left lower lobe (series [id number]  image number 42). This examination is negative for larger pulmonary nodules or  masses. The central trachea and bronchial tree are patent. There is bronchial  wall thickening and discoid opacities within the lung bases consistent with  reactive airways disease. CARDIOVASCULAR: There are moderate calcifications of the coronary arteries.   There is moderate enlargement of the atrial chambers and milder enlargement of  the ventricular chambers. There is a normal caliber to the thoracic aorta. CHEST WALL: There is multilevel spondylosis along the thoracic spine. There is a  mild to moderate dextroconvex scoliosis. There is increased dorsal kyphosis of  the thoracic spine. There are older healed right posterior rib fractures. OTHER: There is surgical suture material demonstrated within the gastric region  consistent with previous bariatric surgery. Impression IMPRESSION:  1. There is a moderate sized loculated right pleural effusion associated with  passive atelectasis. The differential diagnosis would include recent pneumonia  with a residual parapneumonic effusion. Since exam an [de-identified] is negative for pleural  based masses or findings specific for a malignant effusion. 2.  There are several scattered normal size middle mediastinal lymph nodes. 3.  There are findings of prior granulomatous exposure. Please see above text  for further details. IMPRESSION:   1. Acute hypoxic Respiratory failure  2. Chronic Obstructive Pulmonary Disease  3. Chronic A. Fib  4. Leukocytosis  5. Right lower lobe granuloma  6. Right pleural effusion possible parapneumonic effusion which is loculated  7. Obstructive sleep apnea      RECOMMENDATIONS/PLAN:     1. Patient is on oxygen via nasal cannula 2 L we will continue to wean she is not on any oxygen at home agree now on prednisone  2. Patient initially refused to get thoracentesis done  3. Agree with [facility] IV antibiotics pending culture results   4. Follow culture results  5. She has sleep apnea but she does not  Want to wear CPAP machine   6. Supplemental O2 to keep sats > 93%  Rheumatology work-up negative  Pulse ox room air and ambulation  Pt on room air SpO2=96%. Pt during ambulation on room air SpO2=92%.              Peggy Hashimoto, MD

## 2020-10-01 NOTE — PROGRESS NOTES
Consult for Vancomycin Dosing by Pharmacy by Dr. Sharron Hudson provided for this 79y.o. year old female , for indication of sepsis. Day of Therapy: 6    Ht Readings from Last 1 Encounters:   09/24/20 165.1 cm (65\")        Wt Readings from Last 1 Encounters:   09/29/20 91.4 kg (201 lb 8 oz)        Previous Regimen 1000mg IV q24h   Last Level 11mcg/dL   Other Current Antibiotics    Significant Cultures    Serum Creatinine Lab Results   Component Value Date/Time    Creatinine 0.95 09/30/2020 07:15 AM    Creatinine 0.94 09/30/2020 07:15 AM      Creatinine Clearance Estimated Creatinine Clearance: 61.6 mL/min (based on SCr of 0.95 mg/dL). BUN Lab Results   Component Value Date/Time    BUN 51 (H) 09/30/2020 07:15 AM    BUN 52 (H) 09/30/2020 07:15 AM      WBC Lab Results   Component Value Date/Time    WBC 17.7 (H) 09/30/2020 07:15 AM      H/H Lab Results   Component Value Date/Time    HGB 11.9 09/30/2020 07:15 AM      Platelets Lab Results   Component Value Date/Time    PLATELET 725 88/00/6191 07:15 AM      Temp 97.7 °F (36.5 °C)     Vancomycin trough is subtherapeutic. Pharmacy increase the dose to Vancomycin 1500mg IV q24h. Pharmacy to follow daily and will make changes to dose and/or frequency based on clinical status.     Pharmacist Annie Knowles Flair

## 2020-10-01 NOTE — PROCEDURES
Interventional Radiology Brief Procedure Note    Patient: Izzy Oquendo MRN: [id number]  SSN: [id number]    YOB: 1950  Age: 79 y.o. Sex: female      Date of Procedure: 10/1/2020    Pre-Procedure Diagnosis: right pleural effusion    Post-Procedure Diagnosis: SAME    Procedure(s): Thoracentesis    Brief Description of Procedure: US guided placement of 5F Yueh into right pleural space yielded minimal fluid, likely due to loculation.     Performed By: Ritchie Galindo DO     Assistants: None    Anesthesia: Lidocaine    Estimated Blood Loss: Less than 10ml    Specimens: 10mL cloudy serous fluid     Implants: None    Findings: small loculated right pleural effusion    Complications: None

## 2020-10-02 LAB
ALBUMIN SERPL-MCNC: 2.8 G/DL (ref 3.5–5)
ANA TITR SER IF: NEGATIVE {TITER}
ANION GAP SERPL CALC-SCNC: 3 MMOL/L (ref 5–15)
BASOPHILS # BLD: 0 K/UL (ref 0–0.1)
BASOPHILS NFR BLD: 0 % (ref 0–1)
BUN SERPL-MCNC: 27 MG/DL (ref 6–20)
BUN/CREAT SERPL: 30 (ref 12–20)
C-ANCA TITR SER IF: NORMAL TITER
CA-I BLD-MCNC: 8.9 MG/DL (ref 8.5–10.1)
CHLORIDE SERPL-SCNC: 98 MMOL/L (ref 97–108)
CO2 SERPL-SCNC: 38 MMOL/L (ref 21–32)
CREAT SERPL-MCNC: 0.9 MG/DL (ref 0.55–1.02)
CRP SERPL-MCNC: 5.05 MG/DL (ref 0–0.6)
DIFFERENTIAL METHOD BLD: ABNORMAL
EOSINOPHIL # BLD: 0.2 K/UL (ref 0–0.4)
EOSINOPHIL NFR BLD: 1 % (ref 0–7)
ERYTHROCYTE [DISTWIDTH] IN BLOOD BY AUTOMATED COUNT: 16.7 % (ref 11.5–14.5)
GLUCOSE SERPL-MCNC: 98 MG/DL (ref 65–100)
HCT VFR BLD AUTO: 40.1 % (ref 35–47)
HGB BLD-MCNC: 13.3 G/DL (ref 11.5–16)
IMM GRANULOCYTES # BLD AUTO: 0.3 K/UL (ref 0–0.04)
IMM GRANULOCYTES NFR BLD AUTO: 2 % (ref 0–0.5)
LYMPHOCYTES # BLD: 2.4 K/UL (ref 0.8–3.5)
LYMPHOCYTES NFR BLD: 17 % (ref 12–49)
MCH RBC QN AUTO: 30.9 PG (ref 26–34)
MCHC RBC AUTO-ENTMCNC: 33.2 G/DL (ref 30–36.5)
MCV RBC AUTO: 93 FL (ref 80–99)
MONOCYTES # BLD: 1.2 K/UL (ref 0–1)
MONOCYTES NFR BLD: 8 % (ref 5–13)
MYELOPEROXIDASE AB SER IA-ACNC: <9 U/ML (ref 0–9)
NEUTS SEG # BLD: 10.7 K/UL (ref 1.8–8)
NEUTS SEG NFR BLD: 72 % (ref 32–75)
P-ANCA ATYPICAL TITR SER IF: NORMAL TITER
P-ANCA TITR SER IF: NORMAL TITER
PHOSPHATE SERPL-MCNC: 3.5 MG/DL (ref 2.6–4.7)
PLATELET # BLD AUTO: 421 K/UL (ref 150–400)
PMV BLD AUTO: 10.2 FL (ref 8.9–12.9)
POTASSIUM SERPL-SCNC: 3.3 MMOL/L (ref 3.5–5.1)
PROCALCITONIN SERPL-MCNC: 1.01 NG/ML
PROTEINASE3 AB SER IA-ACNC: <3.5 U/ML (ref 0–3.5)
RBC # BLD AUTO: 4.31 M/UL (ref 3.8–5.2)
SODIUM SERPL-SCNC: 139 MMOL/L (ref 136–145)
WBC # BLD AUTO: 14.5 K/UL (ref 3.6–11)

## 2020-10-02 PROCEDURE — 74011000258 HC RX REV CODE- 258: Performed by: PHYSICIAN ASSISTANT

## 2020-10-02 PROCEDURE — 74011250637 HC RX REV CODE- 250/637: Performed by: HOSPITALIST

## 2020-10-02 PROCEDURE — 94640 AIRWAY INHALATION TREATMENT: CPT

## 2020-10-02 PROCEDURE — 85025 COMPLETE CBC W/AUTO DIFF WBC: CPT

## 2020-10-02 PROCEDURE — 97530 THERAPEUTIC ACTIVITIES: CPT

## 2020-10-02 PROCEDURE — 74011250636 HC RX REV CODE- 250/636: Performed by: PHYSICIAN ASSISTANT

## 2020-10-02 PROCEDURE — 74011250637 HC RX REV CODE- 250/637: Performed by: INTERNAL MEDICINE

## 2020-10-02 PROCEDURE — 94760 N-INVAS EAR/PLS OXIMETRY 1: CPT

## 2020-10-02 PROCEDURE — 74011250637 HC RX REV CODE- 250/637: Performed by: NURSE PRACTITIONER

## 2020-10-02 PROCEDURE — 65270000029 HC RM PRIVATE

## 2020-10-02 PROCEDURE — 80069 RENAL FUNCTION PANEL: CPT

## 2020-10-02 PROCEDURE — 84145 PROCALCITONIN (PCT): CPT

## 2020-10-02 PROCEDURE — 74011250637 HC RX REV CODE- 250/637: Performed by: PHYSICIAN ASSISTANT

## 2020-10-02 PROCEDURE — 86140 C-REACTIVE PROTEIN: CPT

## 2020-10-02 PROCEDURE — 74011250636 HC RX REV CODE- 250/636: Performed by: INTERNAL MEDICINE

## 2020-10-02 PROCEDURE — 99232 SBSQ HOSP IP/OBS MODERATE 35: CPT | Performed by: INTERNAL MEDICINE

## 2020-10-02 PROCEDURE — 77010033678 HC OXYGEN DAILY

## 2020-10-02 PROCEDURE — 36415 COLL VENOUS BLD VENIPUNCTURE: CPT

## 2020-10-02 PROCEDURE — 74011636637 HC RX REV CODE- 636/637: Performed by: INTERNAL MEDICINE

## 2020-10-02 RX ORDER — POTASSIUM CHLORIDE 750 MG/1
20 TABLET, FILM COATED, EXTENDED RELEASE ORAL
Status: COMPLETED | OUTPATIENT
Start: 2020-10-02 | End: 2020-10-02

## 2020-10-02 RX ADMIN — PREDNISONE 20 MG: 20 TABLET ORAL at 08:50

## 2020-10-02 RX ADMIN — Medication 10 ML: at 23:56

## 2020-10-02 RX ADMIN — Medication 10 ML: at 15:21

## 2020-10-02 RX ADMIN — PIPERACILLIN SODIUM AND TAZOBACTAM SODIUM 3.38 G: 3; .375 INJECTION, POWDER, LYOPHILIZED, FOR SOLUTION INTRAVENOUS at 15:21

## 2020-10-02 RX ADMIN — ALBUTEROL SULFATE 2 PUFF: 108 AEROSOL, METERED RESPIRATORY (INHALATION) at 21:08

## 2020-10-02 RX ADMIN — POTASSIUM CHLORIDE 40 MEQ: 1500 TABLET, EXTENDED RELEASE ORAL at 08:50

## 2020-10-02 RX ADMIN — RIVAROXABAN 20 MG: 20 TABLET, FILM COATED ORAL at 08:50

## 2020-10-02 RX ADMIN — PIPERACILLIN SODIUM AND TAZOBACTAM SODIUM 3.38 G: 3; .375 INJECTION, POWDER, LYOPHILIZED, FOR SOLUTION INTRAVENOUS at 04:33

## 2020-10-02 RX ADMIN — ALBUTEROL SULFATE 2 PUFF: 108 AEROSOL, METERED RESPIRATORY (INHALATION) at 13:14

## 2020-10-02 RX ADMIN — POTASSIUM CHLORIDE 20 MEQ: 750 TABLET, FILM COATED, EXTENDED RELEASE ORAL at 15:22

## 2020-10-02 RX ADMIN — ALBUTEROL SULFATE 2 PUFF: 108 AEROSOL, METERED RESPIRATORY (INHALATION) at 09:01

## 2020-10-02 RX ADMIN — DIGOXIN 0.12 MG: 125 TABLET ORAL at 08:50

## 2020-10-02 RX ADMIN — FAMOTIDINE 20 MG: 20 TABLET ORAL at 23:55

## 2020-10-02 RX ADMIN — MULTIVITAMIN TABLET 1 TABLET: TABLET at 08:52

## 2020-10-02 RX ADMIN — ATORVASTATIN CALCIUM 40 MG: 40 TABLET, FILM COATED ORAL at 21:28

## 2020-10-02 RX ADMIN — FUROSEMIDE 40 MG: 10 INJECTION, SOLUTION INTRAMUSCULAR; INTRAVENOUS at 08:50

## 2020-10-02 RX ADMIN — PANTOPRAZOLE SODIUM 40 MG: 40 TABLET, DELAYED RELEASE ORAL at 06:11

## 2020-10-02 RX ADMIN — LISINOPRIL 5 MG: 5 TABLET ORAL at 08:50

## 2020-10-02 RX ADMIN — MAGNESIUM OXIDE TAB 400 MG (241.3 MG ELEMENTAL MG) 400 MG: 400 (241.3 MG) TAB at 08:50

## 2020-10-02 RX ADMIN — ASPIRIN 81 MG: 81 TABLET, COATED ORAL at 08:49

## 2020-10-02 RX ADMIN — Medication 10 ML: at 06:12

## 2020-10-02 RX ADMIN — ALBUTEROL SULFATE 2 PUFF: 108 AEROSOL, METERED RESPIRATORY (INHALATION) at 01:53

## 2020-10-02 RX ADMIN — METOPROLOL SUCCINATE 50 MG: 50 TABLET, EXTENDED RELEASE ORAL at 08:50

## 2020-10-02 RX ADMIN — PIPERACILLIN SODIUM AND TAZOBACTAM SODIUM 3.38 G: 3; .375 INJECTION, POWDER, LYOPHILIZED, FOR SOLUTION INTRAVENOUS at 22:43

## 2020-10-02 RX ADMIN — MELATONIN TAB 5 MG 10 MG: 5 TAB at 21:28

## 2020-10-02 RX ADMIN — MAGNESIUM OXIDE TAB 400 MG (241.3 MG ELEMENTAL MG) 400 MG: 400 (241.3 MG) TAB at 21:28

## 2020-10-02 NOTE — PROGRESS NOTES
Patient will be going to [id number] [email or web address]. Writer along with [place] Ramirez , MIRANDA confirmed patient is aware that she will be going to [place] and patient is agreeable. Nurse to set up transport. Patient will be going to room 210, report can be called to [phone].

## 2020-10-02 NOTE — PROGRESS NOTES
Renal Progress Note    Patient: Arthur Cassidy MRN: [id number]  SSN: [id number]    YOB: 1950  Age: 79 y.o. Sex: female      Admit Date: 9/24/2020    LOS: 8 days     Subjective:   Patient seen at bedside. She is complaining of feeling short of breath today. She had indium scan this morning. Lasix dose was increased to 40 twice daily IV .   Swelling is improving    Current Facility-Administered Medications   Medication Dose Route Frequency    metoprolol succinate (TOPROL-XL) XL tablet 50 mg  50 mg Oral DAILY    lisinopriL (PRINIVIL, ZESTRIL) tablet 5 mg  5 mg Oral DAILY    metoprolol (LOPRESSOR) injection 5 mg  5 mg IntraVENous Q6H PRN    furosemide (LASIX) injection 40 mg  40 mg IntraVENous BID    magnesium oxide (MAG-OX) tablet 400 mg  400 mg Oral BID    potassium chloride (K-DUR, KLOR-CON) SR tablet 40 mEq  40 mEq Oral DAILY    pantoprazole (PROTONIX) tablet 40 mg  40 mg Oral ACB    albuterol (PROVENTIL HFA, VENTOLIN HFA, PROAIR HFA) inhaler 2 Puff  2 Puff Inhalation Q6H RT    predniSONE (DELTASONE) tablet 20 mg  20 mg Oral DAILY WITH BREAKFAST    famotidine (PEPCID) tablet 20 mg  20 mg Oral Q48H    albuterol-ipratropium (DUO-NEB) 2.5 MG-0.5 MG/3 ML  3 mL Nebulization Q4H PRN    piperacillin-tazobactam (ZOSYN) 3.375 g in 0.9% sodium chloride (MBP/ADV) 100 mL MBP  3.375 g IntraVENous Q8H    aspirin delayed-release tablet 81 mg  81 mg Oral DAILY    multivitamin (ONE A DAY) tablet 1 Tab  1 Tab Oral DAILY    digoxin (LANOXIN) tablet 0.125 mg  0.125 mg Oral DAILY    atorvastatin (LIPITOR) tablet 40 mg  40 mg Oral QHS    melatonin tablet 10 mg  10 mg Oral QHS    rivaroxaban (XARELTO) tablet 20 mg  20 mg Oral DAILY    sodium chloride (NS) flush 5-10 mL  5-10 mL IntraVENous PRN    sodium chloride (NS) flush 5-40 mL  5-40 mL IntraVENous Q8H    sodium chloride (NS) flush 5-40 mL  5-40 mL IntraVENous PRN    acetaminophen (TYLENOL) tablet 650 mg  650 mg Oral Q6H PRN    Or    acetaminophen (TYLENOL) suppository 650 mg  650 mg Rectal Q6H PRN    polyethylene glycol (MIRALAX) packet 17 g  17 g Oral DAILY PRN    promethazine (PHENERGAN) tablet 12.5 mg  12.5 mg Oral Q6H PRN    Or    ondansetron (ZOFRAN) injection 4 mg  4 mg IntraVENous Q6H PRN        Vitals:    10/02/20 0400 10/02/20 0731 10/02/20 0858 10/02/20 0901   BP:  139/78     Pulse:  60     Resp:  18     Temp:  98.1 °F (36.7 °C)     SpO2:  94% 98% 98%   Weight: 84 kg (185 lb 3 oz)      Height:         Objective:   General: Elderly female, alert awake well-oriented, no acute distress. HEENT: EOMI, no Icterus, no Pallor,  mucosa moist,  throat clear. Neck: Neck is supple, No JVD,   Lungs: fair air entry+ no rhonchi, no rales,  CVS: heart sounds normal, no murmurs, no rubs. GI: soft, nontender, normal BS,   Extremeties: no clubbing, no cyanosis, 1-2+ bilateral extremity edema present,  Neuro: Alert, awake, oriented x3, speech is normal   Skin: normal skin turgor,   Musculoskeletal: no acute joint swellings       Intake and Output:  Current Shift: No intake/output data recorded. Last three shifts: No intake/output data recorded. Lab/Data Review:  Recent Labs     10/02/20  1030 10/01/20  1903 09/30/20  0715   WBC 14.5* 15.5* 17.7*   HGB 13.3 12.6 11.9   HCT 40.1 37.3 35.5   * 423* 395     Recent Labs     10/02/20  0928 10/01/20  1903 09/30/20  0715    144 145  147*   K 3.3* 3.4* 3.5  3.6   CL 98 104 113*  113*   CO2 38* 34* 28  28   GLU 98 141* 113*  110*   BUN 27* 31* 51*  52*   CREA 0.90 0.97 0.95  0.94   CA 8.9 9.1 9.7  9.6   MG  --   --  1.4*   PHOS 3.5  --  2.7   ALB 2.8*  --  2.7*     No results for input(s): PH, PCO2, PO2, HCO3, FIO2 in the last 72 hours.   Recent Results (from the past 24 hour(s))   CBC WITH AUTOMATED DIFF    Collection Time: 10/01/20  7:03 PM   Result Value Ref Range    WBC 15.5 (H) 3.6 - 11.0 K/uL    RBC 4.03 3.80 - 5.20 M/uL    HGB 12.6 11.5 - 16.0 %    HCT 37.3 35.0 - 47.0 % MCV 92.6 80.0 - 99.0 FL    MCH 31.3 26.0 - 34.0 PG    MCHC 33.8 30.0 - 36.5 g/dL    RDW 16.9 (H) 11.5 - 14.5 %    PLATELET 616 (H) 617 - 400 K/uL    MPV 10.4 8.9 - 12.9 FL    NEUTROPHILS 83 (H) 32 - 75 %    LYMPHOCYTES 12 12 - 49 %    MONOCYTES 5 5 - 13 %    EOSINOPHILS 0 0 - 7 %    BASOPHILS 0 0 - 1 %    IMMATURE GRANULOCYTES 3 (H) 0.0 - 0.5 %    ABS. NEUTROPHILS 12.8 (H) 1.8 - 8.0 K/UL    ABS. LYMPHOCYTES 1.9 0.8 - 3.5 K/UL    ABS. MONOCYTES 0.8 0.0 - 1.0 K/UL    ABS. EOSINOPHILS 0.0 0.0 - 0.4 K/UL    ABS. BASOPHILS 0.0 0.0 - 0.1 K/UL    ABS. IMM.  GRANS. 0.4 (H) 0.00 - 0.04 K/UL    DF AUTOMATED     METABOLIC PANEL, BASIC    Collection Time: 10/01/20  7:03 PM   Result Value Ref Range    Sodium 144 136 - 145 mmol/L    Potassium 3.4 (L) 3.5 - 5.1 mmol/L    Chloride 104 97 - 108 mmol/L    CO2 34 (H) 21 - 32 mmol/L    Anion gap 6 5 - 15 mmol/L    Glucose 141 (H) 65 - 100 mg/dL    BUN 31 (H) 6 - 20 mg/dL    Creatinine 0.97 0.55 - 1.02 mg/dL    BUN/Creatinine ratio 32 (H) 12 - 20      GFR est AA >60 >60 ml/min/1.73m2    GFR est non-AA 57 (L) >60 ml/min/1.73m2    Calcium 9.1 8.5 - 10.1 mg/dL   C REACTIVE PROTEIN, QT    Collection Time: 10/01/20  7:03 PM   Result Value Ref Range    C-Reactive protein 5.73 (H) 0.00 - 0.60 mg/dL   PROCALCITONIN    Collection Time: 10/01/20  7:03 PM   Result Value Ref Range    Procalcitonin 1.14 (H) 0 ng/mL   C REACTIVE PROTEIN, QT    Collection Time: 10/02/20  9:28 AM   Result Value Ref Range    C-Reactive protein 5.05 (H) 0.00 - 0.60 mg/dL   RENAL FUNCTION PANEL    Collection Time: 10/02/20  9:28 AM   Result Value Ref Range    Sodium 139 136 - 145 mmol/L    Potassium 3.3 (L) 3.5 - 5.1 mmol/L    Chloride 98 97 - 108 mmol/L    CO2 38 (H) 21 - 32 mmol/L    Anion gap 3 (L) 5 - 15 mmol/L    Glucose 98 65 - 100 mg/dL    BUN 27 (H) 6 - 20 mg/dL    Creatinine 0.90 0.55 - 1.02 mg/dL    BUN/Creatinine ratio 30 (H) 12 - 20      GFR est AA >60 >60 ml/min/1.73m2    GFR est non-AA >60 >60 ml/min/1.73m2 Calcium 8.9 8.5 - 10.1 mg/dL    Phosphorus 3.5 2.6 - 4.7 mg/dL    Albumin 2.8 (L) 3.5 - 5.0 g/dL   CBC WITH AUTOMATED DIFF    Collection Time: 10/02/20 10:30 AM   Result Value Ref Range    WBC 14.5 (H) 3.6 - 11.0 K/uL    RBC 4.31 3.80 - 5.20 M/uL    HGB 13.3 11.5 - 16.0 g/dL    HCT 40.1 35.0 - 47.0 %    MCV 93.0 80.0 - 99.0 FL    MCH 30.9 26.0 - 34.0 PG    MCHC 33.2 30.0 - 36.5 g/dL    RDW 16.7 (H) 11.5 - 14.5 %    PLATELET 068 (H) 707 - 400 K/uL    MPV 10.2 8.9 - 12.9 FL    NEUTROPHILS 72 32 - 75 %    LYMPHOCYTES 17 12 - 49 %    MONOCYTES 8 5 - 13 %    EOSINOPHILS 1 0 - 7 %    BASOPHILS 0 0 - 1 %    IMMATURE GRANULOCYTES 2 (H) 0.0 - 0.5 %    ABS. NEUTROPHILS 10.7 (H) 1.8 - 8.0 K/UL    ABS. LYMPHOCYTES 2.4 0.8 - 3.5 K/UL    ABS. MONOCYTES 1.2 (H) 0.0 - 1.0 K/UL    ABS. EOSINOPHILS 0.2 0.0 - 0.4 K/UL    ABS. BASOPHILS 0.0 0.0 - 0.1 K/UL    ABS. IMM. GRANS. 0.3 (H) 0.00 - 0.04 K/UL    DF AUTOMATED          Assessment and Plan:     1. Acute Kidney Injury on ? ?CKD: probably prerenal azotemia secondary to use of Lasix and lisinopril, in the presence of borderline hypotension+  lisinopril on hold  Lasix was increased yesterday to iv 40 twice daily 2/2 significant leg swelling. Continue same dose  Will monitor I/Os, renal functions and adjust diuretics as needed   urine analysis is bland   Creatinine is improving. It is improved to 3.8-->2.8-->1.2-->0. 9 . Today's labs are pending  Baseline creatinine is unknown.   herself denies any history of CKD   CT abdomen negative for hydro  UA is negative for proteinuria or significant cells, unlikely to have acute GN     2. Acute shortness of breath/history of COPD/obstructive sleep/CHF/chronic edema  Shows normal EF  Improved clinically with diuretics  No wheezing or SOB now  Continue bronchodilators and steroids  continue Lasix 40 mg BID iv  Continue IV antibiotic as per primary service    3. Hypertension  Patient is now elevated. I will discontinue midodrine.   Restart BP meds if still elevated    4. Lower extremity edema: probably related to congestive heart failure/pulmonary hypertension    increase Lasix to 40 twice daily   advised to maintain p.o. fluid restriction of 2652-5654 ml per day and and salt restriction,  Will monitor fluid status clinically and adjust diuretics as needed.          Signed By: Solitario Dee MD     October 2, 2020

## 2020-10-02 NOTE — PROGRESS NOTES
Progress Note    Patient: Rox Valenzuela MRN: [id number]  SSN: [id number]    YOB: 1950  Age: 79 y.o. Sex: female      Admit Date: 9/24/2020    LOS: 8 days     Subjective:   Patient followed for suspected sepsis but no obvious source of infection, though with suspicious right pleural effusion, possibly parapneumonic. Blood culture has been negative. WBC, Procalcitonin and CRP decreasing. She is currently on IV Zosyn alone. Indium scan was interpreted as negative for source of infection. She underwent thoracentesis yesterday with removal of 10 cc cloudy serous fluid sent for culture and still pending. She is sitting up in bedside chair with no complaints. Objective:     Vitals:    10/02/20 0400 10/02/20 0731 10/02/20 0858 10/02/20 0901   BP:  139/78     Pulse:  60     Resp:  18     Temp:  98.1 °F (36.7 °C)     SpO2:  94% 98% 98%   Weight: 185 lb 3 oz (84 kg)      Height:            Intake and Output:  Current Shift: No intake/output data recorded. Last three shifts: No intake/output data recorded. Physical Exam:    Vitals signs and nursing note reviewed. Constitutional:       General: She is not in acute distress. Appearance: She is obese. She is not toxic-appearing or diaphoretic. HENT:         Comments: Nasal O2     Mouth/Throat:      Pharynx: Oropharynx is clear. Cardiovascular:      Rate and Rhythm: Normal rate and regular rhythm. Heart sounds: No murmur. Pulmonary:      Breath sounds: No rhonchi or rales. Comments: Diminished BS   Genitourinary:     Comments: No Bustos  Neurological:      General: No focal deficit present. Mental Status: She is alert and oriented to person, place, and time. Psychiatric:         Behavior: Behavior normal.         Thought Content:  Thought content normal.         Judgment: Judgment normal.      Comments: Depressed mood     Lab/Data Review:  WBC 14,500  Procalcitonin 1.01 (1.14 (0.98 (2.37 (7.64)  CRP 5.05 (8.99 (12.800 (16.70 (41.20)  ESR 52    Blood cultures (9/22) No growth at 6 days  Urine culture (9/25) No growth FINAL  Assessment:       1. Suspected sepsis with leukocytosis, elevated ESR, procalcitonin, CRP, etiology unclear, resolving, Day #8 IV Zosyn  2. Right pleural effusion, etiology unclear, ?parapneumonic, Day #8 IV Zosyn  3. Ruling out Rheumatologic disease  4. Acute kidney injury, resolved  5. COPD by history     Comment:  Possible false negative Indium scan, however, cloudy fluid described supporting infection. She has responded to empiric antibiotics, now Zosyn alone. Plan:      1. Preferable to wait for fluid culture but this may be take 3 days and likely false negative as well. 2. Not unreasonable to transition to Augmentin 875 mg po BID for at least 2 more weeks. 3. Will follow-up up pending blood and fluid cultures  4. Follow-up in [facility] in 2 weeks  5.  Discussed recommendations with patient and she is receptive    Signed By: Miguel Eastman MD     October 2, 2020

## 2020-10-02 NOTE — PROGRESS NOTES
Hospitalist Progress Note    Subjective:   Daily Progress Note: 10/2/2020 6:24 PM    Hospital Course:  79 y. o.female with an extensive past medical history including A. fib on Xarelto, status post cardioversion, hypertension, HORACE, COPD that presented to the ED complaining of worsening dyspnea for the last week or so.  She states that she generally has some component of dyspnea chronically due to her COPD along with persistent cough is nonproductive, however she is also over the last week dyspnea continues to worsen, particularly on exertion.  Symptoms have not been associated with any chest pain, palpitations, or lightheadedness.  Her usual inhaler regimen has not improved her symptoms.  States that prior to that she has been doing relatively well her usual state of health, denies any recent fevers, chills, chest pain, abdominal pain, nausea, vomiting, diarrhea, constipation, lightheadedness dizziness urinary symptoms headache. Subjective: Pt seen sitting up in the chair, no complaints,   S/p thoracentesis, 10 ml fluid removed, no pain, no shortness of breath.        Current Facility-Administered Medications   Medication Dose Route Frequency    metoprolol succinate (TOPROL-XL) XL tablet 50 mg  50 mg Oral DAILY    lisinopriL (PRINIVIL, ZESTRIL) tablet 5 mg  5 mg Oral DAILY    metoprolol (LOPRESSOR) injection 5 mg  5 mg IntraVENous Q6H PRN    furosemide (LASIX) injection 40 mg  40 mg IntraVENous BID    magnesium oxide (MAG-OX) tablet 400 mg  400 mg Oral BID    potassium chloride (K-DUR, KLOR-CON) SR tablet 40 mEq  40 mEq Oral DAILY    pantoprazole (PROTONIX) tablet 40 mg  40 mg Oral ACB    albuterol (PROVENTIL HFA, VENTOLIN HFA, PROAIR HFA) inhaler 2 Puff  2 Puff Inhalation Q6H RT    predniSONE (DELTASONE) tablet 20 mg  20 mg Oral DAILY WITH BREAKFAST    famotidine (PEPCID) tablet 20 mg  20 mg Oral Q48H    albuterol-ipratropium (DUO-NEB) 2.5 MG-0.5 MG/3 ML  3 mL Nebulization Q4H PRN    piperacillin-tazobactam (ZOSYN) 3.375 g in 0.9% sodium chloride (MBP/ADV) 100 mL MBP  3.375 g IntraVENous Q8H    aspirin delayed-release tablet 81 mg  81 mg Oral DAILY    multivitamin (ONE A DAY) tablet 1 Tab  1 Tab Oral DAILY    digoxin (LANOXIN) tablet 0.125 mg  0.125 mg Oral DAILY    atorvastatin (LIPITOR) tablet 40 mg  40 mg Oral QHS    melatonin tablet 10 mg  10 mg Oral QHS    rivaroxaban (XARELTO) tablet 20 mg  20 mg Oral DAILY    sodium chloride (NS) flush 5-10 mL  5-10 mL IntraVENous PRN    sodium chloride (NS) flush 5-40 mL  5-40 mL IntraVENous Q8H    sodium chloride (NS) flush 5-40 mL  5-40 mL IntraVENous PRN    acetaminophen (TYLENOL) tablet 650 mg  650 mg Oral Q6H PRN    Or    acetaminophen (TYLENOL) suppository 650 mg  650 mg Rectal Q6H PRN    polyethylene glycol (MIRALAX) packet 17 g  17 g Oral DAILY PRN    promethazine (PHENERGAN) tablet 12.5 mg  12.5 mg Oral Q6H PRN    Or    ondansetron (ZOFRAN) injection 4 mg  4 mg IntraVENous Q6H PRN        Review of Systems:    ROS     Constitutional: Negative for fever and malaise/fatigue. Respiratory: Positive for cough. Negative for shortness of breath.    Cardiovascular: Negative for chest pain, palpitations and orthopnea. Gastrointestinal: Negative for abdominal pain, heartburn, nausea and vomiting. Genitourinary: Negative for dysuria and frequency. Musculoskeletal: Negative.    Neurological: Negative for dizziness, tingling and headaches  Objective:     Visit Vitals  /78 (BP 1 Location: Left arm, BP Patient Position: At rest)   Pulse 60   Temp 98.1 °F (36.7 °C)   Resp 18   Ht 5' 5\" (1.651 m)   Wt 84 kg (185 lb 3 oz)   SpO2 98%   BMI 30.82 kg/m²    O2 Flow Rate (L/min): 2 l/min O2 Device: Room air    Temp (24hrs), Av.1 °F (36.7 °C), Min:98.1 °F (36.7 °C), Max:98.1 °F (36.7 °C)      No intake/output data recorded. No intake/output data recorded.     PHYSICAL EXAM:    Physical Exam     Constitutional: She is oriented to person, place, and time. She appears well-developed. No distress. Cardiovascular: irregular. Exam reveals no gallop and no friction rub.   Murmur heard. Pulmonary/Chest: Effort normal. No respiratory distress. She has no wheezes. She has no rales.   Few scattered coarse sounds, otherwise clear, slightly diminished in bases. Musculoskeletal: Normal range of motion. Neurological: She is alert and oriented to person, place, and time. Skin: Skin is warm and dry. Psychiatric: She has a normal mood and affect. Her behavior is normal.      Data Review    Recent Results (from the past 24 hour(s))   CBC WITH AUTOMATED DIFF    Collection Time: 10/01/20  7:03 PM   Result Value Ref Range    WBC 15.5 (H) 3.6 - 11.0 K/uL    RBC 4.03 3.80 - 5.20 M/uL    HGB 12.6 11.5 - 16.0 %    HCT 37.3 35.0 - 47.0 %    MCV 92.6 80.0 - 99.0 FL    MCH 31.3 26.0 - 34.0 PG    MCHC 33.8 30.0 - 36.5 g/dL    RDW 16.9 (H) 11.5 - 14.5 %    PLATELET 597 (H) 224 - 400 K/uL    MPV 10.4 8.9 - 12.9 FL    NEUTROPHILS 83 (H) 32 - 75 %    LYMPHOCYTES 12 12 - 49 %    MONOCYTES 5 5 - 13 %    EOSINOPHILS 0 0 - 7 %    BASOPHILS 0 0 - 1 %    IMMATURE GRANULOCYTES 3 (H) 0.0 - 0.5 %    ABS. NEUTROPHILS 12.8 (H) 1.8 - 8.0 K/UL    ABS. LYMPHOCYTES 1.9 0.8 - 3.5 K/UL    ABS. MONOCYTES 0.8 0.0 - 1.0 K/UL    ABS. EOSINOPHILS 0.0 0.0 - 0.4 K/UL    ABS. BASOPHILS 0.0 0.0 - 0.1 K/UL    ABS. IMM.  GRANS. 0.4 (H) 0.00 - 0.04 K/UL    DF AUTOMATED     METABOLIC PANEL, BASIC    Collection Time: 10/01/20  7:03 PM   Result Value Ref Range    Sodium 144 136 - 145 mmol/L    Potassium 3.4 (L) 3.5 - 5.1 mmol/L    Chloride 104 97 - 108 mmol/L    CO2 34 (H) 21 - 32 mmol/L    Anion gap 6 5 - 15 mmol/L    Glucose 141 (H) 65 - 100 mg/dL    BUN 31 (H) 6 - 20 mg/dL    Creatinine 0.97 0.55 - 1.02 mg/dL    BUN/Creatinine ratio 32 (H) 12 - 20      GFR est AA >60 >60 ml/min/1.73m2    GFR est non-AA 57 (L) >60 ml/min/1.73m2    Calcium 9.1 8.5 - 10.1 mg/dL   C REACTIVE PROTEIN, QT    Collection Time: 10/01/20  7:03 PM   Result Value Ref Range    C-Reactive protein 5.73 (H) 0.00 - 0.60 mg/dL   PROCALCITONIN    Collection Time: 10/01/20  7:03 PM   Result Value Ref Range    Procalcitonin 1.14 (H) 0 ng/mL   C REACTIVE PROTEIN, QT    Collection Time: 10/02/20  9:28 AM   Result Value Ref Range    C-Reactive protein 5.05 (H) 0.00 - 0.60 mg/dL   PROCALCITONIN    Collection Time: 10/02/20  9:28 AM   Result Value Ref Range    Procalcitonin 1.01 (H) 0 ng/mL   RENAL FUNCTION PANEL    Collection Time: 10/02/20  9:28 AM   Result Value Ref Range    Sodium 139 136 - 145 mmol/L    Potassium 3.3 (L) 3.5 - 5.1 mmol/L    Chloride 98 97 - 108 mmol/L    CO2 38 (H) 21 - 32 mmol/L    Anion gap 3 (L) 5 - 15 mmol/L    Glucose 98 65 - 100 mg/dL    BUN 27 (H) 6 - 20 mg/dL    Creatinine 0.90 0.55 - 1.02 mg/dL    BUN/Creatinine ratio 30 (H) 12 - 20      GFR est AA >60 >60 ml/min/1.73m2    GFR est non-AA >60 >60 ml/min/1.73m2    Calcium 8.9 8.5 - 10.1 mg/dL    Phosphorus 3.5 2.6 - 4.7 mg/dL    Albumin 2.8 (L) 3.5 - 5.0 g/dL   CBC WITH AUTOMATED DIFF    Collection Time: 10/02/20 10:30 AM   Result Value Ref Range    WBC 14.5 (H) 3.6 - 11.0 K/uL    RBC 4.31 3.80 - 5.20 M/uL    HGB 13.3 11.5 - 16.0 g/dL    HCT 40.1 35.0 - 47.0 %    MCV 93.0 80.0 - 99.0 FL    MCH 30.9 26.0 - 34.0 PG    MCHC 33.2 30.0 - 36.5 g/dL    RDW 16.7 (H) 11.5 - 14.5 %    PLATELET 946 (H) 103 - 400 K/uL    MPV 10.2 8.9 - 12.9 FL    NEUTROPHILS 72 32 - 75 %    LYMPHOCYTES 17 12 - 49 %    MONOCYTES 8 5 - 13 %    EOSINOPHILS 1 0 - 7 %    BASOPHILS 0 0 - 1 %    IMMATURE GRANULOCYTES 2 (H) 0.0 - 0.5 %    ABS. NEUTROPHILS 10.7 (H) 1.8 - 8.0 K/UL    ABS. LYMPHOCYTES 2.4 0.8 - 3.5 K/UL    ABS. MONOCYTES 1.2 (H) 0.0 - 1.0 K/UL    ABS. EOSINOPHILS 0.2 0.0 - 0.4 K/UL    ABS. BASOPHILS 0.0 0.0 - 0.1 K/UL    ABS. IMM. GRANS. 0.3 (H) 0.00 - 0.04 K/UL    DF AUTOMATED         IR THORACENTESIS CATH W IMAGE   Final Result   Impression:    Successful right thoracentesis.       NM INFLAM WB MULTI   Final Result      CT CHEST WO CONT   Final Result   IMPRESSION:   1. There is a moderate sized loculated right pleural effusion associated with   passive atelectasis. The differential diagnosis would include recent pneumonia   with a residual parapneumonic effusion. Since exam an [de-identified] is negative for pleural   based masses or findings specific for a malignant effusion. 2.  There are several scattered normal size middle mediastinal lymph nodes. 3.  There are findings of prior granulomatous exposure. Please see above text   for further details. CT ABD PELV WO CONT   Final Result   IMPRESSION:   Loculated right pleural effusion with atelectasis versus airspace disease in the   base of right lung. No acute finding the abdomen and pelvis. XR CHEST SNGL V   Final Result   IMPRESSION: Cardiomegaly. New right pleural effusion. No change in right lower   lobe granuloma. Dextroscoliosis. IR THORACENTESIS NDL PUNC ASP W IMAGE    (Results Pending)       Active Problems:    Hyponatremia (9/24/2020)      Leukocytosis (9/24/2020)      Hypoalbuminemia (9/24/2020)      Acute on chronic renal failure (HCC) (9/24/2020)      RLL pneumonia (9/24/2020)      Sepsis ([address] (9/24/2020)      PNA (pneumonia) (9/24/2020)        Assessment/Plan:        1. Acute respiratory failure with hypoxia- resolving, now on room air. Ambulating pulse oximetry is above 90%, will check overnight pulse oximetry tonight.     2. Right pleural effusion- s/p thoracentesis- 10 ml fluid removed,   On IV zoysn, will transition to PO abx per ID recs when discharged,  CRP and procalcitonin trending down, idium scan  Done, no sign of infection. 3. COPD- on PO steroid     4. Atrial fibrillation- on digoxin, xarelto, controlled rate.       5. MARILUZ- resolved, creatinine normal.       6.  Discharge- likely tomorrow to SNF.            DVT Prophylaxis:  Code Status:  DNR  POA:    Care Plan discussed with: _______________________________________________________________    Renella Opal, NP

## 2020-10-02 NOTE — CONSULTS
PULMONARY NOTE  [facility]    Name: Danelle Adamson MRN: [id number]   : 1950 Hospital: [address] [place] [address]   Date: 10/2/2020  Admission date: 2020 Hospital Day: 9       HPI:     Hospital Problems  Date Reviewed: 2015          Codes Class Noted POA    Hyponatremia ICD-10-CM: E87.1  ICD-9-CM: 276.1  2020 Yes        Leukocytosis ICD-10-CM: D72.829  ICD-9-CM: 288.60  2020 Yes        Hypoalbuminemia ICD-10-CM: E88.09  ICD-9-CM: 273.8  2020 Unknown        Acute on chronic renal failure ([facility] ICD-10-CM: N17.9, N18.9  ICD-9-CM: 584.9, 585.9  2020 Yes        RLL pneumonia ICD-10-CM: J18.9  ICD-9-CM: 486  2020 Yes        Sepsis ([facility] ICD-10-CM: A41.9  ICD-9-CM: 038.9, 995.91  2020 Yes        PNA (pneumonia) ICD-10-CM: J18.9  ICD-9-CM: 741  2020 Yes                   [x] High complexity decision making was performed  [x] See my orders for details      Subjective/Initial History:     I was asked by Lenka Breen MD to see Danelle Adamson  a 79 y.o.  female in consultation     Excerpts from admission 2020 or consult notes as follows:   57-year-old lady came in because of shortness of breath generalized weakness and also she was in atrial fibrillation past medical history of obstructive sleep apnea syndrome chronic A. fib cardioversion in the past hypertension in fact her blood pressure was low also she has COPD nebulizer treatment not on any oxygen at home complaining about cough without any productive sputum and she was feeling lightheaded dizzy no history of passing out so admitted and pulmonary consult was called for further evaluation.       No Known Allergies     MAR reviewed and pertinent medications noted or modified as needed     Current Facility-Administered Medications   Medication    metoprolol succinate (TOPROL-XL) XL tablet 50 mg    lisinopriL (PRINIVIL, ZESTRIL) tablet 5 mg    metoprolol (LOPRESSOR) injection 5 mg    furosemide (LASIX) injection 40 mg    magnesium oxide (MAG-OX) tablet 400 mg    potassium chloride (K-DUR, KLOR-CON) SR tablet 40 mEq    pantoprazole (PROTONIX) tablet 40 mg    albuterol (PROVENTIL HFA, VENTOLIN HFA, PROAIR HFA) inhaler 2 Puff    predniSONE (DELTASONE) tablet 20 mg    famotidine (PEPCID) tablet 20 mg    albuterol-ipratropium (DUO-NEB) 2.5 MG-0.5 MG/3 ML    piperacillin-tazobactam (ZOSYN) 3.375 g in 0.9% sodium chloride (MBP/ADV) 100 mL MBP    aspirin delayed-release tablet 81 mg    multivitamin (ONE A DAY) tablet 1 Tab    digoxin (LANOXIN) tablet 0.125 mg    atorvastatin (LIPITOR) tablet 40 mg    melatonin tablet 10 mg    rivaroxaban (XARELTO) tablet 20 mg    sodium chloride (NS) flush 5-10 mL    sodium chloride (NS) flush 5-40 mL    sodium chloride (NS) flush 5-40 mL    acetaminophen (TYLENOL) tablet 650 mg    Or    acetaminophen (TYLENOL) suppository 650 mg    polyethylene glycol (MIRALAX) packet 17 g    promethazine (PHENERGAN) tablet 12.5 mg    Or    ondansetron (ZOFRAN) injection 4 mg      Patient PCP: None  PMH:  has a past medical history of AF (paroxysmal atrial fibrillation) (HCC), Anemia, Anxiety, Chronic obstructive pulmonary disease ([address] Frequent urination, Heart failure (HCC), HTN (hypertension), Irregular heart beat, Obesity, HORACE (obstructive sleep apnea), SOB (shortness of breath) on exertion, Stress, Stroke ([address] and Wheezing. She also has no past medical history of Chronic pain. PSH:   has a past surgical history that includes echocardiogram (11/23/2009); nm cardiac spect w strs/rest mult (08/2006); and hx gastric bypass. FHX: family history includes COPD in her mother; Colon Cancer in her father; Diabetes in her mother; Stroke in her brother. SHX:  reports that she has quit smoking. She has never used smokeless tobacco. She reports previous alcohol use. She reports that she does not use drugs.      ROS:    Review of Systems   Constitutional: Positive for diaphoresis and malaise/fatigue. Negative for weight loss. HENT: Negative. Eyes: Negative. Respiratory: Positive for cough and shortness of breath. Cardiovascular: Positive for orthopnea. Gastrointestinal: Negative. Genitourinary: Negative. Musculoskeletal: Negative. Skin: Negative. Neurological: Positive for dizziness and weakness. Endo/Heme/Allergies: Negative. Psychiatric/Behavioral: Negative. Objective:     Vital Signs: Telemetry:    normal sinus rhythm Intake/Output:   Visit Vitals  /78 (BP 1 Location: Left arm, BP Patient Position: At rest)   Pulse 60   Temp 98.1 °F (36.7 °C)   Resp 18   Ht 5' 5\" (1.651 m)   Wt 84 kg (185 lb 3 oz)   SpO2 98%   BMI 30.82 kg/m²       Temp (24hrs), Av.1 °F (36.7 °C), Min:98.1 °F (36.7 °C), Max:98.2 °F (36.8 °C)        O2 Device: Nasal cannula O2 Flow Rate (L/min): 2 l/min       Wt Readings from Last 4 Encounters:   10/02/20 84 kg (185 lb 3 oz)   10/18/16 83.6 kg (184 lb 3.2 oz)   12/24/15 79.8 kg (176 lb)   08/18/15 79.8 kg (176 lb)        No intake or output data in the 24 hours ending 10/02/20 1055    Last shift:      No intake/output data recorded. Last 3 shifts: No intake/output data recorded. Physical Exam:     Physical Exam   Constitutional: She is oriented to person, place, and time. She appears well-developed. HENT:   Head: Normocephalic and atraumatic. Eyes: Pupils are equal, round, and reactive to light. Conjunctivae and EOM are normal.   Neck: Normal range of motion. Neck supple. Cardiovascular: Regular rhythm. irregular   Pulmonary/Chest: Breath sounds normal. She is in respiratory distress. Abdominal: Soft. Bowel sounds are normal.   Neurological: She is alert and oriented to person, place, and time. Skin: Skin is warm. Psychiatric: She has a normal mood and affect.  Her behavior is normal. Judgment and thought content normal.        Labs:    Recent Labs     10/01/20  1903 20  0715   WBC 15.5* 17.7*   HGB 12.6 11.9   * 395     Recent Labs     10/01/20  1903 09/30/20  0715    145  147*   K 3.4* 3.5  3.6    113*  113*   CO2 34* 28  28   * 113*  110*   BUN 31* 51*  52*   CREA 0.97 0.95  0.94   CA 9.1 9.7  9.6   MG  --  1.4*   PHOS  --  2.7   ALB  --  2.7*     No results for input(s): PH, PCO2, PO2, HCO3, FIO2 in the last 72 hours. No results for input(s): CPK, CKNDX, TROIQ in the last 72 hours. No lab exists for component: CPKMB  No results found for: BNPP, BNP   Lab Results   Component Value Date/Time    Culture result: No Growth (<1000 cfu/mL) 09/25/2020 02:15 PM    Culture result: No growth 6 days 09/24/2020 12:45 PM   No results found for: TSH, TSHEXT, TSHEXT    Imaging:    CXR Results  (Last 48 hours)    None        Results from [place] encounter on 09/24/20   XR CHEST SNGL V    Narrative History is shortness of breath. Comparison Is with the chest x-ray of 12/18/2019. An AP portable erect view of the chest demonstrate cardiac monitor leads. There  is now a small to moderate right pleural effusion. There is a small calcified  right lower lobe granuloma. The heart is enlarged. There is approximately 27  degrees dextroscoliosis of the thoracic spine. There is degenerative change  present in the spine as well. Impression IMPRESSION: Cardiomegaly. New right pleural effusion. No change in right lower  lobe granuloma. Dextroscoliosis. Results from [place] encounter on 09/24/20   CT CHEST WO CONT    Narrative The study is a CT evaluation of the chest dated 9/25/2020. HISTORY: History of chronic obstructive pulmonary disease. Heart failure. Recent  pleural fluid collection. Technique: Performed without intravenous contrast. 3 mm axial imaging, axial MIP  imaging, sagittal, and coronal reconstructed imaging sequences are submitted for  evaluation. Thinner section Super Dimension imaging was also performed.     Dose Reduction Technique was employed to reduce radiation exposure - This  includes reduction optimization techniques as appropriate to a performed exam  with automated exposure control adjustments of the mA and/or Kv according to  patient size, or use of iterative reconstruction technique. COMPARISON: Previous CT evaluation of the chest dated 12/18/2019. Recent chest  radiograph dated 9/25/2019. MEDIASTINUM: There are scattered normal sized middle mediastinal lymph nodes  without progression. The largest mediastinal lymph node is located within the  subcarinal region and this lymph node measures 10 mm in short axis measurement  which is considered within normal limits. There is an area of dystrophic calcification within the left thyroid lobe and  mild heterogeneity of the left thyroid lobe. This examination is negative for  large or dominant thyroid nodule or mass. LUNGS AND PLEURA: There is a moderate sized loculated right pleural effusion. This examination is negative for pleural based masses, pleural thickening, or  gas within the pleural fluid collection. There is encasement of the adjacent  lung with areas of passive atelectasis. There is a calcified granuloma within the right lung base. On axial maximum  intensity imaging, there are several additional small scattered micronodules  with 2 nodules demonstrated posteriorly within the left lower lobe (series [id number]  image number 42). This examination is negative for larger pulmonary nodules or  masses. The central trachea and bronchial tree are patent. There is bronchial  wall thickening and discoid opacities within the lung bases consistent with  reactive airways disease. CARDIOVASCULAR: There are moderate calcifications of the coronary arteries. There is moderate enlargement of the atrial chambers and milder enlargement of  the ventricular chambers. There is a normal caliber to the thoracic aorta.     CHEST WALL: There is multilevel spondylosis along the thoracic spine. There is a  mild to moderate dextroconvex scoliosis. There is increased dorsal kyphosis of  the thoracic spine. There are older healed right posterior rib fractures. OTHER: There is surgical suture material demonstrated within the gastric region  consistent with previous bariatric surgery. Impression IMPRESSION:  1. There is a moderate sized loculated right pleural effusion associated with  passive atelectasis. The differential diagnosis would include recent pneumonia  with a residual parapneumonic effusion. Since exam an [de-identified] is negative for pleural  based masses or findings specific for a malignant effusion. 2.  There are several scattered normal size middle mediastinal lymph nodes. 3.  There are findings of prior granulomatous exposure. Please see above text  for further details. IMPRESSION:   1. Acute hypoxic Respiratory failure  2. Chronic Obstructive Pulmonary Disease  3. Chronic A. Fib  4. Leukocytosis  5. Right lower lobe granuloma  6. Right pleural effusion possible parapneumonic effusion which is loculated  7. Obstructive sleep apnea      RECOMMENDATIONS/PLAN:     1. Patient is on oxygen via nasal cannula 2 L we will continue to wean she is not on any oxygen at home agree now on prednisone  2. She had thoracentesis done by IR yesterday patient is feeling much better cultures pending  3. Agree with [facility] IV antibiotics pending culture results   4. Follow culture results  5. She has sleep apnea but she doesn't  Want to wear CPAP machine   6. Supplemental O2 to keep sats > 93%  Rheumatology work-up negative  Pulse ox room air and ambulation  Pt on room air SpO2=96%. Pt during ambulation on room air SpO2=92%.              Alyssa Larose MD

## 2020-10-02 NOTE — PROGRESS NOTES
Problem: Airway Clearance - Ineffective  Goal: *Patent airway  Outcome: Progressing Towards Goal     Problem: Airway Clearance - Ineffective  Goal: *Able to cough effectively  Outcome: Progressing Towards Goal

## 2020-10-02 NOTE — PROGRESS NOTES
PHYSICAL THERAPY TREATMENT  Patient: Ophelia Huizar (41 y.o. female)  Date: 10/2/2020  Diagnosis: RLL pneumonia [J18.9]  Leukocytosis [D72.829]  Acute on chronic renal failure (HCC) [N17.9, N18.9]  Hypoalbuminemia [E88.09]  Hyponatremia [E87.1]  Sepsis ([facility] [A41.9]  PNA (pneumonia) [J18.9]   <principal problem not specified>       Precautions: Fall  Chart, physical therapy assessment, plan of care and goals were reviewed. ASSESSMENT  Patient continues with skilled PT services and is progressing towards goals. Pt. Sitting in chair upon arrival and agreeable to therapy session. Session initiated with seated LE TE. Pt.needed to use Restroom after TE. Able to ambulate with Mod I to bathroom with normal gait, sit to stand stand to sit transfer on and off toilet and maintain standing balance at sink to wash hands. Pt. Complained of slight fatigue afterwards. Able to perform 5 sit to stand transfers and was more fatigued. Recommend DC to HHPT. Current Level of Function Impacting Discharge (mobility/balance): Activity tolerance    Other factors to consider for discharge: n/a         PLAN :  Patient continues to benefit from skilled intervention to address the above impairments. Continue treatment per established plan of care. to address goals. Recommendation for discharge: (in order for the patient to meet his/her long term goals)  Physical therapy at least 2 days/week in the home     This discharge recommendation:  Has been made in collaboration with the attending provider and/or case management    IF patient discharges home will need the following DME: rollator       SUBJECTIVE:   Patient stated  Im doing good this morning.  Maral Winkler    OBJECTIVE DATA SUMMARY:   Critical Behavior:  Neurologic State: Alert  Orientation Level: Oriented X4  Cognition: Appropriate decision making     Functional Mobility Training:  Bed Mobility:                    Transfers:  Sit to Stand: Modified independent  Stand to Sit: Modified independent  Stand Pivot Transfers: Modified independent     Bed to Chair: Modified independent                    Balance:  Sitting: Intact  Standing: Intact  Ambulation/Gait Training:                    Right Side Weight Bearing: Full  Left Side Weight Bearing: Full                                Stairs: Therapeutic Exercises:   Therapeutic Exercises:       EXERCISE   Sets   Reps   Active Active Assist   Passive Self ROM   Comments   Ankle Pumps  30 [x] [] [] []    Quad Sets/Glut Sets   [] [] [] []    Hamstring Sets   [] [] [] []    Short Arc Quads   [] [] [] []    Heel Slides   [] [] [] []    Straight Leg Raises   [] [] [] []    Hip abd/add  30 [x] [] [] []    Long Arc Quads  30 [x] [] [] []    Marching  30 [x] [] [] []    Sit tostand  5 [] [] [] []        Pain Ratin/10    Activity Tolerance:   Good  Please refer to the flowsheet for vital signs taken during this treatment. After treatment patient left in no apparent distress:   Sitting in chair    COMMUNICATION/COLLABORATION:   The patients plan of care was discussed with: Physical therapy assistant.      Scott Rubi   Time Calculation: 34 mins

## 2020-10-03 VITALS
SYSTOLIC BLOOD PRESSURE: 149 MMHG | TEMPERATURE: 98.5 F | BODY MASS INDEX: 30.01 KG/M2 | OXYGEN SATURATION: 94 % | DIASTOLIC BLOOD PRESSURE: 77 MMHG | WEIGHT: 180.12 LBS | RESPIRATION RATE: 18 BRPM | HEIGHT: 65 IN | HEART RATE: 68 BPM

## 2020-10-03 PROCEDURE — 74011250636 HC RX REV CODE- 250/636: Performed by: INTERNAL MEDICINE

## 2020-10-03 PROCEDURE — 74011250636 HC RX REV CODE- 250/636: Performed by: PHYSICIAN ASSISTANT

## 2020-10-03 PROCEDURE — 74011636637 HC RX REV CODE- 636/637: Performed by: INTERNAL MEDICINE

## 2020-10-03 PROCEDURE — 74011250637 HC RX REV CODE- 250/637: Performed by: NURSE PRACTITIONER

## 2020-10-03 PROCEDURE — 74011000258 HC RX REV CODE- 258: Performed by: PHYSICIAN ASSISTANT

## 2020-10-03 PROCEDURE — 74011250637 HC RX REV CODE- 250/637: Performed by: INTERNAL MEDICINE

## 2020-10-03 PROCEDURE — 74011250637 HC RX REV CODE- 250/637: Performed by: HOSPITALIST

## 2020-10-03 PROCEDURE — 94760 N-INVAS EAR/PLS OXIMETRY 1: CPT

## 2020-10-03 PROCEDURE — 94640 AIRWAY INHALATION TREATMENT: CPT

## 2020-10-03 PROCEDURE — 74011250637 HC RX REV CODE- 250/637: Performed by: PHYSICIAN ASSISTANT

## 2020-10-03 RX ORDER — ALBUTEROL SULFATE 90 UG/1
2 AEROSOL, METERED RESPIRATORY (INHALATION)
Qty: 1 INHALER | Refills: 0 | Status: SHIPPED | OUTPATIENT
Start: 2020-10-03 | End: 2020-10-03

## 2020-10-03 RX ORDER — METOPROLOL SUCCINATE 25 MG/1
25 TABLET, EXTENDED RELEASE ORAL DAILY
Qty: 30 TAB | Refills: 0 | Status: SHIPPED | OUTPATIENT
Start: 2020-10-03 | End: 2020-10-03

## 2020-10-03 RX ORDER — METOPROLOL SUCCINATE 25 MG/1
25 TABLET, EXTENDED RELEASE ORAL DAILY
Status: DISCONTINUED | OUTPATIENT
Start: 2020-10-03 | End: 2020-10-03 | Stop reason: HOSPADM

## 2020-10-03 RX ORDER — AMOXICILLIN AND CLAVULANATE POTASSIUM 875; 125 MG/1; MG/1
1 TABLET, FILM COATED ORAL 2 TIMES DAILY
Qty: 28 TAB | Refills: 0 | Status: SHIPPED | OUTPATIENT
Start: 2020-10-03 | End: 2020-10-17

## 2020-10-03 RX ORDER — PREDNISONE 20 MG/1
20 TABLET ORAL
Qty: 7 TAB | Refills: 0 | Status: SHIPPED | OUTPATIENT
Start: 2020-10-03 | End: 2020-10-10

## 2020-10-03 RX ORDER — POTASSIUM CHLORIDE 20 MEQ/1
20 TABLET, EXTENDED RELEASE ORAL DAILY
Qty: 30 TAB | Refills: 0 | Status: SHIPPED | OUTPATIENT
Start: 2020-10-03 | End: 2020-10-03

## 2020-10-03 RX ORDER — LANOLIN ALCOHOL/MO/W.PET/CERES
400 CREAM (GRAM) TOPICAL 2 TIMES DAILY
Qty: 60 TAB | Refills: 0 | Status: SHIPPED | OUTPATIENT
Start: 2020-10-03 | End: 2020-10-03

## 2020-10-03 RX ORDER — LANOLIN ALCOHOL/MO/W.PET/CERES
400 CREAM (GRAM) TOPICAL 2 TIMES DAILY
Qty: 60 TAB | Refills: 0 | Status: SHIPPED | OUTPATIENT
Start: 2020-10-03 | End: 2020-11-02

## 2020-10-03 RX ORDER — POTASSIUM CHLORIDE 20 MEQ/1
20 TABLET, EXTENDED RELEASE ORAL DAILY
Qty: 30 TAB | Refills: 0 | Status: SHIPPED | OUTPATIENT
Start: 2020-10-03 | End: 2020-11-02

## 2020-10-03 RX ORDER — ALBUTEROL SULFATE 90 UG/1
2 AEROSOL, METERED RESPIRATORY (INHALATION)
Qty: 1 INHALER | Refills: 0 | Status: SHIPPED | OUTPATIENT
Start: 2020-10-03 | End: 2020-11-02

## 2020-10-03 RX ORDER — AMOXICILLIN AND CLAVULANATE POTASSIUM 875; 125 MG/1; MG/1
1 TABLET, FILM COATED ORAL EVERY 12 HOURS
Status: DISCONTINUED | OUTPATIENT
Start: 2020-10-03 | End: 2020-10-03 | Stop reason: HOSPADM

## 2020-10-03 RX ORDER — PREDNISONE 20 MG/1
20 TABLET ORAL
Qty: 7 TAB | Refills: 0 | Status: SHIPPED | OUTPATIENT
Start: 2020-10-03 | End: 2020-10-03

## 2020-10-03 RX ORDER — IPRATROPIUM BROMIDE AND ALBUTEROL SULFATE 2.5; .5 MG/3ML; MG/3ML
3 SOLUTION RESPIRATORY (INHALATION)
Qty: 180 NEBULE | Refills: 0 | Status: SHIPPED | OUTPATIENT
Start: 2020-10-03 | End: 2020-10-03

## 2020-10-03 RX ORDER — METOPROLOL SUCCINATE 25 MG/1
25 TABLET, EXTENDED RELEASE ORAL DAILY
Qty: 30 TAB | Refills: 0 | Status: SHIPPED | OUTPATIENT
Start: 2020-10-03 | End: 2020-11-02

## 2020-10-03 RX ORDER — ACETAMINOPHEN 325 MG/1
650 TABLET ORAL
Qty: 30 TAB | Refills: 0 | Status: SHIPPED
Start: 2020-10-03 | End: 2021-01-31

## 2020-10-03 RX ORDER — FUROSEMIDE 40 MG/1
40 TABLET ORAL 2 TIMES DAILY
Status: DISCONTINUED | OUTPATIENT
Start: 2020-10-03 | End: 2020-10-03 | Stop reason: HOSPADM

## 2020-10-03 RX ORDER — LISINOPRIL 5 MG/1
5 TABLET ORAL DAILY
Qty: 30 TAB | Refills: 0 | Status: SHIPPED | OUTPATIENT
Start: 2020-10-03 | End: 2020-11-02

## 2020-10-03 RX ORDER — AMOXICILLIN AND CLAVULANATE POTASSIUM 875; 125 MG/1; MG/1
1 TABLET, FILM COATED ORAL 2 TIMES DAILY
Qty: 28 TAB | Refills: 0 | Status: SHIPPED | OUTPATIENT
Start: 2020-10-03 | End: 2020-10-03

## 2020-10-03 RX ADMIN — ALBUTEROL SULFATE 2 PUFF: 108 AEROSOL, METERED RESPIRATORY (INHALATION) at 14:17

## 2020-10-03 RX ADMIN — PANTOPRAZOLE SODIUM 40 MG: 40 TABLET, DELAYED RELEASE ORAL at 10:02

## 2020-10-03 RX ADMIN — RIVAROXABAN 20 MG: 20 TABLET, FILM COATED ORAL at 10:02

## 2020-10-03 RX ADMIN — MAGNESIUM OXIDE TAB 400 MG (241.3 MG ELEMENTAL MG) 400 MG: 400 (241.3 MG) TAB at 10:02

## 2020-10-03 RX ADMIN — Medication 10 ML: at 06:00

## 2020-10-03 RX ADMIN — LISINOPRIL 5 MG: 5 TABLET ORAL at 10:02

## 2020-10-03 RX ADMIN — AMOXICILLIN AND CLAVULANATE POTASSIUM 1 TABLET: 875; 125 TABLET, FILM COATED ORAL at 13:52

## 2020-10-03 RX ADMIN — PREDNISONE 20 MG: 20 TABLET ORAL at 10:02

## 2020-10-03 RX ADMIN — ALBUTEROL SULFATE 2 PUFF: 108 AEROSOL, METERED RESPIRATORY (INHALATION) at 08:27

## 2020-10-03 RX ADMIN — POTASSIUM CHLORIDE 40 MEQ: 1500 TABLET, EXTENDED RELEASE ORAL at 10:02

## 2020-10-03 RX ADMIN — MULTIVITAMIN TABLET 1 TABLET: TABLET at 10:02

## 2020-10-03 RX ADMIN — Medication 10 ML: at 13:56

## 2020-10-03 RX ADMIN — ASPIRIN 81 MG: 81 TABLET, COATED ORAL at 10:02

## 2020-10-03 RX ADMIN — FUROSEMIDE 40 MG: 10 INJECTION, SOLUTION INTRAMUSCULAR; INTRAVENOUS at 10:01

## 2020-10-03 RX ADMIN — ALBUTEROL SULFATE 2 PUFF: 108 AEROSOL, METERED RESPIRATORY (INHALATION) at 01:26

## 2020-10-03 RX ADMIN — METOPROLOL SUCCINATE 25 MG: 25 TABLET, EXTENDED RELEASE ORAL at 13:52

## 2020-10-03 RX ADMIN — DIGOXIN 0.12 MG: 125 TABLET ORAL at 10:02

## 2020-10-03 RX ADMIN — PIPERACILLIN SODIUM AND TAZOBACTAM SODIUM 3.38 G: 3; .375 INJECTION, POWDER, LYOPHILIZED, FOR SOLUTION INTRAVENOUS at 06:28

## 2020-10-03 NOTE — PROGRESS NOTES
Pt discharge home with home health. Telemetry and IV access d/richelle prior to discharge. Discharge paperwork gone over with patient and all questions answered. Pt left via private vehicle.

## 2020-10-03 NOTE — PROGRESS NOTES
Bedside and verbal shift change report given to Geeta Cruz (oncoming nurse) by Nelia Joshua (offgoing nurse). Report included the following information SBAR, Kardex, MAR, Recent Results, Med Rec Status and Cardiac Rhythm NSR.

## 2020-10-03 NOTE — PROGRESS NOTES
Renal Progress Note    Patient: Allyson Tobias MRN: [id number]  SSN: [id number]    YOB: 1950  Age: 79 y.o. Sex: female      Admit Date: 9/24/2020    LOS: 9 days     Subjective:   Patient seen at bedside. Denies any shortness of breath today  She tells me she is being discharged to [place] [id number] home today  Lasix dose was increased to 40 twice daily IV .   Swelling is improving    Current Facility-Administered Medications   Medication Dose Route Frequency    metoprolol succinate (TOPROL-XL) XL tablet 25 mg  25 mg Oral DAILY    amoxicillin-clavulanate (AUGMENTIN) 875-125 mg per tablet 1 Tab  1 Tab Oral Q12H    lisinopriL (PRINIVIL, ZESTRIL) tablet 5 mg  5 mg Oral DAILY    metoprolol (LOPRESSOR) injection 5 mg  5 mg IntraVENous Q6H PRN    furosemide (LASIX) injection 40 mg  40 mg IntraVENous BID    potassium chloride (K-DUR, KLOR-CON) SR tablet 40 mEq  40 mEq Oral DAILY    pantoprazole (PROTONIX) tablet 40 mg  40 mg Oral ACB    albuterol (PROVENTIL HFA, VENTOLIN HFA, PROAIR HFA) inhaler 2 Puff  2 Puff Inhalation Q6H RT    predniSONE (DELTASONE) tablet 20 mg  20 mg Oral DAILY WITH BREAKFAST    famotidine (PEPCID) tablet 20 mg  20 mg Oral Q48H    albuterol-ipratropium (DUO-NEB) 2.5 MG-0.5 MG/3 ML  3 mL Nebulization Q4H PRN    aspirin delayed-release tablet 81 mg  81 mg Oral DAILY    multivitamin (ONE A DAY) tablet 1 Tab  1 Tab Oral DAILY    digoxin (LANOXIN) tablet 0.125 mg  0.125 mg Oral DAILY    atorvastatin (LIPITOR) tablet 40 mg  40 mg Oral QHS    melatonin tablet 10 mg  10 mg Oral QHS    rivaroxaban (XARELTO) tablet 20 mg  20 mg Oral DAILY    sodium chloride (NS) flush 5-10 mL  5-10 mL IntraVENous PRN    sodium chloride (NS) flush 5-40 mL  5-40 mL IntraVENous Q8H    sodium chloride (NS) flush 5-40 mL  5-40 mL IntraVENous PRN    acetaminophen (TYLENOL) tablet 650 mg  650 mg Oral Q6H PRN    Or    acetaminophen (TYLENOL) suppository 650 mg  650 mg Rectal Q6H PRN    polyethylene glycol (MIRALAX) packet 17 g  17 g Oral DAILY PRN    promethazine (PHENERGAN) tablet 12.5 mg  12.5 mg Oral Q6H PRN    Or    ondansetron (ZOFRAN) injection 4 mg  4 mg IntraVENous Q6H PRN        Vitals:    10/03/20 0432 10/03/20 0747 10/03/20 0834 10/03/20 1001   BP: 120/64 134/69     Pulse: 68 64  66   Resp: 18 16     Temp: 98.2 °F (36.8 °C) 97.8 °F (36.6 °C)     SpO2: 95% 98% 93%    Weight:       Height:         Objective:   General: Elderly female, alert awake well-oriented, no acute distress. HEENT: EOMI, no Icterus, no Pallor,  mucosa moist,  throat clear. Neck: Neck is supple, No JVD,   Lungs: fair air entry+ no rhonchi, no rales,  CVS: heart sounds normal, no murmurs, no rubs. GI: soft, nontender, normal BS,   Extremeties: no clubbing, no cyanosis, 1-2+ bilateral extremity edema present,  Neuro: Alert, awake, oriented x3, speech is normal   Skin: normal skin turgor,   Musculoskeletal: no acute joint swellings       Intake and Output:  Current Shift: No intake/output data recorded. Last three shifts: No intake/output data recorded. Lab/Data Review:  Recent Labs     10/02/20  1030 10/01/20  1903   WBC 14.5* 15.5*   HGB 13.3 12.6   HCT 40.1 37.3   * 423*     Recent Labs     10/02/20  0928 10/01/20  1903    144   K 3.3* 3.4*   CL 98 104   CO2 38* 34*   GLU 98 141*   BUN 27* 31*   CREA 0.90 0.97   CA 8.9 9.1   PHOS 3.5  --    ALB 2.8*  --      No results for input(s): PH, PCO2, PO2, HCO3, FIO2 in the last 72 hours. No results found for this or any previous visit (from the past 24 hour(s)). Assessment and Plan:     1. Acute Kidney Injury on ? ?CKD: probably prerenal azotemia secondary to use of Lasix and lisinopril, in the presence of borderline hypotension+  lisinopril on hold  Lasix was increased yesterday to iv 40 twice daily 2/2 significant leg swelling. We will change it to 40 mg p.o. twice daily.   She should be discharged home on this dose Will monitor I/Os, renal functions and adjust diuretics as needed   urine analysis is bland   Creatinine is improving. It is improved to 3.8-->2.8-->1.2-->0. 9 . Today's labs are pending  Baseline creatinine is unknown.   herself denies any history of CKD   CT abdomen negative for hydro  UA is negative for proteinuria or significant cells, unlikely to have acute GN     2. Acute shortness of breath/history of COPD/obstructive sleep/CHF/chronic edema  Shows normal EF  Improved clinically with diuretics  No wheezing or SOB now  Continue bronchodilators and steroids  continue Lasix 40 mg BID iv  Continue IV antibiotic as per primary service    3. Hypertension  Patient is now elevated. I will discontinue midodrine. Restart BP meds if still elevated    4. Lower extremity edema: probably related to congestive heart failure/pulmonary hypertension    increase Lasix to 40 twice daily   advised to maintain p.o. fluid restriction of 8294-2176 ml per day and and salt restriction,  Will monitor fluid status clinically and adjust diuretics as needed.          Signed By: Glenn Herman MD     October 3, 2020

## 2020-10-03 NOTE — DISCHARGE INSTRUCTIONS
Hyponatremia: Care Instructions  Your Care Instructions     Hyponatremia (say \"yl-ge-nnu-TREE-mariel-uh\") means that you don't have enough sodium in your blood. It can cause nausea, vomiting, and headaches. Or you may not feel hungry. In serious cases, it can cause seizures, a coma, or even death. Hyponatremia is not a disease. It is a problem caused by something else, such as medicines or exercising for a long time in hot weather. You can get hyponatremia if you lose a lot of fluids and then you drink a lot of water or other liquids that don't have much sodium. You can also get it if you have kidney, liver, heart, or other health problems. Treatment is focused on getting your sodium levels back to normal.  Follow-up care is a key part of your treatment and safety. Be sure to make and go to all appointments, and call your doctor if you are having problems. It's also a good idea to know your test results and keep a list of the medicines you take. How can you care for yourself at home? · If your doctor recommends it, drink fluids that have sodium. Sports drinks are a good choice. Or you can eat salty foods. · If your doctor recommends it, limit the amount of water you drink. And limit fluids that are mostly water. These include tea, coffee, and juice. · Take your medicines exactly as prescribed. Call your doctor if you have any problems with your medicine. · Get your sodium levels tested when your doctor tells you to. When should you call for help? Call 911 anytime you think you may need emergency care. For example, call if:    · You have a seizure.     · You passed out (lost consciousness).    Call your doctor now or seek immediate medical care if:    · You are confused or it is hard to focus.     · You have little or no appetite.     · You feel sick to your stomach or you vomit.     · You have a headache.     · You have mood changes.     · You feel more tired than usual.   Watch closely for changes in your health, and be sure to contact your doctor if:    · You do not get better as expected. Where can you learn more? Go to http://www.gray.com/  Enter U075 in the search box to learn more about \"Hyponatremia: Care Instructions. \"  Current as of: December 9, 2019               Content Version: 12.6  © 3839-2991 [employer]. Care instructions adapted under license by [employer] (which disclaims liability or warranty for this information). If you have questions about a medical condition or this instruction, always ask your healthcare professional. [address] any warranty or liability for your use of this information. Patient Education        Learning About High White Blood Cell Counts  What are white blood cells? White blood cells (leukocytes) help protect your body from infection. Normally, when germs get inside your body, your body makes more white blood cells that search for and destroy the germs. Less often, there are medical problems where the body may make a lot more white blood cells than it needs. What happens when you have a high white blood cell count? Your white blood cell count may be high because your body is fighting an infection. But other things can cause it, such as some medicines, burns, an illness, or other health problems. When your doctor sees that your white blood cell count is high, he or she will try to find out why, and then treat the cause. What are the symptoms? A high white blood cell count alone doesn't cause any symptoms. The symptoms you feel may come from the medical problem that your white blood cells are fighting. For example, if you have pneumonia, you may have a fever and trouble breathing. These are symptoms of pneumonia, not of a high white blood cell count. How is it treated? · Your doctor may do more tests to find the problem that's making your white blood cell count high.  Once your doctor finds the problem, he or she may be able to treat it. · Part of your treatment may be telling your doctor if you feel worse. Watch your temperature, and call your doctor if your fever goes up and stays up. Follow-up care is a key part of your treatment and safety. Be sure to make and go to all appointments, and call your doctor if you are having problems. It's also a good idea to know your test results and keep a list of the medicines you take. Where can you learn more? Go to http://www.gray.com/  Enter V383 in the search box to learn more about \"Learning About High White Blood Cell Counts. \"  Current as of: December 9, 2019               Content Version: 12.6  © 8092-5342 [employer]. Care instructions adapted under license by [employer] (which disclaims liability or warranty for this information). If you have questions about a medical condition or this instruction, always ask your healthcare professional. [place] [address] any warranty or liability for your use of this information. Learning About COPD and How to Prevent Lung Infections  How do lung infections affect COPD? Lung infections like pneumonia and acute bronchitis are common causes of COPD flare-ups. And people who have COPD are more likely to get these lung infections, especially if they smoke. When you have COPD, it is important to know the symptoms of pneumonia and acute bronchitis and call your doctor if you have them. Symptoms include:  · A cough that brings up more mucus than usual.  · Fever. · Shortness of breath. What can you do to prevent these infections? Stay healthy   · Get a flu shot every year. · Get a pneumococcal vaccine shot. If you have had one before, ask your doctor whether you need another dose. Two different types of pneumococcal vaccines are recommended for people ages 72 and older.   · If you must be around people with colds or the flu, wash your hands often. · Do not smoke. This is the most important step you can take to prevent more damage to your lungs. If you need help quitting, talk to your doctor about stop-smoking programs and medicines. These can increase your chances of quitting for good. · Avoid secondhand smoke, air pollution, and high altitudes. Also avoid cold, dry air and hot, humid air. Stay at home with your windows closed when air pollution is bad. Exercise and eat well   · If your doctor recommends it, get more exercise. Walking is a good choice. Bit by bit, increase the amount you walk every day. Try for at least 30 minutes on most days of the week. · Eat regular, well-balanced meals. Eating right keeps your energy levels up and helps your body fight infection. · Get plenty of rest and sleep. Follow-up care is a key part of your treatment and safety. Be sure to make and go to all appointments, and call your doctor if you are having problems. It's also a good idea to know your test results and keep a list of the medicines you take. Where can you learn more? Go to http://www.gray.com/  Enter K662 in the search box to learn more about \"Learning About COPD and How to Prevent Lung Infections. \"  Current as of: February 24, 2020               Content Version: 12.6  © 1132-0534 [employer]. Care instructions adapted under license by [employer] (which disclaims liability or warranty for this information). If you have questions about a medical condition or this instruction, always ask your healthcare professional. [place] [address] any warranty or liability for your use of this information.

## 2020-10-03 NOTE — PROGRESS NOTES
Discharge    Pt discharge education and materials provided at bedside. Pt verbalized understanding. Pt daughter Hailey Salgado was notified of discharge and was also provided discharge education and verbalized understanding. Pt is going home with [facility]  due to change in plans from outbreak of COVID at [address] [email or web address] [address] [facility]. Juan Villasenor NP aware of plan. Pt showing no signs or symptoms of distress. Wheeled out to private vehicle.

## 2020-10-03 NOTE — PROGRESS NOTES
CM received notification from patient's nurse, Bernice Solis, that upon calling report to [address] [facility] he was informed they have had a new COVID outbreak and want the patient to be aware. Patient has declined to go to that facility at this time. Reviewed patient's chart and PT and OT were recommending home health. Spoke with the patient via phone to discuss plans. She stated she is able to do everything for herself and sees no reason to go to SNF now. She is able to prepare her meals and care for herself per her report. She uses a cane. CM inquired about obtaining a walker and she declined. She is agreeable to home health and would like to use [facility]. Choice obtained and referral has been sent. Confirmed with nursing patient is no longer on oxygen. Notified Elizabeth Carl, who is agreeable to this plan as well. Patient is able to discharge home today with [address]

## 2020-10-03 NOTE — DISCHARGE SUMMARY
Hospitalist Discharge Summary     Patient ID:    Kain Méndez  [id number]  79 y.o.  1950    Admit date: 9/24/2020    Discharge date : 10/3/2020    Chronic Diagnoses:    Problem List as of 10/3/2020 Date Reviewed: 12/24/2015          Codes Class Noted - Resolved    Hyponatremia ICD-10-CM: E87.1  ICD-9-CM: 276.1  9/24/2020 - Present        Leukocytosis ICD-10-CM: D72.829  ICD-9-CM: 288.60  9/24/2020 - Present        Hypoalbuminemia ICD-10-CM: E88.09  ICD-9-CM: 273.8  9/24/2020 - Present        Acute on chronic renal failure ([facility] ICD-10-CM: N17.9, N18.9  ICD-9-CM: 584.9, 585.9  9/24/2020 - Present        RLL pneumonia ICD-10-CM: J18.9  ICD-9-CM: 679  9/24/2020 - Present        Sepsis ([facility] ICD-10-CM: A41.9  ICD-9-CM: 038.9, 995.91  9/24/2020 - Present        PNA (pneumonia) ICD-10-CM: J18.9  ICD-9-CM: 132  9/24/2020 - Present        Granulomatosis with polyangiitis ([facility] ICD-10-CM: M31.30  ICD-9-CM: 446.4  9/24/2020 - Present        Chronic atrial fibrillation ([facility] ICD-10-CM: I48.20  ICD-9-CM: 427.31  8/18/2015 - Present        Post-menopausal ICD-10-CM: Z78.0  ICD-9-CM: V49.81  8/17/2015 - Present        Overweight (BMI 25.0-29. 9) ICD-10-CM: E66.3  ICD-9-CM: 278.02  8/17/2015 - Present        Venous stasis ICD-10-CM: I87.8  ICD-9-CM: 459.81  8/11/2015 - Present        Hypertension, benign ICD-10-CM: I10  ICD-9-CM: 401.1  Unknown - Present        Paroxysmal atrial fibrillation (HCC) ICD-10-CM: I48.0  ICD-9-CM: 427.31  Unknown - Present        Constitutional obesity ICD-10-CM: E66.8  ICD-9-CM: 278.00  Unknown - Present        Sleep apnea ICD-10-CM: G47.30  ICD-9-CM: 780.57  Unknown - Present        Encounter for long-term (current) use of other medications ICD-10-CM: Z79.899  ICD-9-CM: V58.69  Unknown - Present        RESOLVED: Irregular cardiac rhythm ICD-10-CM: I49.9  ICD-9-CM: 427.9  8/17/2015 - 8/18/2015        RESOLVED: Mitral regurgitation ICD-10-CM: I34.0  ICD-9-CM: 424.0 Unknown - 7/23/2012        RESOLVED: Fatigue ICD-10-CM: R53.83  ICD-9-CM: 780.79  Unknown - 7/23/2012          22    Final Diagnoses: Active Problems:    Hyponatremia (9/24/2020)      Leukocytosis (9/24/2020)      Hypoalbuminemia (9/24/2020)      Acute on chronic renal failure (HCC) (9/24/2020)      RLL pneumonia (9/24/2020)      Sepsis ([address] (9/24/2020)      PNA (pneumonia) (9/24/2020)        Reason for Hospitalization:  79 y. o.female with an extensive past medical history including A. fib on Xarelto, status post cardioversion, hypertension, HORACE, COPD that presented to the ED complaining of worsening dyspnea for the last week or so.  She states that she generally has some component of dyspnea chronically due to her COPD along with persistent cough is nonproductive, however she is also over the last week dyspnea continues to worsen, particularly on exertion.  Symptoms have not been associated with any chest pain, palpitations, or lightheadedness.  Her usual inhaler regimen has not improved her symptoms.  States that prior to that she has been doing relatively well her usual state of health, denies any recent fevers, chills, chest pain, abdominal pain, nausea, vomiting, diarrhea, constipation, lightheadedness dizziness urinary symptoms headache. Hospital Course:   Pt admitted for acute respiratory failure secondary to pneumonia, pleural effusion, started on course of IV antibiotics, supplemental oxygen and intermittent Bipap. Pt underwent a thoracentesis for small loculated right pleural effusion, with removal of 10 ml fluid. Pt improved over hospital course and presently not requiring oxygen and will complete oral course of antibiotics for 14 days per Infectious disease recommendations. Pt to continue with current medications and follow up with PCP and pulmonary after discharge  within one month.     Discharge Medications:   Current Discharge Medication List      START taking these medications    Details acetaminophen (TYLENOL) 325 mg tablet Take 2 Tabs by mouth every six (6) hours as needed for Pain or Fever for up to 120 days. Qty: 30 Tab, Refills: 0      albuterol (PROVENTIL HFA, VENTOLIN HFA, PROAIR HFA) 90 mcg/actuation inhaler Take 2 Puffs by inhalation every four (4) hours as needed for Wheezing for up to 30 days. Qty: 1 Inhaler, Refills: 0      albuterol-ipratropium (DUO-NEB) 2.5 mg-0.5 mg/3 ml nebu 3 mL by Nebulization route every four (4) hours as needed for Wheezing for up to 30 days. Qty: 180 Nebule, Refills: 0      magnesium oxide (MAG-OX) 400 mg tablet Take 1 Tab by mouth two (2) times a day for 30 days. Qty: 60 Tab, Refills: 0      potassium chloride (K-DUR, KLOR-CON) 20 mEq tablet Take 1 Tab by mouth daily for 30 days. Qty: 30 Tab, Refills: 0      amoxicillin-clavulanate (AUGMENTIN) 875-125 mg per tablet Take 1 Tab by mouth two (2) times a day for 14 days. Qty: 28 Tab, Refills: 0      predniSONE (DELTASONE) 20 mg tablet Take 20 mg by mouth daily (with breakfast) for 7 days. Indications: worsening chronic obstructive pulmonary disease  Qty: 7 Tab, Refills: 0         CONTINUE these medications which have CHANGED    Details   lisinopriL (PRINIVIL, ZESTRIL) 5 mg tablet Take 1 Tab by mouth daily for 30 days. Indications: high blood pressure  Qty: 30 Tab, Refills: 0      metoprolol succinate (TOPROL-XL) 25 mg XL tablet Take 1 Tab by mouth daily for 30 days. Indications: high blood pressure  Qty: 30 Tab, Refills: 0         CONTINUE these medications which have NOT CHANGED    Details   atorvastatin (LIPITOR) 40 mg tablet Take  by mouth daily. Associated Diagnoses: Hypertension, benign; Sleep apnea, unspecified type      MELATONIN (MELATIN PO) 10 mg. Take 1-3 tablets every bedtime.     Associated Diagnoses: Hypertension, benign; Sleep apnea, unspecified type      furosemide (LASIX) 20 mg tablet Take 1 tab by mouth daily  Qty: 30 Tab, Refills: 4      multivitamin (ONE A DAY) tablet Take 1 Tab by mouth daily. rivaroxaban (XARELTO) 20 mg tab tablet Take 1 Tab by mouth daily. Qty: 90 Tab, Refills: 3      digoxin (LANOXIN) 0.125 mg tablet Take 1 Tab by mouth daily. Qty: 90 Tab, Refills: 3      aspirin delayed-release 81 mg tablet Take 81 mg by mouth daily. Omeprazole delayed release (PRILOSEC D/R) 20 mg tablet Take 20 mg by mouth daily. STOP taking these medications       propafenone (RYTHMOL) 225 mg tablet Comments:   Reason for Stopping: Follow up Care:    1. None in 1-2 weeks. Follow-up Information     Follow up With Specialties Details Why Contact Info    None    None (395) Patient stated that they have no PCP              Patient Follow Up Instructions: Activity: Activity as tolerated  Diet:  Regular Diet    Condition at Discharge:  Stable  __________________________________________________________________    Disposition  Home with home health OT/PT services  ____________________________________________________________________    Code Status:  DNR  ___________________________________________________________________    Discharge Exam:  Patient seen and examined by me on discharge day. Physical Exam   Constitutional: She is oriented to person, place, and time. She appears well-developed. No distress. Cardiovascular: irregular. Exam reveals no gallop and no friction rub.   Murmur heard. Pulmonary/Chest: Effort normal. No respiratory distress. She has no wheezes. She has no rales.   Few scattered coarse sounds, otherwise clear, slightly diminished in bases, occasional cough  Musculoskeletal: Normal range of motion. Neurological: She is alert and oriented to person, place, and time. Skin: Skin is warm and dry. Psychiatric: She has a normal mood and affect.  Her behavior is normal.    CONSULTATIONS: Pulmonary/Intensive care    Significant Diagnostic Studies:   Recent Results (from the past 24 hour(s))   C REACTIVE PROTEIN, QT    Collection Time: 10/02/20 9:28 AM   Result Value Ref Range    C-Reactive protein 5.05 (H) 0.00 - 0.60 mg/dL   PROCALCITONIN    Collection Time: 10/02/20  9:28 AM   Result Value Ref Range    Procalcitonin 1.01 (H) 0 ng/mL   RENAL FUNCTION PANEL    Collection Time: 10/02/20  9:28 AM   Result Value Ref Range    Sodium 139 136 - 145 mmol/L    Potassium 3.3 (L) 3.5 - 5.1 mmol/L    Chloride 98 97 - 108 mmol/L    CO2 38 (H) 21 - 32 mmol/L    Anion gap 3 (L) 5 - 15 mmol/L    Glucose 98 65 - 100 mg/dL    BUN 27 (H) 6 - 20 mg/dL    Creatinine 0.90 0.55 - 1.02 mg/dL    BUN/Creatinine ratio 30 (H) 12 - 20      GFR est AA >60 >60 ml/min/1.73m2    GFR est non-AA >60 >60 ml/min/1.73m2    Calcium 8.9 8.5 - 10.1 mg/dL    Phosphorus 3.5 2.6 - 4.7 mg/dL    Albumin 2.8 (L) 3.5 - 5.0 g/dL   CBC WITH AUTOMATED DIFF    Collection Time: 10/02/20 10:30 AM   Result Value Ref Range    WBC 14.5 (H) 3.6 - 11.0 K/uL    RBC 4.31 3.80 - 5.20 M/uL    HGB 13.3 11.5 - 16.0 g/dL    HCT 40.1 35.0 - 47.0 %    MCV 93.0 80.0 - 99.0 FL    MCH 30.9 26.0 - 34.0 PG    MCHC 33.2 30.0 - 36.5 g/dL    RDW 16.7 (H) 11.5 - 14.5 %    PLATELET 723 (H) 437 - 400 K/uL    MPV 10.2 8.9 - 12.9 FL    NEUTROPHILS 72 32 - 75 %    LYMPHOCYTES 17 12 - 49 %    MONOCYTES 8 5 - 13 %    EOSINOPHILS 1 0 - 7 %    BASOPHILS 0 0 - 1 %    IMMATURE GRANULOCYTES 2 (H) 0.0 - 0.5 %    ABS. NEUTROPHILS 10.7 (H) 1.8 - 8.0 K/UL    ABS. LYMPHOCYTES 2.4 0.8 - 3.5 K/UL    ABS. MONOCYTES 1.2 (H) 0.0 - 1.0 K/UL    ABS. EOSINOPHILS 0.2 0.0 - 0.4 K/UL    ABS. BASOPHILS 0.0 0.0 - 0.1 K/UL    ABS. IMM. GRANS. 0.3 (H) 0.00 - 0.04 K/UL    DF AUTOMATED       IR THORACENTESIS CATH W IMAGE   Final Result   Impression:    Successful right thoracentesis. NM INFLAM WB MULTI   Final Result      CT CHEST WO CONT   Final Result   IMPRESSION:   1. There is a moderate sized loculated right pleural effusion associated with   passive atelectasis.  The differential diagnosis would include recent pneumonia   with a residual parapneumonic effusion. Since exam an [de-identified] is negative for pleural   based masses or findings specific for a malignant effusion. 2.  There are several scattered normal size middle mediastinal lymph nodes. 3.  There are findings of prior granulomatous exposure. Please see above text   for further details. CT ABD PELV WO CONT   Final Result   IMPRESSION:   Loculated right pleural effusion with atelectasis versus airspace disease in the   base of right lung. No acute finding the abdomen and pelvis. XR CHEST SNGL V   Final Result   IMPRESSION: Cardiomegaly. New right pleural effusion. No change in right lower   lobe granuloma. Dextroscoliosis. IR THORACENTESIS NDL PUNC ASP W IMAGE    (Results Pending)       Discharge: time spent 35  minutes in discharge  Education and counseling.      Signed:  Sharron Waggoner NP  10/3/2020  9:01 AM

## 2020-10-04 LAB
BACTERIA SPEC CULT: NORMAL
BACTERIA SPEC CULT: NORMAL
GRAM STN SPEC: NORMAL
GRAM STN SPEC: NORMAL
SPECIAL REQUESTS,SREQ: NORMAL

## 2020-11-13 ENCOUNTER — TRANSCRIBE ORDER (OUTPATIENT)
Dept: REGISTRATION | Age: 70
End: 2020-11-13

## 2020-11-13 ENCOUNTER — HOSPITAL ENCOUNTER (OUTPATIENT)
Dept: GENERAL RADIOLOGY | Age: 70
Discharge: HOME | End: 2020-11-13
Payer: MEDICARE

## 2020-11-13 DIAGNOSIS — J44.9 COPD (CHRONIC OBSTRUCTIVE PULMONARY DISEASE) (HCC): ICD-10-CM

## 2020-11-13 DIAGNOSIS — J44.9 COPD (CHRONIC OBSTRUCTIVE PULMONARY DISEASE) (HCC): Primary | ICD-10-CM

## 2020-11-13 PROCEDURE — 71046 X-RAY EXAM CHEST 2 VIEWS: CPT

## 2022-03-18 PROBLEM — E87.1 HYPONATREMIA: Status: ACTIVE | Noted: 2020-09-24

## 2022-03-18 PROBLEM — N18.9 ACUTE ON CHRONIC RENAL FAILURE (HCC): Status: ACTIVE | Noted: 2020-09-24

## 2022-03-18 PROBLEM — D72.829 LEUKOCYTOSIS: Status: ACTIVE | Noted: 2020-09-24

## 2022-03-18 PROBLEM — N17.9 ACUTE ON CHRONIC RENAL FAILURE (HCC): Status: ACTIVE | Noted: 2020-09-24

## 2022-03-18 PROBLEM — E88.09 HYPOALBUMINEMIA: Status: ACTIVE | Noted: 2020-09-24

## 2022-03-19 PROBLEM — A41.9 SEPSIS (HCC): Status: ACTIVE | Noted: 2020-09-24

## 2022-03-19 PROBLEM — J18.9 RLL PNEUMONIA: Status: ACTIVE | Noted: 2020-09-24

## 2022-03-19 PROBLEM — J18.9 PNA (PNEUMONIA): Status: ACTIVE | Noted: 2020-09-24

## 2022-03-19 PROBLEM — M31.30 GRANULOMATOSIS WITH POLYANGIITIS (HCC): Status: ACTIVE | Noted: 2020-09-24

## 2023-03-05 ENCOUNTER — HOSPITAL ENCOUNTER (INPATIENT)
Age: 73
LOS: 7 days | Discharge: HOME | DRG: 291 | End: 2023-03-12
Attending: STUDENT IN AN ORGANIZED HEALTH CARE EDUCATION/TRAINING PROGRAM | Admitting: INTERNAL MEDICINE
Payer: MEDICARE

## 2023-03-05 ENCOUNTER — APPOINTMENT (OUTPATIENT)
Dept: GENERAL RADIOLOGY | Age: 73
DRG: 291 | End: 2023-03-05
Attending: STUDENT IN AN ORGANIZED HEALTH CARE EDUCATION/TRAINING PROGRAM
Payer: MEDICARE

## 2023-03-05 DIAGNOSIS — J96.22 ACUTE ON CHRONIC RESPIRATORY FAILURE WITH HYPERCAPNIA (HCC): Primary | ICD-10-CM

## 2023-03-05 DIAGNOSIS — I50.9 CONGESTIVE HEART FAILURE, UNSPECIFIED HF CHRONICITY, UNSPECIFIED HEART FAILURE TYPE (HCC): ICD-10-CM

## 2023-03-05 DIAGNOSIS — J44.1 CHRONIC OBSTRUCTIVE PULMONARY DISEASE WITH ACUTE EXACERBATION (HCC): ICD-10-CM

## 2023-03-05 DIAGNOSIS — I50.31 ACUTE DIASTOLIC CHF (CONGESTIVE HEART FAILURE) (HCC): ICD-10-CM

## 2023-03-05 DIAGNOSIS — I27.20 PULMONARY HYPERTENSION (HCC): ICD-10-CM

## 2023-03-05 LAB
ALBUMIN SERPL-MCNC: 4.4 G/DL (ref 3.5–5)
ALBUMIN/GLOB SERPL: 1.2 (ref 1.1–2.2)
ALP SERPL-CCNC: 152 U/L (ref 45–117)
ALT SERPL-CCNC: 18 U/L (ref 12–78)
ANION GAP SERPL CALC-SCNC: 8 MMOL/L (ref 5–15)
ARTERIAL PATENCY WRIST A: YES
AST SERPL W P-5'-P-CCNC: 24 U/L (ref 15–37)
ATRIAL RATE: 166 BPM
BASE DEFICIT BLDA-SCNC: 5.6 MMOL/L
BASOPHILS # BLD: 0.1 K/UL (ref 0–0.1)
BASOPHILS NFR BLD: 1 % (ref 0–1)
BDY SITE: ABNORMAL
BILIRUB SERPL-MCNC: 0.8 MG/DL (ref 0.2–1)
BNP SERPL-MCNC: 1993 PG/ML
BODY TEMPERATURE: 97.5
BUN SERPL-MCNC: 16 MG/DL (ref 6–20)
BUN/CREAT SERPL: 22 (ref 12–20)
CA-I BLD-MCNC: 9.5 MG/DL (ref 8.5–10.1)
CALCULATED R AXIS, ECG10: -15 DEGREES
CALCULATED T AXIS, ECG11: 79 DEGREES
CHLORIDE SERPL-SCNC: 103 MMOL/L (ref 97–108)
CO2 SERPL-SCNC: 26 MMOL/L (ref 21–32)
COHGB MFR BLD: 0.6 % (ref 1–2)
CREAT SERPL-MCNC: 0.74 MG/DL (ref 0.55–1.02)
D DIMER PPP FEU-MCNC: 0.33 UG/ML(FEU)
DIAGNOSIS, 93000: NORMAL
DIFFERENTIAL METHOD BLD: ABNORMAL
EOSINOPHIL # BLD: 0 K/UL (ref 0–0.4)
EOSINOPHIL NFR BLD: 0 % (ref 0–7)
ERYTHROCYTE [DISTWIDTH] IN BLOOD BY AUTOMATED COUNT: 14.4 % (ref 11.5–14.5)
FIO2 ON VENT: 100 %
FLUAV AG NPH QL IA: NEGATIVE
FLUBV AG NOSE QL IA: NEGATIVE
GAS FLOW.O2 O2 DELIVERY SYS: 15 L/MIN
GLOBULIN SER CALC-MCNC: 3.6 G/DL (ref 2–4)
GLUCOSE SERPL-MCNC: 159 MG/DL (ref 65–100)
HCO3 BLDA-SCNC: 25 MMOL/L (ref 22–26)
HCT VFR BLD AUTO: 45.4 % (ref 35–47)
HGB BLD-MCNC: 14.3 G/DL (ref 11.5–16)
IMM GRANULOCYTES # BLD AUTO: 0 K/UL (ref 0–0.04)
IMM GRANULOCYTES NFR BLD AUTO: 0 % (ref 0–0.5)
LACTATE SERPL-SCNC: 2.6 MMOL/L (ref 0.4–2)
LYMPHOCYTES # BLD: 2.1 K/UL (ref 0.8–3.5)
LYMPHOCYTES NFR BLD: 20 % (ref 12–49)
MCH RBC QN AUTO: 30.6 PG (ref 26–34)
MCHC RBC AUTO-ENTMCNC: 31.5 G/DL (ref 30–36.5)
MCV RBC AUTO: 97.2 FL (ref 80–99)
METHGB MFR BLD: 0.4 % (ref 0–1.4)
MONOCYTES # BLD: 1.1 K/UL (ref 0–1)
MONOCYTES NFR BLD: 11 % (ref 5–13)
NEUTS SEG # BLD: 7.2 K/UL (ref 1.8–8)
NEUTS SEG NFR BLD: 68 % (ref 32–75)
NRBC # BLD: 0 K/UL (ref 0–0.01)
NRBC BLD-RTO: 0 PER 100 WBC
OXYHGB MFR BLD: 98.3 % (ref 95–99)
PCO2 BLDA: 68 MMHG (ref 35–45)
PERFORMED BY, TECHID: ABNORMAL
PH BLDA: 7.17 (ref 7.35–7.45)
PLATELET # BLD AUTO: 267 K/UL (ref 150–400)
PMV BLD AUTO: 10.5 FL (ref 8.9–12.9)
PO2 BLDA: 276 MMHG (ref 80–100)
POTASSIUM SERPL-SCNC: 4.1 MMOL/L (ref 3.5–5.1)
PROT SERPL-MCNC: 8 G/DL (ref 6.4–8.2)
Q-T INTERVAL, ECG07: 240 MS
QRS DURATION, ECG06: 78 MS
QTC CALCULATION (BEZET), ECG08: 407 MS
RBC # BLD AUTO: 4.67 M/UL (ref 3.8–5.2)
SAO2 % BLD: 99 % (ref 95–99)
SAO2% DEVICE SAO2% SENSOR NAME: ABNORMAL
SARS-COV-2 RDRP RESP QL NAA+PROBE: NOT DETECTED
SERVICE CMNT-IMP: ABNORMAL
SODIUM SERPL-SCNC: 137 MMOL/L (ref 136–145)
SPECIMEN SITE: ABNORMAL
TROPONIN I SERPL HS-MCNC: 45 NG/L (ref 0–51)
VENTRICULAR RATE, ECG03: 173 BPM
WBC # BLD AUTO: 10.6 K/UL (ref 3.6–11)

## 2023-03-05 PROCEDURE — 71045 X-RAY EXAM CHEST 1 VIEW: CPT

## 2023-03-05 PROCEDURE — 74011000250 HC RX REV CODE- 250: Performed by: INTERNAL MEDICINE

## 2023-03-05 PROCEDURE — 74011250637 HC RX REV CODE- 250/637: Performed by: INTERNAL MEDICINE

## 2023-03-05 PROCEDURE — 87804 INFLUENZA ASSAY W/OPTIC: CPT

## 2023-03-05 PROCEDURE — 85379 FIBRIN DEGRADATION QUANT: CPT

## 2023-03-05 PROCEDURE — 94660 CPAP INITIATION&MGMT: CPT

## 2023-03-05 PROCEDURE — 94640 AIRWAY INHALATION TREATMENT: CPT

## 2023-03-05 PROCEDURE — 80053 COMPREHEN METABOLIC PANEL: CPT

## 2023-03-05 PROCEDURE — 5A09357 ASSISTANCE WITH RESPIRATORY VENTILATION, LESS THAN 24 CONSECUTIVE HOURS, CONTINUOUS POSITIVE AIRWAY PRESSURE: ICD-10-PCS | Performed by: INTERNAL MEDICINE

## 2023-03-05 PROCEDURE — 83605 ASSAY OF LACTIC ACID: CPT

## 2023-03-05 PROCEDURE — 36415 COLL VENOUS BLD VENIPUNCTURE: CPT

## 2023-03-05 PROCEDURE — 93005 ELECTROCARDIOGRAM TRACING: CPT

## 2023-03-05 PROCEDURE — 36600 WITHDRAWAL OF ARTERIAL BLOOD: CPT

## 2023-03-05 PROCEDURE — 74011250636 HC RX REV CODE- 250/636: Performed by: STUDENT IN AN ORGANIZED HEALTH CARE EDUCATION/TRAINING PROGRAM

## 2023-03-05 PROCEDURE — 74011000250 HC RX REV CODE- 250: Performed by: STUDENT IN AN ORGANIZED HEALTH CARE EDUCATION/TRAINING PROGRAM

## 2023-03-05 PROCEDURE — 82803 BLOOD GASES ANY COMBINATION: CPT

## 2023-03-05 PROCEDURE — 84484 ASSAY OF TROPONIN QUANT: CPT

## 2023-03-05 PROCEDURE — 96374 THER/PROPH/DIAG INJ IV PUSH: CPT

## 2023-03-05 PROCEDURE — 83880 ASSAY OF NATRIURETIC PEPTIDE: CPT

## 2023-03-05 PROCEDURE — 87635 SARS-COV-2 COVID-19 AMP PRB: CPT

## 2023-03-05 PROCEDURE — 85025 COMPLETE CBC W/AUTO DIFF WBC: CPT

## 2023-03-05 PROCEDURE — 74011250636 HC RX REV CODE- 250/636: Performed by: INTERNAL MEDICINE

## 2023-03-05 PROCEDURE — 65270000029 HC RM PRIVATE

## 2023-03-05 PROCEDURE — 99285 EMERGENCY DEPT VISIT HI MDM: CPT

## 2023-03-05 RX ORDER — METOPROLOL SUCCINATE 50 MG/1
50 TABLET, EXTENDED RELEASE ORAL DAILY
Status: DISCONTINUED | OUTPATIENT
Start: 2023-03-05 | End: 2023-03-12 | Stop reason: HOSPADM

## 2023-03-05 RX ORDER — IPRATROPIUM BROMIDE AND ALBUTEROL SULFATE 2.5; .5 MG/3ML; MG/3ML
3 SOLUTION RESPIRATORY (INHALATION)
Status: DISCONTINUED | OUTPATIENT
Start: 2023-03-05 | End: 2023-03-05

## 2023-03-05 RX ORDER — GUAIFENESIN 600 MG/1
1200 TABLET, EXTENDED RELEASE ORAL EVERY 12 HOURS
Status: DISCONTINUED | OUTPATIENT
Start: 2023-03-05 | End: 2023-03-12 | Stop reason: HOSPADM

## 2023-03-05 RX ORDER — IPRATROPIUM BROMIDE AND ALBUTEROL SULFATE 2.5; .5 MG/3ML; MG/3ML
3 SOLUTION RESPIRATORY (INHALATION)
Status: COMPLETED | OUTPATIENT
Start: 2023-03-05 | End: 2023-03-05

## 2023-03-05 RX ORDER — ACETAMINOPHEN 325 MG/1
650 TABLET ORAL
Status: DISCONTINUED | OUTPATIENT
Start: 2023-03-05 | End: 2023-03-12 | Stop reason: HOSPADM

## 2023-03-05 RX ORDER — ADHESIVE BANDAGE
30 BANDAGE TOPICAL DAILY PRN
Status: DISCONTINUED | OUTPATIENT
Start: 2023-03-05 | End: 2023-03-12 | Stop reason: HOSPADM

## 2023-03-05 RX ORDER — METOPROLOL SUCCINATE 50 MG/1
50 TABLET, EXTENDED RELEASE ORAL DAILY
COMMUNITY

## 2023-03-05 RX ORDER — ONDANSETRON 2 MG/ML
4 INJECTION INTRAMUSCULAR; INTRAVENOUS
Status: DISCONTINUED | OUTPATIENT
Start: 2023-03-05 | End: 2023-03-12 | Stop reason: HOSPADM

## 2023-03-05 RX ORDER — PANTOPRAZOLE SODIUM 40 MG/1
40 TABLET, DELAYED RELEASE ORAL DAILY
Status: DISCONTINUED | OUTPATIENT
Start: 2023-03-06 | End: 2023-03-12 | Stop reason: HOSPADM

## 2023-03-05 RX ORDER — SODIUM CHLORIDE 0.9 % (FLUSH) 0.9 %
5-40 SYRINGE (ML) INJECTION EVERY 8 HOURS
Status: DISCONTINUED | OUTPATIENT
Start: 2023-03-05 | End: 2023-03-12 | Stop reason: HOSPADM

## 2023-03-05 RX ORDER — IPRATROPIUM BROMIDE AND ALBUTEROL SULFATE 2.5; .5 MG/3ML; MG/3ML
3 SOLUTION RESPIRATORY (INHALATION)
Status: DISCONTINUED | OUTPATIENT
Start: 2023-03-05 | End: 2023-03-12 | Stop reason: HOSPADM

## 2023-03-05 RX ORDER — BUDESONIDE 0.5 MG/2ML
500 INHALANT ORAL
Status: DISCONTINUED | OUTPATIENT
Start: 2023-03-05 | End: 2023-03-12 | Stop reason: HOSPADM

## 2023-03-05 RX ORDER — LISINOPRIL 10 MG/1
10 TABLET ORAL DAILY
Status: DISCONTINUED | OUTPATIENT
Start: 2023-03-05 | End: 2023-03-12 | Stop reason: HOSPADM

## 2023-03-05 RX ORDER — LISINOPRIL 10 MG/1
TABLET ORAL DAILY
COMMUNITY

## 2023-03-05 RX ORDER — FUROSEMIDE 10 MG/ML
40 INJECTION INTRAMUSCULAR; INTRAVENOUS 2 TIMES DAILY
Status: DISCONTINUED | OUTPATIENT
Start: 2023-03-05 | End: 2023-03-08

## 2023-03-05 RX ORDER — DIGOXIN 125 MCG
0.12 TABLET ORAL DAILY
Status: DISCONTINUED | OUTPATIENT
Start: 2023-03-05 | End: 2023-03-05

## 2023-03-05 RX ORDER — SODIUM CHLORIDE 0.9 % (FLUSH) 0.9 %
5-40 SYRINGE (ML) INJECTION AS NEEDED
Status: DISCONTINUED | OUTPATIENT
Start: 2023-03-05 | End: 2023-03-12 | Stop reason: HOSPADM

## 2023-03-05 RX ORDER — LANOLIN ALCOHOL/MO/W.PET/CERES
3 CREAM (GRAM) TOPICAL
Status: DISCONTINUED | OUTPATIENT
Start: 2023-03-05 | End: 2023-03-12 | Stop reason: HOSPADM

## 2023-03-05 RX ORDER — ASPIRIN 81 MG/1
81 TABLET ORAL DAILY
Status: DISCONTINUED | OUTPATIENT
Start: 2023-03-05 | End: 2023-03-12 | Stop reason: HOSPADM

## 2023-03-05 RX ORDER — ATORVASTATIN CALCIUM 10 MG/1
20 TABLET, FILM COATED ORAL DAILY
Status: DISCONTINUED | OUTPATIENT
Start: 2023-03-05 | End: 2023-03-12 | Stop reason: HOSPADM

## 2023-03-05 RX ORDER — FUROSEMIDE 10 MG/ML
40 INJECTION INTRAMUSCULAR; INTRAVENOUS
Status: COMPLETED | OUTPATIENT
Start: 2023-03-05 | End: 2023-03-05

## 2023-03-05 RX ADMIN — METHYLPREDNISOLONE SODIUM SUCCINATE 60 MG: 125 INJECTION, POWDER, FOR SOLUTION INTRAMUSCULAR; INTRAVENOUS at 17:36

## 2023-03-05 RX ADMIN — FUROSEMIDE 40 MG: 10 INJECTION, SOLUTION INTRAMUSCULAR; INTRAVENOUS at 20:56

## 2023-03-05 RX ADMIN — SODIUM CHLORIDE, PRESERVATIVE FREE 10 ML: 5 INJECTION INTRAVENOUS at 15:16

## 2023-03-05 RX ADMIN — BUDESONIDE 500 MCG: 0.5 INHALANT ORAL at 20:22

## 2023-03-05 RX ADMIN — FUROSEMIDE 40 MG: 10 INJECTION, SOLUTION INTRAMUSCULAR; INTRAVENOUS at 06:44

## 2023-03-05 RX ADMIN — IPRATROPIUM BROMIDE AND ALBUTEROL SULFATE 3 ML: 2.5; .5 SOLUTION RESPIRATORY (INHALATION) at 20:22

## 2023-03-05 RX ADMIN — ASPIRIN 81 MG: 81 TABLET, COATED ORAL at 08:25

## 2023-03-05 RX ADMIN — METOPROLOL SUCCINATE 50 MG: 50 TABLET, EXTENDED RELEASE ORAL at 18:15

## 2023-03-05 RX ADMIN — SODIUM CHLORIDE, PRESERVATIVE FREE 10 ML: 5 INJECTION INTRAVENOUS at 21:00

## 2023-03-05 RX ADMIN — METHYLPREDNISOLONE SODIUM SUCCINATE 60 MG: 125 INJECTION, POWDER, FOR SOLUTION INTRAMUSCULAR; INTRAVENOUS at 12:23

## 2023-03-05 RX ADMIN — RIVAROXABAN 20 MG: 20 TABLET, FILM COATED ORAL at 12:22

## 2023-03-05 RX ADMIN — METHYLPREDNISOLONE SODIUM SUCCINATE 60 MG: 125 INJECTION, POWDER, FOR SOLUTION INTRAMUSCULAR; INTRAVENOUS at 08:25

## 2023-03-05 RX ADMIN — MELATONIN TAB 3 MG 3 MG: 3 TAB at 21:00

## 2023-03-05 RX ADMIN — ATORVASTATIN CALCIUM 20 MG: 10 TABLET, FILM COATED ORAL at 12:23

## 2023-03-05 RX ADMIN — AZITHROMYCIN DIHYDRATE 500 MG: 500 INJECTION, POWDER, LYOPHILIZED, FOR SOLUTION INTRAVENOUS at 08:25

## 2023-03-05 RX ADMIN — METHYLPREDNISOLONE SODIUM SUCCINATE 60 MG: 125 INJECTION, POWDER, FOR SOLUTION INTRAMUSCULAR; INTRAVENOUS at 23:19

## 2023-03-05 RX ADMIN — IPRATROPIUM BROMIDE AND ALBUTEROL SULFATE 3 ML: .5; 3 SOLUTION RESPIRATORY (INHALATION) at 06:15

## 2023-03-05 RX ADMIN — LISINOPRIL 10 MG: 10 TABLET ORAL at 18:15

## 2023-03-05 RX ADMIN — ACETAMINOPHEN 650 MG: 325 TABLET ORAL at 21:01

## 2023-03-05 RX ADMIN — GUAIFENESIN 1200 MG: 600 TABLET, EXTENDED RELEASE ORAL at 20:56
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
## 2023-03-05 NOTE — PROGRESS NOTES
Primary care notified via [employer] Patient requesting her Lisinopril and Metoprolol, they are part of her home medication, states not taking Digoxin any more. Please review and order.

## 2023-03-05 NOTE — PROGRESS NOTES
CM attempted to contact Pt friend on her face sheet, Collette Harrington.   Received a message stating that the call cannot be completed as dial.

## 2023-03-05 NOTE — CONSULTS
Consult  Pulmonary, Critical Care    Name: Bettye Beck MRN: [id number]   : 1950 Hospital: [facility]   Date: 3/5/2023  Admission date: 3/5/2023 Hospital Day: 1       Subjective/Interval History:   Seen on the medical floor currently on nasal oxygen sitting up in the chair awake alert states she feels some better. History is underlying COPD obstructive sleep apnea intolerant of CPAP on no oxygen at home does have nebulized albuterol/ipratropium that she uses about 3 times a day. She also has Lasix questionably 20 mg daily she states it does induce diuresis but sometimes she has to skip it when she has to go to work. Over the last week or so she has had worsening edema of her ankles some worsening shortness of breath cough some thick sputum no purulence no fever chills or sweats    Hospital Problems  Date Reviewed: 2015            Codes Class Noted POA    COPD with acute exacerbation [facility]) ICD-10-CM: J44.1  ICD-9-CM: 491.21  3/5/2023 Unknown           IMPRESSION:   Acute hypoxic respiratory failure room air oxygen saturation when EMS arrived was 85%  Acute hypercapnic respiratory failure  Pulmonary edema  Pulmonary hypertension  Probably cor pulmonale  Exacerbation COPD  Body mass index is 29.95 kg/m². RECOMMENDATIONS/PLAN:   Agree IV Lasix twice daily  We will place on nebulized albuterol Atrovent every 6 hours  Add nebulized budesonide  Agree IV Solu-Medrol  She is currently awake and alert off BiPAP on nasal oxygen will continue that and repeat ABG in a.m. She has a history of sleep apnea has never been tolerant of CPAP but she states she does sleep in a recliner semiupright            [x] High complexity decision making was performed  [x] See my orders for details      Subjective/Initial History:     I was asked by Easton Crowe MD to see Bettye Beck  a 67 y.o.    female in consultation for a chief complaint of acute hypoxic and hypercapnic respiratory failure with exacerbation COPD and pulmonary edema        No Known Allergies     MAR reviewed and pertinent medications noted or modified as needed     Current Facility-Administered Medications   Medication    sodium chloride (NS) flush 5-40 mL    sodium chloride (NS) flush 5-40 mL    methylPREDNISolone (PF) (Solu-MEDROL) injection 60 mg    azithromycin (ZITHROMAX) 500 mg in 0.9% sodium chloride 250 mL (Miae6Mon)    acetaminophen (TYLENOL) tablet 650 mg    ondansetron (ZOFRAN) injection 4 mg    magnesium hydroxide (MILK OF MAGNESIA) 400 mg/5 mL oral suspension 30 mL    furosemide (LASIX) injection 40 mg    aspirin delayed-release tablet 81 mg    atorvastatin (LIPITOR) tablet 20 mg    digoxin (LANOXIN) tablet 0.125 mg    melatonin tablet 3 mg    [START ON 3/6/2023] pantoprazole (PROTONIX) tablet 40 mg    rivaroxaban (XARELTO) tablet 20 mg    albuterol-ipratropium (DUO-NEB) 2.5 MG-0.5 MG/3 ML    budesonide (PULMICORT) 500 mcg/2 ml nebulizer suspension      Patient PCP: Parker Byrne MD  PMH:  has a past medical history of AF (paroxysmal atrial fibrillation) ([address] Anemia, Anxiety, Chronic obstructive pulmonary disease ([address] Heart failure ([address] HTN (hypertension), Obesity, HORACE (obstructive sleep apnea), Pulmonary hypertension ([address] and Stroke ([address] She has no past medical history of Chronic pain. PSH:   has a past surgical history that includes echocardiogram (11/23/2009); nm cardiac spect w strs/rest mult (08/2006); hx gastric bypass; and ir thoracentesis cath w image (10/1/2020). FHX: family history includes COPD in her mother; Colon Cancer in her father; Diabetes in her mother; Stroke in her brother. SHX:  reports that she has quit smoking. She has never used smokeless tobacco. She reports that she does not currently use alcohol. She reports that she does not use drugs.      Systemic review:  General she states her weight stable has not had any chronic fever chills or sweats  Eyes no double vision or momentary blindness  ENT no facial pain over the sinuses she does get chronic congestion uses nasal lavage periodically has not had any recent problems  Endocrinologic no polyuria polydipsia  Musculoskeletal she has had worsening edema of her lower extremities no swollen tender joints  Neurologic no seizures or syncope  Gastrointestinal no nausea vomiting acid indigestion  Genitourinary no pain or discomfort on urination no bleeding  Cardiovascular has worsening ankle edema nocturia once or twice per night only worsening shortness of breath no chest pain or diaphoresis  Respiratory stop smoking over 20 years ago has history of COPD mother had COPD. She has nebulized albuterol Atrovent at home that she uses 2 or 3 times a day. Had worsening of her respiratory status over the last week    Objective:     Vital Signs: Telemetry:    normal sinus rhythm Intake/Output:   Visit Vitals  /87 (BP 1 Location: Right upper arm, BP Patient Position: Semi fowlers)   Pulse 100   Temp 97.3 °F (36.3 °C)   Resp 20   Ht 5' 5\" (1.651 m)   Wt 81.6 kg (180 lb)   SpO2 99%   Breastfeeding No   BMI 29.95 kg/m²       Temp (24hrs), Av.6 °F (36.4 °C), Min:97.3 °F (36.3 °C), Max:97.9 °F (36.6 °C)        O2 Device: Nasal cannula O2 Flow Rate (L/min): 3 l/min       Wt Readings from Last 4 Encounters:   23 81.6 kg (180 lb)   10/03/20 81.7 kg (180 lb 1.9 oz)   10/18/16 83.6 kg (184 lb 3.2 oz)   12/24/15 79.8 kg (176 lb)        No intake or output data in the 24 hours ending 23 1600    Last shift:      No intake/output data recorded. Last 3 shifts: No intake/output data recorded. Physical Exam:   General:  female; sitting in the chair no distress no accessory muscle use  HEENT: NCAT, normal oral mucosa  Eyes: anicteric; conjunctiva clear extraocular movements intact  Neck: no nodes, neck veins are visible  no accessory MM use.   Chest: no deformity,   Cardiac: Regular rate and rhythm  Lungs: Anterior lateral and posterior wheezing diffusely with posterior rales toward the bases  Abd: Overweight soft positive bowel sounds  Ext: Dark discoloration of the lower extremities with edema; no joint swelling; No clubbing  : clear urine  Neuro: Alert oriented speech is clear moves all 4 extremities normally  Psych- no agitation, oriented to person;   Skin: warm, dry, no cyanosis;   Pulses: Brachial and radial pulses intact  Capillary: Normal capillary refill    Labs:    Recent Labs     03/05/23 0613   WBC 10.6   HGB 14.3        Recent Labs     03/05/23 0613      K 4.1      CO2 26   *   BUN 16   CREA 0.74   CA 9.5   LAC 2.6*   ALB 4.4   ALT 18     Recent Labs     03/05/23 0627   PH 7.17*   PCO2 68*   PO2 276*   HCO3 25   FIO2 100     BNP 1993  Troponin 45  9/27/2020 echo ejection fraction 55% with RVSP of 67  Lab Results   Component Value Date/Time    Culture result: culture performed on unspun fluid 10/01/2020 10:15 AM    Culture result: No growth 4 days 10/01/2020 10:15 AM    Culture result: No Growth (<1000 cfu/mL) 09/25/2020 02:15 PM   No results found for: TSH, TSHEXT    Imaging:    CXR Results  (Last 48 hours)                 03/05/23 0625  XR CHEST PORT Final result    Impression:  Pulmonary edema and trace pleural effusions. Narrative:  INDICATION: shortness of breath         EXAM:  AP CHEST RADIOGRAPH       COMPARISON: 11/13/2010       FINDINGS:       AP portable view of the chest demonstrates borderline cardiomegaly. Mild   pulmonary edema and probable trace pleural effusions. No pneumothorax. Defibrillator pad obscures portion of the right lung. The osseous structures are   unremarkable. Results from Hospital Encounter encounter on 03/05/23    XR CHEST PORT    Narrative  INDICATION: shortness of breath    EXAM:  AP CHEST RADIOGRAPH    COMPARISON: 11/13/2010    FINDINGS:    AP portable view of the chest demonstrates borderline cardiomegaly.  Mild  pulmonary edema and probable trace pleural effusions. No pneumothorax. Defibrillator pad obscures portion of the right lung. The osseous structures are  unremarkable. Impression  Pulmonary edema and trace pleural effusions. Results from [place] encounter on 11/13/20    XR CHEST PA LAT    Narrative  Indication: COPD. PA and lateral radiographs of the chest 13 November 2020. Comparison CT chest 25 September 2020. Calcified 1 cm right lower lobe granuloma unchanged. Decreased right pleural effusion with residual posterior and lateral  costophrenic angle thickening and probably residual loculated fluid. Mild  streaky atelectasis or scarring in the lung bases. Mild cardiomegaly. Thoracic spondylosis, kyphoscoliosis. Impression  IMPRESSION: Residual right basilar pleural parenchymal findings, improved. Results from [place] encounter on 09/24/20    XR CHEST SNGL V    Narrative  History is shortness of breath. Comparison Is with the chest x-ray of 12/18/2019. An AP portable erect view of the chest demonstrate cardiac monitor leads. There  is now a small to moderate right pleural effusion. There is a small calcified  right lower lobe granuloma. The heart is enlarged. There is approximately 27  degrees dextroscoliosis of the thoracic spine. There is degenerative change  present in the spine as well. Impression  IMPRESSION: Cardiomegaly. New right pleural effusion. No change in right lower  lobe granuloma. Dextroscoliosis. Results from [place] encounter on 09/24/20    CT CHEST WO CONT    Narrative  The study is a CT evaluation of the chest dated 9/25/2020. HISTORY: History of chronic obstructive pulmonary disease. Heart failure. Recent  pleural fluid collection. Technique: Performed without intravenous contrast. 3 mm axial imaging, axial MIP  imaging, sagittal, and coronal reconstructed imaging sequences are submitted for  evaluation.  Thinner section Super Dimension imaging was also performed. Dose Reduction Technique was employed to reduce radiation exposure - This  includes reduction optimization techniques as appropriate to a performed exam  with automated exposure control adjustments of the mA and/or Kv according to  patient size, or use of iterative reconstruction technique. COMPARISON: Previous CT evaluation of the chest dated 12/18/2019. Recent chest  radiograph dated 9/25/2019. MEDIASTINUM: There are scattered normal sized middle mediastinal lymph nodes  without progression. The largest mediastinal lymph node is located within the  subcarinal region and this lymph node measures 10 mm in short axis measurement  which is considered within normal limits. There is an area of dystrophic calcification within the left thyroid lobe and  mild heterogeneity of the left thyroid lobe. This examination is negative for  large or dominant thyroid nodule or mass. LUNGS AND PLEURA: There is a moderate sized loculated right pleural effusion. This examination is negative for pleural based masses, pleural thickening, or  gas within the pleural fluid collection. There is encasement of the adjacent  lung with areas of passive atelectasis. There is a calcified granuloma within the right lung base. On axial maximum  intensity imaging, there are several additional small scattered micronodules  with 2 nodules demonstrated posteriorly within the left lower lobe (series [id number]  image number [id number]). This examination is negative for larger pulmonary nodules or  masses. The central trachea and bronchial tree are patent. There is bronchial  wall thickening and discoid opacities within the lung bases consistent with  reactive airways disease. CARDIOVASCULAR: There are moderate calcifications of the coronary arteries. There is moderate enlargement of the atrial chambers and milder enlargement of  the ventricular chambers. There is a normal caliber to the thoracic aorta.     CHEST WALL: There is multilevel spondylosis along the thoracic spine. There is a  mild to moderate dextroconvex scoliosis. There is increased dorsal kyphosis of  the thoracic spine. There are older healed right posterior rib fractures. OTHER: There is surgical suture material demonstrated within the gastric region  consistent with previous bariatric surgery. Impression  IMPRESSION:  1. There is a moderate sized loculated right pleural effusion associated with  passive atelectasis. The differential diagnosis would include recent pneumonia  with a residual parapneumonic effusion. Since exam an [de-identified] is negative for pleural  based masses or findings specific for a malignant effusion. 2.  There are several scattered normal size middle mediastinal lymph nodes. 3.  There are findings of prior granulomatous exposure. Please see above text  for further details. Discussion chest x-ray shows pulmonary edema tiny effusions with old echocardiogram showing pulmonary hypertension. Current BNP is elevated she has peripheral edema. Most likely has pulmonary hypertension with cor pulmonale and resultant pulmonary edema with concomitant COPD with exacerbation.   We will proceed as outlined above time of care 35 minutes      Catrachito Kat MD
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
## 2023-03-05 NOTE — PROGRESS NOTES
Problem: Chronic Obstructive Pulmonary Disease (COPD)  Goal: *Able to remain out of bed as prescribed  Outcome: Progressing Towards Goal     Problem: Chronic Obstructive Pulmonary Disease (COPD)  Goal: *Oxygen saturation during activity within specified parameters  Outcome: Progressing Towards Goal  Goal: *Able to remain out of bed as prescribed  Outcome: Progressing Towards Goal  Goal: *Absence of hypoxia  Outcome: Progressing Towards Goal

## 2023-03-06 ENCOUNTER — APPOINTMENT (OUTPATIENT)
Dept: NON INVASIVE DIAGNOSTICS | Age: 73
DRG: 291 | End: 2023-03-06
Attending: INTERNAL MEDICINE
Payer: MEDICARE

## 2023-03-06 LAB
ALBUMIN SERPL-MCNC: 3.8 G/DL (ref 3.5–5)
ANION GAP SERPL CALC-SCNC: 6 MMOL/L (ref 5–15)
ARTERIAL PATENCY WRIST A: YES
BASE DEFICIT BLDA-SCNC: 2.8 MMOL/L
BASOPHILS # BLD: 0 K/UL (ref 0–0.1)
BASOPHILS NFR BLD: 0 % (ref 0–1)
BDY SITE: ABNORMAL
BNP SERPL-MCNC: ABNORMAL PG/ML
BODY TEMPERATURE: 98.3
BUN SERPL-MCNC: 35 MG/DL (ref 6–20)
BUN/CREAT SERPL: 34 (ref 12–20)
CA-I BLD-MCNC: 9.2 MG/DL (ref 8.5–10.1)
CHLORIDE SERPL-SCNC: 99 MMOL/L (ref 97–108)
CO2 SERPL-SCNC: 29 MMOL/L (ref 21–32)
COHGB MFR BLD: 0.9 % (ref 1–2)
CREAT SERPL-MCNC: 1.04 MG/DL (ref 0.55–1.02)
DIFFERENTIAL METHOD BLD: ABNORMAL
ECHO AO ASC DIAM: 3.8 CM
ECHO AO ASCENDING AORTA INDEX: 1.91 CM/M2
ECHO AO ROOT DIAM: 2.9 CM
ECHO AO ROOT INDEX: 1.46 CM/M2
ECHO AV AREA PEAK VELOCITY: 2.5 CM2
ECHO AV AREA VTI: 2.6 CM2
ECHO AV AREA/BSA PEAK VELOCITY: 1.3 CM2/M2
ECHO AV AREA/BSA VTI: 1.3 CM2/M2
ECHO AV CUSP MM: 1.6 CM
ECHO AV MEAN GRADIENT: 5 MMHG
ECHO AV MEAN VELOCITY: 1 M/S
ECHO AV PEAK GRADIENT: 9 MMHG
ECHO AV PEAK VELOCITY: 1.5 M/S
ECHO AV VELOCITY RATIO: 0.8
ECHO AV VTI: 22.7 CM
ECHO LA AREA 2C: 29.9 CM2
ECHO LA AREA 4C: 35.8 CM2
ECHO LA DIAMETER INDEX: 2.61 CM/M2
ECHO LA DIAMETER: 5.2 CM
ECHO LA MAJOR AXIS: 7.9 CM
ECHO LA MINOR AXIS: 7.4 CM
ECHO LA TO AORTIC ROOT RATIO: 1.79
ECHO LA VOL BP: 119 ML (ref 22–52)
ECHO LA VOL/BSA BIPLANE: 60 ML/M2 (ref 16–34)
ECHO LV E' LATERAL VELOCITY: 10 CM/S
ECHO LV E' SEPTAL VELOCITY: 6 CM/S
ECHO LV EDV A2C: 71 ML
ECHO LV EDV A4C: 57 ML
ECHO LV EDV INDEX A4C: 29 ML/M2
ECHO LV EDV NDEX A2C: 36 ML/M2
ECHO LV EJECTION FRACTION A2C: 56 %
ECHO LV EJECTION FRACTION A4C: 51 %
ECHO LV EJECTION FRACTION BIPLANE: 53 % (ref 55–100)
ECHO LV ESV A2C: 31 ML
ECHO LV ESV A4C: 28 ML
ECHO LV ESV INDEX A2C: 16 ML/M2
ECHO LV ESV INDEX A4C: 14 ML/M2
ECHO LV FRACTIONAL SHORTENING: 39 % (ref 28–44)
ECHO LV INTERNAL DIMENSION DIASTOLE INDEX: 2.21 CM/M2
ECHO LV INTERNAL DIMENSION DIASTOLIC MMODE: 6 CM (ref 3.9–5.3)
ECHO LV INTERNAL DIMENSION DIASTOLIC: 4.4 CM (ref 3.9–5.3)
ECHO LV INTERNAL DIMENSION SYSTOLIC INDEX: 1.36 CM/M2
ECHO LV INTERNAL DIMENSION SYSTOLIC MMODE: 4.9 CM
ECHO LV INTERNAL DIMENSION SYSTOLIC: 2.7 CM
ECHO LV IVSD: 1.1 CM (ref 0.6–0.9)
ECHO LV MASS 2D: 180 G (ref 67–162)
ECHO LV MASS INDEX 2D: 90.4 G/M2 (ref 43–95)
ECHO LV POSTERIOR WALL DIASTOLIC MMODE: 1 CM (ref 0.6–0.9)
ECHO LV POSTERIOR WALL DIASTOLIC: 1.2 CM (ref 0.6–0.9)
ECHO LV RELATIVE WALL THICKNESS RATIO: 0.55
ECHO LVOT AREA: 3.1 CM2
ECHO LVOT AV VTI INDEX: 0.82
ECHO LVOT DIAM: 2 CM
ECHO LVOT MEAN GRADIENT: 4 MMHG
ECHO LVOT PEAK GRADIENT: 6 MMHG
ECHO LVOT PEAK VELOCITY: 1.2 M/S
ECHO LVOT STROKE VOLUME INDEX: 29.5 ML/M2
ECHO LVOT SV: 58.7 ML
ECHO LVOT VTI: 18.7 CM
ECHO MV E DECELERATION TIME (DT): 198 MS
ECHO MV E VELOCITY: 0.94 M/S
ECHO MV E/E' LATERAL: 9.4
ECHO MV E/E' RATIO (AVERAGED): 12.53
ECHO MV E/E' SEPTAL: 15.67
ECHO PV MAX VELOCITY: 1.1 M/S
ECHO PV PEAK GRADIENT: 5 MMHG
ECHO RA AREA 4C: 29.1 CM2
ECHO RA END SYSTOLIC VOLUME APICAL 4 CHAMBER INDEX BSA: 56 ML/M2
ECHO RA VOLUME: 111 ML
ECHO RV INTERNAL DIMENSION: 4.1 CM
ECHO RV TAPSE: 1.7 CM (ref 1.7–?)
ECHO RVOT MEAN GRADIENT: 1 MMHG
ECHO RVOT PEAK GRADIENT: 2 MMHG
ECHO RVOT PEAK VELOCITY: 0.7 M/S
ECHO RVOT VTI: 16.2 CM
ECHO TV REGURGITANT MAX VELOCITY: 2.96 M/S
ECHO TV REGURGITANT PEAK GRADIENT: 35 MMHG
EOSINOPHIL # BLD: 0 K/UL (ref 0–0.4)
EOSINOPHIL NFR BLD: 0 % (ref 0–7)
ERYTHROCYTE [DISTWIDTH] IN BLOOD BY AUTOMATED COUNT: 14.3 % (ref 11.5–14.5)
FIO2 ON VENT: 32 %
GAS FLOW.O2 O2 DELIVERY SYS: 3 L/MIN
GLUCOSE SERPL-MCNC: 202 MG/DL (ref 65–100)
HCO3 BLDA-SCNC: 25 MMOL/L (ref 22–26)
HCT VFR BLD AUTO: 39.4 % (ref 35–47)
HGB BLD-MCNC: 12.5 G/DL (ref 11.5–16)
IMM GRANULOCYTES # BLD AUTO: 0 K/UL (ref 0–0.04)
IMM GRANULOCYTES NFR BLD AUTO: 0 % (ref 0–0.5)
LYMPHOCYTES # BLD: 0.5 K/UL (ref 0.8–3.5)
LYMPHOCYTES NFR BLD: 7 % (ref 12–49)
MAGNESIUM SERPL-MCNC: 1.8 MG/DL (ref 1.6–2.4)
MCH RBC QN AUTO: 31.2 PG (ref 26–34)
MCHC RBC AUTO-ENTMCNC: 31.7 G/DL (ref 30–36.5)
MCV RBC AUTO: 98.3 FL (ref 80–99)
METHGB MFR BLD: 0.4 % (ref 0–1.4)
MONOCYTES # BLD: 0.7 K/UL (ref 0–1)
MONOCYTES NFR BLD: 10 % (ref 5–13)
NEUTS SEG # BLD: 5.3 K/UL (ref 1.8–8)
NEUTS SEG NFR BLD: 83 % (ref 32–75)
NRBC # BLD: 0 K/UL (ref 0–0.01)
NRBC BLD-RTO: 0 PER 100 WBC
OXYHGB MFR BLD: 97.4 % (ref 95–99)
PCO2 BLDA: 56 MMHG (ref 35–45)
PERFORMED BY, TECHID: ABNORMAL
PH BLDA: 7.27 (ref 7.35–7.45)
PHOSPHATE SERPL-MCNC: 4.8 MG/DL (ref 2.6–4.7)
PLATELET # BLD AUTO: 246 K/UL (ref 150–400)
PMV BLD AUTO: 11.1 FL (ref 8.9–12.9)
PO2 BLDA: 140 MMHG (ref 80–100)
POTASSIUM SERPL-SCNC: 4.6 MMOL/L (ref 3.5–5.1)
PROCALCITONIN SERPL-MCNC: 0.29 NG/ML
RBC # BLD AUTO: 4.01 M/UL (ref 3.8–5.2)
SAO2 % BLD: 99 % (ref 95–99)
SAO2% DEVICE SAO2% SENSOR NAME: ABNORMAL
SODIUM SERPL-SCNC: 134 MMOL/L (ref 136–145)
SPECIMEN SITE: ABNORMAL
WBC # BLD AUTO: 6.5 K/UL (ref 3.6–11)

## 2023-03-06 PROCEDURE — 36415 COLL VENOUS BLD VENIPUNCTURE: CPT

## 2023-03-06 PROCEDURE — 74011250637 HC RX REV CODE- 250/637: Performed by: INTERNAL MEDICINE

## 2023-03-06 PROCEDURE — 83735 ASSAY OF MAGNESIUM: CPT

## 2023-03-06 PROCEDURE — 74011250636 HC RX REV CODE- 250/636: Performed by: INTERNAL MEDICINE

## 2023-03-06 PROCEDURE — 74011000250 HC RX REV CODE- 250: Performed by: INTERNAL MEDICINE

## 2023-03-06 PROCEDURE — 77010033678 HC OXYGEN DAILY

## 2023-03-06 PROCEDURE — 80069 RENAL FUNCTION PANEL: CPT

## 2023-03-06 PROCEDURE — 82803 BLOOD GASES ANY COMBINATION: CPT

## 2023-03-06 PROCEDURE — 94640 AIRWAY INHALATION TREATMENT: CPT

## 2023-03-06 PROCEDURE — 84145 PROCALCITONIN (PCT): CPT

## 2023-03-06 PROCEDURE — 65270000029 HC RM PRIVATE

## 2023-03-06 PROCEDURE — 85025 COMPLETE CBC W/AUTO DIFF WBC: CPT

## 2023-03-06 PROCEDURE — 93308 TTE F-UP OR LMTD: CPT

## 2023-03-06 PROCEDURE — 36600 WITHDRAWAL OF ARTERIAL BLOOD: CPT

## 2023-03-06 PROCEDURE — 94761 N-INVAS EAR/PLS OXIMETRY MLT: CPT

## 2023-03-06 PROCEDURE — 83880 ASSAY OF NATRIURETIC PEPTIDE: CPT

## 2023-03-06 RX ADMIN — ACETAMINOPHEN 650 MG: 325 TABLET ORAL at 22:44

## 2023-03-06 RX ADMIN — FUROSEMIDE 40 MG: 10 INJECTION, SOLUTION INTRAMUSCULAR; INTRAVENOUS at 22:44

## 2023-03-06 RX ADMIN — PANTOPRAZOLE SODIUM 40 MG: 40 TABLET, DELAYED RELEASE ORAL at 09:20

## 2023-03-06 RX ADMIN — SODIUM CHLORIDE, PRESERVATIVE FREE 10 ML: 5 INJECTION INTRAVENOUS at 13:44

## 2023-03-06 RX ADMIN — SODIUM CHLORIDE, PRESERVATIVE FREE 10 ML: 5 INJECTION INTRAVENOUS at 05:03

## 2023-03-06 RX ADMIN — IPRATROPIUM BROMIDE AND ALBUTEROL SULFATE 3 ML: 2.5; .5 SOLUTION RESPIRATORY (INHALATION) at 20:12

## 2023-03-06 RX ADMIN — IPRATROPIUM BROMIDE AND ALBUTEROL SULFATE 3 ML: 2.5; .5 SOLUTION RESPIRATORY (INHALATION) at 08:30

## 2023-03-06 RX ADMIN — FUROSEMIDE 40 MG: 10 INJECTION, SOLUTION INTRAMUSCULAR; INTRAVENOUS at 09:20

## 2023-03-06 RX ADMIN — AZITHROMYCIN DIHYDRATE 500 MG: 500 INJECTION, POWDER, LYOPHILIZED, FOR SOLUTION INTRAVENOUS at 07:27

## 2023-03-06 RX ADMIN — SODIUM CHLORIDE, PRESERVATIVE FREE 10 ML: 5 INJECTION INTRAVENOUS at 22:45

## 2023-03-06 RX ADMIN — METHYLPREDNISOLONE SODIUM SUCCINATE 60 MG: 125 INJECTION, POWDER, FOR SOLUTION INTRAMUSCULAR; INTRAVENOUS at 05:03

## 2023-03-06 RX ADMIN — METHYLPREDNISOLONE SODIUM SUCCINATE 40 MG: 40 INJECTION, POWDER, FOR SOLUTION INTRAMUSCULAR; INTRAVENOUS at 13:46

## 2023-03-06 RX ADMIN — METHYLPREDNISOLONE SODIUM SUCCINATE 40 MG: 40 INJECTION, POWDER, FOR SOLUTION INTRAMUSCULAR; INTRAVENOUS at 22:44

## 2023-03-06 RX ADMIN — BUDESONIDE 500 MCG: 0.5 INHALANT ORAL at 08:30

## 2023-03-06 RX ADMIN — METOPROLOL SUCCINATE 50 MG: 50 TABLET, EXTENDED RELEASE ORAL at 09:19

## 2023-03-06 RX ADMIN — GUAIFENESIN 1200 MG: 600 TABLET, EXTENDED RELEASE ORAL at 22:44

## 2023-03-06 RX ADMIN — ASPIRIN 81 MG: 81 TABLET, COATED ORAL at 09:19

## 2023-03-06 RX ADMIN — BUDESONIDE 500 MCG: 0.5 INHALANT ORAL at 20:00

## 2023-03-06 RX ADMIN — GUAIFENESIN 1200 MG: 600 TABLET, EXTENDED RELEASE ORAL at 09:19

## 2023-03-06 RX ADMIN — RIVAROXABAN 20 MG: 20 TABLET, FILM COATED ORAL at 09:19

## 2023-03-06 RX ADMIN — ATORVASTATIN CALCIUM 20 MG: 10 TABLET, FILM COATED ORAL at 09:19

## 2023-03-06 RX ADMIN — IPRATROPIUM BROMIDE AND ALBUTEROL SULFATE 3 ML: 2.5; .5 SOLUTION RESPIRATORY (INHALATION) at 01:06

## 2023-03-06 RX ADMIN — LISINOPRIL 10 MG: 10 TABLET ORAL at 09:20

## 2023-03-06 RX ADMIN — IPRATROPIUM BROMIDE AND ALBUTEROL SULFATE 3 ML: 2.5; .5 SOLUTION RESPIRATORY (INHALATION) at 14:37

## 2023-03-06 NOTE — ACP (ADVANCE CARE PLANNING)
Advance Care Planning   Advance Care Planning Inpatient Note  [facility]    Today's Date: 3/6/2023  Unit: [facility]    Received request from  In Basket . Upon review of chart and communication with care team, patient's decision making abilities are not in question. Patient was/were present in the room during visit. Goals of ACP Conversation:  Facilitate a discussion related to patient's goals of care as they align with the patient's values and beliefs    Health Care Decision Makers:      Primary Decision Maker: Elisa Mckeon - [phone]    Summary: AMD not completed at this time. No Decision Maker named by patient at this time    [address] (Patient Wishes) on file:  None     Assessment:    In Basket request for Advance Medical Directive (AMD) with Ms. Nnacy Macario on [address] Kyleigh Garner is sitting up in bed, awake, alert and welcomes . She shared that she should only be here another day or two. That she does not feel ready to be discharged today. 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 She does want to complete an AMD.  provided education on NoK and AMD. She has only 1 contact, a Ms. Vargas Friend, and she expressed that Marie Torrez would be her primary medical decision maker should she ever need one. She did not express her desires for the medical living will section. She did express her desires to be an organ donor, as well as donating her entire body after death to medical research. She opted to wait on filling out AMD form until she goes over it with her friend.        Interventions:  Provided education on documents for clarity and greater understanding  Discussed and provided education on state decision maker hierarchy  Encouraged ongoing ACP conversation with future decision makers and loved ones  Reviewed but did not complete ACP document    Care Preferences Communicated:  No    Outcomes/Plan:  ACP Discussion Completed  Teach Back Method used to verify the patient/Healthcare Decision Maker's understanding of key information in the advance directive documents    Chaplain Mel on 3/6/2023 at 12:40 PM
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
## 2023-03-06 NOTE — PROGRESS NOTES
Problem: Chronic Obstructive Pulmonary Disease (COPD)  Goal: *Able to remain out of bed as prescribed  Outcome: Progressing Towards Goal  Goal: *Absence of hypoxia  Outcome: Progressing Towards Goal     Problem: Pain  Goal: *Control of Pain  Outcome: Progressing Towards Goal

## 2023-03-06 NOTE — PROGRESS NOTES
Spiritual Care Assessment/Progress Note  [facility]      NAME: Ace Ramos      MRN: [id number]  AGE: 67 y.o.  SEX: female  Zoroastrian Affiliation: No preference   Language: English     3/6/2023     Total Time (in minutes): 40     Spiritual Assessment begun in [address] through conversation with:         [x]Patient        [] Family    [] Friend(s)        Reason for Consult: Advance medical directive consult     Spiritual beliefs: (Please include comment if needed)     [x] Identifies with a adan tradition:    Sheela Pop      [] Supported by a adan community:       none      [] Claims no spiritual orientation:           [] Seeking spiritual identity:                [] Adheres to an individual form of spirituality:           [] Not able to assess:                           Identified resources for coping:      [x] Prayer                               [] Music                  [] Guided Imagery     [x] Family/friends                 [] Pet visits     [] Devotional reading                         [] Unknown     [] Other:                                               Interventions offered during this visit: (See comments for more details)    Patient Interventions: Advance medical directive consult, Affirmation of emotions/emotional suffering, Affirmation of adan, Catharsis/review of pertinent events in supportive environment, Coping skills reviewed/reinforced, End of life issues discussed, Life review/legacy, Normalization of emotional/spiritual concerns, Prayer (assurance of), Zoroastrian beliefs/image of God discussed, Initial/Spiritual assessment, patient floor           Plan of Care:     [] Support spiritual and/or cultural needs    [] Support AMD and/or advance care planning process      [] Support grieving process   [] Coordinate Rites and/or Rituals    [] Coordination with community clergy   [] No spiritual needs identified at this time   [] Detailed Plan of Care below (See Comments)  [] Make referral to Music Therapy  [] Make referral to Pet Therapy     [] Make referral to Addiction services  [] Make referral to [facility]  [] Make referral to Spiritual Care Partner  [] No future visits requested        [x] Contact Spiritual Care for further referrals     Comments:   In Basket request for Advance Medical Directive (AMD) with Ms. Galo Lopez on [address] Diamond Barba is sitting up in bed, awake, alert and welcomes . She shared that she should only be here another day or two. That she does not feel ready to be discharged today. 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 She does want to complete an AMD.  provided education on NoK and AMD. She has only 1 contact, a Ms. Vanessa Benites, and she expressed that Freedom Avila would be her primary medical decision maker should she ever need one. She did not express her desires for the medical living will section. She did express her desires to be an organ donor, as well as donating her entire body after death to medical research. She opted to wait on filling out AMD form until she goes over it with her friend. Advised Ms. Diamond Barba to have her nurse page  when she is ready to complete AMD and/or with any questions. Chaplain Marlys Hamman, M.Div. Please contact Hospital  for [phone] [facility].    [phone]

## 2023-03-06 NOTE — PROGRESS NOTES
Consult  Pulmonary, Critical Care    Name: Jaxon Benjamin MRN: [id number]   : 1950 Hospital: [facility]   Date: 3/6/2023  Admission date: 3/5/2023 Hospital Day: 2       Subjective/Interval History:   Seen on the medical floor currently on nasal oxygen sitting up in the chair awake alert states she feels some better. History is underlying COPD obstructive sleep apnea intolerant of CPAP on no oxygen at home does have nebulized albuterol/ipratropium that she uses about 3 times a day. She also has Lasix questionably 20 mg daily she states it does induce diuresis but sometimes she has to skip it when she has to go to work. Over the last week or so she has had worsening edema of her ankles some worsening shortness of breath cough some thick sputum no purulence no fever chills or sweats  3/6 feels a little better but still shortness of breath with any exertion she is a wide awake no distress no accessory muscle use    Hospital Problems  Date Reviewed: 2015            Codes Class Noted POA    COPD with acute exacerbation [facility]) ICD-10-CM: J44.1  ICD-9-CM: 491.21  3/5/2023 Unknown         IMPRESSION:   Acute hypoxic respiratory failure room air oxygen saturation when EMS arrived was 85% currently 1 L nasal oxygen  Acute hypercapnic respiratory failure PCO2 improved  Pulmonary edema  Pulmonary hypertension  Probably cor pulmonale  Exacerbation COPD  Body mass index is 33.64 kg/m². RECOMMENDATIONS/PLAN:   Continue IV Lasix twice daily follow renal function  Continue nebulized albuterol Atrovent every 6 hours  Continue nebulized budesonide  We will start decreasing IV Solu-Medrol  PCO2 is improved but oxygen remains very elevated will decrease to 1 L and check overnight oximetry on and off oxygen tonight. Repeat ABG in a.m.   She has a history of sleep apnea has never been tolerant of CPAP but she states she does sleep in a recliner semiupright            [x] High complexity decision making was performed  [x] See my orders for details      Subjective/Initial History:     I was asked by Zoran Power MD to see Massimo Cramer  a 67 y.o.  female in consultation for a chief complaint of acute hypoxic and hypercapnic respiratory failure with exacerbation COPD and pulmonary edema        No Known Allergies     MAR reviewed and pertinent medications noted or modified as needed     Current Facility-Administered Medications   Medication    sodium chloride (NS) flush 5-40 mL    sodium chloride (NS) flush 5-40 mL    methylPREDNISolone (PF) (Solu-MEDROL) injection 60 mg    azithromycin (ZITHROMAX) 500 mg in 0.9% sodium chloride 250 mL ([id number])    acetaminophen (TYLENOL) tablet 650 mg    ondansetron (ZOFRAN) injection 4 mg    magnesium hydroxide (MILK OF MAGNESIA) 400 mg/5 mL oral suspension 30 mL    furosemide (LASIX) injection 40 mg    aspirin delayed-release tablet 81 mg    atorvastatin (LIPITOR) tablet 20 mg    melatonin tablet 3 mg    pantoprazole (PROTONIX) tablet 40 mg    rivaroxaban (XARELTO) tablet 20 mg    albuterol-ipratropium (DUO-NEB) 2.5 MG-0.5 MG/3 ML    budesonide (PULMICORT) 500 mcg/2 ml nebulizer suspension    guaiFENesin ER (MUCINEX) tablet 1,200 mg    metoprolol succinate (TOPROL-XL) XL tablet 50 mg    lisinopriL (PRINIVIL, ZESTRIL) tablet 10 mg      Patient PCP: Anoop Garsia MD  PMH:  has a past medical history of AF (paroxysmal atrial fibrillation) ([address] Anemia, Anxiety, Chronic obstructive pulmonary disease ([address] Heart failure ([address] HTN (hypertension), Obesity, HORACE (obstructive sleep apnea), Pulmonary hypertension ([address] and Stroke ([address] She has no past medical history of Chronic pain. PSH:   has a past surgical history that includes echocardiogram (11/23/2009); nm cardiac spect w strs/rest mult (08/2006); hx gastric bypass; and ir thoracentesis cath w image (10/1/2020).    FHX: family history includes COPD in her mother; Colon Cancer in her father; Diabetes in her mother; Stroke in her brother. SHX:  reports that she has quit smoking. She has never used smokeless tobacco. She reports that she does not currently use alcohol. She reports that she does not use drugs. Systemic review:  General she states her weight stable has not had any chronic fever chills or sweats  Eyes no double vision or momentary blindness  ENT no facial pain over the sinuses she does get chronic congestion uses nasal lavage periodically has not had any recent problems  Endocrinologic no polyuria polydipsia  Musculoskeletal she has had worsening edema of her lower extremities no swollen tender joints  Neurologic no seizures or syncope  Gastrointestinal no nausea vomiting acid indigestion  Genitourinary no pain or discomfort on urination no bleeding  Cardiovascular has worsening ankle edema nocturia once or twice per night only worsening shortness of breath no chest pain or diaphoresis  Respiratory stop smoking over 20 years ago has history of COPD mother had COPD. She has nebulized albuterol Atrovent at home that she uses 2 or 3 times a day.   Had worsening of her respiratory status over the last week    Objective:     Vital Signs: Telemetry:    normal sinus rhythm Intake/Output:   Visit Vitals  /86   Pulse (!) 107   Temp 97 °F (36.1 °C)   Resp 19   Ht 5' 5\" (1.651 m)   Wt 91.7 kg (202 lb 2.6 oz)   SpO2 97%   Breastfeeding No   BMI 33.64 kg/m²       Temp (24hrs), Av.9 °F (36.1 °C), Min:95.9 °F (35.5 °C), Max:97.5 °F (36.4 °C)        O2 Device: Nasal cannula O2 Flow Rate (L/min): 2 l/min       Wt Readings from Last 4 Encounters:   23 91.7 kg (202 lb 2.6 oz)   10/03/20 81.7 kg (180 lb 1.9 oz)   10/18/16 83.6 kg (184 lb 3.2 oz)   12/24/15 79.8 kg (176 lb)          Intake/Output Summary (Last 24 hours) at 3/6/2023 1133  Last data filed at 3/6/2023 0513  Gross per 24 hour   Intake 420 ml   Output 700 ml   Net -280 ml       Last shift:      No intake/output data recorded. Last 3 shifts: 03/04 1901 - 03/06 0700  In: 5 [P.O.:420]  Out: 700 [Urine:700]       Physical Exam:   General:  female; in bed no distress no accessory muscle use  HEENT: NCAT, normal oral mucosa  Eyes: anicteric; conjunctiva clear extraocular movements intact  Neck: no nodes, neck veins are visible  no accessory MM use. Chest: no deformity,   Cardiac: Regular rate and rhythm  Lungs: Coarse and prolonged exhalation anteriorly with only faint wheeze more noticeable wheezing posteriorly just a few rales in the bases  Abd: Overweight soft positive bowel sounds  Ext: Dark discoloration of the lower extremities with edema; no joint swelling; No clubbing  : clear urine  Neuro: Alert oriented speech is clear moves all 4 extremities normally  Psych- no agitation, oriented to person;   Skin: warm, dry, no cyanosis;   Pulses: Brachial and radial pulses intact  Capillary: Normal capillary refill    Labs:    Recent Labs     03/05/23  0613   WBC 10.6   HGB 14.3          Recent Labs     03/05/23  0613      K 4.1      CO2 26   *   BUN 16   CREA 0.74   CA 9.5   LAC 2.6*   ALB 4.4   ALT 18       Recent Labs     03/06/23  0517 03/05/23  0627   PH 7.27* 7.17*   PCO2 56* 68*   PO2 140* 276*   HCO3 25 25   FIO2 32 100     3/6 initial blood gas on 3 L nasal oxygen   3/6 2 L nasal oxygen with saturation 97% and on 1 L 94%  BNP 1993  Troponin 45  9/27/2020 echo ejection fraction 55% with RVSP of 67  Lab Results   Component Value Date/Time    Culture result: culture performed on unspun fluid 10/01/2020 10:15 AM    Culture result: No growth 4 days 10/01/2020 10:15 AM    Culture result: No Growth (<1000 cfu/mL) 09/25/2020 02:15 PM         Imaging:    CXR Results  (Last 48 hours)                 03/05/23 0625  XR CHEST PORT Final result    Impression:  Pulmonary edema and trace pleural effusions.        Narrative:  INDICATION: shortness of breath         EXAM:  AP CHEST RADIOGRAPH COMPARISON: 11/13/2010       FINDINGS:       AP portable view of the chest demonstrates borderline cardiomegaly. Mild   pulmonary edema and probable trace pleural effusions. No pneumothorax. Defibrillator pad obscures portion of the right lung. The osseous structures are   unremarkable. Results from Hospital Encounter encounter on 03/05/23    XR CHEST PORT    Narrative  INDICATION: shortness of breath    EXAM:  AP CHEST RADIOGRAPH    COMPARISON: 11/13/2010    FINDINGS:    AP portable view of the chest demonstrates borderline cardiomegaly. Mild  pulmonary edema and probable trace pleural effusions. No pneumothorax. Defibrillator pad obscures portion of the right lung. The osseous structures are  unremarkable. Impression  Pulmonary edema and trace pleural effusions. Results from [place] encounter on 11/13/20    XR CHEST PA LAT    Narrative  Indication: COPD. PA and lateral radiographs of the chest 13 November 2020. Comparison CT chest 25 September 2020. Calcified 1 cm right lower lobe granuloma unchanged. Decreased right pleural effusion with residual posterior and lateral  costophrenic angle thickening and probably residual loculated fluid. Mild  streaky atelectasis or scarring in the lung bases. Mild cardiomegaly. Thoracic spondylosis, kyphoscoliosis. Impression  IMPRESSION: Residual right basilar pleural parenchymal findings, improved. Results from [place] encounter on 09/24/20    XR CHEST SNGL V    Narrative  History is shortness of breath. Comparison Is with the chest x-ray of 12/18/2019. An AP portable erect view of the chest demonstrate cardiac monitor leads. There  is now a small to moderate right pleural effusion. There is a small calcified  right lower lobe granuloma. The heart is enlarged. There is approximately 27  degrees dextroscoliosis of the thoracic spine.  There is degenerative change  present in the spine as well.    Impression  IMPRESSION: Cardiomegaly. New right pleural effusion. No change in right lower  lobe granuloma. Dextroscoliosis. Results from [facility] encounter on 09/24/20    CT CHEST WO CONT    Narrative  The study is a CT evaluation of the chest dated 9/25/2020. HISTORY: History of chronic obstructive pulmonary disease. Heart failure. Recent  pleural fluid collection. Technique: Performed without intravenous contrast. 3 mm axial imaging, axial MIP  imaging, sagittal, and coronal reconstructed imaging sequences are submitted for  evaluation. Thinner section Super Dimension imaging was also performed. Dose Reduction Technique was employed to reduce radiation exposure - This  includes reduction optimization techniques as appropriate to a performed exam  with automated exposure control adjustments of the mA and/or Kv according to  patient size, or use of iterative reconstruction technique. COMPARISON: Previous CT evaluation of the chest dated 12/18/2019. Recent chest  radiograph dated 9/25/2019. MEDIASTINUM: There are scattered normal sized middle mediastinal lymph nodes  without progression. The largest mediastinal lymph node is located within the  subcarinal region and this lymph node measures 10 mm in short axis measurement  which is considered within normal limits. There is an area of dystrophic calcification within the left thyroid lobe and  mild heterogeneity of the left thyroid lobe. This examination is negative for  large or dominant thyroid nodule or mass. LUNGS AND PLEURA: There is a moderate sized loculated right pleural effusion. This examination is negative for pleural based masses, pleural thickening, or  gas within the pleural fluid collection. There is encasement of the adjacent  lung with areas of passive atelectasis. There is a calcified granuloma within the right lung base.  On axial maximum  intensity imaging, there are several additional small scattered micronodules  with 2 nodules demonstrated posteriorly within the left lower lobe (series [id number]  image number 42). This examination is negative for larger pulmonary nodules or  masses. The central trachea and bronchial tree are patent. There is bronchial  wall thickening and discoid opacities within the lung bases consistent with  reactive airways disease. CARDIOVASCULAR: There are moderate calcifications of the coronary arteries. There is moderate enlargement of the atrial chambers and milder enlargement of  the ventricular chambers. There is a normal caliber to the thoracic aorta. CHEST WALL: There is multilevel spondylosis along the thoracic spine. There is a  mild to moderate dextroconvex scoliosis. There is increased dorsal kyphosis of  the thoracic spine. There are older healed right posterior rib fractures. OTHER: There is surgical suture material demonstrated within the gastric region  consistent with previous bariatric surgery. Impression  IMPRESSION:  1. There is a moderate sized loculated right pleural effusion associated with  passive atelectasis. The differential diagnosis would include recent pneumonia  with a residual parapneumonic effusion. Since exam an [de-identified] is negative for pleural  based masses or findings specific for a malignant effusion. 2.  There are several scattered normal size middle mediastinal lymph nodes. 3.  There are findings of prior granulomatous exposure. Please see above text  for further details. Discussion chest x-ray shows pulmonary edema tiny effusions with old echocardiogram showing pulmonary hypertension. Current BNP is elevated she has peripheral edema. Most likely has pulmonary hypertension with cor pulmonale and resultant pulmonary edema with concomitant COPD with exacerbation.   We will proceed as outlined above time of care 35 minutes  3/6 awake alert have decreased oxygen to 1 L we will check overnight oximetry on and off oxygen repeat ABG in stuart Chisholm MD

## 2023-03-06 NOTE — PROGRESS NOTES
Patient will discharge selfcare when medically stable. Patient lives in a single level home with 1 step to enter. Patient is independent with ADL/IADL care, self medicate and drive to md appt. [facility] [address] [facility] [phone]. Patient daughter will transport at discharge.

## 2023-03-06 NOTE — PROGRESS NOTES
Bedside and Verbal shift change report given to [facility] (oncoming nurse) by Calin Gaxiola RN (offgoing nurse). Report included the following information SBAR, Kardex, and MAR.

## 2023-03-06 NOTE — PROGRESS NOTES
Problem: Chronic Obstructive Pulmonary Disease (COPD)  Goal: *Oxygen saturation during activity within specified parameters  Outcome: Progressing Towards Goal     Problem: Chronic Obstructive Pulmonary Disease (COPD)  Goal: *Absence of hypoxia  Outcome: Progressing Towards Goal     Problem: Chronic Obstructive Pulmonary Disease (COPD)  Goal: *Able to remain out of bed as prescribed  Outcome: Progressing Towards Goal

## 2023-03-07 LAB
ALBUMIN SERPL-MCNC: 3.7 G/DL (ref 3.5–5)
ANION GAP SERPL CALC-SCNC: 4 MMOL/L (ref 5–15)
ARTERIAL PATENCY WRIST A: ABNORMAL
BASE EXCESS BLDA CALC-SCNC: 2.1 MMOL/L (ref 0–3)
BDY SITE: ABNORMAL
BNP SERPL-MCNC: ABNORMAL PG/ML
BUN SERPL-MCNC: 40 MG/DL (ref 6–20)
BUN/CREAT SERPL: 35 (ref 12–20)
CA-I BLD-MCNC: 1.19 MMOL/L (ref 1.13–1.32)
CA-I BLD-MCNC: 8.9 MG/DL (ref 8.5–10.1)
CHLORIDE SERPL-SCNC: 102 MMOL/L (ref 97–108)
CO2 SERPL-SCNC: 29 MMOL/L (ref 21–32)
COHGB MFR BLD: 0.5 % (ref 1–2)
CREAT SERPL-MCNC: 1.14 MG/DL (ref 0.55–1.02)
FIO2 ON VENT: 21 %
GLUCOSE SERPL-MCNC: 213 MG/DL (ref 65–100)
HCO3 BLDA-SCNC: 29 MMOL/L (ref 22–26)
METHGB MFR BLD: 0.4 % (ref 0–1.4)
OXYHGB MFR BLD: 88.7 % (ref 95–99)
PCO2 BLDA: 53 MMHG (ref 35–45)
PERFORMED BY, TECHID: ABNORMAL
PH BLDA: 7.35 (ref 7.35–7.45)
PHOSPHATE SERPL-MCNC: 2.8 MG/DL (ref 2.6–4.7)
PO2 BLDA: 57 MMHG (ref 80–100)
POTASSIUM SERPL-SCNC: 4.2 MMOL/L (ref 3.5–5.1)
SAO2 % BLD: 90 % (ref 95–99)
SAO2% DEVICE SAO2% SENSOR NAME: ABNORMAL
SERVICE CMNT-IMP: ABNORMAL
SODIUM SERPL-SCNC: 135 MMOL/L (ref 136–145)
SPECIMEN SITE: ABNORMAL

## 2023-03-07 PROCEDURE — 36415 COLL VENOUS BLD VENIPUNCTURE: CPT

## 2023-03-07 PROCEDURE — 74011250637 HC RX REV CODE- 250/637: Performed by: INTERNAL MEDICINE

## 2023-03-07 PROCEDURE — 97165 OT EVAL LOW COMPLEX 30 MIN: CPT

## 2023-03-07 PROCEDURE — 74011000250 HC RX REV CODE- 250: Performed by: INTERNAL MEDICINE

## 2023-03-07 PROCEDURE — 82803 BLOOD GASES ANY COMBINATION: CPT

## 2023-03-07 PROCEDURE — 74011250636 HC RX REV CODE- 250/636: Performed by: INTERNAL MEDICINE

## 2023-03-07 PROCEDURE — 94761 N-INVAS EAR/PLS OXIMETRY MLT: CPT

## 2023-03-07 PROCEDURE — 94640 AIRWAY INHALATION TREATMENT: CPT

## 2023-03-07 PROCEDURE — 77010033678 HC OXYGEN DAILY

## 2023-03-07 PROCEDURE — 97530 THERAPEUTIC ACTIVITIES: CPT

## 2023-03-07 PROCEDURE — 65270000029 HC RM PRIVATE

## 2023-03-07 PROCEDURE — 36600 WITHDRAWAL OF ARTERIAL BLOOD: CPT

## 2023-03-07 PROCEDURE — 80069 RENAL FUNCTION PANEL: CPT

## 2023-03-07 PROCEDURE — 74011636637 HC RX REV CODE- 636/637: Performed by: INTERNAL MEDICINE

## 2023-03-07 PROCEDURE — 83880 ASSAY OF NATRIURETIC PEPTIDE: CPT

## 2023-03-07 RX ORDER — PREDNISONE 20 MG/1
20 TABLET ORAL 2 TIMES DAILY WITH MEALS
Status: DISCONTINUED | OUTPATIENT
Start: 2023-03-07 | End: 2023-03-10

## 2023-03-07 RX ADMIN — LISINOPRIL 10 MG: 10 TABLET ORAL at 09:22

## 2023-03-07 RX ADMIN — BUDESONIDE 500 MCG: 0.5 INHALANT ORAL at 20:20

## 2023-03-07 RX ADMIN — IPRATROPIUM BROMIDE AND ALBUTEROL SULFATE 3 ML: 2.5; .5 SOLUTION RESPIRATORY (INHALATION) at 01:49

## 2023-03-07 RX ADMIN — BUDESONIDE 500 MCG: 0.5 INHALANT ORAL at 08:17

## 2023-03-07 RX ADMIN — GUAIFENESIN 1200 MG: 600 TABLET, EXTENDED RELEASE ORAL at 09:22

## 2023-03-07 RX ADMIN — IPRATROPIUM BROMIDE AND ALBUTEROL SULFATE 3 ML: 2.5; .5 SOLUTION RESPIRATORY (INHALATION) at 13:40

## 2023-03-07 RX ADMIN — SODIUM CHLORIDE, PRESERVATIVE FREE 10 ML: 5 INJECTION INTRAVENOUS at 21:34

## 2023-03-07 RX ADMIN — PREDNISONE 20 MG: 20 TABLET ORAL at 17:26

## 2023-03-07 RX ADMIN — FUROSEMIDE 40 MG: 10 INJECTION, SOLUTION INTRAMUSCULAR; INTRAVENOUS at 09:22

## 2023-03-07 RX ADMIN — METHYLPREDNISOLONE SODIUM SUCCINATE 40 MG: 40 INJECTION, POWDER, FOR SOLUTION INTRAMUSCULAR; INTRAVENOUS at 06:00

## 2023-03-07 RX ADMIN — ASPIRIN 81 MG: 81 TABLET, COATED ORAL at 09:22

## 2023-03-07 RX ADMIN — METOPROLOL SUCCINATE 50 MG: 50 TABLET, EXTENDED RELEASE ORAL at 09:22

## 2023-03-07 RX ADMIN — FUROSEMIDE 40 MG: 10 INJECTION, SOLUTION INTRAMUSCULAR; INTRAVENOUS at 21:34

## 2023-03-07 RX ADMIN — SODIUM CHLORIDE, PRESERVATIVE FREE 10 ML: 5 INJECTION INTRAVENOUS at 06:00

## 2023-03-07 RX ADMIN — ATORVASTATIN CALCIUM 20 MG: 10 TABLET, FILM COATED ORAL at 09:22

## 2023-03-07 RX ADMIN — SODIUM CHLORIDE, PRESERVATIVE FREE 10 ML: 5 INJECTION INTRAVENOUS at 17:27

## 2023-03-07 RX ADMIN — GUAIFENESIN 1200 MG: 600 TABLET, EXTENDED RELEASE ORAL at 21:34

## 2023-03-07 RX ADMIN — PANTOPRAZOLE SODIUM 40 MG: 40 TABLET, DELAYED RELEASE ORAL at 09:22

## 2023-03-07 RX ADMIN — IPRATROPIUM BROMIDE AND ALBUTEROL SULFATE 3 ML: 2.5; .5 SOLUTION RESPIRATORY (INHALATION) at 08:17

## 2023-03-07 RX ADMIN — IPRATROPIUM BROMIDE AND ALBUTEROL SULFATE 3 ML: 2.5; .5 SOLUTION RESPIRATORY (INHALATION) at 20:20

## 2023-03-07 RX ADMIN — AZITHROMYCIN DIHYDRATE 500 MG: 500 INJECTION, POWDER, LYOPHILIZED, FOR SOLUTION INTRAVENOUS at 09:21

## 2023-03-07 RX ADMIN — RIVAROXABAN 20 MG: 20 TABLET, FILM COATED ORAL at 09:22

## 2023-03-07 NOTE — PROGRESS NOTES
Bedside and Verbal shift change report given to Emiliano (oncoming nurse) by Paige Dykes (offgoing nurse). Report included the following information SBAR, Kardex, ED Summary, OR Summary, Procedure Summary, Intake/Output, MAR, Accordion, Recent Results, and Med Rec Status.

## 2023-03-07 NOTE — PROGRESS NOTES
Problem: Chronic Obstructive Pulmonary Disease (COPD)  Goal: *Oxygen saturation during activity within specified parameters  Outcome: Progressing Towards Goal  Goal: *Able to remain out of bed as prescribed  Outcome: Progressing Towards Goal  Goal: *Absence of hypoxia  Outcome: Progressing Towards Goal     Problem: Chronic Obstructive Pulmonary Disease (COPD)  Goal: *Able to remain out of bed as prescribed  Outcome: Progressing Towards Goal  Goal: *Absence of hypoxia  Outcome: Progressing Towards Goal     Problem: Pain  Goal: *Control of Pain  Outcome: Progressing Towards Goal

## 2023-03-07 NOTE — PROGRESS NOTES
Approached patient was PT eval, patient reported indep in ambulation and mobility. NO need for therapy. Also patient and RN reported OT cleared the patient too. We will DC PT orders. please reorder therapy if need be. Thank you for the consult.
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
## 2023-03-07 NOTE — PROGRESS NOTES
OCCUPATIONAL THERAPY EVALUATION/DISCHARGE  Patient: Hank Davila (40 y.o. female)  Date: 3/7/2023  Primary Diagnosis: COPD with acute exacerbation ([facility] [address] [J44.1]       Precautions: Falls       ASSESSMENT  Pt is a 66 y/o female with PMH of afib,COD presenting to [facility] with c/o dyspnea, admitted 3/5/23  and being treated for COPD exacerbation. Pt received semi-supine in bed upon arrival, AXO x4, and agreeable to OT evaluations at this time. Per pt report, pt lives alone in a one-story home with no LORENE and B HR, was IND with ADLs and no AD for mobility at [facility]. Based on current observations, pt presents at functional baseline for ADLs/mobility demonstrating good UE strength/AROM, bed mobility, static/dynamic sitting balance, static/dynamic standing balance, functional activity tolerance impacting overall performance of ADLs and functional transfers/mobility. Pt Ind for supine:sit, sit:stand. Ambulated to bathroom with no AD, Ind with toilet transfer and all aspects of toileting. Completed full grooming task standing at sink. Sitting chair end of session. Pt stated her daughter passed away unexpectedly 2 weeks ago, offered support and therapeutic listening as pt expressed her grief. Pt currently requires no assistance. Overall, pt tolerates session well. Pt is observed and reported to be at functional baseline w/ ADLs/mobility w/ no further OT skills indicated. Pt in agreement. Will therefore d/c patient from OT caseload at this time recommend d/c home w/ family/self care once medically appropriate. Other factors to consider for discharge: IND baseline      Patient will benefit from skilled therapy intervention to address the above noted impairments.        PLAN :  Recommendation for discharge: (in order for the patient to meet his/her long term goals)  [address]    This discharge recommendation:  Has been made in collaboration with the attending provider and/or case management    IF patient discharges home will need the following DME: patient owns DME required for discharge       SUBJECTIVE:   Patient stated I'm still grieving.     OBJECTIVE DATA SUMMARY:   HISTORY:   Past Medical History:   Diagnosis Date    AF (paroxysmal atrial fibrillation) ([facility] [address]     d/c cardioversion 2006, rythmol started 2007    Anemia     Anxiety     Chronic obstructive pulmonary disease (HCC)     Heart failure (HCC)     Diastolic    HTN (hypertension)     Obesity     gastric bypass    HORACE (obstructive sleep apnea)     non compliant with cpap    Pulmonary hypertension ([facility] [address]     Stroke [facility])      Past Surgical History:   Procedure Laterality Date    ECHOCARDIOGRAM  11/23/2009    normal LV wall motion and EF 60-65%, LVH, left atrial enlargement, mild tricuspid regurg, RVSP 36mmhg , no change from jan 2008    HX GASTRIC BYPASS      about 30 years ago. IR THORACENTESIS CATH W IMAGE  10/1/2020    NM CARDIAC SPECT W STRS/REST MULT  08/2006    no fixed or reversible defects       Expanded or extensive additional review of patient history:     Home Situation  Home Environment: Private residence  # Steps to Enter: 3  One/Two Story Residence: One story  # of Interior Steps: 12  Interior Rails: Left  Lift Chair Available: No  Living Alone: Yes  Support Systems: Friend/Neighbor, Child(manju)  Patient Expects to be Discharged to[de-identified] Home  Current DME Used/Available at Home: None    Hand dominance: Right    EXAMINATION OF PERFORMANCE DEFICITS:  Cognitive/Behavioral Status:  Neurologic State: Alert  Orientation Level: Oriented X4  Cognition: Follows commands; Appropriate decision making               Hearing: Auditory  Auditory Impairment: None    Vision/Perceptual:                                     Range of Motion:    AROM: Generally decreased, functional                         Strength:    Strength: Generally decreased, functional                Coordination:     Fine Motor Skills-Upper: Left Intact; Right Intact    Gross Motor Skills-Upper: Left Intact; Right Intact    Tone & Sensation:                              Balance:  Sitting: Intact  Standing: Intact    Functional Mobility and Transfers for ADLs:  Bed Mobility:  Rolling: Independent  Supine to Sit: Independent  Sit to Supine: Independent  Scooting: Independent    Transfers:  Sit to Stand: Independent  Stand to Sit: Independent  Bed to Chair: Independent    ADL Assessment:                                            ADL Intervention and task modifications:       Grooming  Position Performed: Standing  Washing Face: Independent  Washing Hands: Independent  Brushing Teeth: Independent                                     Functional Measure:    [facility] [employer] AM-PACTM \"6 Clicks\"                                                       Daily Activity Inpatient Short Form  How much help from another person does the patient currently need. .. Total; A Lot A Little None   1. Putting on and taking off regular lower body clothing? []  1 []  2 []  3 [x]  4   2. Bathing (including washing, rinsing, drying)? []  1 []  2 []  3 [x]  4   3. Toileting, which includes using toilet, bedpan or urinal? [] 1 []  2 []  3 [x]  4   4. Putting on and taking off regular upper body clothing? []  1 []  2 []  3 [x]  4   5. Taking care of personal grooming such as brushing teeth? []  1 []  2 []  3 [x]  4   6. Eating meals? []  1 []  2   3  [x] 4   © 2007, Trustees of [facility] [employer], under license to [employer]. All rights reserved     Score: 24/24     Interpretation of Tool:  Represents clinically-significant functional categories (i.e. Activities of daily living).   Percentage of Impairment CH    0%   CI    1-19% CJ    20-39% CK    40-59% CL    60-79% CM    80-99% CN     100%   [facility]  Score 6-24 24 23 20-22 15-19 10-14 7-9 6         Occupational Therapy Evaluation Charge Determination   History Examination Decision-Making   LOW Complexity : Brief history review  LOW Complexity : 1-3 performance deficits relating to physical, cognitive , or psychosocial skils that result in activity limitations and / or participation restrictions  MEDIUM Complexity : Patient may present with comorbidities that affect occupational performnce. Miniml to moderate modification of tasks or assistance (eg, physical or verbal ) with assesment(s) is necessary to enable patient to complete evaluation       Based on the above components, the patient evaluation is determined to be of the following complexity level: LOW     Pain Ratin/10    Activity Tolerance: WNL    After treatment patient left in no apparent distress:    Sitting in chair and Call bell within reach    COMMUNICATION/EDUCATION:   The patients plan of care was discussed with: Physical therapist.       OT/PT sessions occurred together for increased patient and clinician safety as pt with decreased activity tolerance and requires A of 2 for mobility at this time.      Thank you for this referral.  Rhea Welsh OT  Time Calculation: 20 mins

## 2023-03-07 NOTE — PROGRESS NOTES
Consult  Pulmonary, Critical Care    Name: Esperanza Alexander MRN: [id number]   : 1950 Hospital: [facility]   Date: 3/7/2023  Admission date: 3/5/2023 Hospital Day: 3       Subjective/Interval History:   Seen on the medical floor currently on nasal oxygen sitting up in the chair awake alert states she feels some better. History is underlying COPD obstructive sleep apnea intolerant of CPAP on no oxygen at home does have nebulized albuterol/ipratropium that she uses about 3 times a day. She also has Lasix questionably 20 mg daily she states it does induce diuresis but sometimes she has to skip it when she has to go to work. Over the last week or so she has had worsening edema of her ankles some worsening shortness of breath cough some thick sputum no purulence no fever chills or sweats  3/6 feels a little better but still shortness of breath with any exertion she is a wide awake no distress no accessory muscle use  3/7 eels better getting up to the bathroom and out of bed without difficulty. Currently on room air oxygen saturation 93%    Hospital Problems  Date Reviewed: 2015            Codes Class Noted POA    COPD with acute exacerbation [facility]) ICD-10-CM: J44.1  ICD-9-CM: 491.21  3/5/2023 Unknown         IMPRESSION:   Acute hypoxic respiratory failure room air oxygen saturation when EMS arrived was 85% now on room air  Acute hypercapnic respiratory failure PCO2 improved  Pulmonary edema  Pulmonary hypertension  Probably cor pulmonale  Exacerbation COPD  Body mass index is 33.64 kg/m².         RECOMMENDATIONS/PLAN:   Continue IV Lasix twice daily follow renal function  Continue nebulized albuterol Atrovent every 6 hours  Continue nebulized budesonide  Switch Solu-Medrol to oral prep  Overnight oximetry showed minimal desaturation only 6 minutes below 88 room air and repeat ABG in a.m. to see if PCO2 continues to improve  She has a history of sleep apnea has never been tolerant of CPAP but she states she does sleep in a recliner semiupright            [x] High complexity decision making was performed  [x] See my orders for details      Subjective/Initial History:     I was asked by Sylvia Melgoza MD to see Betina Fritz  a 67 y.o.  female in consultation for a chief complaint of acute hypoxic and hypercapnic respiratory failure with exacerbation COPD and pulmonary edema        No Known Allergies     MAR reviewed and pertinent medications noted or modified as needed     Current Facility-Administered Medications   Medication    predniSONE (DELTASONE) tablet 20 mg    sodium chloride (NS) flush 5-40 mL    sodium chloride (NS) flush 5-40 mL    azithromycin (ZITHROMAX) 500 mg in 0.9% sodium chloride 250 mL ([id number])    acetaminophen (TYLENOL) tablet 650 mg    ondansetron (ZOFRAN) injection 4 mg    magnesium hydroxide (MILK OF MAGNESIA) 400 mg/5 mL oral suspension 30 mL    furosemide (LASIX) injection 40 mg    aspirin delayed-release tablet 81 mg    atorvastatin (LIPITOR) tablet 20 mg    melatonin tablet 3 mg    pantoprazole (PROTONIX) tablet 40 mg    rivaroxaban (XARELTO) tablet 20 mg    albuterol-ipratropium (DUO-NEB) 2.5 MG-0.5 MG/3 ML    budesonide (PULMICORT) 500 mcg/2 ml nebulizer suspension    guaiFENesin ER (MUCINEX) tablet 1,200 mg    metoprolol succinate (TOPROL-XL) XL tablet 50 mg    lisinopriL (PRINIVIL, ZESTRIL) tablet 10 mg      Patient PCP: Nathanel Fabry, MD  PMH:  has a past medical history of AF (paroxysmal atrial fibrillation) ([address] Anemia, Anxiety, Chronic obstructive pulmonary disease ([address] Heart failure ([address] HTN (hypertension), Obesity, HORACE (obstructive sleep apnea), Pulmonary hypertension ([address] and Stroke ([address] She has no past medical history of Chronic pain. PSH:   has a past surgical history that includes echocardiogram (11/23/2009); nm cardiac spect w strs/rest mult (08/2006); hx gastric bypass; and ir thoracentesis cath w image (10/1/2020).    FHX: family history includes COPD in her mother; Colon Cancer in her father; Diabetes in her mother; Stroke in her brother. SHX:  reports that she has quit smoking. She has never used smokeless tobacco. She reports that she does not currently use alcohol. She reports that she does not use drugs. Systemic review:  General she states her weight stable has not had any chronic fever chills or sweats  Eyes no double vision or momentary blindness  ENT no facial pain over the sinuses she does get chronic congestion uses nasal lavage periodically has not had any recent problems  Endocrinologic no polyuria polydipsia  Musculoskeletal she has had worsening edema of her lower extremities no swollen tender joints  Neurologic no seizures or syncope  Gastrointestinal no nausea vomiting acid indigestion  Genitourinary no pain or discomfort on urination no bleeding  Cardiovascular has worsening ankle edema nocturia once or twice per night only worsening shortness of breath no chest pain or diaphoresis  Respiratory stop smoking over 20 years ago has history of COPD mother had COPD. She has nebulized albuterol Atrovent at home that she uses 2 or 3 times a day.   Had worsening of her respiratory status over the last week    Objective:     Vital Signs: Telemetry:    normal sinus rhythm Intake/Output:   Visit Vitals  BP (!) 144/93 (BP 1 Location: Left upper arm)   Pulse 86   Temp 98.7 °F (37.1 °C)   Resp 20   Ht 5' 5\" (1.651 m)   Wt 91.7 kg (202 lb 2.6 oz)   SpO2 93%   Breastfeeding No   BMI 33.64 kg/m²       Temp (24hrs), Av.2 °F (36.8 °C), Min:97.7 °F (36.5 °C), Max:98.7 °F (37.1 °C)        O2 Device: None (Room air) O2 Flow Rate (L/min): 1 l/min       Wt Readings from Last 4 Encounters:   23 91.7 kg (202 lb 2.6 oz)   10/03/20 81.7 kg (180 lb 1.9 oz)   10/18/16 83.6 kg (184 lb 3.2 oz)   12/24/15 79.8 kg (176 lb)          Intake/Output Summary (Last 24 hours) at 3/7/2023 1256  Last data filed at 3/7/2023 0817  Gross per 24 hour   Intake 490 ml   Output --   Net 490 ml         Last shift:      03/07 0701 - 03/07 1900  In: 240 [P.O.:240]  Out: -   Last 3 shifts: 03/05 1901 - 03/07 0700  In: 250 [I.V.:250]  Out: 700 [Urine:700]       Physical Exam:   General:  female; in bed no distress no accessory muscle use  HEENT: NCAT, normal oral mucosa  Eyes: anicteric; conjunctiva clear extraocular movements intact  Neck: no nodes, neck veins are visible  no accessory MM use. Chest: no deformity,   Cardiac: Regular rate and rhythm  Lungs: Coarse and prolonged exhalation anteriorly with only faint wheeze more noticeable wheezing posteriorly just a few rales in the bases  Abd: Overweight soft positive bowel sounds  Ext: Dark discoloration of the lower extremities with edema; no joint swelling;  No clubbing  : clear urine  Neuro: Alert oriented speech is clear moves all 4 extremities normally  Psych- no agitation, oriented to person;   Skin: warm, dry, no cyanosis;   Pulses: Brachial and radial pulses intact  Capillary: Normal capillary refill    Labs:    Recent Labs     03/06/23  1223 03/05/23  0613   WBC 6.5 10.6   HGB 12.5 14.3    267       Recent Labs     03/06/23  1223 03/05/23  0613   * 137   K 4.6 4.1   CL 99 103   CO2 29 26   * 159*   BUN 35* 16   CREA 1.04* 0.74   CA 9.2 9.5   MG 1.8  --    PHOS 4.8*  --    LAC  --  2.6*   ALB 3.8 4.4   ALT  --  18       Recent Labs     03/07/23  0505 03/06/23  0517 03/05/23  0627   PH 7.35 7.27* 7.17*   PCO2 53* 56* 68*   PO2 57* 140* 276*   HCO3 29* 25 25   FIO2 21 32 100     3/7 overnight oximetry low oxygen saturation 79% only 6 minutes below 88%   3/6 initial blood gas on 3 L nasal oxygen   3/6 2 L nasal oxygen with saturation 97% and on 1 L 94%  BNP 1993  Troponin 45  9/27/2020 echo ejection fraction 55% with RVSP of 67  Lab Results   Component Value Date/Time    Culture result: culture performed on unspun fluid 10/01/2020 10:15 AM    Culture result: No growth 4 days 10/01/2020 10:15 AM    Culture result: No Growth (<1000 cfu/mL) 09/25/2020 02:15 PM         Imaging:    CXR Results  (Last 48 hours)      None          Results from Hospital Encounter encounter on 03/05/23    XR CHEST PORT    Narrative  INDICATION: shortness of breath    EXAM:  AP CHEST RADIOGRAPH    COMPARISON: 11/13/2010    FINDINGS:    AP portable view of the chest demonstrates borderline cardiomegaly. Mild  pulmonary edema and probable trace pleural effusions. No pneumothorax. Defibrillator pad obscures portion of the right lung. The osseous structures are  unremarkable. Impression  Pulmonary edema and trace pleural effusions. Results from [place] encounter on 11/13/20    XR CHEST PA LAT    Narrative  Indication: COPD. PA and lateral radiographs of the chest 13 November 2020. Comparison CT chest 25 September 2020. Calcified 1 cm right lower lobe granuloma unchanged. Decreased right pleural effusion with residual posterior and lateral  costophrenic angle thickening and probably residual loculated fluid. Mild  streaky atelectasis or scarring in the lung bases. Mild cardiomegaly. Thoracic spondylosis, kyphoscoliosis. Impression  IMPRESSION: Residual right basilar pleural parenchymal findings, improved. Results from [place] encounter on 09/24/20    XR CHEST SNGL V    Narrative  History is shortness of breath. Comparison Is with the chest x-ray of 12/18/2019. An AP portable erect view of the chest demonstrate cardiac monitor leads. There  is now a small to moderate right pleural effusion. There is a small calcified  right lower lobe granuloma. The heart is enlarged. There is approximately 27  degrees dextroscoliosis of the thoracic spine. There is degenerative change  present in the spine as well. Impression  IMPRESSION: Cardiomegaly. New right pleural effusion. No change in right lower  lobe granuloma. Dextroscoliosis.     Results from [facility] encounter on 09/24/20    CT CHEST WO CONT    Narrative  The study is a CT evaluation of the chest dated 9/25/2020. HISTORY: History of chronic obstructive pulmonary disease. Heart failure. Recent  pleural fluid collection. Technique: Performed without intravenous contrast. 3 mm axial imaging, axial MIP  imaging, sagittal, and coronal reconstructed imaging sequences are submitted for  evaluation. Thinner section Super Dimension imaging was also performed. Dose Reduction Technique was employed to reduce radiation exposure - This  includes reduction optimization techniques as appropriate to a performed exam  with automated exposure control adjustments of the mA and/or Kv according to  patient size, or use of iterative reconstruction technique. COMPARISON: Previous CT evaluation of the chest dated 12/18/2019. Recent chest  radiograph dated 9/25/2019. MEDIASTINUM: There are scattered normal sized middle mediastinal lymph nodes  without progression. The largest mediastinal lymph node is located within the  subcarinal region and this lymph node measures 10 mm in short axis measurement  which is considered within normal limits. There is an area of dystrophic calcification within the left thyroid lobe and  mild heterogeneity of the left thyroid lobe. This examination is negative for  large or dominant thyroid nodule or mass. LUNGS AND PLEURA: There is a moderate sized loculated right pleural effusion. This examination is negative for pleural based masses, pleural thickening, or  gas within the pleural fluid collection. There is encasement of the adjacent  lung with areas of passive atelectasis. There is a calcified granuloma within the right lung base. On axial maximum  intensity imaging, there are several additional small scattered micronodules  with 2 nodules demonstrated posteriorly within the left lower lobe (series [id number]  image number [id number]).  This examination is negative for larger pulmonary nodules or  masses. The central trachea and bronchial tree are patent. There is bronchial  wall thickening and discoid opacities within the lung bases consistent with  reactive airways disease. CARDIOVASCULAR: There are moderate calcifications of the coronary arteries. There is moderate enlargement of the atrial chambers and milder enlargement of  the ventricular chambers. There is a normal caliber to the thoracic aorta. CHEST WALL: There is multilevel spondylosis along the thoracic spine. There is a  mild to moderate dextroconvex scoliosis. There is increased dorsal kyphosis of  the thoracic spine. There are older healed right posterior rib fractures. OTHER: There is surgical suture material demonstrated within the gastric region  consistent with previous bariatric surgery. Impression  IMPRESSION:  1. There is a moderate sized loculated right pleural effusion associated with  passive atelectasis. The differential diagnosis would include recent pneumonia  with a residual parapneumonic effusion. Since exam an [de-identified] is negative for pleural  based masses or findings specific for a malignant effusion. 2.  There are several scattered normal size middle mediastinal lymph nodes. 3.  There are findings of prior granulomatous exposure. Please see above text  for further details. Discussion chest x-ray shows pulmonary edema tiny effusions with old echocardiogram showing pulmonary hypertension. Current BNP is elevated she has peripheral edema. Most likely has pulmonary hypertension with cor pulmonale and resultant pulmonary edema with concomitant COPD with exacerbation.   We will proceed as outlined above time of care 35 minutes  3/6 awake alert have decreased oxygen to 1 L we will check overnight oximetry on and off oxygen repeat ABG in a.m.  3/7 urine output not recorded but clinically she is doing better no labs available today continue Lasix we will switch Solu-Medrol to oral prednisone we will leave on room air and repeat ABG in a.m. to assess PCO2      Nixon Marie MD

## 2023-03-08 ENCOUNTER — APPOINTMENT (OUTPATIENT)
Dept: GENERAL RADIOLOGY | Age: 73
DRG: 291 | End: 2023-03-08
Attending: INTERNAL MEDICINE
Payer: MEDICARE

## 2023-03-08 LAB
ALBUMIN SERPL-MCNC: 3.7 G/DL (ref 3.5–5)
ANION GAP SERPL CALC-SCNC: 4 MMOL/L (ref 5–15)
ARTERIAL PATENCY WRIST A: ABNORMAL
BASE EXCESS BLDA CALC-SCNC: 4.4 MMOL/L (ref 0–3)
BDY SITE: ABNORMAL
BODY TEMPERATURE: 97.5
BUN SERPL-MCNC: 39 MG/DL (ref 6–20)
BUN/CREAT SERPL: 41 (ref 12–20)
CA-I BLD-MCNC: 9.3 MG/DL (ref 8.5–10.1)
CHLORIDE SERPL-SCNC: 101 MMOL/L (ref 97–108)
CO2 SERPL-SCNC: 31 MMOL/L (ref 21–32)
COHGB MFR BLD: 0.7 % (ref 1–2)
CREAT SERPL-MCNC: 0.95 MG/DL (ref 0.55–1.02)
FIO2 ON VENT: 21 %
GLUCOSE SERPL-MCNC: 119 MG/DL (ref 65–100)
HCO3 BLDA-SCNC: 30 MMOL/L (ref 22–26)
METHGB MFR BLD: 0.4 % (ref 0–1.4)
OXYHGB MFR BLD: 92.6 % (ref 95–99)
PCO2 BLDA: 48 MMHG (ref 35–45)
PERFORMED BY, TECHID: ABNORMAL
PH BLDA: 7.41 (ref 7.35–7.45)
PHOSPHATE SERPL-MCNC: 2.2 MG/DL (ref 2.6–4.7)
PO2 BLDA: 67 MMHG (ref 80–100)
POTASSIUM SERPL-SCNC: 3.6 MMOL/L (ref 3.5–5.1)
SAO2 % BLD: 94 % (ref 95–99)
SAO2% DEVICE SAO2% SENSOR NAME: ABNORMAL
SODIUM SERPL-SCNC: 136 MMOL/L (ref 136–145)
SPECIMEN SITE: ABNORMAL

## 2023-03-08 PROCEDURE — 94640 AIRWAY INHALATION TREATMENT: CPT

## 2023-03-08 PROCEDURE — 74011250636 HC RX REV CODE- 250/636: Performed by: INTERNAL MEDICINE

## 2023-03-08 PROCEDURE — 71045 X-RAY EXAM CHEST 1 VIEW: CPT

## 2023-03-08 PROCEDURE — 80069 RENAL FUNCTION PANEL: CPT

## 2023-03-08 PROCEDURE — 65270000029 HC RM PRIVATE

## 2023-03-08 PROCEDURE — 77010033678 HC OXYGEN DAILY

## 2023-03-08 PROCEDURE — 94761 N-INVAS EAR/PLS OXIMETRY MLT: CPT

## 2023-03-08 PROCEDURE — 74011636637 HC RX REV CODE- 636/637: Performed by: INTERNAL MEDICINE

## 2023-03-08 PROCEDURE — 74011250637 HC RX REV CODE- 250/637: Performed by: INTERNAL MEDICINE

## 2023-03-08 PROCEDURE — 36600 WITHDRAWAL OF ARTERIAL BLOOD: CPT

## 2023-03-08 PROCEDURE — 82803 BLOOD GASES ANY COMBINATION: CPT

## 2023-03-08 PROCEDURE — 74011000250 HC RX REV CODE- 250: Performed by: INTERNAL MEDICINE

## 2023-03-08 PROCEDURE — 74011250637 HC RX REV CODE- 250/637: Performed by: STUDENT IN AN ORGANIZED HEALTH CARE EDUCATION/TRAINING PROGRAM

## 2023-03-08 PROCEDURE — 36415 COLL VENOUS BLD VENIPUNCTURE: CPT

## 2023-03-08 RX ORDER — FUROSEMIDE 40 MG/1
40 TABLET ORAL DAILY
Status: DISCONTINUED | OUTPATIENT
Start: 2023-03-08 | End: 2023-03-12 | Stop reason: HOSPADM

## 2023-03-08 RX ORDER — SODIUM,POTASSIUM PHOSPHATES 280-250MG
2 POWDER IN PACKET (EA) ORAL EVERY 4 HOURS
Status: COMPLETED | OUTPATIENT
Start: 2023-03-08 | End: 2023-03-08

## 2023-03-08 RX ORDER — ACETAZOLAMIDE 250 MG/1
500 TABLET ORAL EVERY 6 HOURS
Status: COMPLETED | OUTPATIENT
Start: 2023-03-08 | End: 2023-03-08

## 2023-03-08 RX ADMIN — PREDNISONE 20 MG: 20 TABLET ORAL at 08:47

## 2023-03-08 RX ADMIN — ACETAZOLAMIDE 500 MG: 250 TABLET ORAL at 15:45

## 2023-03-08 RX ADMIN — IPRATROPIUM BROMIDE AND ALBUTEROL SULFATE 3 ML: 2.5; .5 SOLUTION RESPIRATORY (INHALATION) at 08:17

## 2023-03-08 RX ADMIN — IPRATROPIUM BROMIDE AND ALBUTEROL SULFATE 3 ML: 2.5; .5 SOLUTION RESPIRATORY (INHALATION) at 14:09

## 2023-03-08 RX ADMIN — BUDESONIDE 500 MCG: 0.5 INHALANT ORAL at 08:17

## 2023-03-08 RX ADMIN — PANTOPRAZOLE SODIUM 40 MG: 40 TABLET, DELAYED RELEASE ORAL at 08:47

## 2023-03-08 RX ADMIN — IPRATROPIUM BROMIDE AND ALBUTEROL SULFATE 3 ML: 2.5; .5 SOLUTION RESPIRATORY (INHALATION) at 00:49

## 2023-03-08 RX ADMIN — IPRATROPIUM BROMIDE AND ALBUTEROL SULFATE 3 ML: 2.5; .5 SOLUTION RESPIRATORY (INHALATION) at 19:44

## 2023-03-08 RX ADMIN — METOPROLOL SUCCINATE 50 MG: 50 TABLET, EXTENDED RELEASE ORAL at 08:47

## 2023-03-08 RX ADMIN — RIVAROXABAN 20 MG: 20 TABLET, FILM COATED ORAL at 08:48

## 2023-03-08 RX ADMIN — SODIUM CHLORIDE, PRESERVATIVE FREE 10 ML: 5 INJECTION INTRAVENOUS at 05:08

## 2023-03-08 RX ADMIN — GUAIFENESIN 1200 MG: 600 TABLET, EXTENDED RELEASE ORAL at 08:47

## 2023-03-08 RX ADMIN — BUDESONIDE 500 MCG: 0.5 INHALANT ORAL at 19:45

## 2023-03-08 RX ADMIN — ATORVASTATIN CALCIUM 20 MG: 10 TABLET, FILM COATED ORAL at 08:47

## 2023-03-08 RX ADMIN — POTASSIUM & SODIUM PHOSPHATES POWDER PACK 280-160-250 MG 2 PACKET: 280-160-250 PACK at 22:20

## 2023-03-08 RX ADMIN — POTASSIUM & SODIUM PHOSPHATES POWDER PACK 280-160-250 MG 2 PACKET: 280-160-250 PACK at 19:14

## 2023-03-08 RX ADMIN — SODIUM CHLORIDE, PRESERVATIVE FREE 10 ML: 5 INJECTION INTRAVENOUS at 15:51

## 2023-03-08 RX ADMIN — FUROSEMIDE 40 MG: 40 TABLET ORAL at 11:15

## 2023-03-08 RX ADMIN — LISINOPRIL 10 MG: 10 TABLET ORAL at 08:47

## 2023-03-08 RX ADMIN — POTASSIUM & SODIUM PHOSPHATES POWDER PACK 280-160-250 MG 2 PACKET: 280-160-250 PACK at 15:45

## 2023-03-08 RX ADMIN — SODIUM CHLORIDE, PRESERVATIVE FREE 10 ML: 5 INJECTION INTRAVENOUS at 22:23

## 2023-03-08 RX ADMIN — ACETAZOLAMIDE 500 MG: 250 TABLET ORAL at 22:20

## 2023-03-08 RX ADMIN — GUAIFENESIN 1200 MG: 600 TABLET, EXTENDED RELEASE ORAL at 22:20

## 2023-03-08 RX ADMIN — PREDNISONE 20 MG: 20 TABLET ORAL at 17:43

## 2023-03-08 RX ADMIN — AZITHROMYCIN DIHYDRATE 500 MG: 500 INJECTION, POWDER, LYOPHILIZED, FOR SOLUTION INTRAVENOUS at 06:56

## 2023-03-08 RX ADMIN — ASPIRIN 81 MG: 81 TABLET, COATED ORAL at 08:48

## 2023-03-08 NOTE — PROGRESS NOTES
Problem: Chronic Obstructive Pulmonary Disease (COPD)  Goal: *Able to remain out of bed as prescribed  Outcome: Progressing Towards Goal     Problem: Chronic Obstructive Pulmonary Disease (COPD)  Goal: *Absence of hypoxia  Outcome: Progressing Towards Goal     Problem: Falls - Risk of  Goal: *Absence of Falls  Description: Document Bandar Fall Risk and appropriate interventions in the flowsheet.   Outcome: Progressing Towards Goal  Note: Fall Risk Interventions:

## 2023-03-08 NOTE — PROGRESS NOTES
Consult  Pulmonary, Critical Care    Name: Jaxon Benjamin MRN: [id number]   : 1950 Hospital: [facility]   Date: 3/8/2023  Admission date: 3/5/2023 Hospital Day: 4       Subjective/Interval History:   Seen on the medical floor currently on nasal oxygen sitting up in the chair awake alert states she feels some better. History is underlying COPD obstructive sleep apnea intolerant of CPAP on no oxygen at home does have nebulized albuterol/ipratropium that she uses about 3 times a day. She also has Lasix questionably 20 mg daily she states it does induce diuresis but sometimes she has to skip it when she has to go to work. Over the last week or so she has had worsening edema of her ankles some worsening shortness of breath cough some thick sputum no purulence no fever chills or sweats  3/6 feels a little better but still shortness of breath with any exertion she is a wide awake no distress no accessory muscle use  3/7 eels better getting up to the bathroom and out of bed without difficulty. Currently on room air oxygen saturation 93%    Hospital Problems  Date Reviewed: 2015            Codes Class Noted POA    COPD with acute exacerbation [facility]) ICD-10-CM: J44.1  ICD-9-CM: 491.21  3/5/2023 Unknown       IMPRESSION:   Acute hypoxic respiratory failure resolved now on room air  Acute hypercapnic respiratory failure PCO2 continues to improve  Pulmonary edema resolved radiographically  Pulmonary hypertension  Probably cor pulmonale  Exacerbation COPD  Body mass index is 33.64 kg/m².         RECOMMENDATIONS/PLAN:   Lasix now oral  Continue nebulized albuterol Atrovent   Continue nebulized budesonide  Continue prednisone  Replace phosphorus  Overnight oximetry showed minimal desaturation only 6 minutes below 88 room air and repeat ABG in a.m. to see if PCO2 continues to improve  She has a history of sleep apnea has never been tolerant of CPAP but she states she does sleep in a recliner semiupright            [x] High complexity decision making was performed  [x] See my orders for details      Subjective/Initial History:     I was asked by Dima Schmitt MD to see Mary Zambrano  a 67 y.o.  female in consultation for a chief complaint of acute hypoxic and hypercapnic respiratory failure with exacerbation COPD and pulmonary edema        No Known Allergies     MAR reviewed and pertinent medications noted or modified as needed     Current Facility-Administered Medications   Medication    furosemide (LASIX) tablet 40 mg    acetaZOLAMIDE (DIAMOX) tablet 500 mg    potassium, sodium phosphates (NEUTRA-PHOS) packet 2 Packet    predniSONE (DELTASONE) tablet 20 mg    sodium chloride (NS) flush 5-40 mL    sodium chloride (NS) flush 5-40 mL    azithromycin (ZITHROMAX) 500 mg in 0.9% sodium chloride 250 mL ([id number])    acetaminophen (TYLENOL) tablet 650 mg    ondansetron (ZOFRAN) injection 4 mg    magnesium hydroxide (MILK OF MAGNESIA) 400 mg/5 mL oral suspension 30 mL    aspirin delayed-release tablet 81 mg    atorvastatin (LIPITOR) tablet 20 mg    melatonin tablet 3 mg    pantoprazole (PROTONIX) tablet 40 mg    rivaroxaban (XARELTO) tablet 20 mg    albuterol-ipratropium (DUO-NEB) 2.5 MG-0.5 MG/3 ML    budesonide (PULMICORT) 500 mcg/2 ml nebulizer suspension    guaiFENesin ER (MUCINEX) tablet 1,200 mg    metoprolol succinate (TOPROL-XL) XL tablet 50 mg    lisinopriL (PRINIVIL, ZESTRIL) tablet 10 mg      Patient PCP: Michelle Choi MD  PMH:  has a past medical history of AF (paroxysmal atrial fibrillation) ([address] Anemia, Anxiety, Chronic obstructive pulmonary disease ([address] Heart failure ([address] HTN (hypertension), Obesity, HORACE (obstructive sleep apnea), Pulmonary hypertension ([address] and Stroke ([address] She has no past medical history of Chronic pain.   PSH:   has a past surgical history that includes echocardiogram (11/23/2009); nm cardiac spect w strs/rest mult (08/2006); hx gastric bypass; and ir thoracentesis cath w image (10/1/2020). FHX: family history includes COPD in her mother; Colon Cancer in her father; Diabetes in her mother; Stroke in her brother. SHX:  reports that she has quit smoking. She has never used smokeless tobacco. She reports that she does not currently use alcohol. She reports that she does not use drugs. Systemic review:  General she states her weight stable has not had any chronic fever chills or sweats  Eyes no double vision or momentary blindness  ENT no facial pain over the sinuses she does get chronic congestion uses nasal lavage periodically has not had any recent problems  Endocrinologic no polyuria polydipsia  Musculoskeletal she has had worsening edema of her lower extremities no swollen tender joints  Neurologic no seizures or syncope  Gastrointestinal no nausea vomiting acid indigestion  Genitourinary no pain or discomfort on urination no bleeding  Cardiovascular has worsening ankle edema nocturia once or twice per night only worsening shortness of breath no chest pain or diaphoresis  Respiratory stop smoking over 20 years ago has history of COPD mother had COPD. She has nebulized albuterol Atrovent at home that she uses 2 or 3 times a day.   Had worsening of her respiratory status over the last week    Objective:     Vital Signs: Telemetry:    normal sinus rhythm Intake/Output:   Visit Vitals  /89 (BP 1 Location: Left upper arm, BP Patient Position: Sitting)   Pulse 96   Temp 98 °F (36.7 °C)   Resp 18   Ht 5' 5\" (1.651 m)   Wt 91.7 kg (202 lb 2.6 oz)   SpO2 95%   Breastfeeding No   BMI 33.64 kg/m²       Temp (24hrs), Av.7 °F (36.5 °C), Min:97.5 °F (36.4 °C), Max:98 °F (36.7 °C)        O2 Device: None (Room air) O2 Flow Rate (L/min): 1 l/min       Wt Readings from Last 4 Encounters:   23 91.7 kg (202 lb 2.6 oz)   10/03/20 81.7 kg (180 lb 1.9 oz)   10/18/16 83.6 kg (184 lb 3.2 oz)   12/24/15 79.8 kg (176 lb)        No intake or output data in the 24 hours ending 03/08/23 1529      Last shift:      No intake/output data recorded. Last 3 shifts: 03/06 1901 - 03/08 0700  In: 56 [P.O.:240; I.V.:250]  Out: -        Physical Exam:   General:  female; in bed no distress no accessory muscle use  HEENT: NCAT, normal oral mucosa  Eyes: anicteric; conjunctiva clear extraocular movements intact  Neck: no nodes, neck veins are visible  no accessory MM use. Chest: no deformity,   Cardiac: Regular rate and rhythm  Lungs: Typically clear anterior and laterally just a few wheezes posteriorly with rales in the extreme bases  Abd: Overweight soft positive bowel sounds  Ext: Dark discoloration of the lower extremities with edema; no joint swelling;  No clubbing  : clear urine  Neuro: Alert oriented speech is clear moves all 4 extremities normally  Psych- no agitation, oriented to person;   Skin: warm, dry, no cyanosis;   Pulses: Brachial and radial pulses intact  Capillary: Normal capillary refill    Labs:    Recent Labs     03/06/23  1223   WBC 6.5   HGB 12.5          Recent Labs     03/08/23  1038 03/07/23  1312 03/06/23  1223    135* 134*   K 3.6 4.2 4.6    102 99   CO2 31 29 29   * 213* 202*   BUN 39* 40* 35*   CREA 0.95 1.14* 1.04*   CA 9.3 8.9 9.2   MG  --   --  1.8   PHOS 2.2* 2.8 4.8*   ALB 3.7 3.7 3.8       Recent Labs     03/08/23  0503 03/07/23  0505 03/06/23  0517   PH 7.41 7.35 7.27*   PCO2 48* 53* 56*   PO2 67* 57* 140*   HCO3 30* 29* 25   FIO2 21.0 21 32     3/7 overnight oximetry low oxygen saturation 79% only 6 minutes below 88%   3/6 initial blood gas on 3 L nasal oxygen   3/6 2 L nasal oxygen with saturation 97% and on 1 L 94%  BNP 1993  Troponin 45  9/27/2020 echo ejection fraction 55% with RVSP of 67  Lab Results   Component Value Date/Time    Culture result: culture performed on unspun fluid 10/01/2020 10:15 AM    Culture result: No growth 4 days 10/01/2020 10:15 AM    Culture result: No Growth (<1000 cfu/mL) 09/25/2020 02:15 PM         Imaging:    CXR Results  (Last 48 hours)                 03/08/23 1120  XR CHEST PORT Final result    Impression:  Improved pulmonary edema. Narrative:  INDICATION: Pulmonary edema       EXAMINATION:  AP CHEST, PORTABLE       COMPARISON: March 5, 2023       FINDINGS: Single AP portable view of the chest demonstrates adequate lung   expansion and improved ulnar edema. The cardiomediastinal silhouette is   unchanged. No pneumothorax or focal consolidation. Results from Hospital Encounter encounter on 03/05/23    XR CHEST PORT    Narrative  INDICATION: Pulmonary edema    EXAMINATION:  AP CHEST, PORTABLE    COMPARISON: March 5, 2023    FINDINGS: Single AP portable view of the chest demonstrates adequate lung  expansion and improved ulnar edema. The cardiomediastinal silhouette is  unchanged. No pneumothorax or focal consolidation. Impression  Improved pulmonary edema. XR CHEST PORT    Narrative  INDICATION: shortness of breath    EXAM:  AP CHEST RADIOGRAPH    COMPARISON: 11/13/2010    FINDINGS:    AP portable view of the chest demonstrates borderline cardiomegaly. Mild  pulmonary edema and probable trace pleural effusions. No pneumothorax. Defibrillator pad obscures portion of the right lung. The osseous structures are  unremarkable. Impression  Pulmonary edema and trace pleural effusions. Results from [place] encounter on 11/13/20    XR CHEST PA LAT    Narrative  Indication: COPD. PA and lateral radiographs of the chest 13 November 2020. Comparison CT chest 25 September 2020. Calcified 1 cm right lower lobe granuloma unchanged. Decreased right pleural effusion with residual posterior and lateral  costophrenic angle thickening and probably residual loculated fluid. Mild  streaky atelectasis or scarring in the lung bases. Mild cardiomegaly. Thoracic spondylosis, kyphoscoliosis.     Impression  IMPRESSION: Residual right basilar pleural parenchymal findings, improved. Results from [place] encounter on 09/24/20    CT CHEST WO CONT    Narrative  The study is a CT evaluation of the chest dated 9/25/2020. HISTORY: History of chronic obstructive pulmonary disease. Heart failure. Recent  pleural fluid collection. Technique: Performed without intravenous contrast. 3 mm axial imaging, axial MIP  imaging, sagittal, and coronal reconstructed imaging sequences are submitted for  evaluation. Thinner section Super Dimension imaging was also performed. Dose Reduction Technique was employed to reduce radiation exposure - This  includes reduction optimization techniques as appropriate to a performed exam  with automated exposure control adjustments of the mA and/or Kv according to  patient size, or use of iterative reconstruction technique. COMPARISON: Previous CT evaluation of the chest dated 12/18/2019. Recent chest  radiograph dated 9/25/2019. MEDIASTINUM: There are scattered normal sized middle mediastinal lymph nodes  without progression. The largest mediastinal lymph node is located within the  subcarinal region and this lymph node measures 10 mm in short axis measurement  which is considered within normal limits. There is an area of dystrophic calcification within the left thyroid lobe and  mild heterogeneity of the left thyroid lobe. This examination is negative for  large or dominant thyroid nodule or mass. LUNGS AND PLEURA: There is a moderate sized loculated right pleural effusion. This examination is negative for pleural based masses, pleural thickening, or  gas within the pleural fluid collection. There is encasement of the adjacent  lung with areas of passive atelectasis. There is a calcified granuloma within the right lung base.  On axial maximum  intensity imaging, there are several additional small scattered micronodules  with 2 nodules demonstrated posteriorly within the left lower lobe (series [id number]  image number 42). This examination is negative for larger pulmonary nodules or  masses. The central trachea and bronchial tree are patent. There is bronchial  wall thickening and discoid opacities within the lung bases consistent with  reactive airways disease. CARDIOVASCULAR: There are moderate calcifications of the coronary arteries. There is moderate enlargement of the atrial chambers and milder enlargement of  the ventricular chambers. There is a normal caliber to the thoracic aorta. CHEST WALL: There is multilevel spondylosis along the thoracic spine. There is a  mild to moderate dextroconvex scoliosis. There is increased dorsal kyphosis of  the thoracic spine. There are older healed right posterior rib fractures. OTHER: There is surgical suture material demonstrated within the gastric region  consistent with previous bariatric surgery. Impression  IMPRESSION:  1. There is a moderate sized loculated right pleural effusion associated with  passive atelectasis. The differential diagnosis would include recent pneumonia  with a residual parapneumonic effusion. Since exam an [de-identified] is negative for pleural  based masses or findings specific for a malignant effusion. 2.  There are several scattered normal size middle mediastinal lymph nodes. 3.  There are findings of prior granulomatous exposure. Please see above text  for further details. Discussion chest x-ray shows pulmonary edema tiny effusions with old echocardiogram showing pulmonary hypertension. Current BNP is elevated she has peripheral edema. Most likely has pulmonary hypertension with cor pulmonale and resultant pulmonary edema with concomitant COPD with exacerbation.   We will proceed as outlined above time of care 35 minutes  3/6 awake alert have decreased oxygen to 1 L we will check overnight oximetry on and off oxygen repeat ABG in a.m.  3/7 urine output not recorded but clinically she is doing better no labs available today continue Lasix we will switch Solu-Medrol to oral prednisone we will leave on room air and repeat ABG in a.m. to assess PCO2  3/8 phosphorus is low chest x-ray is improved continue  daily Lasix replace phosphorus if PCO2 has improved further tomorrow and electrolytes stable would consider discharge home      Miguel Botello MD

## 2023-03-08 NOTE — PROGRESS NOTES
Bedside and Verbal shift change report given to [facility] (oncoming nurse) by Sylvester Mei RN (offgoing nurse). Report included the following information SBAR, Kardex, and MAR.
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
## 2023-03-09 LAB
ALBUMIN SERPL-MCNC: 4 G/DL (ref 3.5–5)
ANION GAP SERPL CALC-SCNC: 4 MMOL/L (ref 5–15)
ARTERIAL PATENCY WRIST A: ABNORMAL
BASE EXCESS BLDA CALC-SCNC: 5.6 MMOL/L (ref 0–3)
BDY SITE: ABNORMAL
BODY TEMPERATURE: 98
BUN SERPL-MCNC: 41 MG/DL (ref 6–20)
BUN/CREAT SERPL: 45 (ref 12–20)
CA-I BLD-MCNC: 9.6 MG/DL (ref 8.5–10.1)
CHLORIDE SERPL-SCNC: 101 MMOL/L (ref 97–108)
CO2 SERPL-SCNC: 33 MMOL/L (ref 21–32)
COHGB MFR BLD: 0.6 % (ref 1–2)
CREAT SERPL-MCNC: 0.92 MG/DL (ref 0.55–1.02)
FIO2 ON VENT: 21 %
GLUCOSE SERPL-MCNC: 112 MG/DL (ref 65–100)
HCO3 BLDA-SCNC: 32 MMOL/L (ref 22–26)
MAGNESIUM SERPL-MCNC: 1.8 MG/DL (ref 1.6–2.4)
METHGB MFR BLD: 0.4 % (ref 0–1.4)
OXYHGB MFR BLD: 92.7 % (ref 95–99)
PCO2 BLDA: 55 MMHG (ref 35–45)
PERFORMED BY, TECHID: ABNORMAL
PH BLDA: 7.39 (ref 7.35–7.45)
PHOSPHATE SERPL-MCNC: 4.4 MG/DL (ref 2.6–4.7)
PO2 BLDA: 67 MMHG (ref 80–100)
POTASSIUM SERPL-SCNC: 3.2 MMOL/L (ref 3.5–5.1)
SAO2 % BLD: 94 % (ref 95–99)
SAO2% DEVICE SAO2% SENSOR NAME: ABNORMAL
SODIUM SERPL-SCNC: 138 MMOL/L (ref 136–145)
SPECIMEN SITE: ABNORMAL

## 2023-03-09 PROCEDURE — 74011250637 HC RX REV CODE- 250/637: Performed by: INTERNAL MEDICINE

## 2023-03-09 PROCEDURE — 36415 COLL VENOUS BLD VENIPUNCTURE: CPT

## 2023-03-09 PROCEDURE — 80069 RENAL FUNCTION PANEL: CPT

## 2023-03-09 PROCEDURE — 74011636637 HC RX REV CODE- 636/637: Performed by: INTERNAL MEDICINE

## 2023-03-09 PROCEDURE — 74011250636 HC RX REV CODE- 250/636: Performed by: INTERNAL MEDICINE

## 2023-03-09 PROCEDURE — 74011000250 HC RX REV CODE- 250: Performed by: INTERNAL MEDICINE

## 2023-03-09 PROCEDURE — 36600 WITHDRAWAL OF ARTERIAL BLOOD: CPT

## 2023-03-09 PROCEDURE — 65270000029 HC RM PRIVATE

## 2023-03-09 PROCEDURE — 94761 N-INVAS EAR/PLS OXIMETRY MLT: CPT

## 2023-03-09 PROCEDURE — 83735 ASSAY OF MAGNESIUM: CPT

## 2023-03-09 PROCEDURE — 94640 AIRWAY INHALATION TREATMENT: CPT

## 2023-03-09 PROCEDURE — 82803 BLOOD GASES ANY COMBINATION: CPT

## 2023-03-09 PROCEDURE — 74011250637 HC RX REV CODE- 250/637: Performed by: STUDENT IN AN ORGANIZED HEALTH CARE EDUCATION/TRAINING PROGRAM

## 2023-03-09 RX ORDER — ACETAZOLAMIDE 250 MG/1
500 TABLET ORAL EVERY 6 HOURS
Status: DISCONTINUED | OUTPATIENT
Start: 2023-03-09 | End: 2023-03-09

## 2023-03-09 RX ORDER — ACETAZOLAMIDE 250 MG/1
500 TABLET ORAL EVERY 6 HOURS
Status: COMPLETED | OUTPATIENT
Start: 2023-03-09 | End: 2023-03-09

## 2023-03-09 RX ADMIN — SODIUM CHLORIDE, PRESERVATIVE FREE 10 ML: 5 INJECTION INTRAVENOUS at 05:18

## 2023-03-09 RX ADMIN — BUDESONIDE 500 MCG: 0.5 INHALANT ORAL at 20:02

## 2023-03-09 RX ADMIN — FUROSEMIDE 40 MG: 40 TABLET ORAL at 09:37

## 2023-03-09 RX ADMIN — RIVAROXABAN 20 MG: 20 TABLET, FILM COATED ORAL at 09:37

## 2023-03-09 RX ADMIN — SODIUM CHLORIDE, PRESERVATIVE FREE 10 ML: 5 INJECTION INTRAVENOUS at 14:42

## 2023-03-09 RX ADMIN — GUAIFENESIN 1200 MG: 600 TABLET, EXTENDED RELEASE ORAL at 09:37

## 2023-03-09 RX ADMIN — SODIUM CHLORIDE, PRESERVATIVE FREE 10 ML: 5 INJECTION INTRAVENOUS at 21:22

## 2023-03-09 RX ADMIN — PREDNISONE 20 MG: 20 TABLET ORAL at 09:37

## 2023-03-09 RX ADMIN — IPRATROPIUM BROMIDE AND ALBUTEROL SULFATE 3 ML: 2.5; .5 SOLUTION RESPIRATORY (INHALATION) at 14:01

## 2023-03-09 RX ADMIN — LISINOPRIL 10 MG: 10 TABLET ORAL at 09:37

## 2023-03-09 RX ADMIN — IPRATROPIUM BROMIDE AND ALBUTEROL SULFATE 3 ML: 2.5; .5 SOLUTION RESPIRATORY (INHALATION) at 01:37

## 2023-03-09 RX ADMIN — IPRATROPIUM BROMIDE AND ALBUTEROL SULFATE 3 ML: 2.5; .5 SOLUTION RESPIRATORY (INHALATION) at 08:11

## 2023-03-09 RX ADMIN — ACETAZOLAMIDE 500 MG: 250 TABLET ORAL at 17:34

## 2023-03-09 RX ADMIN — IPRATROPIUM BROMIDE AND ALBUTEROL SULFATE 3 ML: 2.5; .5 SOLUTION RESPIRATORY (INHALATION) at 20:02

## 2023-03-09 RX ADMIN — AZITHROMYCIN DIHYDRATE 500 MG: 500 INJECTION, POWDER, LYOPHILIZED, FOR SOLUTION INTRAVENOUS at 07:32

## 2023-03-09 RX ADMIN — BUDESONIDE 500 MCG: 0.5 INHALANT ORAL at 08:12

## 2023-03-09 RX ADMIN — METOPROLOL SUCCINATE 50 MG: 50 TABLET, EXTENDED RELEASE ORAL at 09:37

## 2023-03-09 RX ADMIN — GUAIFENESIN 1200 MG: 600 TABLET, EXTENDED RELEASE ORAL at 21:21

## 2023-03-09 RX ADMIN — ASPIRIN 81 MG: 81 TABLET, COATED ORAL at 09:37

## 2023-03-09 RX ADMIN — PANTOPRAZOLE SODIUM 40 MG: 40 TABLET, DELAYED RELEASE ORAL at 09:38

## 2023-03-09 RX ADMIN — PREDNISONE 20 MG: 20 TABLET ORAL at 17:34

## 2023-03-09 RX ADMIN — ACETAZOLAMIDE 500 MG: 250 TABLET ORAL at 12:04

## 2023-03-09 RX ADMIN — ATORVASTATIN CALCIUM 20 MG: 10 TABLET, FILM COATED ORAL at 09:38

## 2023-03-09 NOTE — PROGRESS NOTES
Problem: Chronic Obstructive Pulmonary Disease (COPD)  Goal: *Oxygen saturation during activity within specified parameters  Outcome: Progressing Towards Goal  Goal: *Able to remain out of bed as prescribed  Outcome: Progressing Towards Goal  Goal: *Absence of hypoxia  Outcome: Progressing Towards Goal     Problem: Pain  Goal: *Control of Pain  Outcome: Progressing Towards Goal

## 2023-03-09 NOTE — PROGRESS NOTES
Bedside and Verbal shift change report given to [address] [facility] (oncoming nurse) by MIRANDA CAMPBELL (offgoing nurse). Report included the following information SBAR, Kardex, and MAR.

## 2023-03-09 NOTE — PROGRESS NOTES
Consult  Pulmonary, Critical Care    Name: Arpit Hannon MRN: [id number]   : 1950 Hospital: [facility], [place]   Date: 3/9/2023  Admission date: 3/5/2023 Hospital Day: 5       Subjective/Interval History:   Seen on the medical floor currently on nasal oxygen sitting up in the chair awake alert states she feels some better. History is underlying COPD obstructive sleep apnea intolerant of CPAP on no oxygen at home does have nebulized albuterol/ipratropium that she uses about 3 times a day. She also has Lasix questionably 20 mg daily she states it does induce diuresis but sometimes she has to skip it when she has to go to work. Over the last week or so she has had worsening edema of her ankles some worsening shortness of breath cough some thick sputum no purulence no fever chills or sweats  3/6 feels a little better but still shortness of breath with any exertion she is a wide awake no distress no accessory muscle use  3/7 eels better getting up to the bathroom and out of bed without difficulty. Currently on room air oxygen saturation 93%  3/9 awake alert but PCO2 has risen further today. Hospital Problems  Date Reviewed: 2015            Codes Class Noted POA    COPD with acute exacerbation [facility]) ICD-10-CM: J44.1  ICD-9-CM: 491.21  3/5/2023 Unknown       IMPRESSION:   Acute hypoxic respiratory failure resolved now on room air  Acute hypercapnic respiratory failure PCO2 up to 55 on 3/9  Pulmonary edema resolved radiographically  Pulmonary hypertension  Probably cor pulmonale  Exacerbation COPD worsened wheezing today continue nebulized bronchodilators and oral prednisone  Body mass index is 33.64 kg/m².         RECOMMENDATIONS/PLAN:   Lasix now oral  Continue nebulized albuterol Atrovent   Continue nebulized budesonide  Continue prednisone twice daily  Overnight oximetry showed minimal desaturation only 6 minutes below 88 room air and repeat ABG in a.m. to see if PCO2 continues to improve  She has a history of sleep apnea has never been tolerant of CPAP but she states she does sleep in a recliner semiupright            [x] High complexity decision making was performed  [x] See my orders for details      Subjective/Initial History:     I was asked by Bismark Chirinos MD to see Jaxon Benjamin  a 67 y.o.  female in consultation for a chief complaint of acute hypoxic and hypercapnic respiratory failure with exacerbation COPD and pulmonary edema        No Known Allergies     MAR reviewed and pertinent medications noted or modified as needed     Current Facility-Administered Medications   Medication    acetaZOLAMIDE (DIAMOX) tablet 500 mg    furosemide (LASIX) tablet 40 mg    predniSONE (DELTASONE) tablet 20 mg    sodium chloride (NS) flush 5-40 mL    sodium chloride (NS) flush 5-40 mL    acetaminophen (TYLENOL) tablet 650 mg    ondansetron (ZOFRAN) injection 4 mg    magnesium hydroxide (MILK OF MAGNESIA) 400 mg/5 mL oral suspension 30 mL    aspirin delayed-release tablet 81 mg    atorvastatin (LIPITOR) tablet 20 mg    melatonin tablet 3 mg    pantoprazole (PROTONIX) tablet 40 mg    rivaroxaban (XARELTO) tablet 20 mg    albuterol-ipratropium (DUO-NEB) 2.5 MG-0.5 MG/3 ML    budesonide (PULMICORT) 500 mcg/2 ml nebulizer suspension    guaiFENesin ER (MUCINEX) tablet 1,200 mg    metoprolol succinate (TOPROL-XL) XL tablet 50 mg    lisinopriL (PRINIVIL, ZESTRIL) tablet 10 mg      Patient PCP: Tahmina Cullen MD  PMH:  has a past medical history of AF (paroxysmal atrial fibrillation) ([address] Anemia, Anxiety, Chronic obstructive pulmonary disease ([address] Heart failure ([address] HTN (hypertension), Obesity, HORACE (obstructive sleep apnea), Pulmonary hypertension ([address] and Stroke ([address] She has no past medical history of Chronic pain.   PSH:   has a past surgical history that includes echocardiogram (11/23/2009); nm cardiac spect w strs/rest mult (08/2006); hx gastric bypass; and ir thoracentesis cath w image (10/1/2020). FHX: family history includes COPD in her mother; Colon Cancer in her father; Diabetes in her mother; Stroke in her brother. SHX:  reports that she has quit smoking. She has never used smokeless tobacco. She reports that she does not currently use alcohol. She reports that she does not use drugs. Systemic review:  General she states her weight stable has not had any chronic fever chills or sweats  Eyes no double vision or momentary blindness  ENT no facial pain over the sinuses she does get chronic congestion uses nasal lavage periodically has not had any recent problems  Endocrinologic no polyuria polydipsia  Musculoskeletal she has had worsening edema of her lower extremities no swollen tender joints  Neurologic no seizures or syncope  Gastrointestinal no nausea vomiting acid indigestion  Genitourinary no pain or discomfort on urination no bleeding  Cardiovascular has worsening ankle edema nocturia once or twice per night only worsening shortness of breath no chest pain or diaphoresis  Respiratory stop smoking over 20 years ago has history of COPD mother had COPD. She has nebulized albuterol Atrovent at home that she uses 2 or 3 times a day.   Had worsening of her respiratory status over the last week    Objective:     Vital Signs: Telemetry:    normal sinus rhythm Intake/Output:   Visit Vitals  /79 (BP 1 Location: Right upper arm, BP Patient Position: Sitting)   Pulse 81   Temp 97.5 °F (36.4 °C)   Resp 20   Ht 5' 5\" (1.651 m)   Wt 91.7 kg (202 lb 2.6 oz)   SpO2 98%   Breastfeeding No   BMI 33.64 kg/m²       Temp (24hrs), Av.8 °F (36.6 °C), Min:97.5 °F (36.4 °C), Max:98 °F (36.7 °C)        O2 Device: None (Room air) O2 Flow Rate (L/min): 1 l/min       Wt Readings from Last 4 Encounters:   23 91.7 kg (202 lb 2.6 oz)   10/03/20 81.7 kg (180 lb 1.9 oz)   10/18/16 83.6 kg (184 lb 3.2 oz)   12/24/15 79.8 kg (176 lb)        No intake or output data in the 24 hours ending 03/09/23 1201      Last shift:      No intake/output data recorded. Last 3 shifts: No intake/output data recorded. Physical Exam:   General:  female; up in the chair no distress no accessory muscle use  HEENT: NCAT, normal oral mucosa  Eyes: anicteric; conjunctiva clear extraocular movements intact  Neck: no nodes, neck veins are flat sitting upright no accessory MM use. Chest: no deformity,   Cardiac: Regular rate and rhythm  Lungs: More noticeable wheezing anterior and posterior  Abd: Overweight soft positive bowel sounds  Ext: discoloration of the lower extremities with minimal edema; no joint swelling;  No clubbing  : clear urine  Neuro: Alert oriented speech is clear moves all 4 extremities normally  Psych- no agitation, oriented to person;   Skin: warm, dry, no cyanosis;   Pulses: Brachial and radial pulses intact  Capillary: Normal capillary refill    Labs:    Recent Labs     03/06/23  1223   WBC 6.5   HGB 12.5          Recent Labs     03/08/23  1038 03/07/23  1312 03/06/23  1223    135* 134*   K 3.6 4.2 4.6    102 99   CO2 31 29 29   * 213* 202*   BUN 39* 40* 35*   CREA 0.95 1.14* 1.04*   CA 9.3 8.9 9.2   MG  --   --  1.8   PHOS 2.2* 2.8 4.8*   ALB 3.7 3.7 3.8       Recent Labs     03/09/23  0555 03/08/23  0503 03/07/23  0505   PH 7.39 7.41 7.35   PCO2 55* 48* 53*   PO2 67* 67* 57*   HCO3 32* 30* 29*   FIO2 21.0 21.0 21     3/7 overnight oximetry low oxygen saturation 79% only 6 minutes below 88%   3/6 initial blood gas on 3 L nasal oxygen   3/6 2 L nasal oxygen with saturation 97% and on 1 L 94%  BNP 1993  Troponin 45  9/27/2020 echo ejection fraction 55% with RVSP of 67  Lab Results   Component Value Date/Time    Culture result: culture performed on unspun fluid 10/01/2020 10:15 AM    Culture result: No growth 4 days 10/01/2020 10:15 AM    Culture result: No Growth (<1000 cfu/mL) 09/25/2020 02:15 PM         Imaging:    CXR Results  (Last 48 hours) 03/08/23 1120  XR CHEST PORT Final result    Impression:  Improved pulmonary edema. Narrative:  INDICATION: Pulmonary edema       EXAMINATION:  AP CHEST, PORTABLE       COMPARISON: March 5, 2023       FINDINGS: Single AP portable view of the chest demonstrates adequate lung   expansion and improved ulnar edema. The cardiomediastinal silhouette is   unchanged. No pneumothorax or focal consolidation. Results from Hospital Encounter encounter on 03/05/23    XR CHEST PORT    Narrative  INDICATION: Pulmonary edema    EXAMINATION:  AP CHEST, PORTABLE    COMPARISON: March 5, 2023    FINDINGS: Single AP portable view of the chest demonstrates adequate lung  expansion and improved ulnar edema. The cardiomediastinal silhouette is  unchanged. No pneumothorax or focal consolidation. Impression  Improved pulmonary edema. XR CHEST PORT    Narrative  INDICATION: shortness of breath    EXAM:  AP CHEST RADIOGRAPH    COMPARISON: 11/13/2010    FINDINGS:    AP portable view of the chest demonstrates borderline cardiomegaly. Mild  pulmonary edema and probable trace pleural effusions. No pneumothorax. Defibrillator pad obscures portion of the right lung. The osseous structures are  unremarkable. Impression  Pulmonary edema and trace pleural effusions. Results from [place] encounter on 11/13/20    XR CHEST PA LAT    Narrative  Indication: COPD. PA and lateral radiographs of the chest 13 November 2020. Comparison CT chest 25 September 2020. Calcified 1 cm right lower lobe granuloma unchanged. Decreased right pleural effusion with residual posterior and lateral  costophrenic angle thickening and probably residual loculated fluid. Mild  streaky atelectasis or scarring in the lung bases. Mild cardiomegaly. Thoracic spondylosis, kyphoscoliosis. Impression  IMPRESSION: Residual right basilar pleural parenchymal findings, improved.     Results from [facility] encounter on 09/24/20    CT CHEST WO CONT    Narrative  The study is a CT evaluation of the chest dated 9/25/2020. HISTORY: History of chronic obstructive pulmonary disease. Heart failure. Recent  pleural fluid collection. Technique: Performed without intravenous contrast. 3 mm axial imaging, axial MIP  imaging, sagittal, and coronal reconstructed imaging sequences are submitted for  evaluation. Thinner section Super Dimension imaging was also performed. Dose Reduction Technique was employed to reduce radiation exposure - This  includes reduction optimization techniques as appropriate to a performed exam  with automated exposure control adjustments of the mA and/or Kv according to  patient size, or use of iterative reconstruction technique. COMPARISON: Previous CT evaluation of the chest dated 12/18/2019. Recent chest  radiograph dated 9/25/2019. MEDIASTINUM: There are scattered normal sized middle mediastinal lymph nodes  without progression. The largest mediastinal lymph node is located within the  subcarinal region and this lymph node measures 10 mm in short axis measurement  which is considered within normal limits. There is an area of dystrophic calcification within the left thyroid lobe and  mild heterogeneity of the left thyroid lobe. This examination is negative for  large or dominant thyroid nodule or mass. LUNGS AND PLEURA: There is a moderate sized loculated right pleural effusion. This examination is negative for pleural based masses, pleural thickening, or  gas within the pleural fluid collection. There is encasement of the adjacent  lung with areas of passive atelectasis. There is a calcified granuloma within the right lung base. On axial maximum  intensity imaging, there are several additional small scattered micronodules  with 2 nodules demonstrated posteriorly within the left lower lobe (series [id number]  image number [id number]).  This examination is negative for larger pulmonary nodules or  masses. The central trachea and bronchial tree are patent. There is bronchial  wall thickening and discoid opacities within the lung bases consistent with  reactive airways disease. CARDIOVASCULAR: There are moderate calcifications of the coronary arteries. There is moderate enlargement of the atrial chambers and milder enlargement of  the ventricular chambers. There is a normal caliber to the thoracic aorta. CHEST WALL: There is multilevel spondylosis along the thoracic spine. There is a  mild to moderate dextroconvex scoliosis. There is increased dorsal kyphosis of  the thoracic spine. There are older healed right posterior rib fractures. OTHER: There is surgical suture material demonstrated within the gastric region  consistent with previous bariatric surgery. Impression  IMPRESSION:  1. There is a moderate sized loculated right pleural effusion associated with  passive atelectasis. The differential diagnosis would include recent pneumonia  with a residual parapneumonic effusion. Since exam an [de-identified] is negative for pleural  based masses or findings specific for a malignant effusion. 2.  There are several scattered normal size middle mediastinal lymph nodes. 3.  There are findings of prior granulomatous exposure. Please see above text  for further details. Discussion chest x-ray shows pulmonary edema tiny effusions with old echocardiogram showing pulmonary hypertension. Current BNP is elevated she has peripheral edema. Most likely has pulmonary hypertension with cor pulmonale and resultant pulmonary edema with concomitant COPD with exacerbation.   We will proceed as outlined above time of care 35 minutes  3/6 awake alert have decreased oxygen to 1 L we will check overnight oximetry on and off oxygen repeat ABG in a.m.  3/7 urine output not recorded but clinically she is doing better no labs available today continue Lasix we will switch Solu-Medrol to oral prednisone we will leave on room air and repeat ABG in a.m. to assess PCO2  3/8 phosphorus is low chest x-ray is improved continue  daily Lasix replace phosphorus if PCO2 has improved further tomorrow and electrolytes stable would consider discharge home  3/9 PCO2 has risen today she has more active wheezing. We will leave prednisone at 20 twice daily continue nebulizer. We will give Diamox again today and repeat ABG in a.m.       Miguel Botello MD
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
## 2023-03-09 NOTE — PROGRESS NOTES
Problem: Chronic Obstructive Pulmonary Disease (COPD)  Goal: *Able to remain out of bed as prescribed  Outcome: Progressing Towards Goal     Problem: Chronic Obstructive Pulmonary Disease (COPD)  Goal: *Absence of hypoxia  Outcome: Progressing Towards Goal

## 2023-03-10 LAB
ALBUMIN SERPL-MCNC: 3.4 G/DL (ref 3.5–5)
ANION GAP SERPL CALC-SCNC: 4 MMOL/L (ref 5–15)
ARTERIAL PATENCY WRIST A: YES
BASE EXCESS BLDA CALC-SCNC: 2.3 MMOL/L (ref 0–3)
BASOPHILS # BLD: 0 K/UL (ref 0–0.1)
BASOPHILS NFR BLD: 0 % (ref 0–1)
BDY SITE: ABNORMAL
BNP SERPL-MCNC: 4891 PG/ML
BODY TEMPERATURE: 97.8
BUN SERPL-MCNC: 34 MG/DL (ref 6–20)
BUN/CREAT SERPL: 41 (ref 12–20)
CA-I BLD-MCNC: 9 MG/DL (ref 8.5–10.1)
CHLORIDE SERPL-SCNC: 104 MMOL/L (ref 97–108)
CO2 SERPL-SCNC: 32 MMOL/L (ref 21–32)
COHGB MFR BLD: 0.7 % (ref 1–2)
CREAT SERPL-MCNC: 0.82 MG/DL (ref 0.55–1.02)
DIFFERENTIAL METHOD BLD: NORMAL
EOSINOPHIL # BLD: 0 K/UL (ref 0–0.4)
EOSINOPHIL NFR BLD: 0 % (ref 0–7)
ERYTHROCYTE [DISTWIDTH] IN BLOOD BY AUTOMATED COUNT: 14.3 % (ref 11.5–14.5)
FIO2 ON VENT: 21 %
GLUCOSE SERPL-MCNC: 89 MG/DL (ref 65–100)
HCO3 BLDA-SCNC: 29 MMOL/L (ref 22–26)
HCT VFR BLD AUTO: 42.4 % (ref 35–47)
HGB BLD-MCNC: 13.4 G/DL (ref 11.5–16)
IMM GRANULOCYTES # BLD AUTO: 0 K/UL (ref 0–0.04)
IMM GRANULOCYTES NFR BLD AUTO: 0 % (ref 0–0.5)
LYMPHOCYTES # BLD: 2.9 K/UL (ref 0.8–3.5)
LYMPHOCYTES NFR BLD: 36 % (ref 12–49)
MCH RBC QN AUTO: 30.9 PG (ref 26–34)
MCHC RBC AUTO-ENTMCNC: 31.6 G/DL (ref 30–36.5)
MCV RBC AUTO: 97.9 FL (ref 80–99)
METHGB MFR BLD: 0.3 % (ref 0–1.4)
MONOCYTES # BLD: 0.8 K/UL (ref 0–1)
MONOCYTES NFR BLD: 10 % (ref 5–13)
NEUTS SEG # BLD: 4.3 K/UL (ref 1.8–8)
NEUTS SEG NFR BLD: 54 % (ref 32–75)
NRBC # BLD: 0 K/UL (ref 0–0.01)
NRBC BLD-RTO: 0 PER 100 WBC
OXYHGB MFR BLD: 92.9 % (ref 95–99)
PCO2 BLDA: 53 MMHG (ref 35–45)
PERFORMED BY, TECHID: ABNORMAL
PH BLDA: 7.35 (ref 7.35–7.45)
PHOSPHATE SERPL-MCNC: 3.9 MG/DL (ref 2.6–4.7)
PLATELET # BLD AUTO: 260 K/UL (ref 150–400)
PMV BLD AUTO: 11.6 FL (ref 8.9–12.9)
PO2 BLDA: 68 MMHG (ref 80–100)
POTASSIUM SERPL-SCNC: 3.1 MMOL/L (ref 3.5–5.1)
RBC # BLD AUTO: 4.33 M/UL (ref 3.8–5.2)
SAO2 % BLD: 94 % (ref 95–99)
SAO2% DEVICE SAO2% SENSOR NAME: ABNORMAL
SODIUM SERPL-SCNC: 140 MMOL/L (ref 136–145)
SPECIMEN SITE: ABNORMAL
WBC # BLD AUTO: 8 K/UL (ref 3.6–11)

## 2023-03-10 PROCEDURE — 74011000250 HC RX REV CODE- 250: Performed by: INTERNAL MEDICINE

## 2023-03-10 PROCEDURE — 74011250636 HC RX REV CODE- 250/636: Performed by: INTERNAL MEDICINE

## 2023-03-10 PROCEDURE — 74011250637 HC RX REV CODE- 250/637: Performed by: INTERNAL MEDICINE

## 2023-03-10 PROCEDURE — 85025 COMPLETE CBC W/AUTO DIFF WBC: CPT

## 2023-03-10 PROCEDURE — 82803 BLOOD GASES ANY COMBINATION: CPT

## 2023-03-10 PROCEDURE — 94640 AIRWAY INHALATION TREATMENT: CPT

## 2023-03-10 PROCEDURE — 80069 RENAL FUNCTION PANEL: CPT

## 2023-03-10 PROCEDURE — 36415 COLL VENOUS BLD VENIPUNCTURE: CPT

## 2023-03-10 PROCEDURE — 74011250637 HC RX REV CODE- 250/637: Performed by: STUDENT IN AN ORGANIZED HEALTH CARE EDUCATION/TRAINING PROGRAM

## 2023-03-10 PROCEDURE — 36600 WITHDRAWAL OF ARTERIAL BLOOD: CPT

## 2023-03-10 PROCEDURE — 65270000029 HC RM PRIVATE

## 2023-03-10 PROCEDURE — 83880 ASSAY OF NATRIURETIC PEPTIDE: CPT

## 2023-03-10 RX ORDER — POTASSIUM CHLORIDE 750 MG/1
40 TABLET, FILM COATED, EXTENDED RELEASE ORAL
Status: COMPLETED | OUTPATIENT
Start: 2023-03-10 | End: 2023-03-10

## 2023-03-10 RX ADMIN — PANTOPRAZOLE SODIUM 40 MG: 40 TABLET, DELAYED RELEASE ORAL at 10:33

## 2023-03-10 RX ADMIN — BUDESONIDE 500 MCG: 0.5 INHALANT ORAL at 19:53

## 2023-03-10 RX ADMIN — METOPROLOL SUCCINATE 50 MG: 50 TABLET, EXTENDED RELEASE ORAL at 10:33

## 2023-03-10 RX ADMIN — ASPIRIN 81 MG: 81 TABLET, COATED ORAL at 10:33

## 2023-03-10 RX ADMIN — POTASSIUM CHLORIDE 40 MEQ: 750 TABLET, EXTENDED RELEASE ORAL at 10:33

## 2023-03-10 RX ADMIN — IPRATROPIUM BROMIDE AND ALBUTEROL SULFATE 3 ML: 2.5; .5 SOLUTION RESPIRATORY (INHALATION) at 14:06

## 2023-03-10 RX ADMIN — SODIUM CHLORIDE, PRESERVATIVE FREE 10 ML: 5 INJECTION INTRAVENOUS at 06:08

## 2023-03-10 RX ADMIN — BUDESONIDE 500 MCG: 0.5 INHALANT ORAL at 09:06

## 2023-03-10 RX ADMIN — GUAIFENESIN 1200 MG: 600 TABLET, EXTENDED RELEASE ORAL at 21:10

## 2023-03-10 RX ADMIN — RIVAROXABAN 20 MG: 20 TABLET, FILM COATED ORAL at 10:36

## 2023-03-10 RX ADMIN — METHYLPREDNISOLONE SODIUM SUCCINATE 40 MG: 40 INJECTION, POWDER, FOR SOLUTION INTRAMUSCULAR; INTRAVENOUS at 13:13

## 2023-03-10 RX ADMIN — METHYLPREDNISOLONE SODIUM SUCCINATE 40 MG: 40 INJECTION, POWDER, FOR SOLUTION INTRAMUSCULAR; INTRAVENOUS at 17:21

## 2023-03-10 RX ADMIN — IPRATROPIUM BROMIDE AND ALBUTEROL SULFATE 3 ML: 2.5; .5 SOLUTION RESPIRATORY (INHALATION) at 09:06

## 2023-03-10 RX ADMIN — IPRATROPIUM BROMIDE AND ALBUTEROL SULFATE 3 ML: 2.5; .5 SOLUTION RESPIRATORY (INHALATION) at 01:50

## 2023-03-10 RX ADMIN — FUROSEMIDE 40 MG: 40 TABLET ORAL at 10:33

## 2023-03-10 RX ADMIN — GUAIFENESIN 1200 MG: 600 TABLET, EXTENDED RELEASE ORAL at 10:33

## 2023-03-10 RX ADMIN — SODIUM CHLORIDE, PRESERVATIVE FREE 10 ML: 5 INJECTION INTRAVENOUS at 21:11

## 2023-03-10 RX ADMIN — LISINOPRIL 10 MG: 10 TABLET ORAL at 10:33

## 2023-03-10 RX ADMIN — IPRATROPIUM BROMIDE AND ALBUTEROL SULFATE 3 ML: 2.5; .5 SOLUTION RESPIRATORY (INHALATION) at 19:53

## 2023-03-10 RX ADMIN — ATORVASTATIN CALCIUM 20 MG: 10 TABLET, FILM COATED ORAL at 10:33

## 2023-03-10 RX ADMIN — SODIUM CHLORIDE, PRESERVATIVE FREE 10 ML: 5 INJECTION INTRAVENOUS at 13:13

## 2023-03-10 NOTE — PROGRESS NOTES
Problem: Pain  Goal: *Control of Pain  Outcome: Progressing Towards Goal     Problem: Falls - Risk of  Goal: *Absence of Falls  Description: Document Bandar Fall Risk and appropriate interventions in the flowsheet.   Outcome: Progressing Towards Goal  Note: Fall Risk Interventions:

## 2023-03-10 NOTE — PROGRESS NOTES
Consult  Pulmonary, Critical Care    Name: Sandeep Zavala MRN: [id number]   : 1950 Hospital: [facility]   Date: 3/10/2023  Admission date: 3/5/2023 Hospital Day: 6       Subjective/Interval History:   Seen on the medical floor currently on nasal oxygen sitting up in the chair awake alert states she feels some better. History is underlying COPD obstructive sleep apnea intolerant of CPAP on no oxygen at home does have nebulized albuterol/ipratropium that she uses about 3 times a day. She also has Lasix questionably 20 mg daily she states it does induce diuresis but sometimes she has to skip it when she has to go to work. Over the last week or so she has had worsening edema of her ankles some worsening shortness of breath cough some thick sputum no purulence no fever chills or sweats  3/6 feels a little better but still shortness of breath with any exertion she is a wide awake no distress no accessory muscle use  3/7 eels better getting up to the bathroom and out of bed without difficulty. Currently on room air oxygen saturation 93%  3/9 awake alert but PCO2 has risen further today. 3/10 PCO2 mildly improved but she still has severe bronchospasm. BNP remains elevated but last chest x-ray 3/8 showed improvement in pulmonary edema    Hospital Problems  Date Reviewed: 2015            Codes Class Noted POA    COPD with acute exacerbation [facility]) ICD-10-CM: J44.1  ICD-9-CM: 491.21  3/5/2023 Unknown       IMPRESSION:   Acute hypoxic respiratory failure resolved now on room air  Acute hypercapnic respiratory failure PCO2 up to 55 on 3/9, 53 on 3/10  Pulmonary edema resolved radiographically  Hypokalemia to be repleted  Pulmonary hypertension  Probably cor pulmonale  Exacerbation COPD worsened wheezing today continue nebulized bronchodilators and give IV steroids today  Body mass index is 33.64 kg/m².         RECOMMENDATIONS/PLAN:   Lasix now oral last chest x-ray no real pulmonary edema remaining  Continue nebulized albuterol Atrovent   Continue nebulized budesonide  Of IV Solu-Medrol today  Overnight oximetry showed minimal desaturation only 6 minutes below 88 room air and repeat ABG in a.m. to see if PCO2 continues to improve  She has a history of sleep apnea has never been tolerant of CPAP but she states she does sleep in a recliner semiupright            [x] High complexity decision making was performed  [x] See my orders for details      Subjective/Initial History:     I was asked by Raquel Del Valle MD to see Patrisha Sicard  a 67 y.o.    female in consultation for a chief complaint of acute hypoxic and hypercapnic respiratory failure with exacerbation COPD and pulmonary edema        No Known Allergies     MAR reviewed and pertinent medications noted or modified as needed     Current Facility-Administered Medications   Medication    methylPREDNISolone (PF) (Solu-MEDROL) injection 40 mg    furosemide (LASIX) tablet 40 mg    sodium chloride (NS) flush 5-40 mL    sodium chloride (NS) flush 5-40 mL    acetaminophen (TYLENOL) tablet 650 mg    ondansetron (ZOFRAN) injection 4 mg    magnesium hydroxide (MILK OF MAGNESIA) 400 mg/5 mL oral suspension 30 mL    aspirin delayed-release tablet 81 mg    atorvastatin (LIPITOR) tablet 20 mg    melatonin tablet 3 mg    pantoprazole (PROTONIX) tablet 40 mg    rivaroxaban (XARELTO) tablet 20 mg    albuterol-ipratropium (DUO-NEB) 2.5 MG-0.5 MG/3 ML    budesonide (PULMICORT) 500 mcg/2 ml nebulizer suspension    guaiFENesin ER (MUCINEX) tablet 1,200 mg    metoprolol succinate (TOPROL-XL) XL tablet 50 mg    lisinopriL (PRINIVIL, ZESTRIL) tablet 10 mg      Patient PCP: Gurjit Azul MD  PMH:  has a past medical history of AF (paroxysmal atrial fibrillation) ([address] Anemia, Anxiety, Chronic obstructive pulmonary disease ([address] Heart failure ([address] HTN (hypertension), Obesity, HORACE (obstructive sleep apnea), Pulmonary hypertension ([address] and Stroke [facility]).    She has no past medical history of Chronic pain. PSH:   has a past surgical history that includes echocardiogram (2009); nm cardiac spect w strs/rest mult (2006); hx gastric bypass; and ir thoracentesis cath w image (10/1/2020). FHX: family history includes COPD in her mother; Colon Cancer in her father; Diabetes in her mother; Stroke in her brother. SHX:  reports that she has quit smoking. She has never used smokeless tobacco. She reports that she does not currently use alcohol. She reports that she does not use drugs. Systemic review:  General she states her weight stable has not had any chronic fever chills or sweats  Eyes no double vision or momentary blindness  ENT no facial pain over the sinuses she does get chronic congestion uses nasal lavage periodically has not had any recent problems  Endocrinologic no polyuria polydipsia  Musculoskeletal she has had worsening edema of her lower extremities no swollen tender joints  Neurologic no seizures or syncope  Gastrointestinal no nausea vomiting acid indigestion  Genitourinary no pain or discomfort on urination no bleeding  Cardiovascular has worsening ankle edema nocturia once or twice per night only worsening shortness of breath no chest pain or diaphoresis  Respiratory stop smoking over 20 years ago has history of COPD mother had COPD. She has nebulized albuterol Atrovent at home that she uses 2 or 3 times a day.   Had worsening of her respiratory status over the last week    Objective:     Vital Signs: Telemetry:    normal sinus rhythm Intake/Output:   Visit Vitals  /86 (BP 1 Location: Right upper arm, BP Patient Position: Semi fowlers)   Pulse 75   Temp 97.7 °F (36.5 °C)   Resp 18   Ht 5' 5\" (1.651 m)   Wt 91.7 kg (202 lb 2.6 oz)   SpO2 98%   Breastfeeding No   BMI 33.64 kg/m²       Temp (24hrs), Av.9 °F (36.6 °C), Min:97.7 °F (36.5 °C), Max:98.1 °F (36.7 °C)        O2 Device: None (Room air) O2 Flow Rate (L/min): 1 l/min       Wt Readings from Last 4 Encounters:   03/06/23 91.7 kg (202 lb 2.6 oz)   10/03/20 81.7 kg (180 lb 1.9 oz)   10/18/16 83.6 kg (184 lb 3.2 oz)   12/24/15 79.8 kg (176 lb)          Intake/Output Summary (Last 24 hours) at 3/10/2023 1335  Last data filed at 3/9/2023 1401  Gross per 24 hour   Intake 240 ml   Output --   Net 240 ml         Last shift:      No intake/output data recorded. Last 3 shifts: 03/08 1901 - 03/10 0700  In: 340 [P.O.:340]  Out: -        Physical Exam:   General:  female; up in the chair no distress no accessory muscle use  HEENT: NCAT, normal oral mucosa  Eyes: anicteric; conjunctiva clear extraocular movements intact  Neck: no nodes, neck veins are flat sitting upright no accessory MM use. Chest: no deformity,   Cardiac: Regular rate and rhythm  Lungs: Diffuse wheezing anterior and posterior  Abd: Overweight soft positive bowel sounds  Ext: discoloration of the lower extremities with minimal edema; no joint swelling;  No clubbing  : clear urine  Neuro: Alert oriented speech is clear moves all 4 extremities normally  Psych- no agitation, oriented to person;   Skin: warm, dry, no cyanosis;   Pulses: Brachial and radial pulses intact  Capillary: Normal capillary refill    Labs:    Recent Labs     03/10/23  0917   WBC 8.0   HGB 13.4          Recent Labs     03/10/23  0917 03/09/23  1145 03/08/23  1038    138 136   K 3.1* 3.2* 3.6    101 101   CO2 32 33* 31   GLU 89 112* 119*   BUN 34* 41* 39*   CREA 0.82 0.92 0.95   CA 9.0 9.6 9.3   MG  --  1.8  --    PHOS 3.9 4.4 2.2*   ALB 3.4* 4.0 3.7       Recent Labs     03/10/23  0435 03/09/23  0555 03/08/23  0503   PH 7.35 7.39 7.41   PCO2 53* 55* 48*   PO2 68* 67* 67*   HCO3 29* 32* 30*   FIO2 21 21.0 21.0     3/7 overnight oximetry low oxygen saturation 79% only 6 minutes below 88%   3/6 initial blood gas on 3 L nasal oxygen   3/6 2 L nasal oxygen with saturation 97% and on 1 L 94%  BNP 1993  Troponin 45  9/27/2020 echo ejection fraction 55% with RVSP of 67  Lab Results   Component Value Date/Time    Culture result: culture performed on unspun fluid 10/01/2020 10:15 AM    Culture result: No growth 4 days 10/01/2020 10:15 AM    Culture result: No Growth (<1000 cfu/mL) 09/25/2020 02:15 PM         Imaging:    CXR Results  (Last 48 hours)      None          Results from Hospital Encounter encounter on 03/05/23    XR CHEST PORT    Narrative  INDICATION: Pulmonary edema    EXAMINATION:  AP CHEST, PORTABLE    COMPARISON: March 5, 2023    FINDINGS: Single AP portable view of the chest demonstrates adequate lung  expansion and improved ulnar edema. The cardiomediastinal silhouette is  unchanged. No pneumothorax or focal consolidation. Impression  Improved pulmonary edema. XR CHEST PORT    Narrative  INDICATION: shortness of breath    EXAM:  AP CHEST RADIOGRAPH    COMPARISON: 11/13/2010    FINDINGS:    AP portable view of the chest demonstrates borderline cardiomegaly. Mild  pulmonary edema and probable trace pleural effusions. No pneumothorax. Defibrillator pad obscures portion of the right lung. The osseous structures are  unremarkable. Impression  Pulmonary edema and trace pleural effusions. Results from [place] encounter on 11/13/20    XR CHEST PA LAT    Narrative  Indication: COPD. PA and lateral radiographs of the chest 13 November 2020. Comparison CT chest 25 September 2020. Calcified 1 cm right lower lobe granuloma unchanged. Decreased right pleural effusion with residual posterior and lateral  costophrenic angle thickening and probably residual loculated fluid. Mild  streaky atelectasis or scarring in the lung bases. Mild cardiomegaly. Thoracic spondylosis, kyphoscoliosis. Impression  IMPRESSION: Residual right basilar pleural parenchymal findings, improved.     Results from [place] encounter on 09/24/20    CT CHEST WO CONT    Narrative  The study is a CT evaluation of the chest dated 9/25/2020. HISTORY: History of chronic obstructive pulmonary disease. Heart failure. Recent  pleural fluid collection. Technique: Performed without intravenous contrast. 3 mm axial imaging, axial MIP  imaging, sagittal, and coronal reconstructed imaging sequences are submitted for  evaluation. Thinner section Super Dimension imaging was also performed. Dose Reduction Technique was employed to reduce radiation exposure - This  includes reduction optimization techniques as appropriate to a performed exam  with automated exposure control adjustments of the mA and/or Kv according to  patient size, or use of iterative reconstruction technique. COMPARISON: Previous CT evaluation of the chest dated 12/18/2019. Recent chest  radiograph dated 9/25/2019. MEDIASTINUM: There are scattered normal sized middle mediastinal lymph nodes  without progression. The largest mediastinal lymph node is located within the  subcarinal region and this lymph node measures 10 mm in short axis measurement  which is considered within normal limits. There is an area of dystrophic calcification within the left thyroid lobe and  mild heterogeneity of the left thyroid lobe. This examination is negative for  large or dominant thyroid nodule or mass. LUNGS AND PLEURA: There is a moderate sized loculated right pleural effusion. This examination is negative for pleural based masses, pleural thickening, or  gas within the pleural fluid collection. There is encasement of the adjacent  lung with areas of passive atelectasis. There is a calcified granuloma within the right lung base. On axial maximum  intensity imaging, there are several additional small scattered micronodules  with 2 nodules demonstrated posteriorly within the left lower lobe (series [id number]  image number 42). This examination is negative for larger pulmonary nodules or  masses. The central trachea and bronchial tree are patent.  There is bronchial  wall thickening and discoid opacities within the lung bases consistent with  reactive airways disease. CARDIOVASCULAR: There are moderate calcifications of the coronary arteries. There is moderate enlargement of the atrial chambers and milder enlargement of  the ventricular chambers. There is a normal caliber to the thoracic aorta. CHEST WALL: There is multilevel spondylosis along the thoracic spine. There is a  mild to moderate dextroconvex scoliosis. There is increased dorsal kyphosis of  the thoracic spine. There are older healed right posterior rib fractures. OTHER: There is surgical suture material demonstrated within the gastric region  consistent with previous bariatric surgery. Impression  IMPRESSION:  1. There is a moderate sized loculated right pleural effusion associated with  passive atelectasis. The differential diagnosis would include recent pneumonia  with a residual parapneumonic effusion. Since exam an [de-identified] is negative for pleural  based masses or findings specific for a malignant effusion. 2.  There are several scattered normal size middle mediastinal lymph nodes. 3.  There are findings of prior granulomatous exposure. Please see above text  for further details. Discussion chest x-ray shows pulmonary edema tiny effusions with old echocardiogram showing pulmonary hypertension. Current BNP is elevated she has peripheral edema. Most likely has pulmonary hypertension with cor pulmonale and resultant pulmonary edema with concomitant COPD with exacerbation.   We will proceed as outlined above time of care 35 minutes  3/6 awake alert have decreased oxygen to 1 L we will check overnight oximetry on and off oxygen repeat ABG in a.m.  3/7 urine output not recorded but clinically she is doing better no labs available today continue Lasix we will switch Solu-Medrol to oral prednisone we will leave on room air and repeat ABG in a.m. to assess PCO2  3/8 phosphorus is low chest x-ray is improved continue  daily Lasix replace phosphorus if PCO2 has improved further tomorrow and electrolytes stable would consider discharge home  3/9 PCO2 has risen today she has more active wheezing. We will leave prednisone at 20 twice daily continue nebulizer. We will give Diamox again today and repeat ABG in a.m.  3/10 PCO2 mildly improved from yesterday but still has severe diffuse wheezing. Chest x-ray 3/8 showed improved almost clear pulmonary edema.   We will give IV Solu-Medrol today      Ladi Todd MD
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
## 2023-03-11 ENCOUNTER — APPOINTMENT (OUTPATIENT)
Dept: GENERAL RADIOLOGY | Age: 73
DRG: 291 | End: 2023-03-11
Attending: INTERNAL MEDICINE
Payer: MEDICARE

## 2023-03-11 LAB
ALBUMIN SERPL-MCNC: 3.5 G/DL (ref 3.5–5)
ANION GAP SERPL CALC-SCNC: 3 MMOL/L (ref 5–15)
ARTERIAL PATENCY WRIST A: ABNORMAL
BASE DEFICIT BLDA-SCNC: 0.3 MMOL/L
BASOPHILS # BLD: 0 K/UL (ref 0–0.1)
BASOPHILS NFR BLD: 0 % (ref 0–1)
BDY SITE: ABNORMAL
BODY TEMPERATURE: 97.8
BUN SERPL-MCNC: 33 MG/DL (ref 6–20)
BUN/CREAT SERPL: 38 (ref 12–20)
CA-I BLD-MCNC: 9.4 MG/DL (ref 8.5–10.1)
CHLORIDE SERPL-SCNC: 105 MMOL/L (ref 97–108)
CO2 SERPL-SCNC: 27 MMOL/L (ref 21–32)
COHGB MFR BLD: 0.7 % (ref 1–2)
CREAT SERPL-MCNC: 0.88 MG/DL (ref 0.55–1.02)
DIFFERENTIAL METHOD BLD: ABNORMAL
EOSINOPHIL # BLD: 0 K/UL (ref 0–0.4)
EOSINOPHIL NFR BLD: 0 % (ref 0–7)
ERYTHROCYTE [DISTWIDTH] IN BLOOD BY AUTOMATED COUNT: 13.9 % (ref 11.5–14.5)
FIO2 ON VENT: 21 %
GLUCOSE SERPL-MCNC: 190 MG/DL (ref 65–100)
HCO3 BLDA-SCNC: 26 MMOL/L (ref 22–26)
HCT VFR BLD AUTO: 45.1 % (ref 35–47)
HGB BLD-MCNC: 14.4 G/DL (ref 11.5–16)
IMM GRANULOCYTES # BLD AUTO: 0 K/UL (ref 0–0.04)
IMM GRANULOCYTES NFR BLD AUTO: 0 % (ref 0–0.5)
LYMPHOCYTES # BLD: 1 K/UL (ref 0.8–3.5)
LYMPHOCYTES NFR BLD: 16 % (ref 12–49)
MCH RBC QN AUTO: 30.7 PG (ref 26–34)
MCHC RBC AUTO-ENTMCNC: 31.9 G/DL (ref 30–36.5)
MCV RBC AUTO: 96.2 FL (ref 80–99)
METHGB MFR BLD: 0.4 % (ref 0–1.4)
MONOCYTES # BLD: 0.2 K/UL (ref 0–1)
MONOCYTES NFR BLD: 3 % (ref 5–13)
NEUTS SEG # BLD: 5 K/UL (ref 1.8–8)
NEUTS SEG NFR BLD: 81 % (ref 32–75)
NRBC # BLD: 0 K/UL (ref 0–0.01)
NRBC BLD-RTO: 0 PER 100 WBC
OXYHGB MFR BLD: 94.6 % (ref 95–99)
PCO2 BLDA: 46 MMHG (ref 35–45)
PERFORMED BY, TECHID: ABNORMAL
PH BLDA: 7.36 (ref 7.35–7.45)
PHOSPHATE SERPL-MCNC: 3.7 MG/DL (ref 2.6–4.7)
PLATELET # BLD AUTO: 299 K/UL (ref 150–400)
PMV BLD AUTO: 10.9 FL (ref 8.9–12.9)
PO2 BLDA: 77 MMHG (ref 80–100)
POTASSIUM SERPL-SCNC: 4.1 MMOL/L (ref 3.5–5.1)
RBC # BLD AUTO: 4.69 M/UL (ref 3.8–5.2)
SAO2 % BLD: 96 % (ref 95–99)
SAO2% DEVICE SAO2% SENSOR NAME: ABNORMAL
SODIUM SERPL-SCNC: 135 MMOL/L (ref 136–145)
SPECIMEN SITE: ABNORMAL
WBC # BLD AUTO: 6.2 K/UL (ref 3.6–11)

## 2023-03-11 PROCEDURE — 85025 COMPLETE CBC W/AUTO DIFF WBC: CPT

## 2023-03-11 PROCEDURE — 74011636637 HC RX REV CODE- 636/637: Performed by: INTERNAL MEDICINE

## 2023-03-11 PROCEDURE — 74011250636 HC RX REV CODE- 250/636: Performed by: INTERNAL MEDICINE

## 2023-03-11 PROCEDURE — 65270000029 HC RM PRIVATE

## 2023-03-11 PROCEDURE — 36600 WITHDRAWAL OF ARTERIAL BLOOD: CPT

## 2023-03-11 PROCEDURE — 94640 AIRWAY INHALATION TREATMENT: CPT

## 2023-03-11 PROCEDURE — 74011000250 HC RX REV CODE- 250: Performed by: INTERNAL MEDICINE

## 2023-03-11 PROCEDURE — 36415 COLL VENOUS BLD VENIPUNCTURE: CPT

## 2023-03-11 PROCEDURE — 74011250637 HC RX REV CODE- 250/637: Performed by: STUDENT IN AN ORGANIZED HEALTH CARE EDUCATION/TRAINING PROGRAM

## 2023-03-11 PROCEDURE — 74011250637 HC RX REV CODE- 250/637: Performed by: INTERNAL MEDICINE

## 2023-03-11 PROCEDURE — 82803 BLOOD GASES ANY COMBINATION: CPT

## 2023-03-11 PROCEDURE — 71045 X-RAY EXAM CHEST 1 VIEW: CPT

## 2023-03-11 PROCEDURE — 80069 RENAL FUNCTION PANEL: CPT

## 2023-03-11 RX ORDER — PREDNISONE 20 MG/1
20 TABLET ORAL 2 TIMES DAILY WITH MEALS
Status: DISCONTINUED | OUTPATIENT
Start: 2023-03-11 | End: 2023-03-12 | Stop reason: HOSPADM

## 2023-03-11 RX ORDER — POTASSIUM CHLORIDE 750 MG/1
20 TABLET, FILM COATED, EXTENDED RELEASE ORAL DAILY
Status: DISCONTINUED | OUTPATIENT
Start: 2023-03-11 | End: 2023-03-12 | Stop reason: HOSPADM

## 2023-03-11 RX ADMIN — LISINOPRIL 10 MG: 10 TABLET ORAL at 09:35

## 2023-03-11 RX ADMIN — METHYLPREDNISOLONE SODIUM SUCCINATE 40 MG: 40 INJECTION, POWDER, FOR SOLUTION INTRAMUSCULAR; INTRAVENOUS at 05:57

## 2023-03-11 RX ADMIN — METHYLPREDNISOLONE SODIUM SUCCINATE 40 MG: 40 INJECTION, POWDER, FOR SOLUTION INTRAMUSCULAR; INTRAVENOUS at 00:14

## 2023-03-11 RX ADMIN — FUROSEMIDE 40 MG: 40 TABLET ORAL at 09:35

## 2023-03-11 RX ADMIN — PREDNISONE 20 MG: 20 TABLET ORAL at 13:58

## 2023-03-11 RX ADMIN — GUAIFENESIN 1200 MG: 600 TABLET, EXTENDED RELEASE ORAL at 09:35

## 2023-03-11 RX ADMIN — RIVAROXABAN 20 MG: 20 TABLET, FILM COATED ORAL at 09:35

## 2023-03-11 RX ADMIN — SODIUM CHLORIDE, PRESERVATIVE FREE 10 ML: 5 INJECTION INTRAVENOUS at 14:00

## 2023-03-11 RX ADMIN — IPRATROPIUM BROMIDE AND ALBUTEROL SULFATE 3 ML: 2.5; .5 SOLUTION RESPIRATORY (INHALATION) at 01:07

## 2023-03-11 RX ADMIN — PANTOPRAZOLE SODIUM 40 MG: 40 TABLET, DELAYED RELEASE ORAL at 09:35

## 2023-03-11 RX ADMIN — GUAIFENESIN 1200 MG: 600 TABLET, EXTENDED RELEASE ORAL at 21:10

## 2023-03-11 RX ADMIN — SODIUM CHLORIDE, PRESERVATIVE FREE 10 ML: 5 INJECTION INTRAVENOUS at 05:59

## 2023-03-11 RX ADMIN — BUDESONIDE 500 MCG: 0.5 INHALANT ORAL at 08:06

## 2023-03-11 RX ADMIN — SODIUM CHLORIDE, PRESERVATIVE FREE 10 ML: 5 INJECTION INTRAVENOUS at 21:10

## 2023-03-11 RX ADMIN — IPRATROPIUM BROMIDE AND ALBUTEROL SULFATE 3 ML: 2.5; .5 SOLUTION RESPIRATORY (INHALATION) at 13:10

## 2023-03-11 RX ADMIN — METOPROLOL SUCCINATE 50 MG: 50 TABLET, EXTENDED RELEASE ORAL at 09:35

## 2023-03-11 RX ADMIN — PREDNISONE 20 MG: 20 TABLET ORAL at 17:46

## 2023-03-11 RX ADMIN — BUDESONIDE 500 MCG: 0.5 INHALANT ORAL at 20:05

## 2023-03-11 RX ADMIN — IPRATROPIUM BROMIDE AND ALBUTEROL SULFATE 3 ML: 2.5; .5 SOLUTION RESPIRATORY (INHALATION) at 08:06

## 2023-03-11 RX ADMIN — ASPIRIN 81 MG: 81 TABLET, COATED ORAL at 09:35

## 2023-03-11 RX ADMIN — IPRATROPIUM BROMIDE AND ALBUTEROL SULFATE 3 ML: 2.5; .5 SOLUTION RESPIRATORY (INHALATION) at 20:05

## 2023-03-11 RX ADMIN — POTASSIUM CHLORIDE 20 MEQ: 750 TABLET, EXTENDED RELEASE ORAL at 09:35

## 2023-03-11 RX ADMIN — ATORVASTATIN CALCIUM 20 MG: 10 TABLET, FILM COATED ORAL at 09:35

## 2023-03-11 NOTE — PROGRESS NOTES
Problem: Chronic Obstructive Pulmonary Disease (COPD)  Goal: *Oxygen saturation during activity within specified parameters  Outcome: Progressing Towards Goal  Goal: *Able to remain out of bed as prescribed  Outcome: Progressing Towards Goal  Goal: *Absence of hypoxia  Outcome: Progressing Towards Goal     Problem: Chronic Obstructive Pulmonary Disease (COPD)  Goal: *Able to remain out of bed as prescribed  Outcome: Progressing Towards Goal  Goal: *Absence of hypoxia  Outcome: Progressing Towards Goal

## 2023-03-11 NOTE — PROGRESS NOTES
Consult  Pulmonary, Critical Care    Name: Patrisha Sicard MRN: [id number]   : 1950 Hospital: [facility]   Date: 3/11/2023  Admission date: 3/5/2023 Hospital Day: 7       Subjective/Interval History:   Seen on the medical floor currently on nasal oxygen sitting up in the chair awake alert states she feels some better. History is underlying COPD obstructive sleep apnea intolerant of CPAP on no oxygen at home does have nebulized albuterol/ipratropium that she uses about 3 times a day. She also has Lasix questionably 20 mg daily she states it does induce diuresis but sometimes she has to skip it when she has to go to work. Over the last week or so she has had worsening edema of her ankles some worsening shortness of breath cough some thick sputum no purulence no fever chills or sweats  3/6 feels a little better but still shortness of breath with any exertion she is a wide awake no distress no accessory muscle use  3/7 eels better getting up to the bathroom and out of bed without difficulty. Currently on room air oxygen saturation 93%  3/9 awake alert but PCO2 has risen further today. 3/10 PCO2 mildly improved but she still has severe bronchospasm.   BNP remains elevated but last chest x-ray 3/8 showed improvement in pulmonary edema  3/11 up walking in the room without difficulty no shortness of breath PCO2 is improved again today    Hospital Problems  Date Reviewed: 2015            Codes Class Noted POA    COPD with acute exacerbation [facility]) ICD-10-CM: J44.1  ICD-9-CM: 491.21  3/5/2023 Unknown       IMPRESSION:   Acute hypoxic respiratory failure resolved now on room air  Acute hypercapnic respiratory failure PCO2 up to 55 on 3/9, 53 on 3/10 and 46 on 3/11  Pulmonary edema resolved radiographically  Hypokalemia repleted  Pulmonary hypertension  Probably cor pulmonale  Exacerbation COPD worsened wheezing 3/10 slightly improved today we will switch Solu-Medrol back to oral prednisone  Body mass index is 33.64 kg/m². RECOMMENDATIONS/PLAN:   Lasix now oral current chest x-ray no real pulmonary edema remaining  Continue nebulized albuterol Atrovent   Continue nebulized budesonide  Discontinue Solu-Medrol switch back to prednisone  Overnight oximetry showed minimal desaturation only 6 minutes below 88 room air and repeat ABG in a.m. to see if PCO2 continues to improve  She has a history of sleep apnea has never been tolerant of CPAP but she states she does sleep in a recliner semiupright            [x] High complexity decision making was performed  [x] See my orders for details      Subjective/Initial History:     I was asked by Vanesa Mae MD to see Gauri Huerta  a 67 y.o.    female in consultation for a chief complaint of acute hypoxic and hypercapnic respiratory failure with exacerbation COPD and pulmonary edema        No Known Allergies     MAR reviewed and pertinent medications noted or modified as needed     Current Facility-Administered Medications   Medication    potassium chloride SR (KLOR-CON 10) tablet 20 mEq    predniSONE (DELTASONE) tablet 20 mg    furosemide (LASIX) tablet 40 mg    sodium chloride (NS) flush 5-40 mL    sodium chloride (NS) flush 5-40 mL    acetaminophen (TYLENOL) tablet 650 mg    ondansetron (ZOFRAN) injection 4 mg    magnesium hydroxide (MILK OF MAGNESIA) 400 mg/5 mL oral suspension 30 mL    aspirin delayed-release tablet 81 mg    atorvastatin (LIPITOR) tablet 20 mg    melatonin tablet 3 mg    pantoprazole (PROTONIX) tablet 40 mg    rivaroxaban (XARELTO) tablet 20 mg    albuterol-ipratropium (DUO-NEB) 2.5 MG-0.5 MG/3 ML    budesonide (PULMICORT) 500 mcg/2 ml nebulizer suspension    guaiFENesin ER (MUCINEX) tablet 1,200 mg    metoprolol succinate (TOPROL-XL) XL tablet 50 mg    lisinopriL (PRINIVIL, ZESTRIL) tablet 10 mg      Patient PCP: Nona Kelley MD  [facility]:  has a past medical history of AF (paroxysmal atrial fibrillation) ([address] Anemia, Anxiety, Chronic obstructive pulmonary disease ([address] Heart failure ([address] HTN (hypertension), Obesity, HORACE (obstructive sleep apnea), Pulmonary hypertension ([address] and Stroke ([address] She has no past medical history of Chronic pain. PSH:   has a past surgical history that includes echocardiogram (11/23/2009); nm cardiac spect w strs/rest mult (08/2006); hx gastric bypass; and ir thoracentesis cath w image (10/1/2020). FHX: family history includes COPD in her mother; Colon Cancer in her father; Diabetes in her mother; Stroke in her brother. SHX:  reports that she has quit smoking. She has never used smokeless tobacco. She reports that she does not currently use alcohol. She reports that she does not use drugs. Systemic review:  General she states her weight stable has not had any chronic fever chills or sweats  Eyes no double vision or momentary blindness  ENT no facial pain over the sinuses she does get chronic congestion uses nasal lavage periodically has not had any recent problems  Endocrinologic no polyuria polydipsia  Musculoskeletal she has had worsening edema of her lower extremities no swollen tender joints  Neurologic no seizures or syncope  Gastrointestinal no nausea vomiting acid indigestion  Genitourinary no pain or discomfort on urination no bleeding  Cardiovascular has worsening ankle edema nocturia once or twice per night only worsening shortness of breath no chest pain or diaphoresis  Respiratory stop smoking over 20 years ago has history of COPD mother had COPD. She has nebulized albuterol Atrovent at home that she uses 2 or 3 times a day.   Had worsening of her respiratory status over the last week    Objective:     Vital Signs: Telemetry:    normal sinus rhythm Intake/Output:   Visit Vitals  /80 (BP 1 Location: Left upper arm, BP Patient Position: At rest;Lying)   Pulse 80   Temp 98.3 °F (36.8 °C)   Resp 20   Ht 5' 5\" (1.651 m)   Wt 91.7 kg (202 lb 2.6 oz)   SpO2 97% Breastfeeding No   BMI 33.64 kg/m²       Temp (24hrs), Av °F (36.7 °C), Min:97.8 °F (36.6 °C), Max:98.3 °F (36.8 °C)        O2 Device: None (Room air) O2 Flow Rate (L/min): 1 l/min       Wt Readings from Last 4 Encounters:   23 91.7 kg (202 lb 2.6 oz)   10/03/20 81.7 kg (180 lb 1.9 oz)   10/18/16 83.6 kg (184 lb 3.2 oz)   12/24/15 79.8 kg (176 lb)        No intake or output data in the 24 hours ending 23 1003      Last shift:      No intake/output data recorded. Last 3 shifts: No intake/output data recorded. Physical Exam:   General:  female; up in the chair no distress no accessory muscle use  HEENT: NCAT, normal oral mucosa  Eyes: anicteric; conjunctiva clear extraocular movements intact  Neck: no nodes, neck veins are flat sitting upright no accessory MM use. Chest: no deformity,   Cardiac: Regular rate and rhythm  Lungs: Diffuse wheezing anterior and posterior  Abd: Overweight soft positive bowel sounds  Ext: discoloration of the lower extremities with minimal edema; no joint swelling;  No clubbing  : clear urine  Neuro: Alert oriented speech is clear moves all 4 extremities normally  Psych- no agitation, oriented to person;   Skin: warm, dry, no cyanosis;   Pulses: Brachial and radial pulses intact  Capillary: Normal capillary refill    Labs:    Recent Labs     03/10/23  0917   WBC 8.0   HGB 13.4          Recent Labs     23  0904 03/10/23  0917 23  1145   * 140 138   K 4.1 3.1* 3.2*    104 101   CO2 27 32 33*   * 89 112*   BUN 33* 34* 41*   CREA 0.88 0.82 0.92   CA 9.4 9.0 9.6   MG  --   --  1.8   PHOS 3.7 3.9 4.4   ALB 3.5 3.4* 4.0       Recent Labs     23  0330 03/10/23  0435 23  0555   PH 7.36 7.35 7.39   PCO2 46* 53* 55*   PO2 77* 68* 67*   HCO3 26 29* 32*   FIO2 21.0 21 21.0     3/11 on room air   3/7 overnight oximetry low oxygen saturation 79% only 6 minutes below 88%   3/6 initial blood gas on 3 L nasal oxygen   3/6 2 L nasal oxygen with saturation 97% and on 1 L 94%  BNP 1993  Troponin 45  9/27/2020 echo ejection fraction 55% with RVSP of 67  Lab Results   Component Value Date/Time    Culture result: culture performed on unspun fluid 10/01/2020 10:15 AM    Culture result: No growth 4 days 10/01/2020 10:15 AM    Culture result: No Growth (<1000 cfu/mL) 09/25/2020 02:15 PM         Imaging:    CXR Results  (Last 48 hours)                 03/11/23 0914  XR CHEST PORT Final result    Impression:  Trace right pleural effusion and basilar atelectasis. No residual pulmonary   edema. Narrative:  INDICATION: Pulmonary edema       EXAMINATION:  AP CHEST, PORTABLE       COMPARISON: March 8, 2023       FINDINGS: Single AP portable view of the chest demonstrates adequate expansion   of the lungs, with no significant pulmonary edema. Right basilar atelectasis and   trace effusion. The cardiomediastinal silhouette is unchanged. No pneumothorax. Healed left rib fractures. Results from Hospital Encounter encounter on 03/05/23    XR CHEST PORT    Narrative  INDICATION: Pulmonary edema    EXAMINATION:  AP CHEST, PORTABLE    COMPARISON: March 8, 2023    FINDINGS: Single AP portable view of the chest demonstrates adequate expansion  of the lungs, with no significant pulmonary edema. Right basilar atelectasis and  trace effusion. The cardiomediastinal silhouette is unchanged. No pneumothorax. Healed left rib fractures. Impression  Trace right pleural effusion and basilar atelectasis. No residual pulmonary  edema. XR CHEST PORT    Narrative  INDICATION: Pulmonary edema    EXAMINATION:  AP CHEST, PORTABLE    COMPARISON: March 5, 2023    FINDINGS: Single AP portable view of the chest demonstrates adequate lung  expansion and improved ulnar edema. The cardiomediastinal silhouette is  unchanged. No pneumothorax or focal consolidation. Impression  Improved pulmonary edema.       XR CHEST PORT    Narrative  INDICATION: shortness of breath    EXAM:  AP CHEST RADIOGRAPH    COMPARISON: 11/13/2010    FINDINGS:    AP portable view of the chest demonstrates borderline cardiomegaly. Mild  pulmonary edema and probable trace pleural effusions. No pneumothorax. Defibrillator pad obscures portion of the right lung. The osseous structures are  unremarkable. Impression  Pulmonary edema and trace pleural effusions. Results from [place] encounter on 09/24/20    CT CHEST WO CONT    Narrative  The study is a CT evaluation of the chest dated 9/25/2020. HISTORY: History of chronic obstructive pulmonary disease. Heart failure. Recent  pleural fluid collection. Technique: Performed without intravenous contrast. 3 mm axial imaging, axial MIP  imaging, sagittal, and coronal reconstructed imaging sequences are submitted for  evaluation. Thinner section Super Dimension imaging was also performed. Dose Reduction Technique was employed to reduce radiation exposure - This  includes reduction optimization techniques as appropriate to a performed exam  with automated exposure control adjustments of the mA and/or Kv according to  patient size, or use of iterative reconstruction technique. COMPARISON: Previous CT evaluation of the chest dated 12/18/2019. Recent chest  radiograph dated 9/25/2019. MEDIASTINUM: There are scattered normal sized middle mediastinal lymph nodes  without progression. The largest mediastinal lymph node is located within the  subcarinal region and this lymph node measures 10 mm in short axis measurement  which is considered within normal limits. There is an area of dystrophic calcification within the left thyroid lobe and  mild heterogeneity of the left thyroid lobe. This examination is negative for  large or dominant thyroid nodule or mass. LUNGS AND PLEURA: There is a moderate sized loculated right pleural effusion.   This examination is negative for pleural based masses, pleural thickening, or  gas within the pleural fluid collection. There is encasement of the adjacent  lung with areas of passive atelectasis. There is a calcified granuloma within the right lung base. On axial maximum  intensity imaging, there are several additional small scattered micronodules  with 2 nodules demonstrated posteriorly within the left lower lobe (series [id number]  image number 42). This examination is negative for larger pulmonary nodules or  masses. The central trachea and bronchial tree are patent. There is bronchial  wall thickening and discoid opacities within the lung bases consistent with  reactive airways disease. CARDIOVASCULAR: There are moderate calcifications of the coronary arteries. There is moderate enlargement of the atrial chambers and milder enlargement of  the ventricular chambers. There is a normal caliber to the thoracic aorta. CHEST WALL: There is multilevel spondylosis along the thoracic spine. There is a  mild to moderate dextroconvex scoliosis. There is increased dorsal kyphosis of  the thoracic spine. There are older healed right posterior rib fractures. OTHER: There is surgical suture material demonstrated within the gastric region  consistent with previous bariatric surgery. Impression  IMPRESSION:  1. There is a moderate sized loculated right pleural effusion associated with  passive atelectasis. The differential diagnosis would include recent pneumonia  with a residual parapneumonic effusion. Since exam an [de-identified] is negative for pleural  based masses or findings specific for a malignant effusion. 2.  There are several scattered normal size middle mediastinal lymph nodes. 3.  There are findings of prior granulomatous exposure. Please see above text  for further details. Discussion chest x-ray shows pulmonary edema tiny effusions with old echocardiogram showing pulmonary hypertension. Current BNP is elevated she has peripheral edema.   Most likely has pulmonary hypertension with cor pulmonale and resultant pulmonary edema with concomitant COPD with exacerbation. We will proceed as outlined above time of care 35 minutes  3/6 awake alert have decreased oxygen to 1 L we will check overnight oximetry on and off oxygen repeat ABG in a.m.  3/7 urine output not recorded but clinically she is doing better no labs available today continue Lasix we will switch Solu-Medrol to oral prednisone we will leave on room air and repeat ABG in a.m. to assess PCO2  3/8 phosphorus is low chest x-ray is improved continue  daily Lasix replace phosphorus if PCO2 has improved further tomorrow and electrolytes stable would consider discharge home  3/9 PCO2 has risen today she has more active wheezing. We will leave prednisone at 20 twice daily continue nebulizer. We will give Diamox again today and repeat ABG in a.m.  3/10 PCO2 mildly improved from yesterday but still has severe diffuse wheezing. Chest x-ray 3/8 showed improved almost clear pulmonary edema. We will give IV Solu-Medrol today  2/11 PCO2 further improved she feels better. Still has diffuse wheezing but mildly improved. We will switch Solu-Medrol back to oral prednisone.   If no worsening next 24 hours consider discharge tomorrow    Lilia Chisholm MD
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
## 2023-03-12 VITALS
DIASTOLIC BLOOD PRESSURE: 71 MMHG | HEIGHT: 65 IN | BODY MASS INDEX: 33.24 KG/M2 | WEIGHT: 199.52 LBS | TEMPERATURE: 97.8 F | RESPIRATION RATE: 20 BRPM | OXYGEN SATURATION: 98 % | SYSTOLIC BLOOD PRESSURE: 114 MMHG | HEART RATE: 98 BPM

## 2023-03-12 PROBLEM — I50.9 ACUTE EXACERBATION OF CHF (CONGESTIVE HEART FAILURE) (HCC): Status: ACTIVE | Noted: 2023-03-12

## 2023-03-12 PROBLEM — J98.01 BRONCHOSPASM: Status: ACTIVE | Noted: 2023-03-12

## 2023-03-12 PROCEDURE — 74011000250 HC RX REV CODE- 250: Performed by: INTERNAL MEDICINE

## 2023-03-12 PROCEDURE — 74011250637 HC RX REV CODE- 250/637: Performed by: INTERNAL MEDICINE

## 2023-03-12 PROCEDURE — 94640 AIRWAY INHALATION TREATMENT: CPT

## 2023-03-12 PROCEDURE — 74011250637 HC RX REV CODE- 250/637: Performed by: STUDENT IN AN ORGANIZED HEALTH CARE EDUCATION/TRAINING PROGRAM

## 2023-03-12 PROCEDURE — 74011636637 HC RX REV CODE- 636/637: Performed by: INTERNAL MEDICINE

## 2023-03-12 RX ORDER — PREDNISONE 10 MG/1
TABLET ORAL
Qty: 30 TABLET | Refills: 0 | Status: SHIPPED | OUTPATIENT
Start: 2023-03-12 | End: 2023-03-24

## 2023-03-12 RX ORDER — FUROSEMIDE 20 MG/1
20 TABLET ORAL 2 TIMES DAILY
Qty: 180 TABLET | Refills: 0 | Status: SHIPPED | OUTPATIENT
Start: 2023-03-12 | End: 2023-06-10

## 2023-03-12 RX ADMIN — FUROSEMIDE 40 MG: 40 TABLET ORAL at 09:52

## 2023-03-12 RX ADMIN — ATORVASTATIN CALCIUM 20 MG: 10 TABLET, FILM COATED ORAL at 09:52

## 2023-03-12 RX ADMIN — LISINOPRIL 10 MG: 10 TABLET ORAL at 09:52

## 2023-03-12 RX ADMIN — IPRATROPIUM BROMIDE AND ALBUTEROL SULFATE 3 ML: 2.5; .5 SOLUTION RESPIRATORY (INHALATION) at 06:44

## 2023-03-12 RX ADMIN — PREDNISONE 20 MG: 20 TABLET ORAL at 09:52

## 2023-03-12 RX ADMIN — RIVAROXABAN 20 MG: 20 TABLET, FILM COATED ORAL at 09:52

## 2023-03-12 RX ADMIN — PANTOPRAZOLE SODIUM 40 MG: 40 TABLET, DELAYED RELEASE ORAL at 09:52

## 2023-03-12 RX ADMIN — METOPROLOL SUCCINATE 50 MG: 50 TABLET, EXTENDED RELEASE ORAL at 09:52

## 2023-03-12 RX ADMIN — SODIUM CHLORIDE, PRESERVATIVE FREE 10 ML: 5 INJECTION INTRAVENOUS at 05:27

## 2023-03-12 RX ADMIN — IPRATROPIUM BROMIDE AND ALBUTEROL SULFATE 3 ML: 2.5; .5 SOLUTION RESPIRATORY (INHALATION) at 13:41

## 2023-03-12 RX ADMIN — BUDESONIDE 500 MCG: 0.5 INHALANT ORAL at 06:44

## 2023-03-12 RX ADMIN — ASPIRIN 81 MG: 81 TABLET, COATED ORAL at 09:52

## 2023-03-12 RX ADMIN — POTASSIUM CHLORIDE 20 MEQ: 750 TABLET, EXTENDED RELEASE ORAL at 09:52

## 2023-03-12 RX ADMIN — GUAIFENESIN 1200 MG: 600 TABLET, EXTENDED RELEASE ORAL at 09:52

## 2023-03-12 RX ADMIN — IPRATROPIUM BROMIDE AND ALBUTEROL SULFATE 3 ML: 2.5; .5 SOLUTION RESPIRATORY (INHALATION) at 01:18

## 2023-03-12 NOTE — PROGRESS NOTES
Consult  Pulmonary, Critical Care    Name: Letty Rodriguez MRN: [id number]   : 1950 Hospital: [facility]   Date: 3/12/2023  Admission date: 3/5/2023 Hospital Day: 8       Subjective/Interval History:   Seen on the medical floor currently on nasal oxygen sitting up in the chair awake alert states she feels some better. History is underlying COPD obstructive sleep apnea intolerant of CPAP on no oxygen at home does have nebulized albuterol/ipratropium that she uses about 3 times a day. She also has Lasix questionably 20 mg daily she states it does induce diuresis but sometimes she has to skip it when she has to go to work. Over the last week or so she has had worsening edema of her ankles some worsening shortness of breath cough some thick sputum no purulence no fever chills or sweats  3/6 feels a little better but still shortness of breath with any exertion she is a wide awake no distress no accessory muscle use  3/7 eels better getting up to the bathroom and out of bed without difficulty. Currently on room air oxygen saturation 93%  3/9 awake alert but PCO2 has risen further today. 3/10 PCO2 mildly improved but she still has severe bronchospasm.   BNP remains elevated but last chest x-ray 3/8 showed improvement in pulmonary edema  3/11 up walking in the room without difficulty no shortness of breath PCO2 is improved again today    Hospital Problems  Date Reviewed: 2015            Codes Class Noted POA    Acute exacerbation of CHF (congestive heart failure) ([facility]) ICD-10-CM: I50.9  ICD-9-CM: 428.0  3/12/2023 Unknown        Bronchospasm ICD-10-CM: J98.01  ICD-9-CM: 519.11  3/12/2023 Unknown        * (Principal) COPD with acute exacerbation [facility]) ICD-10-CM: J44.1  ICD-9-CM: 491.21  3/5/2023 Unknown       IMPRESSION:   Acute hypoxic respiratory failure resolved now on room air  Acute hypercapnic respiratory failure PCO2 up to 55 on 3/9, 53 on 3/10 and 46 on 3/11  Pulmonary edema resolved radiographically  Hypokalemia repleted  Pulmonary hypertension  Probably cor pulmonale  Exacerbation COPD worsened wheezing 3/10 slightly improved today we will switch Solu-Medrol back to oral prednisone  Body mass index is 33.2 kg/m². RECOMMENDATIONS/PLAN:   Lasix now oral current chest x-ray no real pulmonary edema remaining  Continue nebulized albuterol Atrovent   Continue nebulized budesonide  Discontinue Solu-Medrol switch back to prednisone  She has a history of sleep apnea has never been tolerant of CPAP but she states she does sleep in a recliner semiupright          [x] High complexity decision making was performed  [x] See my orders for details      Subjective/Initial History:     I was asked by Kasey Santana MD to see Chula Bauer  a 67 y.o.    female in consultation for a chief complaint of acute hypoxic and hypercapnic respiratory failure with exacerbation COPD and pulmonary edema        No Known Allergies     MAR reviewed and pertinent medications noted or modified as needed     Current Facility-Administered Medications   Medication    potassium chloride SR (KLOR-CON 10) tablet 20 mEq    predniSONE (DELTASONE) tablet 20 mg    furosemide (LASIX) tablet 40 mg    sodium chloride (NS) flush 5-40 mL    sodium chloride (NS) flush 5-40 mL    acetaminophen (TYLENOL) tablet 650 mg    ondansetron (ZOFRAN) injection 4 mg    magnesium hydroxide (MILK OF MAGNESIA) 400 mg/5 mL oral suspension 30 mL    aspirin delayed-release tablet 81 mg    atorvastatin (LIPITOR) tablet 20 mg    melatonin tablet 3 mg    pantoprazole (PROTONIX) tablet 40 mg    rivaroxaban (XARELTO) tablet 20 mg    albuterol-ipratropium (DUO-NEB) 2.5 MG-0.5 MG/3 ML    budesonide (PULMICORT) 500 mcg/2 ml nebulizer suspension    guaiFENesin ER (MUCINEX) tablet 1,200 mg    metoprolol succinate (TOPROL-XL) XL tablet 50 mg    lisinopriL (PRINIVIL, ZESTRIL) tablet 10 mg      Patient PCP: Stormy Ta MD  PMH:  has a past medical history of AF (paroxysmal atrial fibrillation) ([facility] [address] Anemia, Anxiety, Chronic obstructive pulmonary disease ([facility] [address] Heart failure ([facility] [address] HTN (hypertension), Obesity, HORACE (obstructive sleep apnea), Pulmonary hypertension ([facility] [address] and Stroke ([facility] [address] She has no past medical history of Chronic pain. PSH:   has a past surgical history that includes echocardiogram (11/23/2009); nm cardiac spect w strs/rest mult (08/2006); hx gastric bypass; and ir thoracentesis cath w image (10/1/2020). FHX: family history includes COPD in her mother; Colon Cancer in her father; Diabetes in her mother; Stroke in her brother. SHX:  reports that she has quit smoking. She has never used smokeless tobacco. She reports that she does not currently use alcohol. She reports that she does not use drugs. Systemic review:  General she states her weight stable has not had any chronic fever chills or sweats  Eyes no double vision or momentary blindness  ENT no facial pain over the sinuses she does get chronic congestion uses nasal lavage periodically has not had any recent problems  Endocrinologic no polyuria polydipsia  Musculoskeletal she has had worsening edema of her lower extremities no swollen tender joints  Neurologic no seizures or syncope  Gastrointestinal no nausea vomiting acid indigestion  Genitourinary no pain or discomfort on urination no bleeding  Cardiovascular has worsening ankle edema nocturia once or twice per night only worsening shortness of breath no chest pain or diaphoresis  Respiratory stop smoking over 20 years ago has history of COPD mother had COPD. She has nebulized albuterol Atrovent at home that she uses 2 or 3 times a day.   Had worsening of her respiratory status over the last week    Objective:     Vital Signs: Telemetry:    normal sinus rhythm Intake/Output:   Visit Vitals  /72 (BP 1 Location: Right upper arm, BP Patient Position: At rest)   Pulse 87   Temp 97.4 °F (36.3 °C)   Resp 20   Ht 5' 5\" (1.651 m)   Wt 90.5 kg (199 lb 8.3 oz)   SpO2 96%   Breastfeeding No   BMI 33.20 kg/m²       Temp (24hrs), Av.9 °F (36.6 °C), Min:97.4 °F (36.3 °C), Max:98.4 °F (36.9 °C)        O2 Device: None (Room air) O2 Flow Rate (L/min): 1 l/min       Wt Readings from Last 4 Encounters:   23 90.5 kg (199 lb 8.3 oz)   10/03/20 81.7 kg (180 lb 1.9 oz)   10/18/16 83.6 kg (184 lb 3.2 oz)   12/24/15 79.8 kg (176 lb)          Intake/Output Summary (Last 24 hours) at 3/12/2023 0931  Last data filed at 3/11/2023 0935  Gross per 24 hour   Intake 240 ml   Output --   Net 240 ml         Last shift:      No intake/output data recorded. Last 3 shifts: 03/10 190 -  0700  In: 240 [P.O.:240]  Out: -        Physical Exam:   General:  female; up in the chair no distress no accessory muscle use  HEENT: NCAT, normal oral mucosa  Eyes: anicteric; conjunctiva clear extraocular movements intact  Neck: no nodes, neck veins are flat sitting upright no accessory MM use. Chest: no deformity,   Cardiac: Regular rate and rhythm  Lungs: Diffuse wheezing anterior and posterior  Abd: Overweight soft positive bowel sounds  Ext: discoloration of the lower extremities with minimal edema; no joint swelling;  No clubbing  : clear urine  Neuro: Alert oriented speech is clear moves all 4 extremities normally  Psych- no agitation, oriented to person;   Skin: warm, dry, no cyanosis;   Pulses: Brachial and radial pulses intact  Capillary: Normal capillary refill    Labs:    Recent Labs     23  0904 03/10/23  09   WBC 6.2 8.0   HGB 14.4 13.4    260       Recent Labs     23  0904 03/10/23  0923  1145   * 140 138   K 4.1 3.1* 3.2*    104 101   CO2 27 32 33*   * 89 112*   BUN 33* 34* 41*   CREA 0.88 0.82 0.92   CA 9.4 9.0 9.6   MG  --   --  1.8   PHOS 3.7 3.9 4.4   ALB 3.5 3.4* 4.0       Recent Labs     23  0330 03/10/23  0435   PH 7.36 7.35   PCO2 46* 53*   PO2 77* 68*   HCO3 26 29*   FIO2 21.0 21     3/11 on room air   3/7 overnight oximetry low oxygen saturation 79% only 6 minutes below 88%   3/6 initial blood gas on 3 L nasal oxygen   3/6 2 L nasal oxygen with saturation 97% and on 1 L 94%  BNP 1993  Troponin 45  9/27/2020 echo ejection fraction 55% with RVSP of 67  Lab Results   Component Value Date/Time    Culture result: culture performed on unspun fluid 10/01/2020 10:15 AM    Culture result: No growth 4 days 10/01/2020 10:15 AM    Culture result: No Growth (<1000 cfu/mL) 09/25/2020 02:15 PM         Imaging:    CXR Results  (Last 48 hours)                 03/11/23 0914  XR CHEST PORT Final result    Impression:  Trace right pleural effusion and basilar atelectasis. No residual pulmonary   edema. Narrative:  INDICATION: Pulmonary edema       EXAMINATION:  AP CHEST, PORTABLE       COMPARISON: March 8, 2023       FINDINGS: Single AP portable view of the chest demonstrates adequate expansion   of the lungs, with no significant pulmonary edema. Right basilar atelectasis and   trace effusion. The cardiomediastinal silhouette is unchanged. No pneumothorax. Healed left rib fractures. Results from Hospital Encounter encounter on 03/05/23    XR CHEST PORT    Narrative  INDICATION: Pulmonary edema    EXAMINATION:  AP CHEST, PORTABLE    COMPARISON: March 8, 2023    FINDINGS: Single AP portable view of the chest demonstrates adequate expansion  of the lungs, with no significant pulmonary edema. Right basilar atelectasis and  trace effusion. The cardiomediastinal silhouette is unchanged. No pneumothorax. Healed left rib fractures. Impression  Trace right pleural effusion and basilar atelectasis. No residual pulmonary  edema. XR CHEST PORT    Narrative  INDICATION: Pulmonary edema    EXAMINATION:  AP CHEST, PORTABLE    COMPARISON: March 5, 2023    FINDINGS: Single AP portable view of the chest demonstrates adequate lung  expansion and improved ulnar edema.  The cardiomediastinal silhouette is  unchanged. No pneumothorax or focal consolidation. Impression  Improved pulmonary edema. XR CHEST PORT    Narrative  INDICATION: shortness of breath    EXAM:  AP CHEST RADIOGRAPH    COMPARISON: 11/13/2010    FINDINGS:    AP portable view of the chest demonstrates borderline cardiomegaly. Mild  pulmonary edema and probable trace pleural effusions. No pneumothorax. Defibrillator pad obscures portion of the right lung. The osseous structures are  unremarkable. Impression  Pulmonary edema and trace pleural effusions. Results from [place] encounter on 09/24/20    CT CHEST WO CONT    Narrative  The study is a CT evaluation of the chest dated 9/25/2020. HISTORY: History of chronic obstructive pulmonary disease. Heart failure. Recent  pleural fluid collection. Technique: Performed without intravenous contrast. 3 mm axial imaging, axial MIP  imaging, sagittal, and coronal reconstructed imaging sequences are submitted for  evaluation. Thinner section Super Dimension imaging was also performed. Dose Reduction Technique was employed to reduce radiation exposure - This  includes reduction optimization techniques as appropriate to a performed exam  with automated exposure control adjustments of the mA and/or Kv according to  patient size, or use of iterative reconstruction technique. COMPARISON: Previous CT evaluation of the chest dated 12/18/2019. Recent chest  radiograph dated 9/25/2019. MEDIASTINUM: There are scattered normal sized middle mediastinal lymph nodes  without progression. The largest mediastinal lymph node is located within the  subcarinal region and this lymph node measures 10 mm in short axis measurement  which is considered within normal limits. There is an area of dystrophic calcification within the left thyroid lobe and  mild heterogeneity of the left thyroid lobe. This examination is negative for  large or dominant thyroid nodule or mass.     LUNGS AND PLEURA: There is a moderate sized loculated right pleural effusion. This examination is negative for pleural based masses, pleural thickening, or  gas within the pleural fluid collection. There is encasement of the adjacent  lung with areas of passive atelectasis. There is a calcified granuloma within the right lung base. On axial maximum  intensity imaging, there are several additional small scattered micronodules  with 2 nodules demonstrated posteriorly within the left lower lobe (series [id number]  image number 42). This examination is negative for larger pulmonary nodules or  masses. The central trachea and bronchial tree are patent. There is bronchial  wall thickening and discoid opacities within the lung bases consistent with  reactive airways disease. CARDIOVASCULAR: There are moderate calcifications of the coronary arteries. There is moderate enlargement of the atrial chambers and milder enlargement of  the ventricular chambers. There is a normal caliber to the thoracic aorta. CHEST WALL: There is multilevel spondylosis along the thoracic spine. There is a  mild to moderate dextroconvex scoliosis. There is increased dorsal kyphosis of  the thoracic spine. There are older healed right posterior rib fractures. OTHER: There is surgical suture material demonstrated within the gastric region  consistent with previous bariatric surgery. Impression  IMPRESSION:  1. There is a moderate sized loculated right pleural effusion associated with  passive atelectasis. The differential diagnosis would include recent pneumonia  with a residual parapneumonic effusion. Since exam an [de-identified] is negative for pleural  based masses or findings specific for a malignant effusion. 2.  There are several scattered normal size middle mediastinal lymph nodes. 3.  There are findings of prior granulomatous exposure. Please see above text  for further details.     Discussion chest x-ray shows pulmonary edema tiny effusions with old echocardiogram showing pulmonary hypertension. Current BNP is elevated she has peripheral edema. Most likely has pulmonary hypertension with cor pulmonale and resultant pulmonary edema with concomitant COPD with exacerbation. We will proceed as outlined above time of care 35 minutes  3/6 awake alert have decreased oxygen to 1 L we will check overnight oximetry on and off oxygen repeat ABG in a.m.  3/7 urine output not recorded but clinically she is doing better no labs available today continue Lasix we will switch Solu-Medrol to oral prednisone we will leave on room air and repeat ABG in a.m. to assess PCO2  3/8 phosphorus is low chest x-ray is improved continue  daily Lasix replace phosphorus if PCO2 has improved further tomorrow and electrolytes stable would consider discharge home  3/9 PCO2 has risen today she has more active wheezing. We will leave prednisone at 20 twice daily continue nebulizer. We will give Diamox again today and repeat ABG in a.m.  3/10 PCO2 mildly improved from yesterday but still has severe diffuse wheezing. Chest x-ray 3/8 showed improved almost clear pulmonary edema. We will give IV Solu-Medrol today  2/11 PCO2 further improved she feels better. Still has diffuse wheezing but mildly improved. We will switch Solu-Medrol back to oral prednisone.   If no worsening next 24 hours consider discharge tomorrow  2/12 sitting out of bed to chair no wheezing on prednisone    Shannan Mckay MD

## 2023-03-12 NOTE — PROGRESS NOTES
Problem: Falls - Risk of  Goal: *Absence of Falls  Description: Document Pepper Dias Fall Risk and appropriate interventions in the flowsheet.   Outcome: Progressing Towards Goal  Note: Fall Risk Interventions:     Ensure patient wears non skid socks  Place bed in lowest position and ensure brakes are locked  Place belongings in close proximity  Do hourly rounding
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
## 2023-03-12 NOTE — PROGRESS NOTES
Discharge plan of care/case management plan validated with provider discharge order. Discharge medications reviewed with patient and appropriate educational materials and side effects teaching were provided. I have reviewed discharge instructions with the patient. The patient verbalized understanding. Patient discharged home. Patient escorted to exit by Charge nurse via wheelchair with belongings. Patient's friend here to take patient home via private vehicle.

## 2023-03-12 NOTE — PROGRESS NOTES
Bedside and Verbal shift change report given to Everton Raman RN (oncoming nurse) by Cuauhtemoc Zavala LPN (offgoing nurse). Report included the following information SBAR, Kardex, Intake/Output, MAR, Accordion, Recent Results, and Med Rec Status.

## 2023-05-15 ENCOUNTER — APPOINTMENT (OUTPATIENT)
Facility: HOSPITAL | Age: 73
End: 2023-05-15
Payer: MEDICARE

## 2023-05-15 ENCOUNTER — HOSPITAL ENCOUNTER (EMERGENCY)
Facility: HOSPITAL | Age: 73
Discharge: HOME | End: 2023-05-15
Attending: EMERGENCY MEDICINE
Payer: MEDICARE

## 2023-05-15 VITALS
RESPIRATION RATE: 17 BRPM | BODY MASS INDEX: 31.08 KG/M2 | SYSTOLIC BLOOD PRESSURE: 157 MMHG | WEIGHT: 198 LBS | HEIGHT: 67 IN | DIASTOLIC BLOOD PRESSURE: 83 MMHG | OXYGEN SATURATION: 97 % | TEMPERATURE: 97.9 F | HEART RATE: 90 BPM

## 2023-05-15 DIAGNOSIS — R07.9 CHEST PAIN, UNSPECIFIED TYPE: Primary | ICD-10-CM

## 2023-05-15 LAB
ALBUMIN SERPL-MCNC: 3.9 G/DL (ref 3.5–5)
ALBUMIN/GLOB SERPL: 1.1 (ref 1.1–2.2)
ALP SERPL-CCNC: 133 U/L (ref 45–117)
ALT SERPL-CCNC: 16 U/L (ref 12–78)
ANION GAP SERPL CALC-SCNC: 7 MMOL/L (ref 5–15)
AST SERPL W P-5'-P-CCNC: 15 U/L (ref 15–37)
BASOPHILS # BLD: 0.1 K/UL (ref 0–0.1)
BASOPHILS NFR BLD: 1 % (ref 0–1)
BILIRUB SERPL-MCNC: 1.1 MG/DL (ref 0.2–1)
BNP SERPL-MCNC: 1482 PG/ML
BUN SERPL-MCNC: 20 MG/DL (ref 6–20)
BUN/CREAT SERPL: 26 (ref 12–20)
CA-I BLD-MCNC: 9.4 MG/DL (ref 8.5–10.1)
CHLORIDE SERPL-SCNC: 98 MMOL/L (ref 97–108)
CO2 SERPL-SCNC: 32 MMOL/L (ref 21–32)
CREAT SERPL-MCNC: 0.76 MG/DL (ref 0.55–1.02)
DIFFERENTIAL METHOD BLD: ABNORMAL
EKG ATRIAL RATE: 92 BPM
EKG DIAGNOSIS: NORMAL
EKG Q-T INTERVAL: 348 MS
EKG QRS DURATION: 80 MS
EKG QTC CALCULATION (BAZETT): 457 MS
EKG R AXIS: -5 DEGREES
EKG T AXIS: 102 DEGREES
EKG VENTRICULAR RATE: 104 BPM
EOSINOPHIL # BLD: 0 K/UL (ref 0–0.4)
EOSINOPHIL NFR BLD: 0 % (ref 0–7)
ERYTHROCYTE [DISTWIDTH] IN BLOOD BY AUTOMATED COUNT: 14.5 % (ref 11.5–14.5)
GLOBULIN SER CALC-MCNC: 3.5 G/DL (ref 2–4)
GLUCOSE SERPL-MCNC: 124 MG/DL (ref 65–100)
HCT VFR BLD AUTO: 38.9 % (ref 35–47)
HGB BLD-MCNC: 12.8 G/DL (ref 11.5–16)
IMM GRANULOCYTES # BLD AUTO: 0.1 K/UL (ref 0–0.04)
IMM GRANULOCYTES NFR BLD AUTO: 0 % (ref 0–0.5)
LYMPHOCYTES # BLD: 1.3 K/UL (ref 0.8–3.5)
LYMPHOCYTES NFR BLD: 10 % (ref 12–49)
MCH RBC QN AUTO: 31.2 PG (ref 26–34)
MCHC RBC AUTO-ENTMCNC: 32.9 G/DL (ref 30–36.5)
MCV RBC AUTO: 94.9 FL (ref 80–99)
MONOCYTES # BLD: 1.2 K/UL (ref 0–1)
MONOCYTES NFR BLD: 10 % (ref 5–13)
NEUTS SEG # BLD: 9.7 K/UL (ref 1.8–8)
NEUTS SEG NFR BLD: 79 % (ref 32–75)
NRBC # BLD: 0 K/UL (ref 0–0.01)
NRBC BLD-RTO: 0 PER 100 WBC
PLATELET # BLD AUTO: 307 K/UL (ref 150–400)
PMV BLD AUTO: 10.2 FL (ref 8.9–12.9)
POTASSIUM SERPL-SCNC: 3.8 MMOL/L (ref 3.5–5.1)
PROT SERPL-MCNC: 7.4 G/DL (ref 6.4–8.2)
RBC # BLD AUTO: 4.1 M/UL (ref 3.8–5.2)
SODIUM SERPL-SCNC: 137 MMOL/L (ref 136–145)
TROPONIN I SERPL HS-MCNC: 13 NG/L (ref 0–51)
TROPONIN I SERPL HS-MCNC: 13 NG/L (ref 0–51)
WBC # BLD AUTO: 12.4 K/UL (ref 3.6–11)

## 2023-05-15 PROCEDURE — 84484 ASSAY OF TROPONIN QUANT: CPT

## 2023-05-15 PROCEDURE — 71045 X-RAY EXAM CHEST 1 VIEW: CPT

## 2023-05-15 PROCEDURE — 6370000000 HC RX 637 (ALT 250 FOR IP): Performed by: EMERGENCY MEDICINE

## 2023-05-15 PROCEDURE — 93005 ELECTROCARDIOGRAM TRACING: CPT | Performed by: EMERGENCY MEDICINE

## 2023-05-15 PROCEDURE — 96374 THER/PROPH/DIAG INJ IV PUSH: CPT

## 2023-05-15 PROCEDURE — 85025 COMPLETE CBC W/AUTO DIFF WBC: CPT

## 2023-05-15 PROCEDURE — 80053 COMPREHEN METABOLIC PANEL: CPT

## 2023-05-15 PROCEDURE — 83880 ASSAY OF NATRIURETIC PEPTIDE: CPT

## 2023-05-15 PROCEDURE — 93010 ELECTROCARDIOGRAM REPORT: CPT | Performed by: INTERNAL MEDICINE

## 2023-05-15 PROCEDURE — 99285 EMERGENCY DEPT VISIT HI MDM: CPT

## 2023-05-15 PROCEDURE — 6360000002 HC RX W HCPCS: Performed by: EMERGENCY MEDICINE

## 2023-05-15 PROCEDURE — 94640 AIRWAY INHALATION TREATMENT: CPT

## 2023-05-15 PROCEDURE — 36415 COLL VENOUS BLD VENIPUNCTURE: CPT

## 2023-05-15 RX ORDER — PREDNISONE 20 MG/1
40 TABLET ORAL DAILY
Qty: 8 TABLET | Refills: 0 | Status: SHIPPED | OUTPATIENT
Start: 2023-05-16 | End: 2023-05-20

## 2023-05-15 RX ORDER — IPRATROPIUM BROMIDE AND ALBUTEROL SULFATE 2.5; .5 MG/3ML; MG/3ML
1 SOLUTION RESPIRATORY (INHALATION)
Status: COMPLETED | OUTPATIENT
Start: 2023-05-15 | End: 2023-05-15

## 2023-05-15 RX ORDER — AZITHROMYCIN 500 MG/1
500 TABLET, FILM COATED ORAL
Status: COMPLETED | OUTPATIENT
Start: 2023-05-15 | End: 2023-05-15

## 2023-05-15 RX ORDER — FUROSEMIDE 10 MG/ML
20 INJECTION INTRAMUSCULAR; INTRAVENOUS
Status: COMPLETED | OUTPATIENT
Start: 2023-05-15 | End: 2023-05-15

## 2023-05-15 RX ORDER — AZITHROMYCIN 250 MG/1
250 TABLET, FILM COATED ORAL DAILY
Qty: 4 TABLET | Refills: 0 | Status: SHIPPED | OUTPATIENT
Start: 2023-05-15 | End: 2023-05-19

## 2023-05-15 RX ORDER — ASPIRIN 325 MG
325 TABLET ORAL
Status: COMPLETED | OUTPATIENT
Start: 2023-05-15 | End: 2023-05-15

## 2023-05-15 RX ORDER — ALBUTEROL SULFATE 90 UG/1
2 AEROSOL, METERED RESPIRATORY (INHALATION) 4 TIMES DAILY PRN
Qty: 54 G | Refills: 1 | Status: SHIPPED | OUTPATIENT
Start: 2023-05-15

## 2023-05-15 RX ORDER — PREDNISONE 20 MG/1
40 TABLET ORAL ONCE
Status: COMPLETED | OUTPATIENT
Start: 2023-05-15 | End: 2023-05-15

## 2023-05-15 RX ADMIN — ASPIRIN 325 MG: 325 TABLET ORAL at 08:23

## 2023-05-15 RX ADMIN — PREDNISONE 40 MG: 20 TABLET ORAL at 08:23

## 2023-05-15 RX ADMIN — AZITHROMYCIN MONOHYDRATE 500 MG: 500 TABLET ORAL at 08:49

## 2023-05-15 RX ADMIN — IPRATROPIUM BROMIDE AND ALBUTEROL SULFATE 1 AMPULE: 2.5; .5 SOLUTION RESPIRATORY (INHALATION) at 08:34

## 2023-05-15 RX ADMIN — FUROSEMIDE 20 MG: 10 INJECTION, SOLUTION INTRAMUSCULAR; INTRAVENOUS at 11:01

## 2023-05-15 ASSESSMENT — PAIN SCALES - GENERAL: PAINLEVEL_OUTOF10: 7

## 2023-05-15 ASSESSMENT — PAIN - FUNCTIONAL ASSESSMENT: PAIN_FUNCTIONAL_ASSESSMENT: 0-10

## 2023-05-15 ASSESSMENT — LIFESTYLE VARIABLES
HOW OFTEN DO YOU HAVE A DRINK CONTAINING ALCOHOL: NEVER
HOW MANY STANDARD DRINKS CONTAINING ALCOHOL DO YOU HAVE ON A TYPICAL DAY: PATIENT DOES NOT DRINK

## 2023-05-15 NOTE — DISCHARGE INSTRUCTIONS
Take 2 tablets of your furosemide in the morning while you are taking the prednisone. Return to the ER for any new chest pain, difficulty breathing, fevers, or any other new or concerning symptoms. Thank you! Thank you for allowing me to care for you in the emergency department. It is my goal to provide you with excellent care. If you have not received excellent quality care, please ask to speak to the nurse manager. Please fill out the survey that will come to you by mail or email since we listen to your feedback! Below you will find a list of your tests from today's visit. Should you have any questions, please do not hesitate to call the emergency department.     Labs  Recent Results (from the past 12 hour(s))   Troponin    Collection Time: 05/15/23  8:15 AM   Result Value Ref Range    Troponin, High Sensitivity 13 0 - 51 ng/L   CBC with Auto Differential    Collection Time: 05/15/23  8:19 AM   Result Value Ref Range    WBC 12.4 (H) 3.6 - 11.0 K/uL    RBC 4.10 3.80 - 5.20 M/uL    Hemoglobin 12.8 11.5 - 16.0 g/dL    Hematocrit 38.9 35.0 - 47.0 %    MCV 94.9 80.0 - 99.0 FL    MCH 31.2 26.0 - 34.0 PG    MCHC 32.9 30.0 - 36.5 g/dL    RDW 14.5 11.5 - 14.5 %    Platelets 928 013 - 030 K/uL    MPV 10.2 8.9 - 12.9 FL    Nucleated RBCs 0.0 0.0  WBC    nRBC 0.00 0.00 - 0.01 K/uL    Neutrophils % 79 (H) 32 - 75 %    Lymphocytes % 10 (L) 12 - 49 %    Monocytes % 10 5 - 13 %    Eosinophils % 0 0 - 7 %    Basophils % 1 0 - 1 %    Immature Granulocytes 0 0 - 0.5 %    Neutrophils Absolute 9.7 (H) 1.8 - 8.0 K/UL    Lymphocytes Absolute 1.3 0.8 - 3.5 K/UL    Monocytes Absolute 1.2 (H) 0.0 - 1.0 K/UL    Eosinophils Absolute 0.0 0.0 - 0.4 K/UL    Basophils Absolute 0.1 0.0 - 0.1 K/UL    Absolute Immature Granulocyte 0.1 (H) 0.00 - 0.04 K/UL    Differential Type AUTOMATED     Comprehensive Metabolic Panel    Collection Time: 05/15/23  8:19 AM   Result Value Ref Range    Sodium 137 136 - 145 mmol/L    Potassium 3.8

## 2023-05-15 NOTE — ED NOTES
Informed Provider Eri that patient is flagged as meeting sepsis criteria.   No orders given at this time     [facility]  [id number]

## 2023-05-15 NOTE — ED PROVIDER NOTES
EMERGENCY DEPARTMENT HISTORY AND PHYSICAL EXAM      Date: 5/15/2023  Patient Name: Traci Welch      History of Presenting Illness     Chief Complaint   Patient presents with    Chest Pain       History Provided By: patient    HPI: Traci Welch, 67 y.o. female with a past medical history significant for CHF, COPD, CVA, Afib on AC presents to the ED with cc of chest pain, SOB. Onset yesterday evening. Chest pain is pleuritic, not present at rest. No other provoking factors, no palliating factors. Denies F/C/N/V/Diaphoresis, diarrhea. Taking lasix as prescribed, no LE edema. Taking xarelto as prescribed. States she never gets chest pain with her COPD. Think she may have pulled a muscle from coughing but is unsure. There are no other complaints, changes, or physical findings at this time.     PCP: Lyndsey Stapleton MD    Current Facility-Administered Medications   Medication Dose Route Frequency Provider Last Rate Last Admin    aspirin tablet 325 mg  325 mg Oral NOW Ely Lambert MD        predniSONE (DELTASONE) tablet 40 mg  40 mg Oral Once Ely Lambert MD        ipratropium-albuterol (DUONEB) nebulizer solution 1 ampule  1 ampule Inhalation NOW Ely Lambert MD        azithromycin (ZITHROMAX) tablet 500 mg  500 mg Oral NOW Ely Lambert MD         Current Outpatient Medications   Medication Sig Dispense Refill    aspirin 81 MG EC tablet Take 81 mg by mouth daily      atorvastatin (LIPITOR) 40 MG tablet Take by mouth daily      furosemide (LASIX) 20 MG tablet Take 20 mg by mouth 2 times daily      lisinopril (PRINIVIL;ZESTRIL) 10 MG tablet Take by mouth daily      metoprolol succinate (TOPROL XL) 50 MG extended release tablet Take 50 mg by mouth daily      omeprazole (PRILOSEC OTC) 20 MG tablet Take 20 mg by mouth daily      rivaroxaban (XARELTO) 20 MG TABS tablet Take 20 mg by mouth daily         Past History     Past Medical History:  Past Medical History:   Diagnosis Date    AF (paroxysmal atrial

## 2024-01-01 NOTE — PROGRESS NOTES
Progress Note    Patient: Shanna Sorto MRN: [id number]  SSN: [id number]    YOB: 1950  Age: 79 y.o. Sex: female      Admit Date: 9/24/2020    LOS: 7 days     Subjective:   Patient followed for suspected sepsis but no obvious source of infection, though with suspicious right pleural effusion, possibly parapneumonic. Blood culture has been negative. WBC is now increasing but she is on Prednisone. Procalcitonin and CRP decreasing. She remains on IV Vancomycin and Zosyn. Indium scan has been completed but not yet interpreted. She is sitting up in bedside chair with no complaints. Objective:     Vitals:    10/01/20 0742 10/01/20 0835 10/01/20 1002 10/01/20 1157   BP:    (!) 164/80   Pulse:    80   Resp:    20   Temp:       SpO2: 100%   95%   Weight:  185 lb 13.6 oz (84.3 kg)     Height:   5' 5\" (1.651 m)         Intake and Output:  Current Shift: No intake/output data recorded. Last three shifts: No intake/output data recorded. Physical Exam:    Vitals signs and nursing note reviewed. Constitutional:       General: She is not in acute distress. Appearance: She is obese. She is ill-appearing. She is not toxic-appearing or diaphoretic. HENT:         Comments: Nasal O2     Mouth/Throat:      Mouth: Mucous membranes are dry. Pharynx: Oropharynx is clear. Cardiovascular:      Rate and Rhythm: Normal rate and regular rhythm. Heart sounds: No murmur. Pulmonary:      Breath sounds: No rhonchi or rales. Comments: Diminished BS   Genitourinary:     Comments: No Bustos  Skin:     Findings: Bruising and lesion (right thumb wound healing) present. Neurological:      General: No focal deficit present. Mental Status: She is alert and oriented to person, place, and time. Psychiatric:         Behavior: Behavior normal.         Thought Content:  Thought content normal.         Judgment: Judgment normal.      Comments: Depressed mood     Lab/Data Review:  WBC 17,700  Procalcitonin Pending (0.39 (0.98 (2.37 (7.64)  CRP Pending (8.99 (12.800 (16.70 (41.20)  ESR 52    Blood cultures (9/22) No growth at 6 days  Urine culture (9/25) No growth FINAL  Assessment:       1. Suspected sepsis with leukocytosis, elevated ESR, procalcitonin, CRP, etiology unclear, resolving, Day #7 IV Vancomycin and Zosyn  2. Right pleural effusion, etiology unclear, ?parapneumonic, Day #7 IV Vancomycin and Zosyn  3. Ruling out Rheumatologic disease  4. Acute kidney injury, resolving  5. COPD by history     Comment:  By my review of Indium scan there is no focal uptake to suggest pleural fluid infection, however, this could be false negative since she has been on antibiotics for a week. Plan:      1. Start de-escalation by stopping Vancomycin today  2. Continue IV Zosyn  3. If clinical improvement with decreasing inflammatory markers continue, would consider transition to Augmenin 875 mg po BID  4. In am, repeat procalcitonin and CRP, done  5.  Follow-up Indium scan interpretation    Signed By: Briseyda Leiva MD     October 1, 2020 57

## 2024-01-24 ENCOUNTER — APPOINTMENT (OUTPATIENT)
Facility: HOSPITAL | Age: 74
End: 2024-01-24
Payer: MEDICARE

## 2024-01-24 ENCOUNTER — HOSPITAL ENCOUNTER (INPATIENT)
Facility: HOSPITAL | Age: 74
LOS: 8 days | Discharge: NURSING, REHAB OR LONG-TERM CARE | DRG: 064 | End: 2024-02-01
Attending: ANESTHESIOLOGY | Admitting: ANESTHESIOLOGY
Payer: MEDICARE

## 2024-01-24 ENCOUNTER — HOSPITAL ENCOUNTER (EMERGENCY)
Facility: HOSPITAL | Age: 74
Discharge: ANOTHER HOSPITAL | End: 2024-01-24
Attending: EMERGENCY MEDICINE
Payer: MEDICARE

## 2024-01-24 VITALS
BODY MASS INDEX: 31.67 KG/M2 | SYSTOLIC BLOOD PRESSURE: 128 MMHG | RESPIRATION RATE: 25 BRPM | DIASTOLIC BLOOD PRESSURE: 68 MMHG | WEIGHT: 185.5 LBS | OXYGEN SATURATION: 100 % | TEMPERATURE: 98.5 F | HEIGHT: 64 IN | HEART RATE: 99 BPM

## 2024-01-24 DIAGNOSIS — I61.2 NONTRAUMATIC HEMORRHAGE OF CEREBRAL HEMISPHERE, UNSPECIFIED LATERALITY (HCC): Primary | ICD-10-CM

## 2024-01-24 DIAGNOSIS — I61.9 STROKE, HEMORRHAGIC (HCC): Primary | ICD-10-CM

## 2024-01-24 LAB
ALBUMIN SERPL-MCNC: 3.8 G/DL (ref 3.5–5)
ALBUMIN/GLOB SERPL: 1.1 (ref 1.1–2.2)
ALP SERPL-CCNC: 120 U/L (ref 45–117)
ALT SERPL-CCNC: 140 U/L (ref 12–78)
AMPHET UR QL SCN: NEGATIVE
ANION GAP SERPL CALC-SCNC: 7 MMOL/L (ref 5–15)
APPEARANCE UR: CLEAR
APTT PPP: 31.2 SEC
APTT PPP: 31.5 SEC
AST SERPL W P-5'-P-CCNC: 216 U/L (ref 15–37)
BACTERIA URNS QL MICRO: NEGATIVE /HPF
BARBITURATES UR QL SCN: NEGATIVE
BASE DEFICIT BLD-SCNC: 1.1 MMOL/L
BASOPHILS # BLD: 0 K/UL (ref 0–0.1)
BASOPHILS NFR BLD: 0 % (ref 0–1)
BENZODIAZ UR QL: NEGATIVE
BILIRUB SERPL-MCNC: 0.8 MG/DL (ref 0.2–1)
BILIRUB UR QL: NEGATIVE
BUN SERPL-MCNC: 49 MG/DL (ref 6–20)
BUN/CREAT SERPL: 46 (ref 12–20)
CA-I BLD-MCNC: 1.16 MMOL/L
CA-I BLD-MCNC: 9.7 MG/DL (ref 8.5–10.1)
CANNABINOIDS UR QL SCN: NEGATIVE
CHLORIDE BLD-SCNC: 102 MMOL/L
CHLORIDE SERPL-SCNC: 102 MMOL/L (ref 97–108)
CO2 BLD-SCNC: 27 MMOL/L
CO2 SERPL-SCNC: 26 MMOL/L (ref 21–32)
COCAINE UR QL SCN: NEGATIVE
COLOR UR: ABNORMAL
CREAT SERPL-MCNC: 1.07 MG/DL (ref 0.55–1.02)
CREAT UR-MCNC: 0.86 MG/DL
DIFFERENTIAL METHOD BLD: ABNORMAL
EOSINOPHIL # BLD: 0 K/UL (ref 0–0.4)
EOSINOPHIL NFR BLD: 0 % (ref 0–7)
EPITH CASTS URNS QL MICRO: ABNORMAL /LPF
ERYTHROCYTE [DISTWIDTH] IN BLOOD BY AUTOMATED COUNT: 13.8 % (ref 11.5–14.5)
ERYTHROCYTE [DISTWIDTH] IN BLOOD BY AUTOMATED COUNT: 14.1 % (ref 11.5–14.5)
ETHANOL SERPL-MCNC: <10 MG/DL (ref 0–0.08)
GLOBULIN SER CALC-MCNC: 3.5 G/DL (ref 2–4)
GLUCOSE BLD STRIP.AUTO-MCNC: 109 MG/DL
GLUCOSE SERPL-MCNC: 121 MG/DL (ref 65–100)
GLUCOSE UR STRIP.AUTO-MCNC: NEGATIVE MG/DL
HCO3 BLD-SCNC: 27.2 MMOL/L
HCT VFR BLD AUTO: 41.4 % (ref 35–47)
HCT VFR BLD AUTO: 45.3 % (ref 35–47)
HGB BLD-MCNC: 13.3 G/DL (ref 11.5–16)
HGB BLD-MCNC: 14.7 G/DL (ref 11.5–16)
HGB UR QL STRIP: ABNORMAL
HYALINE CASTS URNS QL MICRO: ABNORMAL /LPF
IMM GRANULOCYTES # BLD AUTO: 0.1 K/UL (ref 0–0.04)
IMM GRANULOCYTES NFR BLD AUTO: 0 % (ref 0–0.5)
INR PPP: 1.3
INR PPP: 1.3
KETONES UR QL STRIP.AUTO: 20 MG/DL
LACTATE BLD-SCNC: 1.19 MMOL/L
LEUKOCYTE ESTERASE UR QL STRIP.AUTO: NEGATIVE
LYMPHOCYTES # BLD: 1.1 K/UL (ref 0.8–3.5)
LYMPHOCYTES NFR BLD: 8 % (ref 12–49)
Lab: NORMAL
MCH RBC QN AUTO: 30.5 PG (ref 26–34)
MCH RBC QN AUTO: 30.6 PG (ref 26–34)
MCHC RBC AUTO-ENTMCNC: 32.1 G/DL (ref 30–36.5)
MCHC RBC AUTO-ENTMCNC: 32.5 G/DL (ref 30–36.5)
MCV RBC AUTO: 94.4 FL (ref 80–99)
MCV RBC AUTO: 95 FL (ref 80–99)
METHADONE UR QL: NEGATIVE
MONOCYTES # BLD: 1.3 K/UL (ref 0–1)
MONOCYTES NFR BLD: 10 % (ref 5–13)
MUCOUS THREADS URNS QL MICRO: ABNORMAL /LPF
NEUTS SEG # BLD: 10.7 K/UL (ref 1.8–8)
NEUTS SEG NFR BLD: 82 % (ref 32–75)
NITRITE UR QL STRIP.AUTO: NEGATIVE
NRBC # BLD: 0 K/UL (ref 0–0.01)
NRBC # BLD: 0 K/UL (ref 0–0.01)
NRBC BLD-RTO: 0 PER 100 WBC
NRBC BLD-RTO: 0 PER 100 WBC
OPIATES UR QL: NEGATIVE
PCO2 BLD: 58.5 MMHG
PCP UR QL: NEGATIVE
PERFORMED BY:: ABNORMAL
PH BLD: 7.28
PH UR STRIP: 5
PLATELET # BLD AUTO: 244 K/UL (ref 150–400)
PLATELET # BLD AUTO: 260 K/UL (ref 150–400)
PMV BLD AUTO: 10.4 FL (ref 8.9–12.9)
PMV BLD AUTO: 10.6 FL (ref 8.9–12.9)
PO2 BLD: 38 MMHG
POTASSIUM BLD-SCNC: 4 MMOL/L
POTASSIUM SERPL-SCNC: 4.6 MMOL/L (ref 3.5–5.1)
PROCALCITONIN SERPL-MCNC: 0.23 NG/ML
PROT SERPL-MCNC: 7.3 G/DL (ref 6.4–8.2)
PROT UR STRIP-MCNC: 100 MG/DL
PROTHROMBIN TIME: 16.5 SEC
PROTHROMBIN TIME: 16.5 SEC
RBC # BLD AUTO: 4.36 M/UL (ref 3.8–5.2)
RBC # BLD AUTO: 4.8 M/UL (ref 3.8–5.2)
RBC #/AREA URNS HPF: ABNORMAL /HPF
SAO2 % BLD: 63 %
SODIUM BLD-SCNC: 137 MMOL/L
SODIUM SERPL-SCNC: 135 MMOL/L (ref 136–145)
SP GR UR REFRACTOMETRY: 1.02
SPECIMEN SITE: ABNORMAL
THERAPEUTIC RANGE: NORMAL SEC
THERAPEUTIC RANGE: NORMAL SEC
TROPONIN I SERPL HS-MCNC: 1381 NG/L (ref 0–51)
TROPONIN I SERPL HS-MCNC: 1646 NG/L (ref 0–51)
URINE CULTURE IF INDICATED: ABNORMAL
UROBILINOGEN UR QL STRIP.AUTO: 0.1 EU/DL
WBC # BLD AUTO: 13.1 K/UL (ref 3.6–11)
WBC # BLD AUTO: 13.1 K/UL (ref 3.6–11)
WBC URNS QL MICRO: ABNORMAL /HPF

## 2024-01-24 PROCEDURE — 80053 COMPREHEN METABOLIC PANEL: CPT

## 2024-01-24 PROCEDURE — 99285 EMERGENCY DEPT VISIT HI MDM: CPT

## 2024-01-24 PROCEDURE — 84295 ASSAY OF SERUM SODIUM: CPT

## 2024-01-24 PROCEDURE — 83605 ASSAY OF LACTIC ACID: CPT

## 2024-01-24 PROCEDURE — 2000000000 HC ICU R&B

## 2024-01-24 PROCEDURE — 82330 ASSAY OF CALCIUM: CPT

## 2024-01-24 PROCEDURE — 84484 ASSAY OF TROPONIN QUANT: CPT

## 2024-01-24 PROCEDURE — 2580000003 HC RX 258: Performed by: NURSE PRACTITIONER

## 2024-01-24 PROCEDURE — 2580000003 HC RX 258: Performed by: EMERGENCY MEDICINE

## 2024-01-24 PROCEDURE — 87040 BLOOD CULTURE FOR BACTERIA: CPT

## 2024-01-24 PROCEDURE — 96375 TX/PRO/DX INJ NEW DRUG ADDON: CPT

## 2024-01-24 PROCEDURE — 93005 ELECTROCARDIOGRAM TRACING: CPT | Performed by: EMERGENCY MEDICINE

## 2024-01-24 PROCEDURE — 85610 PROTHROMBIN TIME: CPT

## 2024-01-24 PROCEDURE — 80307 DRUG TEST PRSMV CHEM ANLYZR: CPT

## 2024-01-24 PROCEDURE — 70450 CT HEAD/BRAIN W/O DYE: CPT

## 2024-01-24 PROCEDURE — 85730 THROMBOPLASTIN TIME PARTIAL: CPT

## 2024-01-24 PROCEDURE — 84145 PROCALCITONIN (PCT): CPT

## 2024-01-24 PROCEDURE — 82077 ASSAY SPEC XCP UR&BREATH IA: CPT

## 2024-01-24 PROCEDURE — 82803 BLOOD GASES ANY COMBINATION: CPT

## 2024-01-24 PROCEDURE — 85027 COMPLETE CBC AUTOMATED: CPT

## 2024-01-24 PROCEDURE — 84132 ASSAY OF SERUM POTASSIUM: CPT

## 2024-01-24 PROCEDURE — 96365 THER/PROPH/DIAG IV INF INIT: CPT

## 2024-01-24 PROCEDURE — 71045 X-RAY EXAM CHEST 1 VIEW: CPT

## 2024-01-24 PROCEDURE — 85025 COMPLETE CBC W/AUTO DIFF WBC: CPT

## 2024-01-24 PROCEDURE — 6360000002 HC RX W HCPCS: Performed by: EMERGENCY MEDICINE

## 2024-01-24 PROCEDURE — 81001 URINALYSIS AUTO W/SCOPE: CPT

## 2024-01-24 PROCEDURE — 82947 ASSAY GLUCOSE BLOOD QUANT: CPT

## 2024-01-24 RX ORDER — POTASSIUM CHLORIDE 29.8 MG/ML
20 INJECTION INTRAVENOUS PRN
Status: DISCONTINUED | OUTPATIENT
Start: 2024-01-24 | End: 2024-02-01 | Stop reason: HOSPADM

## 2024-01-24 RX ORDER — HEPARIN SODIUM 1000 [USP'U]/ML
60 INJECTION, SOLUTION INTRAVENOUS; SUBCUTANEOUS ONCE
Status: DISCONTINUED | OUTPATIENT
Start: 2024-01-24 | End: 2024-01-24

## 2024-01-24 RX ORDER — HEPARIN SODIUM 1000 [USP'U]/ML
60 INJECTION, SOLUTION INTRAVENOUS; SUBCUTANEOUS PRN
Status: DISCONTINUED | OUTPATIENT
Start: 2024-01-24 | End: 2024-01-24

## 2024-01-24 RX ORDER — ACETAMINOPHEN 650 MG/1
650 SUPPOSITORY RECTAL EVERY 6 HOURS PRN
Status: DISCONTINUED | OUTPATIENT
Start: 2024-01-24 | End: 2024-02-01 | Stop reason: HOSPADM

## 2024-01-24 RX ORDER — SODIUM CHLORIDE, SODIUM LACTATE, POTASSIUM CHLORIDE, CALCIUM CHLORIDE 600; 310; 30; 20 MG/100ML; MG/100ML; MG/100ML; MG/100ML
INJECTION, SOLUTION INTRAVENOUS CONTINUOUS
Status: DISCONTINUED | OUTPATIENT
Start: 2024-01-24 | End: 2024-01-26

## 2024-01-24 RX ORDER — POTASSIUM CHLORIDE 7.45 MG/ML
10 INJECTION INTRAVENOUS PRN
Status: DISCONTINUED | OUTPATIENT
Start: 2024-01-24 | End: 2024-02-01 | Stop reason: HOSPADM

## 2024-01-24 RX ORDER — SODIUM CHLORIDE 0.9 % (FLUSH) 0.9 %
5-40 SYRINGE (ML) INJECTION EVERY 12 HOURS SCHEDULED
Status: DISCONTINUED | OUTPATIENT
Start: 2024-01-24 | End: 2024-02-01 | Stop reason: HOSPADM

## 2024-01-24 RX ORDER — ONDANSETRON 4 MG/1
4 TABLET, ORALLY DISINTEGRATING ORAL EVERY 8 HOURS PRN
Status: DISCONTINUED | OUTPATIENT
Start: 2024-01-24 | End: 2024-02-01 | Stop reason: HOSPADM

## 2024-01-24 RX ORDER — IPRATROPIUM BROMIDE AND ALBUTEROL SULFATE 2.5; .5 MG/3ML; MG/3ML
1 SOLUTION RESPIRATORY (INHALATION) EVERY 4 HOURS PRN
Status: DISCONTINUED | OUTPATIENT
Start: 2024-01-24 | End: 2024-01-26

## 2024-01-24 RX ORDER — ACETAMINOPHEN 325 MG/1
650 TABLET ORAL EVERY 6 HOURS PRN
Status: DISCONTINUED | OUTPATIENT
Start: 2024-01-24 | End: 2024-02-01 | Stop reason: HOSPADM

## 2024-01-24 RX ORDER — POLYETHYLENE GLYCOL 3350 17 G/17G
17 POWDER, FOR SOLUTION ORAL DAILY PRN
Status: DISCONTINUED | OUTPATIENT
Start: 2024-01-24 | End: 2024-02-01 | Stop reason: HOSPADM

## 2024-01-24 RX ORDER — SODIUM CHLORIDE 0.9 % (FLUSH) 0.9 %
5-40 SYRINGE (ML) INJECTION PRN
Status: DISCONTINUED | OUTPATIENT
Start: 2024-01-24 | End: 2024-02-01 | Stop reason: HOSPADM

## 2024-01-24 RX ORDER — HEPARIN SODIUM 10000 [USP'U]/100ML
5-30 INJECTION, SOLUTION INTRAVENOUS CONTINUOUS
Status: DISCONTINUED | OUTPATIENT
Start: 2024-01-24 | End: 2024-01-24

## 2024-01-24 RX ORDER — HEPARIN SODIUM 1000 [USP'U]/ML
30 INJECTION, SOLUTION INTRAVENOUS; SUBCUTANEOUS PRN
Status: DISCONTINUED | OUTPATIENT
Start: 2024-01-24 | End: 2024-01-24

## 2024-01-24 RX ORDER — ONDANSETRON 2 MG/ML
4 INJECTION INTRAMUSCULAR; INTRAVENOUS EVERY 6 HOURS PRN
Status: DISCONTINUED | OUTPATIENT
Start: 2024-01-24 | End: 2024-02-01 | Stop reason: HOSPADM

## 2024-01-24 RX ORDER — SODIUM CHLORIDE 9 MG/ML
INJECTION, SOLUTION INTRAVENOUS PRN
Status: DISCONTINUED | OUTPATIENT
Start: 2024-01-24 | End: 2024-02-01 | Stop reason: HOSPADM

## 2024-01-24 RX ORDER — SODIUM CHLORIDE 9 MG/ML
50 INJECTION, SOLUTION INTRAVENOUS ONCE
Status: COMPLETED | OUTPATIENT
Start: 2024-01-24 | End: 2024-01-24

## 2024-01-24 RX ORDER — MAGNESIUM SULFATE IN WATER 40 MG/ML
2000 INJECTION, SOLUTION INTRAVENOUS PRN
Status: DISCONTINUED | OUTPATIENT
Start: 2024-01-24 | End: 2024-02-01 | Stop reason: HOSPADM

## 2024-01-24 RX ADMIN — SODIUM CHLORIDE 50 ML: 9 INJECTION, SOLUTION INTRAVENOUS at 19:43

## 2024-01-24 RX ADMIN — AZITHROMYCIN DIHYDRATE 500 MG: 500 INJECTION, POWDER, LYOPHILIZED, FOR SOLUTION INTRAVENOUS at 18:25

## 2024-01-24 RX ADMIN — SODIUM CHLORIDE, PRESERVATIVE FREE 10 ML: 5 INJECTION INTRAVENOUS at 23:25

## 2024-01-24 RX ADMIN — SODIUM CHLORIDE, POTASSIUM CHLORIDE, SODIUM LACTATE AND CALCIUM CHLORIDE: 600; 310; 30; 20 INJECTION, SOLUTION INTRAVENOUS at 23:25

## 2024-01-24 RX ADMIN — CEFTRIAXONE SODIUM 1000 MG: 1 INJECTION, POWDER, FOR SOLUTION INTRAMUSCULAR; INTRAVENOUS at 18:24

## 2024-01-24 RX ADMIN — PROTHROMBIN, COAGULATION FACTOR VII HUMAN, COAGULATION FACTOR IX HUMAN, COAGULATION FACTOR X HUMAN, PROTEIN C, PROTEIN S HUMAN, AND WATER 2000 UNITS: KIT at 19:24

## 2024-01-24 ASSESSMENT — PAIN - FUNCTIONAL ASSESSMENT: PAIN_FUNCTIONAL_ASSESSMENT: NONE - DENIES PAIN

## 2024-01-24 NOTE — ED PROVIDER NOTES
file   Alcohol Use: Not on file   Financial Resource Strain: Not on file   Food Insecurity: Not on file   Transportation Needs: Not on file   Physical Activity: Not on file   Stress: Not on file   Social Connections: Not on file   Intimate Partner Violence: Not on file   Depression: Not on file   Housing Stability: Not on file   Interpersonal Safety: Not on file   Utilities: Not on file       PHYSICAL EXAM   Physical Exam  Vitals and nursing note reviewed.   Constitutional:       Appearance: Normal appearance. Jose Mix is obese.      Comments: Patient is drowsy   HENT:      Head: Normocephalic and atraumatic.      Nose: Nose normal.      Mouth/Throat:      Mouth: Mucous membranes are moist.   Eyes:      Extraocular Movements: Extraocular movements intact.      Conjunctiva/sclera: Conjunctivae normal.   Cardiovascular:      Rate and Rhythm: Regular rhythm. Tachycardia present.   Pulmonary:      Effort: Pulmonary effort is normal. No respiratory distress.   Abdominal:      General: Abdomen is flat. There is no distension.      Tenderness: There is no abdominal tenderness.   Musculoskeletal:         General: Normal range of motion.      Cervical back: Normal range of motion.   Skin:     General: Skin is warm.   Neurological:      General: No focal deficit present.      Comments: Patient is able to answer some questions, however she does repeat some answers, saying that it is 2022 but knows that the month is January able to say her birthday and her name.  She is following all commands.  5 out of 5 strength in all 4 extremities.  Trace pitting edema in her legs   Psychiatric:         Mood and Affect: Mood normal.           SCREENINGS                   LAB, EKG AND DIAGNOSTIC RESULTS   Labs:  No results found for this or any previous visit (from the past 12 hour(s)).    EKG:.See ED Course Below    Radiologic Studies:  Non-plain film images such as CT, Ultrasound and MRI are read by the radiologist. Plain

## 2024-01-24 NOTE — ED TRIAGE NOTES
EMS called for shortness of breath, pt repeating the same things, pt generally weak, able to answer questions but unable to give her demographfic information.  EMS found pt to be hypoxic on RA, spo2 in 80s, pt placed on NRB.  EKG completed which read STEMI, STEMI Alert called from field.

## 2024-01-24 NOTE — ED NOTES
MD requesting sepis labs but no alert at this time. Orders placed. Pt to have blood drawn and then stat CT.

## 2024-01-25 ENCOUNTER — APPOINTMENT (OUTPATIENT)
Facility: HOSPITAL | Age: 74
DRG: 064 | End: 2024-01-25
Attending: ANESTHESIOLOGY
Payer: MEDICARE

## 2024-01-25 ENCOUNTER — APPOINTMENT (OUTPATIENT)
Facility: HOSPITAL | Age: 74
DRG: 064 | End: 2024-01-25
Attending: INTERNAL MEDICINE
Payer: MEDICARE

## 2024-01-25 LAB
ALBUMIN SERPL-MCNC: 3.3 G/DL (ref 3.5–5)
ALBUMIN/GLOB SERPL: 1 (ref 1.1–2.2)
ALP SERPL-CCNC: 101 U/L (ref 45–117)
ALT SERPL-CCNC: 110 U/L (ref 12–78)
ANION GAP SERPL CALC-SCNC: 12 MMOL/L (ref 5–15)
APTT PPP: 28 SEC (ref 22.1–31)
ARTERIAL PATENCY WRIST A: POSITIVE
ARTERIAL PATENCY WRIST A: YES
AST SERPL-CCNC: 129 U/L (ref 15–37)
BASE DEFICIT BLDA-SCNC: 2.2 MMOL/L
BASE EXCESS BLD CALC-SCNC: 2.6 MMOL/L
BDY SITE: ABNORMAL
BDY SITE: ABNORMAL
BILIRUB SERPL-MCNC: 0.5 MG/DL (ref 0.2–1)
BUN SERPL-MCNC: 47 MG/DL (ref 6–20)
BUN/CREAT SERPL: 49 (ref 12–20)
CALCIUM SERPL-MCNC: 8.9 MG/DL (ref 8.5–10.1)
CHLORIDE SERPL-SCNC: 105 MMOL/L (ref 97–108)
CO2 SERPL-SCNC: 22 MMOL/L (ref 21–32)
CREAT SERPL-MCNC: 0.95 MG/DL (ref 0.55–1.02)
ECHO BSA: 1.95 M2
EKG ATRIAL RATE: 312 BPM
EKG DIAGNOSIS: NORMAL
EKG Q-T INTERVAL: 348 MS
EKG QRS DURATION: 76 MS
EKG QTC CALCULATION (BAZETT): 473 MS
EKG R AXIS: 5 DEGREES
EKG T AXIS: 70 DEGREES
EKG VENTRICULAR RATE: 111 BPM
ERYTHROCYTE [DISTWIDTH] IN BLOOD BY AUTOMATED COUNT: 14.2 % (ref 11.5–14.5)
FIO2 ON VENT: 40 %
GAS FLOW.O2 O2 DELIVERY SYS: ABNORMAL
GLOBULIN SER CALC-MCNC: 3.3 G/DL (ref 2–4)
GLUCOSE SERPL-MCNC: 105 MG/DL (ref 65–100)
HCO3 BLD-SCNC: 30.8 MMOL/L (ref 22–26)
HCO3 BLDA-SCNC: 28 MMOL/L (ref 22–26)
HCT VFR BLD AUTO: 41.6 % (ref 35–47)
HGB BLD-MCNC: 13.6 G/DL (ref 11.5–16)
INR PPP: 1.1 (ref 0.9–1.1)
IPAP/PIP/HIGH PEEP: 20
MAGNESIUM SERPL-MCNC: 2.1 MG/DL (ref 1.6–2.4)
MCH RBC QN AUTO: 31.3 PG (ref 26–34)
MCHC RBC AUTO-ENTMCNC: 32.7 G/DL (ref 30–36.5)
MCV RBC AUTO: 95.6 FL (ref 80–99)
NRBC # BLD: 0 K/UL (ref 0–0.01)
NRBC BLD-RTO: 0 PER 100 WBC
NT PRO BNP: 9661 PG/ML
O2/TOTAL GAS SETTING VFR VENT: 30 %
PCO2 BLD: 61.1 MMHG (ref 35–45)
PCO2 BLDA: 71 MMHG (ref 35–45)
PEEP RESPIRATORY: 6
PEEP RESPIRATORY: 8 CMH2O
PH BLD: 7.31 (ref 7.35–7.45)
PH BLDA: 7.21 (ref 7.35–7.45)
PHOSPHATE SERPL-MCNC: 3.5 MG/DL (ref 2.6–4.7)
PLATELET # BLD AUTO: 242 K/UL (ref 150–400)
PMV BLD AUTO: 10.5 FL (ref 8.9–12.9)
PO2 BLD: 84 MMHG (ref 80–100)
PO2 BLDA: 112 MMHG (ref 80–100)
POTASSIUM SERPL-SCNC: 4.1 MMOL/L (ref 3.5–5.1)
PROCALCITONIN SERPL-MCNC: 0.14 NG/ML
PROT SERPL-MCNC: 6.6 G/DL (ref 6.4–8.2)
PROTHROMBIN TIME: 11.6 SEC (ref 9–11.1)
RBC # BLD AUTO: 4.35 M/UL (ref 3.8–5.2)
SAO2 % BLD: 94.8 % (ref 92–97)
SAO2 % BLD: 97 % (ref 92–97)
SAO2% DEVICE SAO2% SENSOR NAME: ABNORMAL
SERVICE CMNT-IMP: ABNORMAL
SODIUM SERPL-SCNC: 139 MMOL/L (ref 136–145)
SPECIMEN SITE: ABNORMAL
SPECIMEN TYPE: ABNORMAL
THERAPEUTIC RANGE: NORMAL SECS (ref 58–77)
TROPONIN I SERPL HS-MCNC: 674 NG/L (ref 0–51)
WBC # BLD AUTO: 10.1 K/UL (ref 3.6–11)

## 2024-01-25 PROCEDURE — 71045 X-RAY EXAM CHEST 1 VIEW: CPT

## 2024-01-25 PROCEDURE — 83880 ASSAY OF NATRIURETIC PEPTIDE: CPT

## 2024-01-25 PROCEDURE — 6360000002 HC RX W HCPCS: Performed by: INTERNAL MEDICINE

## 2024-01-25 PROCEDURE — 2500000003 HC RX 250 WO HCPCS: Performed by: INTERNAL MEDICINE

## 2024-01-25 PROCEDURE — 84145 PROCALCITONIN (PCT): CPT

## 2024-01-25 PROCEDURE — 2580000003 HC RX 258: Performed by: NURSE PRACTITIONER

## 2024-01-25 PROCEDURE — 85027 COMPLETE CBC AUTOMATED: CPT

## 2024-01-25 PROCEDURE — A4216 STERILE WATER/SALINE, 10 ML: HCPCS | Performed by: NURSE PRACTITIONER

## 2024-01-25 PROCEDURE — 36415 COLL VENOUS BLD VENIPUNCTURE: CPT

## 2024-01-25 PROCEDURE — 2000000000 HC ICU R&B

## 2024-01-25 PROCEDURE — 80053 COMPREHEN METABOLIC PANEL: CPT

## 2024-01-25 PROCEDURE — C9113 INJ PANTOPRAZOLE SODIUM, VIA: HCPCS | Performed by: NURSE PRACTITIONER

## 2024-01-25 PROCEDURE — 6360000002 HC RX W HCPCS: Performed by: NURSE PRACTITIONER

## 2024-01-25 PROCEDURE — 84484 ASSAY OF TROPONIN QUANT: CPT

## 2024-01-25 PROCEDURE — 93306 TTE W/DOPPLER COMPLETE: CPT

## 2024-01-25 PROCEDURE — 84100 ASSAY OF PHOSPHORUS: CPT

## 2024-01-25 PROCEDURE — 6370000000 HC RX 637 (ALT 250 FOR IP): Performed by: NURSE PRACTITIONER

## 2024-01-25 PROCEDURE — 85730 THROMBOPLASTIN TIME PARTIAL: CPT

## 2024-01-25 PROCEDURE — 83735 ASSAY OF MAGNESIUM: CPT

## 2024-01-25 PROCEDURE — 82803 BLOOD GASES ANY COMBINATION: CPT

## 2024-01-25 PROCEDURE — 36600 WITHDRAWAL OF ARTERIAL BLOOD: CPT

## 2024-01-25 PROCEDURE — 93306 TTE W/DOPPLER COMPLETE: CPT | Performed by: INTERNAL MEDICINE

## 2024-01-25 PROCEDURE — 85610 PROTHROMBIN TIME: CPT

## 2024-01-25 RX ORDER — FUROSEMIDE 10 MG/ML
40 INJECTION INTRAMUSCULAR; INTRAVENOUS ONCE
Status: COMPLETED | OUTPATIENT
Start: 2024-01-25 | End: 2024-01-25

## 2024-01-25 RX ORDER — FUROSEMIDE 10 MG/ML
20 INJECTION INTRAMUSCULAR; INTRAVENOUS ONCE
Status: DISCONTINUED | OUTPATIENT
Start: 2024-01-25 | End: 2024-01-25

## 2024-01-25 RX ORDER — METOPROLOL TARTRATE 1 MG/ML
2.5 INJECTION, SOLUTION INTRAVENOUS ONCE
Status: COMPLETED | OUTPATIENT
Start: 2024-01-25 | End: 2024-01-25

## 2024-01-25 RX ORDER — METOPROLOL TARTRATE 1 MG/ML
5 INJECTION, SOLUTION INTRAVENOUS EVERY 6 HOURS
Status: DISCONTINUED | OUTPATIENT
Start: 2024-01-25 | End: 2024-01-28

## 2024-01-25 RX ORDER — CASTOR OIL AND BALSAM, PERU 788; 87 MG/G; MG/G
OINTMENT TOPICAL EVERY 12 HOURS
Status: DISCONTINUED | OUTPATIENT
Start: 2024-01-25 | End: 2024-02-01 | Stop reason: HOSPADM

## 2024-01-25 RX ORDER — FUROSEMIDE 10 MG/ML
20 INJECTION INTRAMUSCULAR; INTRAVENOUS ONCE
Status: COMPLETED | OUTPATIENT
Start: 2024-01-25 | End: 2024-01-25

## 2024-01-25 RX ADMIN — SODIUM CHLORIDE 40 MG: 9 INJECTION INTRAMUSCULAR; INTRAVENOUS; SUBCUTANEOUS at 08:40

## 2024-01-25 RX ADMIN — FUROSEMIDE 40 MG: 10 INJECTION, SOLUTION INTRAMUSCULAR; INTRAVENOUS at 18:22

## 2024-01-25 RX ADMIN — CASTOR OIL AND BALSAM, PERU: 788; 87 OINTMENT TOPICAL at 23:06

## 2024-01-25 RX ADMIN — METOPROLOL TARTRATE 5 MG: 1 INJECTION, SOLUTION INTRAVENOUS at 14:58

## 2024-01-25 RX ADMIN — SODIUM CHLORIDE, PRESERVATIVE FREE 10 ML: 5 INJECTION INTRAVENOUS at 20:25

## 2024-01-25 RX ADMIN — FUROSEMIDE 20 MG: 10 INJECTION, SOLUTION INTRAMUSCULAR; INTRAVENOUS at 12:27

## 2024-01-25 RX ADMIN — CASTOR OIL AND BALSAM, PERU: 788; 87 OINTMENT TOPICAL at 12:03

## 2024-01-25 RX ADMIN — METOPROLOL TARTRATE 5 MG: 1 INJECTION, SOLUTION INTRAVENOUS at 20:25

## 2024-01-25 RX ADMIN — METOPROLOL TARTRATE 2.5 MG: 1 INJECTION, SOLUTION INTRAVENOUS at 08:40

## 2024-01-25 NOTE — ED NOTES
Patient leaving with [facility] now for [facility]'s room 7118.   E[place] completed and sent with patient and chart/information/belongings.

## 2024-01-25 NOTE — CARE COORDINATION
Care Management Initial Assessment       RUR:  11% Low Risk  Readmission? No  1st IM letter given? No  1st  letter given: No       01/25/24 0945   Service Assessment   Patient Orientation Person   Cognition Short Term Memory Deficit   History Provided By Friend   Primary Caregiver Self   Support Systems Friends/Neighbors   PCP Verified by CM Yes   Last Visit to PCP Within last 3 months   Prior Functional Level Independent in ADLs/IADLs   Current Functional Level Independent in ADLs/IADLs   Can patient return to prior living arrangement Unknown at present   Ability to make needs known: Unable   Family able to assist with home care needs: Yes   Would you like for me to discuss the discharge plan with any other family members/significant others, and if so, who? Yes   Financial Resources Medicare   Social/Functional History   Lives With Alone   Type of Home House   Home Layout One level   Home Access Stairs to enter with rails   Entrance Stairs - Number of Steps 2   Entrance Stairs - Rails Both   Bathroom Shower/Tub Tub/Shower unit   Bathroom Toilet Standard   Home Equipment None   Receives Help From Friend(s)   ADL Assistance Needs assistance   Bath Independent   Dressing Independent   Grooming Independent   Feeding Independent   Toileting Independent   Homemaking Assistance Independent   Homemaking Responsibilities Yes   Meal Prep Responsibility Primary   Laundry Responsibility Primary   Cleaning Responsibility Primary   Bill Paying/Finance Responsibility Primary   Shopping Responsibility Primary   Ambulation Assistance Independent   Transfer Assistance Independent   Active  Yes   Mode of Transportation SUV   Education Beauty Skilled   Occupation Retired;Part time employment   Discharge Planning   Type of Residence House   Living Arrangements Alone   Current Services Prior To Admission None;C-pap   Potential Assistance Needed N/A   One/Two Story Residence One story   History of falls? 0   Services

## 2024-01-25 NOTE — WOUND CARE
Wound Care Note:     New consult placed by nurse request for skin tear on gluteal fold    Chart shows:  Admitted for hemorrhagic stroke  Past Medical History:   Diagnosis Date    COPD (chronic obstructive pulmonary disease) ([facility])      WBC = 10.1 on 1/25/24  Admitted from home    Assessment:   Patient is alert and talking, frustrated because she cannot remember her address, told her the address in her chart and she denied living there, incontinent with some assistance needed in repositioning.    Bed: low air loss- Isolibrium  Diet: NPO  Patient reports no pain.      Bilateral buttocks skin intact and without erythema.    1. POA fissure in gluteal cleft approximately 2 cm x 0.1 cm x 0.1 cm, wound bed is red, no drainage, wound edges are open, proximal and distally are moist also.  Z guard paste applied.    2.  POA sacral and bilateral heels with erythema that is blanchable.  Venelex ointment to be ordered.    Spoke with YFN Peoples, wound care orders obtained.    Patient repositioned supine.  Heels offloaded on pillows.     Recommendations:    Gluteal cleft- Every 12 hours and as needed apply zinc oxide (orange paste).    Sacrum and bilateral heels- Every 12 hours liberally apply Venelex ointment.    Skin Care & Pressure Prevention:  Minimize layers of linen/pads under patient to optimize support surface.    Turn/reposition approximately every 2 hours and offload heels.  Manage incontinence / promote continence   Nourishing Skin Cream to dry skin, minimize use of briefs when able    Discussed above plan with patient & NAVNEET Castorena    Transition of Care: Plan to follow as needed while admitted to hospital.    Suma \"Marlene\" NADINE Chew, RN, CWON  Certified Wound and Ostomy Nurse  office [phone]  Best way to contact me is through [employer]

## 2024-01-25 NOTE — ED NOTES
Received report from previous RN, patient on cardiac monitor resting on stretcher with unlabored respirations, wet cough noted. On 3L, responds to questions with yes/no.  BP is not meeting parameters for nicardine at this time, patient is being set up for transfer.     Kcentra received from pharmacy at this time and started.    Bloodwork sent.

## 2024-01-25 NOTE — ED NOTES
Patient's jewelry,phone and medicare card at bedside to be transported with patient.   Labeled and in patient belonging bag.

## 2024-01-25 NOTE — H&P
CRITICAL CARE NOTE      Name: Radha Oro   : 1950   MRN: [id number]   Date: 2024      REASON FOR ICU ADMISSION: IPH     PRINCIPAL ICU DIAGNOSIS     IPH  Aifb  COPD  CHF  HLD  HTN  GERD    BRIEF PATIENT SUMMARY     Per OSH  72 yo adult with a reported history of COPD and CHF presenting for altered mental status.  According to EMS, patient's friend called EMS as patient was more altered and could not answer questions properly.  Patient states to me that she does use oxygen at home but cannot tell me exactly how much, is repeating some answers over and over again.  Denies any dysuria, hematuria has been having a cough.  States that she does have a history of CHF.  Patient denies any nausea, vomiting, chest pain, diarrhea.     Head CT with L occipital IPH, no midline shift, NSGY c/s and rec txr/admit to ICU for neuro checks, no surgical intervention. Given K centra. Txr to [facility].    Home Rx  MVT  Omerparazole 20 mg  Metoprolol 50 mg ER  Lisonopril 10 mg   Furosemide 20 mg BID  Atorvastatin 40 mg  Xarelto 20 mg  Duo neb    HOSPITAL COURSE/DAILY EVENT LOG     24 HOUR EVENTS: as above    COMPREHENSIVE ASSESSMENT & PLAN:SYSTEM BASED     NEUROLOGICAL:     Aggressive Pain Control.  Pain Medications: PRN  Target RASS: 0  Sedation Medications:none    Neuro checks  Serial imaging per NSGY    PULMONOLOGY:     Pulmonary toilet: HOB, IS, PRN nebs  SpO2 Goal: > 92%    CARDIOVASCULAR:     Vasopressors to keep MAP 65 and above for adequate tissue perfusion.   SBP Goal of: > 90 mmHg  MAP Goal of: > 65 mmHg  Cardiac Gtts: none  to maintain SBP/MAP goals  Transfusion Trigger (Hgb): <7 g/dL  Keep K > 4; Mg > 2     GASTROINTESTINAL:      GI Prophylaxis: PPI  Bowel Regimen: PRN  Feeding:  Pending     RENAL/ELECTROLYTE/FLUIDS:     Goal urine output > 0.5 cc/kg/hr, Strict I/Os, avoid nephrotoxins  IVFs: LR    ENDOCRINE:     Blood Sugar Goal 120-180, Avoid hypo/hyperglycemia    HEMATOLOGY/ONCOLOGY:     Transfusion Trigger

## 2024-01-26 ENCOUNTER — APPOINTMENT (OUTPATIENT)
Facility: HOSPITAL | Age: 74
DRG: 064 | End: 2024-01-26
Attending: ANESTHESIOLOGY
Payer: MEDICARE

## 2024-01-26 PROBLEM — Z51.5 PALLIATIVE CARE BY SPECIALIST: Status: ACTIVE | Noted: 2024-01-26

## 2024-01-26 PROBLEM — J44.1 CHRONIC OBSTRUCTIVE PULMONARY DISEASE WITH ACUTE EXACERBATION (HCC): Status: ACTIVE | Noted: 2024-01-26

## 2024-01-26 PROBLEM — J96.01 ACUTE RESPIRATORY FAILURE WITH HYPOXIA (HCC): Status: ACTIVE | Noted: 2024-01-26

## 2024-01-26 PROBLEM — J69.0 ASPIRATION PNEUMONIA OF LEFT LUNG (HCC): Status: ACTIVE | Noted: 2024-01-26

## 2024-01-26 LAB
ALBUMIN SERPL-MCNC: 3.2 G/DL (ref 3.5–5)
ALBUMIN/GLOB SERPL: 1 (ref 1.1–2.2)
ALP SERPL-CCNC: 96 U/L (ref 45–117)
ALT SERPL-CCNC: 87 U/L (ref 12–78)
ANION GAP SERPL CALC-SCNC: 6 MMOL/L (ref 5–15)
APTT PPP: 25.5 SEC (ref 22.1–31)
ARTERIAL PATENCY WRIST A: YES
AST SERPL-CCNC: 57 U/L (ref 15–37)
BASE EXCESS BLDA CALC-SCNC: 4.5 MMOL/L
BDY SITE: ABNORMAL
BILIRUB SERPL-MCNC: 0.5 MG/DL (ref 0.2–1)
BUN SERPL-MCNC: 40 MG/DL (ref 6–20)
BUN/CREAT SERPL: 39 (ref 12–20)
CALCIUM SERPL-MCNC: 9 MG/DL (ref 8.5–10.1)
CHLORIDE SERPL-SCNC: 104 MMOL/L (ref 97–108)
CO2 SERPL-SCNC: 32 MMOL/L (ref 21–32)
CREAT SERPL-MCNC: 1.02 MG/DL (ref 0.55–1.02)
ERYTHROCYTE [DISTWIDTH] IN BLOOD BY AUTOMATED COUNT: 14.2 % (ref 11.5–14.5)
FIO2 ON VENT: 30 %
GAS FLOW.O2 SETTING OXYMISER: 8
GLOBULIN SER CALC-MCNC: 3.1 G/DL (ref 2–4)
GLUCOSE SERPL-MCNC: 96 MG/DL (ref 65–100)
HCO3 BLDA-SCNC: 33 MMOL/L (ref 22–26)
HCT VFR BLD AUTO: 44 % (ref 35–47)
HGB BLD-MCNC: 14.3 G/DL (ref 11.5–16)
INR PPP: 1.1 (ref 0.9–1.1)
MAGNESIUM SERPL-MCNC: 2.1 MG/DL (ref 1.6–2.4)
MCH RBC QN AUTO: 31.2 PG (ref 26–34)
MCHC RBC AUTO-ENTMCNC: 32.5 G/DL (ref 30–36.5)
MCV RBC AUTO: 96.1 FL (ref 80–99)
NRBC # BLD: 0.02 K/UL (ref 0–0.01)
NRBC BLD-RTO: 0.2 PER 100 WBC
PCO2 BLDA: 64 MMHG (ref 35–45)
PEEP RESPIRATORY: 6
PH BLDA: 7.33 (ref 7.35–7.45)
PHOSPHATE SERPL-MCNC: 3.4 MG/DL (ref 2.6–4.7)
PLATELET # BLD AUTO: 229 K/UL (ref 150–400)
PMV BLD AUTO: 10.3 FL (ref 8.9–12.9)
PO2 BLDA: 62 MMHG (ref 80–100)
POTASSIUM SERPL-SCNC: 4 MMOL/L (ref 3.5–5.1)
PROT SERPL-MCNC: 6.3 G/DL (ref 6.4–8.2)
PROTHROMBIN TIME: 11.7 SEC (ref 9–11.1)
RBC # BLD AUTO: 4.58 M/UL (ref 3.8–5.2)
SAO2 % BLD: 89 % (ref 92–97)
SAO2% DEVICE SAO2% SENSOR NAME: ABNORMAL
SODIUM SERPL-SCNC: 142 MMOL/L (ref 136–145)
SPECIMEN SITE: ABNORMAL
THERAPEUTIC RANGE: NORMAL SECS (ref 58–77)
WBC # BLD AUTO: 11.2 K/UL (ref 3.6–11)

## 2024-01-26 PROCEDURE — 6360000004 HC RX CONTRAST MEDICATION: Performed by: RADIOLOGY

## 2024-01-26 PROCEDURE — 2500000003 HC RX 250 WO HCPCS: Performed by: INTERNAL MEDICINE

## 2024-01-26 PROCEDURE — 2580000003 HC RX 258: Performed by: INTERNAL MEDICINE

## 2024-01-26 PROCEDURE — 80053 COMPREHEN METABOLIC PANEL: CPT

## 2024-01-26 PROCEDURE — 36600 WITHDRAWAL OF ARTERIAL BLOOD: CPT

## 2024-01-26 PROCEDURE — 83735 ASSAY OF MAGNESIUM: CPT

## 2024-01-26 PROCEDURE — C9113 INJ PANTOPRAZOLE SODIUM, VIA: HCPCS | Performed by: NURSE PRACTITIONER

## 2024-01-26 PROCEDURE — 94002 VENT MGMT INPAT INIT DAY: CPT

## 2024-01-26 PROCEDURE — 99222 1ST HOSP IP/OBS MODERATE 55: CPT | Performed by: INTERNAL MEDICINE

## 2024-01-26 PROCEDURE — 0BH17EZ INSERTION OF ENDOTRACHEAL AIRWAY INTO TRACHEA, VIA NATURAL OR ARTIFICIAL OPENING: ICD-10-PCS | Performed by: INTERNAL MEDICINE

## 2024-01-26 PROCEDURE — 85027 COMPLETE CBC AUTOMATED: CPT

## 2024-01-26 PROCEDURE — 2000000000 HC ICU R&B

## 2024-01-26 PROCEDURE — 71045 X-RAY EXAM CHEST 1 VIEW: CPT

## 2024-01-26 PROCEDURE — 2580000003 HC RX 258: Performed by: NURSE PRACTITIONER

## 2024-01-26 PROCEDURE — 6360000002 HC RX W HCPCS: Performed by: NURSE PRACTITIONER

## 2024-01-26 PROCEDURE — 82803 BLOOD GASES ANY COMBINATION: CPT

## 2024-01-26 PROCEDURE — A4216 STERILE WATER/SALINE, 10 ML: HCPCS | Performed by: NURSE PRACTITIONER

## 2024-01-26 PROCEDURE — 94660 CPAP INITIATION&MGMT: CPT

## 2024-01-26 PROCEDURE — 71275 CT ANGIOGRAPHY CHEST: CPT

## 2024-01-26 PROCEDURE — 85730 THROMBOPLASTIN TIME PARTIAL: CPT

## 2024-01-26 PROCEDURE — 5A1935Z RESPIRATORY VENTILATION, LESS THAN 24 CONSECUTIVE HOURS: ICD-10-PCS | Performed by: INTERNAL MEDICINE

## 2024-01-26 PROCEDURE — 5A09457 ASSISTANCE WITH RESPIRATORY VENTILATION, 24-96 CONSECUTIVE HOURS, CONTINUOUS POSITIVE AIRWAY PRESSURE: ICD-10-PCS | Performed by: INTERNAL MEDICINE

## 2024-01-26 PROCEDURE — 70450 CT HEAD/BRAIN W/O DYE: CPT

## 2024-01-26 PROCEDURE — 94640 AIRWAY INHALATION TREATMENT: CPT

## 2024-01-26 PROCEDURE — 31645 BRNCHSC W/THER ASPIR 1ST: CPT

## 2024-01-26 PROCEDURE — 85610 PROTHROMBIN TIME: CPT

## 2024-01-26 PROCEDURE — 0BC78ZZ EXTIRPATION OF MATTER FROM LEFT MAIN BRONCHUS, VIA NATURAL OR ARTIFICIAL OPENING ENDOSCOPIC: ICD-10-PCS | Performed by: INTERNAL MEDICINE

## 2024-01-26 PROCEDURE — APPNB30 APP NON BILLABLE TIME 0-30 MINS: Performed by: NURSE PRACTITIONER

## 2024-01-26 PROCEDURE — 6360000002 HC RX W HCPCS: Performed by: INTERNAL MEDICINE

## 2024-01-26 PROCEDURE — 36415 COLL VENOUS BLD VENIPUNCTURE: CPT

## 2024-01-26 PROCEDURE — 84100 ASSAY OF PHOSPHORUS: CPT

## 2024-01-26 PROCEDURE — 6370000000 HC RX 637 (ALT 250 FOR IP): Performed by: INTERNAL MEDICINE

## 2024-01-26 RX ORDER — PHENYLEPHRINE HCL IN 0.9% NACL 0.4MG/10ML
SYRINGE (ML) INTRAVENOUS
Status: DISCONTINUED
Start: 2024-01-26 | End: 2024-01-27

## 2024-01-26 RX ORDER — ETOMIDATE 2 MG/ML
20 INJECTION INTRAVENOUS ONCE
Status: COMPLETED | OUTPATIENT
Start: 2024-01-26 | End: 2024-01-26

## 2024-01-26 RX ORDER — IPRATROPIUM BROMIDE AND ALBUTEROL SULFATE 2.5; .5 MG/3ML; MG/3ML
1 SOLUTION RESPIRATORY (INHALATION)
Status: DISCONTINUED | OUTPATIENT
Start: 2024-01-26 | End: 2024-01-27

## 2024-01-26 RX ORDER — FUROSEMIDE 10 MG/ML
20 INJECTION INTRAMUSCULAR; INTRAVENOUS ONCE
Status: COMPLETED | OUTPATIENT
Start: 2024-01-26 | End: 2024-01-26

## 2024-01-26 RX ORDER — PROPOFOL 10 MG/ML
5-50 INJECTION, EMULSION INTRAVENOUS CONTINUOUS
Status: DISCONTINUED | OUTPATIENT
Start: 2024-01-26 | End: 2024-01-27

## 2024-01-26 RX ORDER — ROCURONIUM BROMIDE 10 MG/ML
1 INJECTION, SOLUTION INTRAVENOUS ONCE
Status: COMPLETED | OUTPATIENT
Start: 2024-01-26 | End: 2024-01-26

## 2024-01-26 RX ADMIN — METOPROLOL TARTRATE 5 MG: 1 INJECTION, SOLUTION INTRAVENOUS at 16:08

## 2024-01-26 RX ADMIN — WATER 40 MG: 1 INJECTION INTRAMUSCULAR; INTRAVENOUS; SUBCUTANEOUS at 23:52

## 2024-01-26 RX ADMIN — WATER 40 MG: 1 INJECTION INTRAMUSCULAR; INTRAVENOUS; SUBCUTANEOUS at 16:12

## 2024-01-26 RX ADMIN — ROCURONIUM BROMIDE 81 MG: 10 INJECTION, SOLUTION INTRAVENOUS at 15:48

## 2024-01-26 RX ADMIN — IPRATROPIUM BROMIDE AND ALBUTEROL SULFATE 1 DOSE: 2.5; .5 SOLUTION RESPIRATORY (INHALATION) at 17:23

## 2024-01-26 RX ADMIN — FUROSEMIDE 20 MG: 10 INJECTION, SOLUTION INTRAMUSCULAR; INTRAVENOUS at 09:04

## 2024-01-26 RX ADMIN — METOPROLOL TARTRATE 5 MG: 1 INJECTION, SOLUTION INTRAVENOUS at 04:00

## 2024-01-26 RX ADMIN — PROPOFOL 20 MCG/KG/MIN: 10 INJECTION, EMULSION INTRAVENOUS at 16:09

## 2024-01-26 RX ADMIN — ETOMIDATE 20 MG: 2 INJECTION, SOLUTION INTRAVENOUS at 15:48

## 2024-01-26 RX ADMIN — CASTOR OIL AND BALSAM, PERU: 788; 87 OINTMENT TOPICAL at 12:03

## 2024-01-26 RX ADMIN — IPRATROPIUM BROMIDE AND ALBUTEROL SULFATE 1 DOSE: 2.5; .5 SOLUTION RESPIRATORY (INHALATION) at 11:35

## 2024-01-26 RX ADMIN — AZITHROMYCIN MONOHYDRATE 500 MG: 500 INJECTION, POWDER, LYOPHILIZED, FOR SOLUTION INTRAVENOUS at 16:11

## 2024-01-26 RX ADMIN — WATER 40 MG: 1 INJECTION INTRAMUSCULAR; INTRAVENOUS; SUBCUTANEOUS at 12:02

## 2024-01-26 RX ADMIN — SODIUM CHLORIDE, PRESERVATIVE FREE 10 ML: 5 INJECTION INTRAVENOUS at 09:04

## 2024-01-26 RX ADMIN — IOPAMIDOL 80 ML: 755 INJECTION, SOLUTION INTRAVENOUS at 09:50

## 2024-01-26 RX ADMIN — METOPROLOL TARTRATE 5 MG: 1 INJECTION, SOLUTION INTRAVENOUS at 21:16

## 2024-01-26 RX ADMIN — METOPROLOL TARTRATE 5 MG: 1 INJECTION, SOLUTION INTRAVENOUS at 09:04

## 2024-01-26 RX ADMIN — SODIUM CHLORIDE 40 MG: 9 INJECTION INTRAMUSCULAR; INTRAVENOUS; SUBCUTANEOUS at 09:04

## 2024-01-26 ASSESSMENT — PULMONARY FUNCTION TESTS: PIF_VALUE: 29

## 2024-01-27 LAB
ERYTHROCYTE [DISTWIDTH] IN BLOOD BY AUTOMATED COUNT: 14.6 % (ref 11.5–14.5)
HCT VFR BLD AUTO: 48.9 % (ref 35–47)
HGB BLD-MCNC: 15 G/DL (ref 11.5–16)
MCH RBC QN AUTO: 30.7 PG (ref 26–34)
MCHC RBC AUTO-ENTMCNC: 30.7 G/DL (ref 30–36.5)
MCV RBC AUTO: 100.2 FL (ref 80–99)
NRBC # BLD: 0.03 K/UL (ref 0–0.01)
NRBC BLD-RTO: 0.5 PER 100 WBC
PLATELET # BLD AUTO: 249 K/UL (ref 150–400)
PMV BLD AUTO: 10.6 FL (ref 8.9–12.9)
RBC # BLD AUTO: 4.88 M/UL (ref 3.8–5.2)
WBC # BLD AUTO: 5.8 K/UL (ref 3.6–11)

## 2024-01-27 PROCEDURE — 94660 CPAP INITIATION&MGMT: CPT

## 2024-01-27 PROCEDURE — 6360000002 HC RX W HCPCS: Performed by: INTERNAL MEDICINE

## 2024-01-27 PROCEDURE — 2580000003 HC RX 258: Performed by: NURSE PRACTITIONER

## 2024-01-27 PROCEDURE — 6370000000 HC RX 637 (ALT 250 FOR IP): Performed by: INTERNAL MEDICINE

## 2024-01-27 PROCEDURE — 2580000003 HC RX 258: Performed by: INTERNAL MEDICINE

## 2024-01-27 PROCEDURE — A4216 STERILE WATER/SALINE, 10 ML: HCPCS | Performed by: NURSE PRACTITIONER

## 2024-01-27 PROCEDURE — 94640 AIRWAY INHALATION TREATMENT: CPT

## 2024-01-27 PROCEDURE — 2000000000 HC ICU R&B

## 2024-01-27 PROCEDURE — 6360000002 HC RX W HCPCS: Performed by: NURSE PRACTITIONER

## 2024-01-27 PROCEDURE — 2500000003 HC RX 250 WO HCPCS: Performed by: INTERNAL MEDICINE

## 2024-01-27 PROCEDURE — C9113 INJ PANTOPRAZOLE SODIUM, VIA: HCPCS | Performed by: NURSE PRACTITIONER

## 2024-01-27 PROCEDURE — 85027 COMPLETE CBC AUTOMATED: CPT

## 2024-01-27 PROCEDURE — 36415 COLL VENOUS BLD VENIPUNCTURE: CPT

## 2024-01-27 RX ORDER — PHENOBARBITAL 32.4 MG/1
32.4 TABLET ORAL
Status: DISCONTINUED | OUTPATIENT
Start: 2024-01-27 | End: 2024-02-01 | Stop reason: HOSPADM

## 2024-01-27 RX ORDER — IPRATROPIUM BROMIDE AND ALBUTEROL SULFATE 2.5; .5 MG/3ML; MG/3ML
1 SOLUTION RESPIRATORY (INHALATION)
Status: DISCONTINUED | OUTPATIENT
Start: 2024-01-27 | End: 2024-01-28

## 2024-01-27 RX ADMIN — METOPROLOL TARTRATE 5 MG: 1 INJECTION, SOLUTION INTRAVENOUS at 16:26

## 2024-01-27 RX ADMIN — WATER 40 MG: 1 INJECTION INTRAMUSCULAR; INTRAVENOUS; SUBCUTANEOUS at 04:11

## 2024-01-27 RX ADMIN — METOPROLOL TARTRATE 5 MG: 1 INJECTION, SOLUTION INTRAVENOUS at 20:32

## 2024-01-27 RX ADMIN — SODIUM CHLORIDE 40 MG: 9 INJECTION INTRAMUSCULAR; INTRAVENOUS; SUBCUTANEOUS at 09:02

## 2024-01-27 RX ADMIN — IPRATROPIUM BROMIDE AND ALBUTEROL SULFATE 1 DOSE: 2.5; .5 SOLUTION RESPIRATORY (INHALATION) at 11:14

## 2024-01-27 RX ADMIN — CASTOR OIL AND BALSAM, PERU: 788; 87 OINTMENT TOPICAL at 11:57

## 2024-01-27 RX ADMIN — SODIUM CHLORIDE, PRESERVATIVE FREE 10 ML: 5 INJECTION INTRAVENOUS at 09:02

## 2024-01-27 RX ADMIN — WATER 40 MG: 1 INJECTION INTRAMUSCULAR; INTRAVENOUS; SUBCUTANEOUS at 09:01

## 2024-01-27 RX ADMIN — IPRATROPIUM BROMIDE AND ALBUTEROL SULFATE 1 DOSE: 2.5; .5 SOLUTION RESPIRATORY (INHALATION) at 16:27

## 2024-01-27 RX ADMIN — WATER 40 MG: 1 INJECTION INTRAMUSCULAR; INTRAVENOUS; SUBCUTANEOUS at 20:32

## 2024-01-27 RX ADMIN — METOPROLOL TARTRATE 5 MG: 1 INJECTION, SOLUTION INTRAVENOUS at 04:11

## 2024-01-27 RX ADMIN — IPRATROPIUM BROMIDE AND ALBUTEROL SULFATE 1 DOSE: 2.5; .5 SOLUTION RESPIRATORY (INHALATION) at 20:12

## 2024-01-27 RX ADMIN — AZITHROMYCIN MONOHYDRATE 500 MG: 500 INJECTION, POWDER, LYOPHILIZED, FOR SOLUTION INTRAVENOUS at 16:26

## 2024-01-27 RX ADMIN — IPRATROPIUM BROMIDE AND ALBUTEROL SULFATE 1 DOSE: 2.5; .5 SOLUTION RESPIRATORY (INHALATION) at 07:19

## 2024-01-27 RX ADMIN — CASTOR OIL AND BALSAM, PERU: 788; 87 OINTMENT TOPICAL at 00:53

## 2024-01-27 RX ADMIN — METOPROLOL TARTRATE 5 MG: 1 INJECTION, SOLUTION INTRAVENOUS at 09:02

## 2024-01-28 ENCOUNTER — APPOINTMENT (OUTPATIENT)
Facility: HOSPITAL | Age: 74
DRG: 064 | End: 2024-01-28
Attending: ANESTHESIOLOGY
Payer: MEDICARE

## 2024-01-28 LAB
ALBUMIN SERPL-MCNC: 2.2 G/DL (ref 3.5–5)
ALBUMIN/GLOB SERPL: 1 (ref 1.1–2.2)
ALP SERPL-CCNC: 65 U/L (ref 45–117)
ALT SERPL-CCNC: 41 U/L (ref 12–78)
ANION GAP SERPL CALC-SCNC: 8 MMOL/L (ref 5–15)
AST SERPL-CCNC: 15 U/L (ref 15–37)
BACTERIA SPEC CULT: NORMAL
BACTERIA SPEC CULT: NORMAL
BILIRUB DIRECT SERPL-MCNC: 0.2 MG/DL (ref 0–0.2)
BILIRUB SERPL-MCNC: 0.4 MG/DL (ref 0.2–1)
BUN SERPL-MCNC: 38 MG/DL (ref 6–20)
BUN/CREAT SERPL: 46 (ref 12–20)
CALCIUM SERPL-MCNC: 6.9 MG/DL (ref 8.5–10.1)
CHLORIDE SERPL-SCNC: 111 MMOL/L (ref 97–108)
CO2 SERPL-SCNC: 27 MMOL/L (ref 21–32)
CREAT SERPL-MCNC: 0.83 MG/DL (ref 0.55–1.02)
GLOBULIN SER CALC-MCNC: 2.2 G/DL (ref 2–4)
GLUCOSE SERPL-MCNC: 149 MG/DL (ref 65–100)
Lab: NORMAL
Lab: NORMAL
MAGNESIUM SERPL-MCNC: 1.4 MG/DL (ref 1.6–2.4)
POTASSIUM SERPL-SCNC: 3 MMOL/L (ref 3.5–5.1)
PROT SERPL-MCNC: 4.4 G/DL (ref 6.4–8.2)
SODIUM SERPL-SCNC: 146 MMOL/L (ref 136–145)

## 2024-01-28 PROCEDURE — 80076 HEPATIC FUNCTION PANEL: CPT

## 2024-01-28 PROCEDURE — 2500000003 HC RX 250 WO HCPCS: Performed by: INTERNAL MEDICINE

## 2024-01-28 PROCEDURE — 70553 MRI BRAIN STEM W/O & W/DYE: CPT

## 2024-01-28 PROCEDURE — 6360000002 HC RX W HCPCS: Performed by: INTERNAL MEDICINE

## 2024-01-28 PROCEDURE — 2580000003 HC RX 258: Performed by: NURSE PRACTITIONER

## 2024-01-28 PROCEDURE — 6370000000 HC RX 637 (ALT 250 FOR IP): Performed by: INTERNAL MEDICINE

## 2024-01-28 PROCEDURE — 97161 PT EVAL LOW COMPLEX 20 MIN: CPT

## 2024-01-28 PROCEDURE — 80048 BASIC METABOLIC PNL TOTAL CA: CPT

## 2024-01-28 PROCEDURE — 92610 EVALUATE SWALLOWING FUNCTION: CPT

## 2024-01-28 PROCEDURE — 97530 THERAPEUTIC ACTIVITIES: CPT

## 2024-01-28 PROCEDURE — A9579 GAD-BASE MR CONTRAST NOS,1ML: HCPCS

## 2024-01-28 PROCEDURE — 36415 COLL VENOUS BLD VENIPUNCTURE: CPT

## 2024-01-28 PROCEDURE — 2060000000 HC ICU INTERMEDIATE R&B

## 2024-01-28 PROCEDURE — 83735 ASSAY OF MAGNESIUM: CPT

## 2024-01-28 PROCEDURE — 6360000004 HC RX CONTRAST MEDICATION

## 2024-01-28 RX ORDER — ASCORBIC ACID 500 MG
500 TABLET ORAL DAILY
Status: DISCONTINUED | OUTPATIENT
Start: 2024-01-28 | End: 2024-02-01 | Stop reason: HOSPADM

## 2024-01-28 RX ORDER — ALBUTEROL SULFATE 2.5 MG/3ML
2.5 SOLUTION RESPIRATORY (INHALATION) EVERY 4 HOURS PRN
Status: DISCONTINUED | OUTPATIENT
Start: 2024-01-28 | End: 2024-02-01 | Stop reason: HOSPADM

## 2024-01-28 RX ORDER — CALCIUM GLUCONATE 20 MG/ML
1000 INJECTION, SOLUTION INTRAVENOUS ONCE
Status: COMPLETED | OUTPATIENT
Start: 2024-01-28 | End: 2024-01-28

## 2024-01-28 RX ORDER — FERROUS SULFATE 325(65) MG
325 TABLET ORAL DAILY
Status: DISCONTINUED | OUTPATIENT
Start: 2024-01-28 | End: 2024-02-01 | Stop reason: HOSPADM

## 2024-01-28 RX ORDER — METOPROLOL TARTRATE 50 MG/1
50 TABLET, FILM COATED ORAL 2 TIMES DAILY
Status: DISCONTINUED | OUTPATIENT
Start: 2024-01-28 | End: 2024-02-01 | Stop reason: HOSPADM

## 2024-01-28 RX ORDER — POTASSIUM CHLORIDE 750 MG/1
40 TABLET, FILM COATED, EXTENDED RELEASE ORAL 2 TIMES DAILY
Status: DISCONTINUED | OUTPATIENT
Start: 2024-01-28 | End: 2024-02-01 | Stop reason: HOSPADM

## 2024-01-28 RX ORDER — MULTIVITAMIN,THERAPEUTIC
1 TABLET ORAL DAILY
Status: DISCONTINUED | OUTPATIENT
Start: 2024-01-28 | End: 2024-02-01 | Stop reason: SDUPTHER

## 2024-01-28 RX ORDER — POTASSIUM CHLORIDE 750 MG/1
40 TABLET, FILM COATED, EXTENDED RELEASE ORAL ONCE
Status: DISCONTINUED | OUTPATIENT
Start: 2024-01-28 | End: 2024-01-28

## 2024-01-28 RX ORDER — OMEPRAZOLE 20 MG/1
20 CAPSULE, DELAYED RELEASE ORAL DAILY
COMMUNITY

## 2024-01-28 RX ORDER — PANTOPRAZOLE SODIUM 40 MG/1
40 TABLET, DELAYED RELEASE ORAL
Status: DISCONTINUED | OUTPATIENT
Start: 2024-01-29 | End: 2024-02-01 | Stop reason: HOSPADM

## 2024-01-28 RX ORDER — MULTIVIT-MIN/IRON/FOLIC ACID/K 18-600-40
1 CAPSULE ORAL
COMMUNITY

## 2024-01-28 RX ORDER — PREDNISONE 20 MG/1
40 TABLET ORAL DAILY
Status: COMPLETED | OUTPATIENT
Start: 2024-01-28 | End: 2024-01-30

## 2024-01-28 RX ORDER — MAGNESIUM SULFATE IN WATER 40 MG/ML
2000 INJECTION, SOLUTION INTRAVENOUS ONCE
Status: COMPLETED | OUTPATIENT
Start: 2024-01-28 | End: 2024-01-28

## 2024-01-28 RX ORDER — PNV NO.95/FERROUS FUM/FOLIC AC 28MG-0.8MG
1 TABLET ORAL
COMMUNITY

## 2024-01-28 RX ORDER — RIVAROXABAN 20 MG/1
20 TABLET, FILM COATED ORAL
Status: ON HOLD | COMMUNITY
End: 2024-01-31 | Stop reason: HOSPADM

## 2024-01-28 RX ADMIN — SODIUM CHLORIDE, PRESERVATIVE FREE 10 ML: 5 INJECTION INTRAVENOUS at 23:46

## 2024-01-28 RX ADMIN — IPRATROPIUM BROMIDE 0.5 MG: 0.5 SOLUTION RESPIRATORY (INHALATION) at 21:25

## 2024-01-28 RX ADMIN — ARFORMOTEROL TARTRATE: 15 SOLUTION RESPIRATORY (INHALATION) at 21:25

## 2024-01-28 RX ADMIN — IPRATROPIUM BROMIDE AND ALBUTEROL SULFATE 1 DOSE: 2.5; .5 SOLUTION RESPIRATORY (INHALATION) at 07:52

## 2024-01-28 RX ADMIN — FERROUS SULFATE TAB 325 MG (65 MG ELEMENTAL FE) 325 MG: 325 (65 FE) TAB at 10:46

## 2024-01-28 RX ADMIN — ARFORMOTEROL TARTRATE: 15 SOLUTION RESPIRATORY (INHALATION) at 11:47

## 2024-01-28 RX ADMIN — IPRATROPIUM BROMIDE 0.5 MG: 0.5 SOLUTION RESPIRATORY (INHALATION) at 14:57

## 2024-01-28 RX ADMIN — METOPROLOL TARTRATE 50 MG: 50 TABLET, FILM COATED ORAL at 08:23

## 2024-01-28 RX ADMIN — POTASSIUM CHLORIDE 40 MEQ: 750 TABLET, FILM COATED, EXTENDED RELEASE ORAL at 21:25

## 2024-01-28 RX ADMIN — GADOTERIDOL 15 ML: 279.3 INJECTION, SOLUTION INTRAVENOUS at 09:46

## 2024-01-28 RX ADMIN — PREDNISONE 40 MG: 20 TABLET ORAL at 08:23

## 2024-01-28 RX ADMIN — CASTOR OIL AND BALSAM, PERU: 788; 87 OINTMENT TOPICAL at 10:47

## 2024-01-28 RX ADMIN — THERA TABS 1 TABLET: TAB at 10:45

## 2024-01-28 RX ADMIN — CASTOR OIL AND BALSAM, PERU: 788; 87 OINTMENT TOPICAL at 23:47

## 2024-01-28 RX ADMIN — OXYCODONE HYDROCHLORIDE AND ACETAMINOPHEN 500 MG: 500 TABLET ORAL at 10:36

## 2024-01-28 RX ADMIN — SODIUM CHLORIDE, PRESERVATIVE FREE 10 ML: 5 INJECTION INTRAVENOUS at 08:12

## 2024-01-28 RX ADMIN — METOPROLOL TARTRATE 50 MG: 50 TABLET, FILM COATED ORAL at 21:25

## 2024-01-28 RX ADMIN — POTASSIUM CHLORIDE 40 MEQ: 750 TABLET, FILM COATED, EXTENDED RELEASE ORAL at 08:13

## 2024-01-28 RX ADMIN — METOPROLOL TARTRATE 5 MG: 1 INJECTION, SOLUTION INTRAVENOUS at 03:17

## 2024-01-28 RX ADMIN — CALCIUM GLUCONATE 1000 MG: 20 INJECTION, SOLUTION INTRAVENOUS at 10:35

## 2024-01-28 RX ADMIN — MAGNESIUM SULFATE HEPTAHYDRATE 2000 MG: 40 INJECTION, SOLUTION INTRAVENOUS at 10:34

## 2024-01-28 NOTE — H&P
History and Physical    Date of Service:  1/28/2024  Primary Care Provider: Clarke Meadows MD  Source of information: The patient and Chart review    Chief Complaint: cough and confusion    History of Presenting Illness/Hospital course:   Radha Oro is a 73 y.o. female with chronic atrial fibrillation on rivaroxaban, chronic HFpEF, COPD/chronic hypoxic respiratory failure on supplemental O2 (Pulm Dr Demarcus Salinas), HERMILO intolerant of CPAP, pulmonary HTN, anemia, anxiety, h/o stroke, HTN, obesity s/p gastric bypass (30 years ago) who was a direct admission from [facility] ED to [facility] ICU on 1/24/2024 with acute L occipital IPH, acute metabolic encephalopathy requiring NSGY eval (NSGY Dr Weinstein said no NSGY intervention but transfer to ICU for frequent neurochecks), atrial fibrillation with RVR, and acute hypoxic respiratory failure. Pt received Kcentra at [facility]. CT head on 1/24 showed acute L occipital IPH 1.4cm with surrounding edema and no midline shift. Rivaroxaban was held. WOCN was consulted for gluteal cleft fissure and sacral and bilateral heel blanchable erythema, all POA, and wound care orders were placed. NSGY was consulted but recommended MRI brain. Palliative Medicine was consulted on 1/26. CTA chest 1/26 showed aspiration, L mainstem bronchus occlusion, no PE, nodules up to 6mm.  Pt underwent bronchoscopy on 1/26 by the Intensivist and mucus was removed from the L mainstem bronchus. Pt was briefly on the ventilator 1/26 but was requiring BiPAP before and after. She was weaned to NC but is now satting well on RA. Repeat CT head 1/26 showed stable L occipital IPH. MRI brain was ordered 1/25 but has still not been done. PT/OT/SLP evals have been ordered. HM was consulted for transfer of care today but NSGY has not yet cleared the patient for transfer.     Pt was seen in ICU while she was eating her breakfast. She c/o problems with her teeth, continuing \"fogginess,\" and mild cough. She denies

## 2024-01-29 ENCOUNTER — APPOINTMENT (OUTPATIENT)
Facility: HOSPITAL | Age: 74
DRG: 064 | End: 2024-01-29
Attending: ANESTHESIOLOGY
Payer: MEDICARE

## 2024-01-29 PROBLEM — Z71.89 GOALS OF CARE, COUNSELING/DISCUSSION: Status: ACTIVE | Noted: 2024-01-29

## 2024-01-29 LAB
ANION GAP SERPL CALC-SCNC: 6 MMOL/L (ref 5–15)
BUN SERPL-MCNC: 39 MG/DL (ref 6–20)
BUN/CREAT SERPL: 48 (ref 12–20)
CALCIUM SERPL-MCNC: 9 MG/DL (ref 8.5–10.1)
CHLORIDE SERPL-SCNC: 103 MMOL/L (ref 97–108)
CO2 SERPL-SCNC: 32 MMOL/L (ref 21–32)
CREAT SERPL-MCNC: 0.82 MG/DL (ref 0.55–1.02)
GLUCOSE SERPL-MCNC: 134 MG/DL (ref 65–100)
POTASSIUM SERPL-SCNC: 5 MMOL/L (ref 3.5–5.1)
SODIUM SERPL-SCNC: 141 MMOL/L (ref 136–145)

## 2024-01-29 PROCEDURE — 6370000000 HC RX 637 (ALT 250 FOR IP): Performed by: INTERNAL MEDICINE

## 2024-01-29 PROCEDURE — 36415 COLL VENOUS BLD VENIPUNCTURE: CPT

## 2024-01-29 PROCEDURE — 6360000004 HC RX CONTRAST MEDICATION: Performed by: NURSE PRACTITIONER

## 2024-01-29 PROCEDURE — 94640 AIRWAY INHALATION TREATMENT: CPT

## 2024-01-29 PROCEDURE — 99232 SBSQ HOSP IP/OBS MODERATE 35: CPT | Performed by: INTERNAL MEDICINE

## 2024-01-29 PROCEDURE — 80048 BASIC METABOLIC PNL TOTAL CA: CPT

## 2024-01-29 PROCEDURE — 2580000003 HC RX 258: Performed by: NURSE PRACTITIONER

## 2024-01-29 PROCEDURE — 97165 OT EVAL LOW COMPLEX 30 MIN: CPT

## 2024-01-29 PROCEDURE — 2060000000 HC ICU INTERMEDIATE R&B

## 2024-01-29 PROCEDURE — 95819 EEG AWAKE AND ASLEEP: CPT

## 2024-01-29 PROCEDURE — 97535 SELF CARE MNGMENT TRAINING: CPT

## 2024-01-29 PROCEDURE — 70498 CT ANGIOGRAPHY NECK: CPT

## 2024-01-29 PROCEDURE — 95819 EEG AWAKE AND ASLEEP: CPT | Performed by: PSYCHIATRY & NEUROLOGY

## 2024-01-29 PROCEDURE — 99223 1ST HOSP IP/OBS HIGH 75: CPT | Performed by: NURSE PRACTITIONER

## 2024-01-29 PROCEDURE — 6360000002 HC RX W HCPCS: Performed by: INTERNAL MEDICINE

## 2024-01-29 PROCEDURE — 97116 GAIT TRAINING THERAPY: CPT

## 2024-01-29 PROCEDURE — 97530 THERAPEUTIC ACTIVITIES: CPT

## 2024-01-29 PROCEDURE — APPNB30 APP NON BILLABLE TIME 0-30 MINS: Performed by: NURSE PRACTITIONER

## 2024-01-29 RX ORDER — CHOLECALCIFEROL (VITAMIN D3) 125 MCG
500 CAPSULE ORAL DAILY
Status: DISCONTINUED | OUTPATIENT
Start: 2024-01-29 | End: 2024-02-01 | Stop reason: HOSPADM

## 2024-01-29 RX ORDER — VITAMIN B COMPLEX
1000 TABLET ORAL 3 TIMES DAILY
Status: DISCONTINUED | OUTPATIENT
Start: 2024-01-29 | End: 2024-02-01 | Stop reason: HOSPADM

## 2024-01-29 RX ORDER — MULTIVITAMIN WITH IRON
1 TABLET ORAL DAILY
Status: DISCONTINUED | OUTPATIENT
Start: 2024-01-29 | End: 2024-02-01 | Stop reason: HOSPADM

## 2024-01-29 RX ORDER — CALCIUM CARBONATE 500 MG/1
500 TABLET, CHEWABLE ORAL 3 TIMES DAILY
Status: DISCONTINUED | OUTPATIENT
Start: 2024-01-29 | End: 2024-02-01 | Stop reason: HOSPADM

## 2024-01-29 RX ADMIN — IOPAMIDOL 100 ML: 755 INJECTION, SOLUTION INTRAVENOUS at 19:44

## 2024-01-29 RX ADMIN — THERA TABS 1 TABLET: TAB at 08:32

## 2024-01-29 RX ADMIN — PANTOPRAZOLE SODIUM 40 MG: 40 TABLET, DELAYED RELEASE ORAL at 06:09

## 2024-01-29 RX ADMIN — ARFORMOTEROL TARTRATE: 15 SOLUTION RESPIRATORY (INHALATION) at 20:36

## 2024-01-29 RX ADMIN — SODIUM CHLORIDE, PRESERVATIVE FREE 10 ML: 5 INJECTION INTRAVENOUS at 21:39

## 2024-01-29 RX ADMIN — ARFORMOTEROL TARTRATE: 15 SOLUTION RESPIRATORY (INHALATION) at 07:41

## 2024-01-29 RX ADMIN — OXYCODONE HYDROCHLORIDE AND ACETAMINOPHEN 500 MG: 500 TABLET ORAL at 08:31

## 2024-01-29 RX ADMIN — SODIUM CHLORIDE, PRESERVATIVE FREE 10 ML: 5 INJECTION INTRAVENOUS at 08:43

## 2024-01-29 RX ADMIN — CASTOR OIL AND BALSAM, PERU: 788; 87 OINTMENT TOPICAL at 12:23

## 2024-01-29 RX ADMIN — CYANOCOBALAMIN TAB 500 MCG 500 MCG: 500 TAB at 19:01

## 2024-01-29 RX ADMIN — POTASSIUM CHLORIDE 40 MEQ: 750 TABLET, FILM COATED, EXTENDED RELEASE ORAL at 21:38

## 2024-01-29 RX ADMIN — PREDNISONE 40 MG: 20 TABLET ORAL at 08:32

## 2024-01-29 RX ADMIN — FERROUS SULFATE TAB 325 MG (65 MG ELEMENTAL FE) 325 MG: 325 (65 FE) TAB at 08:32

## 2024-01-29 RX ADMIN — METOPROLOL TARTRATE 50 MG: 50 TABLET, FILM COATED ORAL at 08:32

## 2024-01-29 RX ADMIN — THERA TABS 1 TABLET: TAB at 19:01

## 2024-01-29 RX ADMIN — CASTOR OIL AND BALSAM, PERU: 788; 87 OINTMENT TOPICAL at 23:56

## 2024-01-29 RX ADMIN — IPRATROPIUM BROMIDE 0.5 MG: 0.5 SOLUTION RESPIRATORY (INHALATION) at 07:41

## 2024-01-29 RX ADMIN — IPRATROPIUM BROMIDE 0.5 MG: 0.5 SOLUTION RESPIRATORY (INHALATION) at 20:41

## 2024-01-29 RX ADMIN — Medication 1000 UNITS: at 21:38

## 2024-01-29 RX ADMIN — METOPROLOL TARTRATE 50 MG: 50 TABLET, FILM COATED ORAL at 21:38

## 2024-01-29 RX ADMIN — POTASSIUM CHLORIDE 40 MEQ: 750 TABLET, FILM COATED, EXTENDED RELEASE ORAL at 08:32

## 2024-01-29 RX ADMIN — CALCIUM CARBONATE (ANTACID) CHEW TAB 500 MG 500 MG: 500 CHEW TAB at 21:39

## 2024-01-29 NOTE — CARE COORDINATION
Transition of Care Plan:    IPR - Pending choice; will not require insurance auth     Transport:     RUR: 15%  Prior Level of Functioning: Independent, did not use DME  Disposition: IPR  If SNF or IPR: Date FOC offered: Not yet offered   Date FOC received:   Accepting facility:   Follow up appointments: PCP   DME needed: None  Transportation at discharge: [facility]  IM/IMM Medicare/ letter given: 1/25  Caregiver Contact: [place] [phone]   Discharge Caregiver contacted prior to discharge?   Care Conference needed? No  Barriers to discharge: Medical, ID consulted; Neurosurgery consulted. >48 hours. Placement - IPR choice needed.    PT recommending IPR. CM will follow and discuss IPR choice closer to discharge, Pt plan pending.    KIKE Burrell (Ally).

## 2024-01-29 NOTE — CONSULTS
NEUROLOGY CONSULT NOTE    Name Radha Oro Age 73 y.o.   MRN [id number]  1950     Consulting Physician: Mari Baker MD      Chief Complaint:  L occipital IPH     Assessment:     Principal Problem:    Stroke, hemorrhagic (HCC)  Active Problems:    Palliative care by specialist    Acute respiratory failure with hypoxia (HCC)    Aspiration pneumonia of left lung (HCC)    Chronic obstructive pulmonary disease with acute exacerbation (HCC)  Resolved Problems:    * No resolved hospital problems. *      Patient is a 73 year-old female with history of CHF, afib on Xarelto, h/o CVA HERMILO and COPD who presented on 24 with AMS found to have L occipital IPH in setting of coagulopathy and reversed with K-Centra. Patient states she was knitting at home and did not feel like herself and felt confused with slurred speech. She subsequently missed work and then her coworkers came to check on her and thought she was altered. Neurology was only consulted late yesterday for IPH. Neurosurgery evaluated initially but waited on MRI results for further plan. MRI brain w wo contrast done yesterday cannot exclude underlying lesion and there is a punctate R occipital infarct. Occipital lesion circumferential with unusual appearance cannot exclude underlying vascular anomaly or area of mass lesion. L medial temporal area of enhancement less concern for infection or autoimmune encephalitis, however, more likely related to seizure. Occipital lobe IPH would not make patient altered.   TTE EF 50-55%, L severely dilated, bubble study negative.    L occipital spontaneous IPH and R punctate parietal infarct  Recommendations:   - She has not had vessel imaging yet. Obtain CTA head/neck; otherwise etiology of IPH unknown, not typical area for hypertensive bleed  - Concern for seizure given L temporal lobe findings and h/o being altered at home. Obtain routine EEG. Pt may need to start on empiric ASD especially if surgical 
74yo presented to OSH with AMS.  Apparently friend called EMS as patient was unable to communicate effectively.  History of COPD an CHF.  Head CT shows left occipital ICH, mild edema.  Agree with holding Xarelto, BP management with goal of SBP<140, and MRI of brain with and without contrast to look for underlying lesion.  Formal consult when MRI completed.  
Comprehensive Nutrition Assessment    Type and Reason for Visit: Initial, Consult    Nutrition Recommendations/Plan:   Add once daily oral nutrition supplement.  Consider adding gastric bypass vitamins/minerals:  Daily MVI  500mcg Vit B12  Calcium citrate 500 mg BID  3000IU Vit D     3.    Modified diet to standard 4 carb serving/meal.         Malnutrition Assessment:  Malnutrition Status:  Moderate malnutrition (01/29/24 1102)    Context:  Acute Illness     Findings of the 6 clinical characteristics of malnutrition:  Energy Intake:  Unable to assess  Weight Loss:  Unable to assess     Body Fat Loss:  Mild body fat loss Buccal region   Muscle Mass Loss:  Mild muscle mass loss Temples (temporalis), Clavicles (pectoralis & deltoids), Hand (interosseous)  Fluid Accumulation:  No significant fluid accumulation     Strength:  Not Performed       Nutrition Assessment:    Past Medical History:   Diagnosis Date    COPD (chronic obstructive pulmonary disease) (HCC)    Pt admitted for AMS, CT positive L ICH. No neurosurgical intervention.    Nutrition consult received for ONS and malnutrition assessment. Visited with pt at bedside this morning. She shares that she ate a good breakfast, but unsure about prior day's intakes as they were keeping her NPO. She has been on a diet for a few days, so not able to fully assess recent PO intakes. Reports UBW ~180#, and states used to weigh 300# but had gastric bypass \"a few years ago.\" We discussed the importance of adequate protein and she is agreeable to once daily ONS. Will continue to monitor her progress with additional recommendations as needed.      Nutritionally Significant Medications:  Vitamin C, ferrous sulfate, Protonix, K-Cl, Prednisone      Estimated Daily Nutrient Needs:  Energy Requirements Based On: Formula  Weight Used for Energy Requirements: Current  Energy (kcal/day): 6759-7725 (MSJ x 1.2 x 1)  Weight Used for Protein Requirements: Current  Protein (g/day): 
Palliative Medicine  Patient Name: Radha Oro  YOB: 1950  MRN: [id number]  Age: 73 y.o.  Gender: female    Date of Initial Consult: 1/26/2024  Date of Service: 1/26/2024  Time: 2:02 PM  Provider: Theresa Wu MD  Hospital Day: 3  Admit Date: 1/24/2024  Referring Provider: Dr. Yap       Reasons for Consultation:  Goals of Care    HISTORY OF PRESENT ILLNESS (HPI):   Radha Oro is a 73 y.o. female with a past medical history of COPD, CHF, afib on AC, hx CVA, hx obesity, s/p gastric bypass, HERMILO , who was admitted on 1/24/2024 from home/ transfer from [facility] 1/24 with a diagnosis of altered mental status  Head CT: L occipital IPH, no midline shift, admitted to ICU for monitoring  (Patient had been on Xarelto, was given Kcentra)   Developed acute respiratory distress, currently on BIPAP  CT Chest: 1/26/24 .  Large amount of debris within the airways and opacities at the bases,  consistent with aspiration. The left mainstem bronchus is essentially occluded.  2.  No pulmonary embolism.  3.  Scattered pulmonary nodules measuring up to 6 mm. CT chest in 3-6 months  recommended per Fleischner guidelines.    Psychosocial: patient has never , no children  Lives alone prior to admission, independent in ADLs/IADLs  [place]-- [place] full time as [employer]    AMD appoints either Zuri Munson  [phone] or Rika Mullins [phone]  [facility]     PALLIATIVE DIAGNOSES:    Change in cognition  L occipital IPH  Acute respiratory failure  Aspiration pneumonia  COPD  Afib with RVR   Palliative medicine encounter      ASSESSMENT AND PLAN:   Discussed with ICU team  Palliative medicine introduced to patient, cousin, nieces at bedside as added layer of support  ICU attending coming by shortly to discuss possibility of bronch to treat aspiration, short term intubation.  Addendum - patient has agreed to short term intubation (but would not want prolonged)   Copy of AMD has been placed on her chart 
input from neurosurgery and neurology to determine if further GOC need to be addressed  She shares the bronchoscopy was difficult to go through and she's glad that's over  Copy of AMD has been placed on her chart for scanning  MPOA either Zuri Munson or Rika Mullins.   Zuri and Rika have good insight into patient's wishes  Our team will check in again Monday, continue to follow as her ability to make good gains is more clear  Please call with any palliative questions or concerns.  Palliative Care Team is available via perfect serve or via phone.    Referrals to:   [] Outpatient Palliative Care  [] Home Based Palliative Care  [] Home Based Primary Care  [] Hospice       ADVANCE CARE PLANNING:   [x] [facility] Interdisciplinary Team has updated the ACP Navigator with Health Care Decision Maker and Patient Capacity      Primary Decision Maker: Zuri Hill - Erinece/Nephew - [phone]    Primary Decision Maker: Rika Mullins - Niece/Nephew - [phone]       Current Code Status: Full Code     Goals of Care:         Please refer to Palliative Medicine ACP notes for further details.    PALLIATIVE ASSESSMENT:      Palliative Performance Scale (PPS):  PPS: 40    ECOG:        Modified ESAS:  Modified-[place] Symptom Assessment Scale (ESAS)  Pain Score: No pain  Dyspnea Score: 5    Clinical Pain Assessment (nonverbal scale for severity on nonverbal patients):   Clinical Pain Assessment  Severity: 0       NVPS:       RDOS:         Vital Signs: Blood pressure (!) 140/93, pulse 74, temperature 98 °F (36.7 °C), temperature source Oral, resp. rate 19, height 1.626 m (5' 4.02\"), weight 80.2 kg (176 lb 14.4 oz), SpO2 93 %.    PHYSICAL ASSESSMENT:   General: [] Oriented x3  [] Well appearing  [] Intubated  [x]Ill appearing  [x]Other:talkative on BIPAP  Mental Status: [x] Normal mental status exam  [] Drowsy  [] Confused  []Other:  Cardiovascular: [] Regular rate/rhythm  [x] Arrhythmia  [] Other:  Chest: [] Effort normal

## 2024-01-30 PROBLEM — R41.82 ALTERED MENTAL STATUS: Status: ACTIVE | Noted: 2024-01-30

## 2024-01-30 LAB
25(OH)D3 SERPL-MCNC: 37.3 NG/ML (ref 30–100)
ALBUMIN SERPL-MCNC: 2.6 G/DL (ref 3.5–5)
ALBUMIN/GLOB SERPL: 0.9 (ref 1.1–2.2)
ALP SERPL-CCNC: 73 U/L (ref 45–117)
ALT SERPL-CCNC: 32 U/L (ref 12–78)
ANION GAP SERPL CALC-SCNC: 3 MMOL/L (ref 5–15)
AST SERPL-CCNC: 27 U/L (ref 15–37)
BACTERIA SPEC CULT: NORMAL
BACTERIA SPEC CULT: NORMAL
BILIRUB SERPL-MCNC: 0.7 MG/DL (ref 0.2–1)
BUN SERPL-MCNC: 35 MG/DL (ref 6–20)
BUN/CREAT SERPL: 40 (ref 12–20)
CALCIUM SERPL-MCNC: 8.4 MG/DL (ref 8.5–10.1)
CHLORIDE SERPL-SCNC: 105 MMOL/L (ref 97–108)
CHOLEST SERPL-MCNC: 177 MG/DL
CO2 SERPL-SCNC: 30 MMOL/L (ref 21–32)
CREAT SERPL-MCNC: 0.87 MG/DL (ref 0.55–1.02)
ERYTHROCYTE [DISTWIDTH] IN BLOOD BY AUTOMATED COUNT: 14.6 % (ref 11.5–14.5)
EST. AVERAGE GLUCOSE BLD GHB EST-MCNC: 123 MG/DL
FERRITIN SERPL-MCNC: 185 NG/ML (ref 26–388)
FOLATE SERPL-MCNC: 18.4 NG/ML (ref 5–21)
GLOBULIN SER CALC-MCNC: 2.8 G/DL (ref 2–4)
GLUCOSE SERPL-MCNC: 97 MG/DL (ref 65–100)
HBA1C MFR BLD: 5.9 % (ref 4–5.6)
HCT VFR BLD AUTO: 45 % (ref 35–47)
HDLC SERPL-MCNC: 76 MG/DL
HDLC SERPL: 2.3 (ref 0–5)
HGB BLD-MCNC: 14.6 G/DL (ref 11.5–16)
IRON SATN MFR SERPL: 33 % (ref 20–50)
IRON SERPL-MCNC: 78 UG/DL (ref 35–150)
LDLC SERPL CALC-MCNC: 83.4 MG/DL (ref 0–100)
Lab: NORMAL
Lab: NORMAL
MAGNESIUM SERPL-MCNC: 2 MG/DL (ref 1.6–2.4)
MCH RBC QN AUTO: 30.5 PG (ref 26–34)
MCHC RBC AUTO-ENTMCNC: 32.4 G/DL (ref 30–36.5)
MCV RBC AUTO: 94.1 FL (ref 80–99)
NRBC # BLD: 0 K/UL (ref 0–0.01)
NRBC BLD-RTO: 0 PER 100 WBC
PHOSPHATE SERPL-MCNC: 2.5 MG/DL (ref 2.6–4.7)
PLATELET # BLD AUTO: 221 K/UL (ref 150–400)
PMV BLD AUTO: 10.7 FL (ref 8.9–12.9)
POTASSIUM SERPL-SCNC: 5.9 MMOL/L (ref 3.5–5.1)
PROT SERPL-MCNC: 5.4 G/DL (ref 6.4–8.2)
RBC # BLD AUTO: 4.78 M/UL (ref 3.8–5.2)
SODIUM SERPL-SCNC: 138 MMOL/L (ref 136–145)
TIBC SERPL-MCNC: 237 UG/DL (ref 250–450)
TRIGL SERPL-MCNC: 88 MG/DL
TSH SERPL DL<=0.05 MIU/L-ACNC: 2.14 UIU/ML (ref 0.36–3.74)
VIT B12 SERPL-MCNC: 720 PG/ML (ref 193–986)
VLDLC SERPL CALC-MCNC: 17.6 MG/DL
WBC # BLD AUTO: 12.2 K/UL (ref 3.6–11)

## 2024-01-30 PROCEDURE — 92523 SPEECH SOUND LANG COMPREHEN: CPT

## 2024-01-30 PROCEDURE — 82728 ASSAY OF FERRITIN: CPT

## 2024-01-30 PROCEDURE — 6370000000 HC RX 637 (ALT 250 FOR IP): Performed by: INTERNAL MEDICINE

## 2024-01-30 PROCEDURE — 2060000000 HC ICU INTERMEDIATE R&B

## 2024-01-30 PROCEDURE — 99233 SBSQ HOSP IP/OBS HIGH 50: CPT | Performed by: NURSE PRACTITIONER

## 2024-01-30 PROCEDURE — 84100 ASSAY OF PHOSPHORUS: CPT

## 2024-01-30 PROCEDURE — 80053 COMPREHEN METABOLIC PANEL: CPT

## 2024-01-30 PROCEDURE — 83036 HEMOGLOBIN GLYCOSYLATED A1C: CPT

## 2024-01-30 PROCEDURE — 99232 SBSQ HOSP IP/OBS MODERATE 35: CPT | Performed by: NURSE PRACTITIONER

## 2024-01-30 PROCEDURE — 83735 ASSAY OF MAGNESIUM: CPT

## 2024-01-30 PROCEDURE — 97112 NEUROMUSCULAR REEDUCATION: CPT

## 2024-01-30 PROCEDURE — 85027 COMPLETE CBC AUTOMATED: CPT

## 2024-01-30 PROCEDURE — 36415 COLL VENOUS BLD VENIPUNCTURE: CPT

## 2024-01-30 PROCEDURE — 82607 VITAMIN B-12: CPT

## 2024-01-30 PROCEDURE — 80061 LIPID PANEL: CPT

## 2024-01-30 PROCEDURE — 82746 ASSAY OF FOLIC ACID SERUM: CPT

## 2024-01-30 PROCEDURE — 6370000000 HC RX 637 (ALT 250 FOR IP): Performed by: NURSE PRACTITIONER

## 2024-01-30 PROCEDURE — 2580000003 HC RX 258: Performed by: NURSE PRACTITIONER

## 2024-01-30 PROCEDURE — 82306 VITAMIN D 25 HYDROXY: CPT

## 2024-01-30 PROCEDURE — 84443 ASSAY THYROID STIM HORMONE: CPT

## 2024-01-30 PROCEDURE — 83540 ASSAY OF IRON: CPT

## 2024-01-30 PROCEDURE — 94640 AIRWAY INHALATION TREATMENT: CPT

## 2024-01-30 PROCEDURE — 92526 ORAL FUNCTION THERAPY: CPT

## 2024-01-30 PROCEDURE — 6360000002 HC RX W HCPCS: Performed by: INTERNAL MEDICINE

## 2024-01-30 PROCEDURE — 83550 IRON BINDING TEST: CPT

## 2024-01-30 PROCEDURE — 97535 SELF CARE MNGMENT TRAINING: CPT

## 2024-01-30 RX ORDER — LEVETIRACETAM 500 MG/1
500 TABLET ORAL 2 TIMES DAILY
Status: DISCONTINUED | OUTPATIENT
Start: 2024-01-30 | End: 2024-02-01 | Stop reason: HOSPADM

## 2024-01-30 RX ADMIN — SODIUM CHLORIDE, PRESERVATIVE FREE 10 ML: 5 INJECTION INTRAVENOUS at 20:36

## 2024-01-30 RX ADMIN — Medication 1000 UNITS: at 08:29

## 2024-01-30 RX ADMIN — Medication 1000 UNITS: at 14:11

## 2024-01-30 RX ADMIN — LEVETIRACETAM 500 MG: 500 TABLET, FILM COATED ORAL at 20:34

## 2024-01-30 RX ADMIN — CYANOCOBALAMIN TAB 500 MCG 500 MCG: 500 TAB at 08:29

## 2024-01-30 RX ADMIN — FERROUS SULFATE TAB 325 MG (65 MG ELEMENTAL FE) 325 MG: 325 (65 FE) TAB at 08:29

## 2024-01-30 RX ADMIN — LEVETIRACETAM 500 MG: 500 TABLET, FILM COATED ORAL at 12:08

## 2024-01-30 RX ADMIN — THERA TABS 1 TABLET: TAB at 08:38

## 2024-01-30 RX ADMIN — SODIUM CHLORIDE, PRESERVATIVE FREE 10 ML: 5 INJECTION INTRAVENOUS at 08:37

## 2024-01-30 RX ADMIN — CASTOR OIL AND BALSAM, PERU: 788; 87 OINTMENT TOPICAL at 22:56

## 2024-01-30 RX ADMIN — PANTOPRAZOLE SODIUM 40 MG: 40 TABLET, DELAYED RELEASE ORAL at 05:28

## 2024-01-30 RX ADMIN — POTASSIUM CHLORIDE 40 MEQ: 750 TABLET, FILM COATED, EXTENDED RELEASE ORAL at 08:29

## 2024-01-30 RX ADMIN — CALCIUM CARBONATE (ANTACID) CHEW TAB 500 MG 500 MG: 500 CHEW TAB at 20:34

## 2024-01-30 RX ADMIN — IPRATROPIUM BROMIDE 0.5 MG: 0.5 SOLUTION RESPIRATORY (INHALATION) at 20:39

## 2024-01-30 RX ADMIN — CALCIUM CARBONATE (ANTACID) CHEW TAB 500 MG 500 MG: 500 CHEW TAB at 14:11

## 2024-01-30 RX ADMIN — PREDNISONE 40 MG: 20 TABLET ORAL at 08:29

## 2024-01-30 RX ADMIN — THERA TABS 1 TABLET: TAB at 08:29

## 2024-01-30 RX ADMIN — ARFORMOTEROL TARTRATE: 15 SOLUTION RESPIRATORY (INHALATION) at 20:39

## 2024-01-30 RX ADMIN — METOPROLOL TARTRATE 50 MG: 50 TABLET, FILM COATED ORAL at 08:29

## 2024-01-30 RX ADMIN — CASTOR OIL AND BALSAM, PERU: 788; 87 OINTMENT TOPICAL at 12:08

## 2024-01-30 RX ADMIN — CALCIUM CARBONATE (ANTACID) CHEW TAB 500 MG 500 MG: 500 CHEW TAB at 08:29

## 2024-01-30 RX ADMIN — POTASSIUM CHLORIDE 40 MEQ: 750 TABLET, FILM COATED, EXTENDED RELEASE ORAL at 20:34

## 2024-01-30 RX ADMIN — IPRATROPIUM BROMIDE 0.5 MG: 0.5 SOLUTION RESPIRATORY (INHALATION) at 07:15

## 2024-01-30 RX ADMIN — ARFORMOTEROL TARTRATE: 15 SOLUTION RESPIRATORY (INHALATION) at 07:16

## 2024-01-30 RX ADMIN — Medication 1000 UNITS: at 20:34

## 2024-01-30 RX ADMIN — METOPROLOL TARTRATE 50 MG: 50 TABLET, FILM COATED ORAL at 20:34

## 2024-01-30 RX ADMIN — OXYCODONE HYDROCHLORIDE AND ACETAMINOPHEN 500 MG: 500 TABLET ORAL at 08:29

## 2024-01-30 NOTE — PROCEDURES
[facility]  Bronchoscopy Procedure Note    Procedure:  Therapeutic bronchoscopy.    Diagnosis:  Mucous plug/secretion     Indication:  Abnormal chest imaging and Mucus Plugging    Consent/Treatment:  Informed consent was obtained from the  family after risks, benefits and alternatives were explained. Timeout verified the correct patient and correct procedure.     Anesthesia:   Patient on ventilator and receiving  Propofol drip       The following parameters were monitored: oxygen saturation, heart rate, blood pressure, respiratory rate, EKG, adequacy of pulmonary ventilation and response to care. Total physician intraservice time was 35 mins    Procedure Details:   -- The bronchoscope was introduced through an endotracheal tube.   -- The trachea and alvarez were completely inspected and normal .  -- The right-sided endobronchial anatomy was completely inspected and were found to be normal.  -- The left-sided endobronchial anatomy was completely inspected and moderate secretion at the left main bronchus, minimal purulent secretions, rest inspected and normal .     Specimens:   Non applicable or indicated    Rapid On-Site Evaluation: A preliminary diagnosis of Mucous secretion Left main bronchus    Complications: none    Estimated Blood Loss: Minimal    Jillian Yap MD   Staff Intensivist  [facility]   
     Procedure Note - Intubation:   Performed by Jillian Yap MD .     Diagnosis: Resp failure for bronch  Insertion Date: 1/26 Time:4:30pm   Obtained Consent? yes; informed   Procedure Location:  CCU.      Immediately prior to the procedure, the patient was reevaluated and found suitable for the planned procedure and any planned medications.  Immediately prior to the procedure a time out was called to verify the correct patient, procedure, equipment, staff, and marking as appropriate.    Preoxygenation applied via yes  Medications given were rocuronium (Zemuron).   Grade 1 view obtained with Mac 4 blade  A number 8.0 cuffed   ETT was placed to 23 cm at the teeth.   Placement was evaluated by noting bilateral, symmetric breath sounds, good end-tidal CO2 detector color change , no breath sounds over stomach, bulb aspirator expands promptly, chest x-ray visualization, and limited fiberoptic bronchoscopy.    Attempts required: 1.    Complications: none.    RSI was used..  The procedure was tolerated well.  A follow-up chest x-ray was not ordered post procedure.        Jillian Yap MD  Critical Care Medicine  [facility]     
(PROTONIX) tablet 40 mg  40 mg Oral QAM Janna Pritchard MD   40 mg at 01/30/24 0528    PHENobarbital tablet 32.4 mg  32.4 mg Oral Q3H PRN Mick England MD        balsum peru-castor oil (VENELEX) ointment   Topical Q12H Richelle Montoya APRN - NP   Given at 01/30/24 1208    sodium chloride flush 0.9 % injection 5-40 mL  5-40 mL IntraVENous 2 times per day Clarke Toscano, APRN - NP   10 mL at 01/30/24 0837    sodium chloride flush 0.9 % injection 5-40 mL  5-40 mL IntraVENous PRN Clarke Toscano APRN - NP        0.9 % sodium chloride infusion   IntraVENous PRN Clarke Toscano APRN - NP        potassium chloride 20 mEq/50 mL IVPB (Central Line)  20 mEq IntraVENous PRN Clarke Toscano APRN - NP        Or    potassium chloride 10 mEq/100 mL IVPB (Peripheral Line)  10 mEq IntraVENous PRN Clarke Toscano APRN - NP        magnesium sulfate 2000 mg in 50 mL IVPB premix  2,000 mg IntraVENous PRN Clarke Toscano APRN - NP        ondansetron (ZOFRAN-ODT) disintegrating tablet 4 mg  4 mg Oral Q8H PRN Clarke Toscano APRN - NP        Or    ondansetron (ZOFRAN) injection 4 mg  4 mg IntraVENous Q6H PRN Clarke Toscano APRN - NP        polyethylene glycol (GLYCOLAX) packet 17 g  17 g Oral Daily PRN Clarke Toscano APRN - NP        acetaminophen (TYLENOL) tablet 650 mg  650 mg Oral Q6H PRN Clarke Toscano APRN - NP        Or    acetaminophen (TYLENOL) suppository 650 mg  650 mg Rectal Q6H PRN Clarke Toscano APRN - NP           CONDITIONS OF RECORDING: This is a routine 21-channel EEG recording performed in accordance with the international 10-20 system with one channel devoted to limited EKG. This study was done during states of wakefulness and sleep. No activating procedures were performed.       DESCRIPTION:   Upon maximal arousal the posterior dominant rhythm has a frequency of 9Hz with an amplitude of 20uV. This activity

## 2024-01-31 ENCOUNTER — TELEPHONE (OUTPATIENT)
Facility: HOSPITAL | Age: 74
End: 2024-01-31

## 2024-01-31 LAB
ALBUMIN SERPL-MCNC: 2.7 G/DL (ref 3.5–5)
ALBUMIN/GLOB SERPL: 1.1 (ref 1.1–2.2)
ALP SERPL-CCNC: 67 U/L (ref 45–117)
ALT SERPL-CCNC: 30 U/L (ref 12–78)
ANION GAP SERPL CALC-SCNC: 5 MMOL/L (ref 5–15)
AST SERPL-CCNC: 25 U/L (ref 15–37)
BILIRUB SERPL-MCNC: 0.7 MG/DL (ref 0.2–1)
BUN SERPL-MCNC: 32 MG/DL (ref 6–20)
BUN/CREAT SERPL: 45 (ref 12–20)
CALCIUM SERPL-MCNC: 8.5 MG/DL (ref 8.5–10.1)
CHLORIDE SERPL-SCNC: 105 MMOL/L (ref 97–108)
CO2 SERPL-SCNC: 29 MMOL/L (ref 21–32)
CREAT SERPL-MCNC: 0.71 MG/DL (ref 0.55–1.02)
ERYTHROCYTE [DISTWIDTH] IN BLOOD BY AUTOMATED COUNT: 14.6 % (ref 11.5–14.5)
GLOBULIN SER CALC-MCNC: 2.5 G/DL (ref 2–4)
GLUCOSE SERPL-MCNC: 101 MG/DL (ref 65–100)
HCT VFR BLD AUTO: 42.7 % (ref 35–47)
HGB BLD-MCNC: 14.5 G/DL (ref 11.5–16)
MAGNESIUM SERPL-MCNC: 2 MG/DL (ref 1.6–2.4)
MCH RBC QN AUTO: 31.3 PG (ref 26–34)
MCHC RBC AUTO-ENTMCNC: 34 G/DL (ref 30–36.5)
MCV RBC AUTO: 92.2 FL (ref 80–99)
NRBC # BLD: 0 K/UL (ref 0–0.01)
NRBC BLD-RTO: 0 PER 100 WBC
PHOSPHATE SERPL-MCNC: 3.6 MG/DL (ref 2.6–4.7)
PLATELET # BLD AUTO: 192 K/UL (ref 150–400)
PMV BLD AUTO: 10.9 FL (ref 8.9–12.9)
POTASSIUM SERPL-SCNC: 5.8 MMOL/L (ref 3.5–5.1)
PROT SERPL-MCNC: 5.2 G/DL (ref 6.4–8.2)
RBC # BLD AUTO: 4.63 M/UL (ref 3.8–5.2)
SODIUM SERPL-SCNC: 139 MMOL/L (ref 136–145)
WBC # BLD AUTO: 12.4 K/UL (ref 3.6–11)

## 2024-01-31 PROCEDURE — 6370000000 HC RX 637 (ALT 250 FOR IP): Performed by: INTERNAL MEDICINE

## 2024-01-31 PROCEDURE — 97535 SELF CARE MNGMENT TRAINING: CPT

## 2024-01-31 PROCEDURE — 83735 ASSAY OF MAGNESIUM: CPT

## 2024-01-31 PROCEDURE — 94761 N-INVAS EAR/PLS OXIMETRY MLT: CPT

## 2024-01-31 PROCEDURE — 84100 ASSAY OF PHOSPHORUS: CPT

## 2024-01-31 PROCEDURE — 80053 COMPREHEN METABOLIC PANEL: CPT

## 2024-01-31 PROCEDURE — 6360000002 HC RX W HCPCS: Performed by: INTERNAL MEDICINE

## 2024-01-31 PROCEDURE — 2580000003 HC RX 258: Performed by: NURSE PRACTITIONER

## 2024-01-31 PROCEDURE — 99232 SBSQ HOSP IP/OBS MODERATE 35: CPT | Performed by: NURSE PRACTITIONER

## 2024-01-31 PROCEDURE — 2060000000 HC ICU INTERMEDIATE R&B

## 2024-01-31 PROCEDURE — 94640 AIRWAY INHALATION TREATMENT: CPT

## 2024-01-31 PROCEDURE — 85027 COMPLETE CBC AUTOMATED: CPT

## 2024-01-31 PROCEDURE — 97116 GAIT TRAINING THERAPY: CPT

## 2024-01-31 PROCEDURE — 36415 COLL VENOUS BLD VENIPUNCTURE: CPT

## 2024-01-31 PROCEDURE — 6370000000 HC RX 637 (ALT 250 FOR IP): Performed by: NURSE PRACTITIONER

## 2024-01-31 RX ORDER — CASTOR OIL AND BALSAM, PERU 788; 87 MG/G; MG/G
OINTMENT TOPICAL EVERY 12 HOURS
Qty: 28.35 G | Refills: 0 | Status: SHIPPED | OUTPATIENT
Start: 2024-01-31 | End: 2024-03-01

## 2024-01-31 RX ORDER — CALCIUM CARBONATE 500 MG/1
500 TABLET, CHEWABLE ORAL 3 TIMES DAILY
Qty: 90 TABLET | Refills: 0 | Status: SHIPPED | COMMUNITY
Start: 2024-01-31 | End: 2024-03-01

## 2024-01-31 RX ORDER — LEVETIRACETAM 500 MG/1
500 TABLET ORAL 2 TIMES DAILY
Qty: 60 TABLET | Refills: 0 | Status: SHIPPED | OUTPATIENT
Start: 2024-01-31

## 2024-01-31 RX ORDER — ATORVASTATIN CALCIUM 20 MG/1
20 TABLET, FILM COATED ORAL NIGHTLY
Status: DISCONTINUED | OUTPATIENT
Start: 2024-01-31 | End: 2024-02-01 | Stop reason: HOSPADM

## 2024-01-31 RX ORDER — METOPROLOL TARTRATE 50 MG/1
50 TABLET, FILM COATED ORAL 2 TIMES DAILY
Qty: 60 TABLET | Refills: 0 | Status: SHIPPED | OUTPATIENT
Start: 2024-01-31

## 2024-01-31 RX ORDER — CHOLECALCIFEROL (VITAMIN D3) 25 MCG
1000 TABLET ORAL DAILY
Qty: 30 TABLET | Refills: 0 | Status: SHIPPED | COMMUNITY
Start: 2024-01-31 | End: 2024-03-01

## 2024-01-31 RX ADMIN — ARFORMOTEROL TARTRATE: 15 SOLUTION RESPIRATORY (INHALATION) at 07:51

## 2024-01-31 RX ADMIN — ATORVASTATIN CALCIUM 20 MG: 20 TABLET, FILM COATED ORAL at 20:54

## 2024-01-31 RX ADMIN — CALCIUM CARBONATE (ANTACID) CHEW TAB 500 MG 500 MG: 500 CHEW TAB at 20:47

## 2024-01-31 RX ADMIN — THERA TABS 1 TABLET: TAB at 08:38

## 2024-01-31 RX ADMIN — CALCIUM CARBONATE (ANTACID) CHEW TAB 500 MG 500 MG: 500 CHEW TAB at 08:38

## 2024-01-31 RX ADMIN — PANTOPRAZOLE SODIUM 40 MG: 40 TABLET, DELAYED RELEASE ORAL at 05:39

## 2024-01-31 RX ADMIN — OXYCODONE HYDROCHLORIDE AND ACETAMINOPHEN 500 MG: 500 TABLET ORAL at 08:38

## 2024-01-31 RX ADMIN — LEVETIRACETAM 500 MG: 500 TABLET, FILM COATED ORAL at 20:48

## 2024-01-31 RX ADMIN — SODIUM CHLORIDE, PRESERVATIVE FREE 10 ML: 5 INJECTION INTRAVENOUS at 09:11

## 2024-01-31 RX ADMIN — LEVETIRACETAM 500 MG: 500 TABLET, FILM COATED ORAL at 08:37

## 2024-01-31 RX ADMIN — CALCIUM CARBONATE (ANTACID) CHEW TAB 500 MG 500 MG: 500 CHEW TAB at 15:09

## 2024-01-31 RX ADMIN — CASTOR OIL AND BALSAM, PERU: 788; 87 OINTMENT TOPICAL at 22:41

## 2024-01-31 RX ADMIN — FERROUS SULFATE TAB 325 MG (65 MG ELEMENTAL FE) 325 MG: 325 (65 FE) TAB at 08:38

## 2024-01-31 RX ADMIN — Medication 1000 UNITS: at 20:48

## 2024-01-31 RX ADMIN — IPRATROPIUM BROMIDE 0.5 MG: 0.5 SOLUTION RESPIRATORY (INHALATION) at 07:52

## 2024-01-31 RX ADMIN — SODIUM CHLORIDE, PRESERVATIVE FREE 10 ML: 5 INJECTION INTRAVENOUS at 20:50

## 2024-01-31 RX ADMIN — METOPROLOL TARTRATE 50 MG: 50 TABLET, FILM COATED ORAL at 20:48

## 2024-01-31 RX ADMIN — IPRATROPIUM BROMIDE 0.5 MG: 0.5 SOLUTION RESPIRATORY (INHALATION) at 21:58

## 2024-01-31 RX ADMIN — ARFORMOTEROL TARTRATE: 15 SOLUTION RESPIRATORY (INHALATION) at 21:58

## 2024-01-31 RX ADMIN — CYANOCOBALAMIN TAB 500 MCG 500 MCG: 500 TAB at 08:38

## 2024-01-31 RX ADMIN — METOPROLOL TARTRATE 50 MG: 50 TABLET, FILM COATED ORAL at 08:38

## 2024-01-31 RX ADMIN — CASTOR OIL AND BALSAM, PERU: 788; 87 OINTMENT TOPICAL at 12:14

## 2024-01-31 RX ADMIN — Medication 1000 UNITS: at 15:09

## 2024-01-31 RX ADMIN — THERA TABS 1 TABLET: TAB at 08:39

## 2024-01-31 RX ADMIN — Medication 1000 UNITS: at 08:38

## 2024-01-31 NOTE — PALLIATIVE CARE DISCHARGE
Goals of Care/Treatment Preferences    The Palliative Medicine team was consulted as part of your/your loved one's care in the hospital. Our team is a supportive service; we strive to relieve suffering and improve quality of life.    We reviewed advance care planning information, which includes the following:    Primary Decision Maker: Zuri Hill - Niece/Nephew - [phone]    Primary Decision Maker: SusannaRika - Nijewels/Nephew - [phone]  Patient's Healthcare Decision Maker is:: Named in Scanned ACP Document  Confirm Advance Directive: None      We reviewed / discussed your code status as:   Code Status: Full Code     “Full Code” means perform CPR in the event of cardiac arrest.      “DNR” means do NOT perform CPR in the event of cardiac arrest.      “Partial Code” means you have specific preferences, please discuss with your healthcare team.      “No Order” means this issue was not addressed / resolved during your stay        Because of the importance of this information, we are providing you with a printed copy to share with other healthcare providers after this hospitalization is complete.

## 2024-01-31 NOTE — TELEPHONE ENCOUNTER
Pt needs a hospital follow up appointment  Provider: Dr. Logan  Location: [facility]  In person or virtual: In person  When: 4-6 weeks   Diagnosis/reason for follow up:  presumed seizure with confusion and temporal lobe abnormality on MRI, also sharps on EEG-> started on Keppra 500 mg BID  Additional information:

## 2024-01-31 NOTE — CARE COORDINATION
Transition of Care Plan:    IPR - [facility]; bed available 2/1    Transport: BLS    RUR: 15%  Prior Level of Functioning: Independent, did not use DME  Disposition: IPR  If SNF or IPR: Date FOC offered: 1/31  Date FOC received: 1/31  Accepting facility: Pending  Follow up appointments: PCP   DME needed: None  Transportation at discharge: [facility]  IM/IMM Medicare/ letter given: 1/25  Caregiver Contact: Ace Hill [phone]   Discharge Caregiver contacted prior to discharge?   Care Conference needed? No  Barriers to discharge: Placement/bed availability    PT/OT and Dr. Baker recommending IPR at discharge. Pt deferred choice to her niece, Zuri. She prefers [facility] - referral sent in [facility].    DC order noted. Waiting on review of referral and bed availability for today.    1030: no bed available at [facility] today. Pt niece not amenable to another rehab given distance.     KIKE Burrell (Ally).

## 2024-02-01 VITALS
HEART RATE: 81 BPM | BODY MASS INDEX: 30.75 KG/M2 | RESPIRATION RATE: 21 BRPM | DIASTOLIC BLOOD PRESSURE: 90 MMHG | TEMPERATURE: 97.8 F | WEIGHT: 180.1 LBS | HEIGHT: 64 IN | SYSTOLIC BLOOD PRESSURE: 109 MMHG | OXYGEN SATURATION: 95 %

## 2024-02-01 LAB
ALBUMIN SERPL-MCNC: 2.9 G/DL (ref 3.5–5)
ALBUMIN/GLOB SERPL: 1.1 (ref 1.1–2.2)
ALP SERPL-CCNC: 78 U/L (ref 45–117)
ALT SERPL-CCNC: 26 U/L (ref 12–78)
ANION GAP SERPL CALC-SCNC: 6 MMOL/L (ref 5–15)
AST SERPL-CCNC: 20 U/L (ref 15–37)
BILIRUB SERPL-MCNC: 0.5 MG/DL (ref 0.2–1)
BUN SERPL-MCNC: 37 MG/DL (ref 6–20)
BUN/CREAT SERPL: 44 (ref 12–20)
CALCIUM SERPL-MCNC: 9 MG/DL (ref 8.5–10.1)
CHLORIDE SERPL-SCNC: 102 MMOL/L (ref 97–108)
CO2 SERPL-SCNC: 30 MMOL/L (ref 21–32)
CREAT SERPL-MCNC: 0.84 MG/DL (ref 0.55–1.02)
ERYTHROCYTE [DISTWIDTH] IN BLOOD BY AUTOMATED COUNT: 14.9 % (ref 11.5–14.5)
GLOBULIN SER CALC-MCNC: 2.6 G/DL (ref 2–4)
GLUCOSE SERPL-MCNC: 93 MG/DL (ref 65–100)
HCT VFR BLD AUTO: 45.4 % (ref 35–47)
HGB BLD-MCNC: 14.6 G/DL (ref 11.5–16)
MCH RBC QN AUTO: 30.7 PG (ref 26–34)
MCHC RBC AUTO-ENTMCNC: 32.2 G/DL (ref 30–36.5)
MCV RBC AUTO: 95.6 FL (ref 80–99)
NRBC # BLD: 0 K/UL (ref 0–0.01)
NRBC BLD-RTO: 0 PER 100 WBC
PLATELET # BLD AUTO: 184 K/UL (ref 150–400)
PMV BLD AUTO: 11.1 FL (ref 8.9–12.9)
POTASSIUM SERPL-SCNC: 5 MMOL/L (ref 3.5–5.1)
PROT SERPL-MCNC: 5.5 G/DL (ref 6.4–8.2)
RBC # BLD AUTO: 4.75 M/UL (ref 3.8–5.2)
SODIUM SERPL-SCNC: 138 MMOL/L (ref 136–145)
WBC # BLD AUTO: 14.2 K/UL (ref 3.6–11)

## 2024-02-01 PROCEDURE — 6370000000 HC RX 637 (ALT 250 FOR IP): Performed by: INTERNAL MEDICINE

## 2024-02-01 PROCEDURE — 36415 COLL VENOUS BLD VENIPUNCTURE: CPT

## 2024-02-01 PROCEDURE — 6370000000 HC RX 637 (ALT 250 FOR IP): Performed by: NURSE PRACTITIONER

## 2024-02-01 PROCEDURE — 80053 COMPREHEN METABOLIC PANEL: CPT

## 2024-02-01 PROCEDURE — 97112 NEUROMUSCULAR REEDUCATION: CPT

## 2024-02-01 PROCEDURE — 85027 COMPLETE CBC AUTOMATED: CPT

## 2024-02-01 PROCEDURE — 2580000003 HC RX 258: Performed by: NURSE PRACTITIONER

## 2024-02-01 RX ORDER — ATORVASTATIN CALCIUM 20 MG/1
20 TABLET, FILM COATED ORAL NIGHTLY
Qty: 30 TABLET | Refills: 0 | Status: SHIPPED | OUTPATIENT
Start: 2024-02-01

## 2024-02-01 RX ADMIN — OXYCODONE HYDROCHLORIDE AND ACETAMINOPHEN 500 MG: 500 TABLET ORAL at 08:17

## 2024-02-01 RX ADMIN — SODIUM CHLORIDE, PRESERVATIVE FREE 10 ML: 5 INJECTION INTRAVENOUS at 08:29

## 2024-02-01 RX ADMIN — Medication 1000 UNITS: at 08:17

## 2024-02-01 RX ADMIN — CYANOCOBALAMIN TAB 500 MCG 500 MCG: 500 TAB at 08:17

## 2024-02-01 RX ADMIN — METOPROLOL TARTRATE 50 MG: 50 TABLET, FILM COATED ORAL at 08:18

## 2024-02-01 RX ADMIN — THERA TABS 1 TABLET: TAB at 08:18

## 2024-02-01 RX ADMIN — FERROUS SULFATE TAB 325 MG (65 MG ELEMENTAL FE) 325 MG: 325 (65 FE) TAB at 08:17

## 2024-02-01 RX ADMIN — LEVETIRACETAM 500 MG: 500 TABLET, FILM COATED ORAL at 08:17

## 2024-02-01 RX ADMIN — PANTOPRAZOLE SODIUM 40 MG: 40 TABLET, DELAYED RELEASE ORAL at 06:30

## 2024-02-01 RX ADMIN — POTASSIUM CHLORIDE 40 MEQ: 750 TABLET, FILM COATED, EXTENDED RELEASE ORAL at 08:17

## 2024-02-01 RX ADMIN — CALCIUM CARBONATE (ANTACID) CHEW TAB 500 MG 500 MG: 500 CHEW TAB at 08:17

## 2024-02-01 RX ADMIN — CASTOR OIL AND BALSAM, PERU: 788; 87 OINTMENT TOPICAL at 10:42

## 2024-02-01 NOTE — WOUND CARE
Wound Care Note:     Wound care follow up for skin tear on gluteal fold    Chart shows:  Admitted for hemorrhagic stroke  Past Medical History:   Diagnosis Date    COPD (chronic obstructive pulmonary disease) ([facility])      WBC = 10.1 on 1/25/24  Admitted from home    Assessment:   Patient is alert and talking, incontinent with some assistance needed in repositioning.    Bed: [place]  Diet: Adult diet regular; 4 carb choices with nutritional supplements  Patient reports no pain.      Bilateral buttocks skin intact and without erythema.    1. POA fissure in gluteal cleft appears shorter and wider, approximately 1 cm x 0.3 cm x 0.1 cm, wound bed is red, no drainage, wound edges are open, luis-wound with blanchable erythema.   Z guard paste applied.    2.  POA sacral and bilateral heels with erythema that is blanchable.  Venelex ointment to be ordered.    Patient repositioned supine.  Heels offloaded on pillows.     Recommendations:    Continue with current wound care    Gluteal cleft- Every 12 hours and as needed apply zinc oxide (orange paste).    Sacrum and bilateral heels- Every 12 hours liberally apply Venelex ointment.    Skin Care & Pressure Prevention:  Minimize layers of linen/pads under patient to optimize support surface.    Turn/reposition approximately every 2 hours and offload heels.  Manage incontinence / promote continence   Nourishing Skin Cream to dry skin, minimize use of briefs when able    Discussed above plan with patient & NAVNEET Mabry    Transition of Care: Plan to follow as needed while admitted to hospital.    Suma \"Marlene\" NADINE Chew, RN, CWON  Certified Wound and Ostomy Nurse  office [phone]  Best way to contact me is through Perfect Serve

## 2024-02-01 NOTE — PLAN OF CARE
Problem: Discharge Planning  Goal: Discharge to home or other facility with appropriate resources  1/29/2024 1449 by Emily Mott RN  Outcome: Progressing  1/29/2024 1449 by Emily Mott RN  Outcome: Progressing  Flowsheets (Taken 1/29/2024 0800)  Discharge to home or other facility with appropriate resources: Identify barriers to discharge with patient and caregiver     Problem: Skin/Tissue Integrity  Goal: Absence of new skin breakdown  Description: 1.  Monitor for areas of redness and/or skin breakdown  2.  Assess vascular access sites hourly  3.  Every 4-6 hours minimum:  Change oxygen saturation probe site  4.  Every 4-6 hours:  If on nasal continuous positive airway pressure, respiratory therapy assess nares and determine need for appliance change or resting period.  1/29/2024 1449 by Emily Mott RN  Outcome: Progressing  1/29/2024 1449 by Emily Mott RN  Outcome: Progressing     Problem: Safety - Adult  Goal: Free from fall injury  Outcome: Progressing  Flowsheets (Taken 1/29/2024 0800)  Free From Fall Injury: Instruct family/caregiver on patient safety     Problem: Physical Therapy - Adult  Goal: By Discharge: Performs mobility at highest level of function for planned discharge setting.  See evaluation for individualized goals.  Description: FUNCTIONAL STATUS PRIOR TO ADMISSION: Patient was independent and active without use of DME.  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     Problem: Chronic Conditions and Co-morbidities  Goal: Patient's chronic conditions and co-morbidity symptoms are monitored and maintained or improved  Outcome: Progressing     Problem: Pain  Goal: Verbalizes/displays adequate comfort level or baseline comfort level  Outcome: Progressing     
  Problem: Discharge Planning  Goal: Discharge to home or other facility with appropriate resources  1/30/2024 0110 by Emmanuelle Masterson RN  Outcome: Progressing  1/29/2024 1449 by Emily Mott RN  Outcome: Progressing  1/29/2024 1449 by Emily Mott RN  Outcome: Progressing  Flowsheets (Taken 1/29/2024 0800)  Discharge to home or other facility with appropriate resources: Identify barriers to discharge with patient and caregiver     Problem: Skin/Tissue Integrity  Goal: Absence of new skin breakdown  Description: 1.  Monitor for areas of redness and/or skin breakdown  2.  Assess vascular access sites hourly  3.  Every 4-6 hours minimum:  Change oxygen saturation probe site  4.  Every 4-6 hours:  If on nasal continuous positive airway pressure, respiratory therapy assess nares and determine need for appliance change or resting period.  1/29/2024 1449 by Emily Mott RN  Outcome: Progressing  1/29/2024 1449 by Emily Mott RN  Outcome: Progressing     Problem: Safety - Adult  Goal: Free from fall injury  1/30/2024 0110 by Emmanuelle Masterson RN  Outcome: Progressing  1/29/2024 1449 by Emily Mott RN  Outcome: Progressing  Flowsheets (Taken 1/29/2024 0800)  Free From Fall Injury: Instruct family/caregiver on patient safety     Problem: Pain  Goal: Verbalizes/displays adequate comfort level or baseline comfort level  1/30/2024 0110 by Emmanuelle Masterson RN  Outcome: Progressing  1/29/2024 1449 by Emily Mott RN  Outcome: Progressing     
  Problem: Discharge Planning  Goal: Discharge to home or other facility with appropriate resources  1/30/2024 1044 by Emily Mott RN  Outcome: Progressing  Flowsheets (Taken 1/30/2024 0800)  Discharge to home or other facility with appropriate resources: Identify barriers to discharge with patient and caregiver  1/30/2024 0110 by Emmanuelle Masterson RN  Outcome: Progressing     Problem: Skin/Tissue Integrity  Goal: Absence of new skin breakdown  Description: 1.  Monitor for areas of redness and/or skin breakdown  2.  Assess vascular access sites hourly  3.  Every 4-6 hours minimum:  Change oxygen saturation probe site  4.  Every 4-6 hours:  If on nasal continuous positive airway pressure, respiratory therapy assess nares and determine need for appliance change or resting period.  Outcome: Progressing     Problem: Safety - Adult  Goal: Free from fall injury  1/30/2024 1044 by Emily Mott RN  Outcome: Progressing  Flowsheets (Taken 1/30/2024 0800)  Free From Fall Injury: Instruct family/caregiver on patient safety  1/30/2024 0110 by Emmanuelle Masterson RN  Outcome: Progressing     Problem: Chronic Conditions and Co-morbidities  Goal: Patient's chronic conditions and co-morbidity symptoms are monitored and maintained or improved  Outcome: Progressing  Flowsheets (Taken 1/30/2024 0800)  Care Plan - Patient's Chronic Conditions and Co-Morbidity Symptoms are Monitored and Maintained or Improved: Monitor and assess patient's chronic conditions and comorbid symptoms for stability, deterioration, or improvement     Problem: Pain  Goal: Verbalizes/displays adequate comfort level or baseline comfort level  1/30/2024 1044 by Emily Mott RN  Outcome: Progressing  1/30/2024 0110 by Emmanuelle Masterson RN  Outcome: Progressing     
  Problem: Discharge Planning  Goal: Discharge to home or other facility with appropriate resources  1/31/2024 1516 by Lupe Mitchell, RN  Outcome: Progressing     Problem: Skin/Tissue Integrity  Goal: Absence of new skin breakdown  Description: 1.  Monitor for areas of redness and/or skin breakdown  2.  Assess vascular access sites hourly  3.  Every 4-6 hours minimum:  Change oxygen saturation probe site  4.  Every 4-6 hours:  If on nasal continuous positive airway pressure, respiratory therapy assess nares and determine need for appliance change or resting period.  Outcome: Progressing     Problem: Safety - Adult  Goal: Free from fall injury  1/31/2024 1516 by Lupe Mitchell, RN  Outcome: Progressing     Problem: Physical Therapy - Adult  Goal: By Discharge: Performs mobility at highest level of function for planned discharge setting.  See evaluation for individualized goals.  Description: FUNCTIONAL STATUS PRIOR TO ADMISSION: Patient was independent and active without use of DME.  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      Problem: Chronic Conditions and Co-morbidities  Goal: Patient's chronic conditions and co-morbidity symptoms are monitored and maintained or improved  Outcome: Progressing     Problem: Pain  Goal: Verbalizes/displays adequate comfort level or baseline comfort level  1/31/2024 1516 by Lupe Mitchell, RN  Outcome: Progressing    
  Problem: Discharge Planning  Goal: Discharge to home or other facility with appropriate resources  1/31/2024 2303 by Kelsie Padilla RN  Outcome: Progressing  Flowsheets (Taken 1/31/2024 2000)  Discharge to home or other facility with appropriate resources: Identify barriers to discharge with patient and caregiver  1/31/2024 1516 by Lupe Mitchell, RN  Outcome: Progressing     Problem: Skin/Tissue Integrity  Goal: Absence of new skin breakdown  Description: 1.  Monitor for areas of redness and/or skin breakdown  2.  Assess vascular access sites hourly  3.  Every 4-6 hours minimum:  Change oxygen saturation probe site  4.  Every 4-6 hours:  If on nasal continuous positive airway pressure, respiratory therapy assess nares and determine need for appliance change or resting period.  1/31/2024 2303 by Kelsie Padilla, RN  Outcome: Progressing  1/31/2024 1516 by Lupe Mitchell, RN  Outcome: Progressing     Problem: Safety - Adult  Goal: Free from fall injury  1/31/2024 2303 by Kelsie Padilla RN  Outcome: Progressing  1/31/2024 1516 by Lupe Mitchell RN  Outcome: Progressing     Problem: Physical Therapy - Adult  Goal: By Discharge: Performs mobility at highest level of function for planned discharge setting.  See evaluation for individualized goals.  Description: FUNCTIONAL STATUS PRIOR TO ADMISSION: Patient was independent and active without use of DME.  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     Physical Therapy Goals  Initiated 1/28/2024  1.  Patient will move from supine to sit and sit to supine, scoot up and down, and roll side to side in bed with contact guard assist within 7 day(s).    2.  Patient will perform sit to stand with contact guard assist within 7 day(s).  3.  Patient will transfer from bed to chair and chair to bed with contact guard assist using the least restrictive device within 7 day(s).  4.  Patient will ambulate with contact guard assist for 25 feet with the least restrictive 
  Problem: Discharge Planning  Goal: Discharge to home or other facility with appropriate resources  Outcome: Progressing     Problem: Safety - Adult  Goal: Free from fall injury  Outcome: Progressing     Problem: Pain  Goal: Verbalizes/displays adequate comfort level or baseline comfort level  Outcome: Progressing     
  Problem: Discharge Planning  Goal: Discharge to home or other facility with appropriate resources  Outcome: Progressing     Problem: Skin/Tissue Integrity  Goal: Absence of new skin breakdown  Description: 1.  Monitor for areas of redness and/or skin breakdown  2.  Assess vascular access sites hourly  3.  Every 4-6 hours minimum:  Change oxygen saturation probe site  4.  Every 4-6 hours:  If on nasal continuous positive airway pressure, respiratory therapy assess nares and determine need for appliance change or resting period.  Outcome: Progressing     Problem: Safety - Adult  Goal: Free from fall injury  Outcome: Progressing     Problem: SLP Adult - Impaired Communication  Goal: By Discharge: Demonstrates communication skills at highest level of function for planned discharge setting.  See evaluation for individualized goals.  Description: Speech Therapy Goals  Initiated 1/28/24    1. Patient will tolerate baseline diet without adverse effects within 7 days.  2. Patient will participate in cognitive-linguistic evaluation within 7 days.   1/28/2024 1439 by Ruthann Rosenthal, SLP  Outcome: Progressing     Problem: Physical Therapy - Adult  Goal: By Discharge: Performs mobility at highest level of function for planned discharge setting.  See evaluation for individualized goals.  Description: FUNCTIONAL STATUS PRIOR TO ADMISSION: Patient was independent and active without use of DME.  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     Physical Therapy Goals  Initiated 1/28/2024  1.  Patient will move from supine to sit and sit to supine, scoot up and down, and roll side to side in bed with contact guard assist within 7 day(s).    2.  Patient will perform sit to stand with contact guard assist within 7 day(s).  3.  Patient will transfer from bed to chair and chair to bed with contact guard assist using the least restrictive device within 7 day(s).  4.  Patient will ambulate with contact guard assist for 25 feet with the 
  Problem: Occupational Therapy - Adult  Goal: By Discharge: Performs self-care activities at highest level of function for planned discharge setting.  See evaluation for individualized goals.  Description: FUNCTIONAL STATUS PRIOR TO ADMISSION:  Patient was ambulatory using no DME and was independent for ADLs/IADLs including driving and working part time as a .      Receives Help From: Friend(s), ADL Assistance: Independent, Bath: Independent, Dressing: Independent, Grooming: Independent, Feeding: Independent, Toileting: Independent, Homemaking Assistance: Independent, Ambulation Assistance: Independent, Transfer Assistance: Independent, Active : Yes      HOME SUPPORT: Patient lived alone with friends to provide assistance.     Occupational Therapy Goals  Initiated 1/29/2024   1.  Patient will perform grooming standing at sink with Supervision within 7 day(s).  2.  Patient will perform lower body dressing with Supervision within 7 day(s).  3.  Patient will perform bathing with Supervision within 7 day(s).  4.  Patient will perform toilet transfers with Supervision within 7 day(s).  5.  Patient will perform all aspects of toileting with Supervision within 7 day(s).  6.  Patient will participate in upper extremity therapeutic exercise/activities with [place] within 7 day(s).    7.  Patient will utilize energy conservation techniques during functional activities with verbal cues within 7 day(s).  8.  Patient will improve their Fugl Ramsey score by 5 points in prep for ADLs within 7 days.  Outcome: Progressing   OCCUPATIONAL THERAPY EVALUATION    Patient: Radha Oro (73 y.o. female)  Date: 1/29/2024  Primary Diagnosis: Stroke, hemorrhagic (HCC) [I61.9]         Precautions: Fall Risk                ASSESSMENT :  The patient is limited by decreased functional mobility, independence in ADLs, high-level IADLs, strength, activity tolerance, endurance, safety awareness, coordination, balance. 
  Problem: Occupational Therapy - Adult  Goal: By Discharge: Performs self-care activities at highest level of function for planned discharge setting.  See evaluation for individualized goals.  Description: FUNCTIONAL STATUS PRIOR TO ADMISSION:  Patient was ambulatory using no DME and was independent for ADLs/IADLs including driving and working part time as a .      Receives Help From: Friend(s), ADL Assistance: Independent, Bath: Independent, Dressing: Independent, Grooming: Independent, Feeding: Independent, Toileting: Independent, Homemaking Assistance: Independent, Ambulation Assistance: Independent, Transfer Assistance: Independent, Active : Yes      HOME SUPPORT: Patient lived alone with friends to provide assistance.     Occupational Therapy Goals  Initiated 1/29/2024   1.  Patient will perform grooming standing at sink with Supervision within 7 day(s).  2.  Patient will perform lower body dressing with Supervision within 7 day(s).  3.  Patient will perform bathing with Supervision within 7 day(s).  4.  Patient will perform toilet transfers with Supervision within 7 day(s).  5.  Patient will perform all aspects of toileting with Supervision within 7 day(s).  6.  Patient will participate in upper extremity therapeutic exercise/activities with [place] within 7 day(s).    7.  Patient will utilize energy conservation techniques during functional activities with verbal cues within 7 day(s).  8.  Patient will improve their Fugl Ramsey score by 5 points in prep for ADLs within 7 days.  Outcome: Progressing   OCCUPATIONAL THERAPY TREATMENT  Patient: Radha Oro (73 y.o. female)  Date: 1/31/2024  Primary Diagnosis: Stroke, hemorrhagic (HCC) [I61.9]       Precautions: Fall Risk                Chart, occupational therapy assessment, plan of care, and goals were reviewed.    ASSESSMENT  Patient continues to benefit from skilled OT services and is progressing towards goals. Patient received semi 
  Problem: Physical Therapy - Adult  Goal: By Discharge: Performs mobility at highest level of function for planned discharge setting.  See evaluation for individualized goals.  Description: FUNCTIONAL STATUS PRIOR TO ADMISSION: Patient was independent and active without use of DME.  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     Physical Therapy Goals  Initiated 1/28/2024  1.  Patient will move from supine to sit and sit to supine, scoot up and down, and roll side to side in bed with contact guard assist within 7 day(s).    2.  Patient will perform sit to stand with contact guard assist within 7 day(s).  3.  Patient will transfer from bed to chair and chair to bed with contact guard assist using the least restrictive device within 7 day(s).  4.  Patient will ambulate with contact guard assist for 25 feet with the least restrictive device within 7 day(s).   5.  Patient will ascend/descend 2 stairs with single handrail(s) with minimal assistance within 7 day(s).    Outcome: Progressing   PHYSICAL THERAPY TREATMENT    Patient: Radha Oro (73 y.o. female)  Date: 1/29/2024  Diagnosis: Stroke, hemorrhagic (HCC) [I61.9] Stroke, hemorrhagic (HCC)      Precautions:                        ASSESSMENT:  Patient continues to benefit from skilled PT services and is slowly progressing towards goals. Patient received in bed agreeable to treatment. Noted to require min A overall for mobility. First trial of gait training with HHA and min A however patient is reaching out to touch surfaces with other hand to steady. Provided RW for second trial. Improved gait pattern observed however continued to require multimodal cues for proper use of RW and to prevent LOB. Noted to have forward trunk flexion and cues to keep RW closer to body. Continues with R hemiparesis affecting gait pattern. Patient with SOB following activity, SpO2 90-93% on RA. BP noted to be hypertensive following session, alerted RN. Remains well below functional 
  Problem: Physical Therapy - Adult  Goal: By Discharge: Performs mobility at highest level of function for planned discharge setting.  See evaluation for individualized goals.  Description: FUNCTIONAL STATUS PRIOR TO ADMISSION: Patient was independent and active without use of DME.  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     Physical Therapy Goals  Initiated 1/28/2024  1.  Patient will move from supine to sit and sit to supine, scoot up and down, and roll side to side in bed with contact guard assist within 7 day(s).    2.  Patient will perform sit to stand with contact guard assist within 7 day(s).  3.  Patient will transfer from bed to chair and chair to bed with contact guard assist using the least restrictive device within 7 day(s).  4.  Patient will ambulate with contact guard assist for 25 feet with the least restrictive device within 7 day(s).   5.  Patient will ascend/descend 2 stairs with single handrail(s) with minimal assistance within 7 day(s).    Outcome: Progressing   PHYSICAL THERAPY TREATMENT    Patient: Radha Oro (73 y.o. female)  Date: 1/31/2024  Diagnosis: Stroke, hemorrhagic (HCC) [I61.9] Stroke, hemorrhagic (HCC)      Precautions: Fall Risk      ASSESSMENT:  Patient continues to benefit from skilled PT services and is slowly progressing towards goals. Patient received in bed endorsing need to use bathroom. Assisted to EOB, vitals obtained and WNL. Patient ambulated with RW and min A to bathroom. VC for upright posture and appropriate use of RW. Required min A for toilet transfer. Stood at sink with constant support without LOB. On return to room, patient endorses \"wooziness\" like she may pass out. Assisted to chair, observed to be SOB. PT assisted in elevating her feet. She began to state she thought her symptoms were related to anxiety. Vitals stable and WNL. SpO2 92% on RA, HR 90s-100s, BP 150s/60s. Patient reports symptoms have resolved. Patient declined further mobility at this 
  Problem: Skin/Tissue Integrity  Goal: Absence of new skin breakdown  Description: 1.  Monitor for areas of redness and/or skin breakdown  2.  Assess vascular access sites hourly  3.  Every 4-6 hours minimum:  Change oxygen saturation probe site  4.  Every 4-6 hours:  If on nasal continuous positive airway pressure, respiratory therapy assess nares and determine need for appliance change or resting period.  Outcome: Not Progressing  Note: Patient has stage 2 on sacrum     
Speech LAnguage Pathology EVALUATION    Patient: Radha Oro (73 y.o. female)  Date: 1/28/2024  Primary Diagnosis: Stroke, hemorrhagic (HCC) [I61.9]       Precautions:                     ASSESSMENT :  Based on the objective data described below, the patient presents with seemingly functional oropharyngeal phases of swallow on this date with no overt difficulty nor overt s/s of aspiration present. Recommend pt continue baseline diet. Will follow up for diet safety/tolerance. Additionally, pt noted in conversation to have some confusion and cognitive deficits. This appears to be very different from her baseline and therefore will plan to complete further cognitive-linguistic evaluation.     Patient will benefit from skilled intervention to address the above impairments.     PLAN :  Recommendations and Planned Interventions:  Diet: Regular and thin liquids  General aspiration precautions  Cognitive-linguistic evaluation     Acute SLP Services: Yes, patient will be followed by speech-language pathology 2x/week to address goals. Patient's rehabilitation potential is considered to be Good.    Discharge Recommendations: Yes, recommend SLP treatment at next level of care     SUBJECTIVE:   Patient stated, “You look good and different.” Pt appeared to have confused SLP for someone else.     OBJECTIVE:     Past Medical History:   Diagnosis Date    COPD (chronic obstructive pulmonary disease) (HCC)    No past surgical history on file.  Prior Level of Function/Home Situation:   Social/Functional History  Lives With: Alone  Type of Home: House  Home Layout: One level  Home Access: Stairs to enter with rails  Entrance Stairs - Number of Steps: 2  Entrance Stairs - Rails: Both  Bathroom Shower/Tub: Tub/Shower unit  Bathroom Toilet: Standard  Home Equipment: None  Receives Help From: Friend(s)  ADL Assistance: Needs assistance  Bath: Independent  Dressing: Independent  Grooming: Independent  Feeding: Independent  Toileting: 
neuromuscular re-education, patient and family training/education, and therapeutic activities    Frequency/Duration: Patient will be followed by physical therapy to address goals, PT Plan of Care: 5 times/week to address goals.    Recommendation for discharge: (in order for the patient to meet his/her long term goals): Therapy 3 hours/day 5-7 days/week    Other factors to consider for discharge: lives alone, independent baseline, fall risk, impaired cognition     IF patient discharges home will need the following DME: continuing to assess with progress         SUBJECTIVE:   Patient stated “I like to cook, but we'll see about that now.”    OBJECTIVE DATA SUMMARY:       Past Medical History:   Diagnosis Date    COPD (chronic obstructive pulmonary disease) ([facility])    No past surgical history on file.    Home Situation:  Social/Functional History  Lives With: Alone  Type of Home: House  Home Layout: Two level, Able to Live on Main level with bedroom/bathroom  Home Access: Stairs to enter with rails  Entrance Stairs - Number of Steps: 2  Entrance Stairs - Rails: Both  Bathroom Shower/Tub: Walk-in shower  Bathroom Toilet: Standard  Home Equipment: None  Receives Help From: Friend(s)  ADL Assistance: Needs assistance  Bath: Independent  Dressing: Independent  Grooming: Independent  Feeding: Independent  Toileting: Independent  Homemaking Assistance: Independent  Homemaking Responsibilities: Yes  Meal Prep Responsibility: Primary  Laundry Responsibility: Primary  Cleaning Responsibility: Primary  Bill Paying/Finance Responsibility: Primary  Shopping Responsibility: Primary  Ambulation Assistance: Independent  Transfer Assistance: Independent  Active : Yes  Mode of Transportation: [employer]  Education: [employer]  Occupation: Retired, Part time employment    Cognitive/Behavioral Status:        Strength:    Strength: Generally decreased, functional    Tone & Sensation:   Tone: Normal  Sensation: 
sitting in recliner and returned to recliner at end of session, shower cap in place upon OT arrival. Patient transferred to standing with RW and ambulated from recliner into bathroom, transferred onto commode for BM and to void. Patient performed luis and bowel care in sitting on commode and then stood at sink to wash hands, brush teeth and comb hair. Patient then reporting fatigue so returned to recliner. Patient donned brief over feet and stood additional time to pull into place, assisted to tighten. Patient worked on static standing at sink with mirror for visual feedback and performing bimanual tasks at sink. Patient would benefit from skilled OT services during admission to improve independence with self care and functional mobility/transfers. Recommend discharge to [facility] at this time as patient is below independent baseline and lives alone.         PLAN :  Patient continues to benefit from skilled intervention to address the above impairments.  Continue treatment per established plan of care to address goals.    Recommend with staff: up to chair 3x/day for meals, up to bathroom for toileting    Recommend next OT session: continue POC    Recommendation for discharge: (in order for the patient to meet his/her long term goals): Therapy 3 hours/day 5-7 days/week    Other factors to consider for discharge: poor safety awareness, high risk for falls, and limited social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

## 2024-02-01 NOTE — CARE COORDINATION
Transition of Care Plan:    IPR - [facility]; DC today  RN to call report to [phone]    Transport: BLS - [facility] 1400    RUR: 13%  Prior Level of Functioning: Independent, did not use DME  Disposition: IPR  If SNF or IPR: Date FOC offered: 1/31  Date FOC received: 1/31  Accepting facility: [facility]  Follow up appointments: PCP   DME needed: None  Transportation at discharge: [facility]  IM/IMM Medicare/ letter given: 1/25, 2/1  Caregiver Contact: Ace Hill [phone]   Discharge Caregiver contacted prior to discharge? Yes  Care Conference needed? No  Barriers to discharge: Placement/bed availability    [facility] has a bed available for Pt today at 1400.     CM spoke with patient's Zuri gonsalez. They prefer BLS transport at discharge. Family verbalized understanding that insurance will be billed for trip, however CM is not able to guarantee 100% coverage for trip. PCS form copied and placed on patient's hard chart.    [facility] scheduled for 1400.       Farida Amaral M.S (Ally).KAYLIE.

## 2024-02-01 NOTE — PROGRESS NOTES
Neurology Progress Note     NAME: Radha Oro   :  1950   MRN:  [id number]   DATE:  2024    Assessment:     Principal Problem:    Stroke, hemorrhagic (HCC)  Active Problems:    Palliative care by specialist    Acute respiratory failure with hypoxia (HCC)    Aspiration pneumonia of left lung (HCC)    Chronic obstructive pulmonary disease with acute exacerbation (HCC)    Goals of care, counseling/discussion    Altered mental status  Resolved Problems:    * No resolved hospital problems. *    Patient is a 73 year-old female with history of CHF, afib on Xarelto, h/o CVA, HERMILO and COPD who presented on 24 with AMS found to have L occipital IPH in setting of coagulopathy and reversed with K-Centra. Patient states she was knitting at home and did not feel like herself and felt confused with slurred speech. She subsequently missed work and then her coworkers came to check on her and thought she was altered. MRI brain w wo contrast (24) with L occipital hemorrhage cannot exclude underlying lesion or vascular malformation and there is a punctate R occipital infarct. L medial temporal area of enhancement less concern for infection or autoimmune encephalitis, however, more likely related to seizure. Occipital lobe IPH would not make patient altered. Routine EEG (24) with intermittent slowing and occasional L frontotemporal sharp waves.  CTA head/neck with mild atherosclerosis of bilat ICAs, no LVO.   TTE EF 50-55%, L severely dilated, bubble study negative.  LDL 83.4, A1c 5.9     L occipital spontaneous IPH and R punctate parietal infarct. Unknown source of IPH possible underlying lesion or vascular malformation. Punctate R infarct likely cardioembolic while being off of Xarelto. EEG with frontotemporal sharps concerning for seizure focus.   Plan:   - 
                                                                                                                                                   Neurology Progress Note     NAME: Radha Oro   :  1950   MRN:  [id number]   DATE:  2024    Assessment:     Principal Problem:    Stroke, hemorrhagic (HCC)  Active Problems:    Palliative care by specialist    Acute respiratory failure with hypoxia (HCC)    Aspiration pneumonia of left lung (HCC)    Chronic obstructive pulmonary disease with acute exacerbation (HCC)    Goals of care, counseling/discussion    Altered mental status  Resolved Problems:    * No resolved hospital problems. *    Patient is a 73 year-old female with history of CHF, afib on Xarelto, h/o CVA, HERMILO and COPD who presented on 24 with AMS found to have L occipital IPH in setting of coagulopathy and reversed with K-Centra. Patient states she was knitting at home and did not feel like herself and felt confused with slurred speech. She subsequently missed work and then her coworkers came to check on her and thought she was altered. MRI brain w wo contrast (24) with L occipital hemorrhage cannot exclude underlying lesion or vascular malformation and there is a punctate R occipital infarct. L medial temporal area of enhancement less concern for infection or autoimmune encephalitis, however, more likely related to seizure. Occipital lobe IPH would not make patient altered. Routine EEG (24) with intermittent slowing and occasional L frontotemporal sharp waves.  CTA head/neck with mild atherosclerosis of bilat ICAs, no LVO.   TTE EF 50-55%, L severely dilated, bubble study negative.     L occipital spontaneous IPH and R punctate parietal infarct. Unknown source of IPH possible underlying lesion or vascular malformation. Punctate R infarct likely cardioembolic while being off of Xarelto. EEG with frontotemporal sharps concerning for seizure focus.   Plan:   - Start Keppra 500 mg 
                       [facility]     ICU TEAM Progress Note           Name: Radha Oro   : 1950   MRN: [id number]   Date: 2024          CU PROBLEM LIST   IPH  Aifb  COPD  CHF  HLD  HTN  GERD     HISTORY OF PRESENT ILLNESS:     72 yo adult with a reported history of COPD and CHF presenting for altered mental status.  According to EMS, patient's friend called EMS as patient was more altered and could not answer questions properly.  Patient states to me that she does use oxygen at home but cannot tell me exactly how much, is repeating some answers over and over again.  Denies any dysuria, hematuria has been having a cough.  States that she does have a history of CHF.  Patient denies any nausea, vomiting, chest pain, diarrhea.      Head CT with L occipital IPH, no midline shift, NSGY c/s and rec txr/admit to ICU for neuro checks, no surgical intervention. Given K centra. Txr to [facility].     Home Rx  MVT  Omerparazole 20 mg  Metoprolol 50 mg ER  Lisonopril 10 mg   Furosemide 20 mg BID  Atorvastatin 40 mg  Xarelto 20 mg  Duo neb    This a.m. patient is noted to be tachypneic, bilateral diffuse crackles heard, slightly increased oxygen requirement.  Continue to have some word finding difficulties.    SUBJECTIVE:   As above     NEUROLOGICAL:      IPH, no surgical plans  Previously on anticoagulation with Xarelto, status post Kcentra  --Neurosurgery on consult follow recommendations  --Pending brain MRI  --Continue frequent neurochecks     PULMONOLOGY:     Acute respiratory distress,  Suspect aspiration pneumonitis  DDx; neurogenic pulmonary edema,   Chest CT scan reveals -  Large amount of debris within the airways and opacities at the bases,  consistent with aspiration. The left mainstem bronchus is essentially occluded.  Also revealed scattered pulmonary nodules measuring up to 6 mm.  --BiPAP as needed  --Lasix 20 mg x 1 administered  --Keep n.p.o. pending speech  --Plan to intubate and 
                       [facility]     ICU TEAM Progress Note           Name: Radha Oro   : 1950   MRN: [id number]   Date: 2024          CU PROBLEM LIST   IPH  Aifb  COPD  CHF  HLD  HTN  GERD     HISTORY OF PRESENT ILLNESS:   72 yo adult with a reported history of COPD and CHF presenting for altered mental status.  According to EMS, patient's friend called EMS as patient was more altered and could not answer questions properly.  Patient states to me that she does use oxygen at home but cannot tell me exactly how much, is repeating some answers over and over again.  Denies any dysuria, hematuria has been having a cough.  States that she does have a history of CHF.  Patient denies any nausea, vomiting, chest pain, diarrhea. Head CT with L occipital IPH, no midline shift, NSGY c/s and rec txr/admit to ICU for neuro checks, no surgical intervention. Given K centra. Txr to [facility].     Home Rx  MVT  Omerparazole 20 mg  Metoprolol 50 mg ER  Lisonopril 10 mg   Furosemide 20 mg BID  Atorvastatin 40 mg  Xarelto 20 mg  Duo neb     CTA chest: Large amount of debris within the airways and opacities at the bases, consistent with aspiration. The left mainstem bronchus is essentially occluded. No pulmonary embolism.   Intubated and FOB performed which revealed only mucoid secretions in major airways easily suctioned and removed. Extubated post-procedure   Comfortable on NC O2. Rattling cough. Cognition intact    SUBJECTIVE:   Comfortable on NC O2. Rattling cough. Cognition intact     NEUROLOGICAL:    IPH, no surgical plans  Previously on anticoagulation with Xarelto, status post Kcentra  --Neurosurgery on consult follow recommendations  --Pending brain MRI  --Continue frequent neurochecks     PULMONOLOGY:   Acute respiratory distress, resolved  Mucus hypersecretion and retention - much improved. She has an effective cough when coached  DDx; neurogenic pulmonary edema,     Cont supp O2  Airway 
                       [facility]     ICU TEAM Progress Note           Name: Radha Oro   : 1950   MRN: [id number]   Date: 2024          CU PROBLEM LIST   IPH  Aifb  COPD  CHF  HLD  HTN  GERD     HISTORY OF PRESENT ILLNESS:     74 yo adult with a reported history of COPD and CHF presenting for altered mental status.  According to EMS, patient's friend called EMS as patient was more altered and could not answer questions properly.  Patient states to me that she does use oxygen at home but cannot tell me exactly how much, is repeating some answers over and over again.  Denies any dysuria, hematuria has been having a cough.  States that she does have a history of CHF.  Patient denies any nausea, vomiting, chest pain, diarrhea.      Head CT with L occipital IPH, no midline shift, NSGY c/s and rec txr/admit to ICU for neuro checks, no surgical intervention. Given K centra. Txr to [facility].     Home Rx  MVT  Omerparazole 20 mg  Metoprolol 50 mg ER  Lisonopril 10 mg   Furosemide 20 mg BID  Atorvastatin 40 mg  Xarelto 20 mg  Duo neb    This a.m. patient is noted to be tachypneic, bilateral diffuse crackles heard, slightly increased oxygen requirement.  Continue to have some word finding difficulties.    SUBJECTIVE:   As above     NEUROLOGICAL:      IPH, no surgical plans  Previously on anticoagulation with Xarelto, status post Kcentra  --Neurosurgery on consult follow recommendations  --Pending brain MRI  --Continue frequent neurochecks     PULMONOLOGY:     Acute respiratory distress,  Suspect aspiration pneumonitis  DDx; pulmonary edema  --BiPAP as needed  --Lasix 20 mg x 1 administered  --Keep n.p.o. pending speech  -Obtain procalcitonin, BNP and echocardiogram     CARDIOVASCULAR:      Previous history of heart failure, ejection fraction of 40 to 55% with no significant diastolic function  Noted to have been on diuresis intermittently at home  -Lasix x 1 administered  -Pend echocardiogram    Atrial 
     Neurosurgery Progress Note            Admit Date: 2024   LOS: 5 days        Daily Progress Note: 2024    Subjective:   Respiratory status improved. MRI brain completed. Tells me she was an alcoholic. She did drink alcohol the day of admission but states she hasn't had alcohol for a few weeks prior. She tells me \"the pharmacist gave me a bottle of alcohol when I asked for a bottle of water and then I drank it on Wednesday and it turned out to be alcohol instead of water.\"    Objective:     Vital signs  Temp (24hrs), Av.1 °F (36.7 °C), Min:97.4 °F (36.3 °C), Max:98.4 °F (36.9 °C)   No intake/output data recorded.   1901 -  0700  In: 100   Out: 650 [Urine:650]    BP (!) 140/93   Pulse 79   Temp 98 °F (36.7 °C) (Oral)   Resp 19   Ht 1.626 m (5' 4.02\")   Wt 80.2 kg (176 lb 14.4 oz)   SpO2 93%   BMI 30.35 kg/m²          Pain control       PT/OT  Gait                 Physical Exam:  Gen:NAD  Neuro: A&Ox3. Follows commands. Speech clear. Affect normal. Says some things out of context  PERRL. EOMI. Face symmetric.  STRONG spontaneously.  Negative drift.  Gait deferred.    24 hour results:    Recent Results (from the past 24 hour(s))   Basic Metabolic Panel w/ Reflex to MG    Collection Time: 24  1:50 AM   Result Value Ref Range    Sodium 141 136 - 145 mmol/L    Potassium 5.0 3.5 - 5.1 mmol/L    Chloride 103 97 - 108 mmol/L    CO2 32 21 - 32 mmol/L    Anion Gap 6 5 - 15 mmol/L    Glucose 134 (H) 65 - 100 mg/dL    BUN 39 (H) 6 - 20 MG/DL    Creatinine 0.82 0.55 - 1.02 MG/DL    Bun/Cre Ratio 48 (H) 12 - 20      Est, Glom Filt Rate >60 >60 ml/min/1.73m2    Calcium 9.0 8.5 - 10.1 MG/DL          Assessment:     Principal Problem:    Stroke, hemorrhagic (HCC)  Active Problems:    Palliative care by specialist    Acute respiratory failure with hypoxia (HCC)    Aspiration pneumonia of left lung (HCC)    Chronic obstructive pulmonary disease with acute exacerbation (HCC)  Resolved Problems:    * 
     Neurosurgery Progress Note            Admit Date: 2024   LOS: 6 days        Daily Progress Note: 2024    Subjective:   No acute events overnight. Pt still with intermittent periods of confusion. EEG consistent with focal sharps in the left temporal lobe consistent with changes on MRI. Patient started on keppra by neurology today. Family at bedside today.    Objective:     Vital signs  Temp (24hrs), Av.2 °F (36.8 °C), Min:97.7 °F (36.5 °C), Max:98.5 °F (36.9 °C)   No intake/output data recorded.   1901 -  0700  In: -   Out: 900 [Urine:900]    /71   Pulse 77   Temp 98.2 °F (36.8 °C) (Oral)   Resp 19   Ht 1.626 m (5' 4.02\")   Wt 80.2 kg (176 lb 14.4 oz)   SpO2 96%   BMI 30.35 kg/m²          Pain control       PT/OT  Gait                 Physical Exam:  Gen:NAD  Neuro: A&Ox3. Follows commands. Speech clear. Affect normal. Says some things out of context  PERRL. EOMI. Face symmetric.  STRONG spontaneously.  Negative drift.  Gait deferred.    24 hour results:    Recent Results (from the past 24 hour(s))   Vitamin D 25 Hydroxy    Collection Time: 24  1:48 AM   Result Value Ref Range    Vit D, 25-Hydroxy 37.3 30 - 100 ng/mL   Comprehensive Metabolic Panel    Collection Time: 24  1:48 AM   Result Value Ref Range    Sodium 138 136 - 145 mmol/L    Potassium 5.9 (H) 3.5 - 5.1 mmol/L    Chloride 105 97 - 108 mmol/L    CO2 30 21 - 32 mmol/L    Anion Gap 3 (L) 5 - 15 mmol/L    Glucose 97 65 - 100 mg/dL    BUN 35 (H) 6 - 20 MG/DL    Creatinine 0.87 0.55 - 1.02 MG/DL    Bun/Cre Ratio 40 (H) 12 - 20      Est, Glom Filt Rate >60 >60 ml/min/1.73m2    Calcium 8.4 (L) 8.5 - 10.1 MG/DL    Total Bilirubin 0.7 0.2 - 1.0 MG/DL    ALT 32 12 - 78 U/L    AST 27 15 - 37 U/L    Alk Phosphatase 73 45 - 117 U/L    Total Protein 5.4 (L) 6.4 - 8.2 g/dL    Albumin 2.6 (L) 3.5 - 5.0 g/dL    Globulin 2.8 2.0 - 4.0 g/dL    Albumin/Globulin Ratio 0.9 (L) 1.1 - 2.2     CBC    Collection Time: 24  
  Physician Progress Note      PATIENT:               FELY MARY  [facility] #:                  [id number]  :                       1950  ADMIT DATE:       2024 9:41 PM  DISCH DATE:  RESPONDING  PROVIDER #:        Mari Baker MD          QUERY TEXT:    Patient admitted with ICH. Noted to have RD documentation of weight loss,   dietician assessment with moderate malnutrition diagnosis in Nutrition note   . If possible, please document in progress notes and discharge summary if   you are evaluating and /or treating any of the following:      The medical record reflects the following:  Risk Factors: ICH, s/p Intubation, NPO status    Clinical Indicators: RD Note: Malnutrition Assessment:  Malnutrition Status:  Moderate malnutrition (24 1102)  Context:  Acute Illness  Findings of the 6 clinical characteristics of malnutrition:  Energy Intake:  Unable to assess  Weight Loss:  Unable to assess  Body Fat Loss:  Mild body fat loss Buccal region  Muscle Mass Loss:  Mild muscle mass loss Temples (temporalis), Clavicles   (pectoralis & deltoids), Hand (interosseous)  Fluid Accumulation:  No significant fluid accumulation   Strength:  Not Performed    Treatment: Nutrition consulted  1. Add once daily oral nutrition supplement.  2. Consider adding gastric bypass vitamins/minerals:  ? Daily MVI  ? 500mcg Vit B12  ? Calcium citrate 500 mg BID  ? 3000IU Vit D ?  3.    Modified diet to standard 4 carb serving/meal.    ASPEN Criteria:    https://aspenjournals.onlinelibrary.chavez.com/doi/full/10.1177/578867050250075  5  Options provided:  -- Protein calorie malnutrition moderate  -- Other - I will add my own diagnosis  -- Disagree - Not applicable / Not valid  -- Disagree - Clinically unable to determine / Unknown  -- Refer to Clinical Documentation Reviewer    PROVIDER RESPONSE TEXT:    This patient has moderate protein calorie malnutrition.    Query created by: Demetrice Jacob on 2024 12:47 
0730: Bedside and Verbal shift change report given to JATINDER Pearl RN (oncoming nurse) by LINNETTE Stephenson RN (offgoing nurse). Report included the following information Nurse Handoff Report, Adult Overview, Surgery Report, Intake/Output, MAR, Recent Results, Med Rec Status, Cardiac Rhythm Atrial Fibrillation, Alarm Parameters, and Neuro Assessment.     1045: ICU multidisciplinary rounds lead by Dr. David MD (Intensivist)  The following elements were discussed:   A. Pain Management Plan: not applicable  B. SAT/SBT:not applicable  C. Sedation and RASS Goal not applicable  D. CAM-ICU: Positive Prevention/Management & Sleep plan? yes  Restraints: Needed? NO   Order Renewed? not applicable  E. Mobility Level   PT/OT Consulted: yes  F. Family Involvement: Has the family been updated in the last 24 hours?  attempting to locate patient's family   Central line? NO Still Indicated? N/A  Stress Ulcer Prophylaxis Indicated/in place: YES  DVT Prophylaxis SCD's or Sequential Compression Device  Nutrition: NPO    Review of body systems, fluid balance I/Os, recent labs and imaging, length of ICU stay discussed.     Daily Goals include: Speech therapy, continue q1h neuro checks, diuresis, possible MRI (Note written by RN)    1130: Patient appears to be SOB/using accessory muscles for breathing. Patient also had an 8 beat run of Vtach. Dr. Hernandez made aware. Orders received to unhold lasix order at this time.     1245: Dr. Hernandez at bedside. Patient still struggling to breathe at this time. RN had previously turned oxygen up to 6L to maintain an SPO2 of greater than 88. Orders received to initiate BIPAP.     1253: RT at bedside to initiate BIPAP.    1800: Patient continues to have tenuous respiratory status and appears tired. Neurologically the same as earlier. Complete ABG and additional dose of lasix per Dr. Jay.    Able to remove patient's cross ring on left hand. Placed into a lab bag which was labeled and 
0932 Pt in MRI, RN and HCT with patient   
1250: This RN attempted to call report to [facility] ([phone]). [facility]  informed this RN that they will call back for report at a later time.     1330: Discussed with the patient the discharge instructions and all questions fully answered.     1415: Report called to NAVNEET Vega from [facility]   
4 Eyes Skin Assessment     NAME:  Radha Oro  YOB: 1950  MEDICAL RECORD NUMBER:  [id number]    The patient is being assessed for  Admission    I agree that at least one RN has performed a thorough Head to Toe Skin Assessment on the patient. ALL assessment sites listed below have been assessed.      Areas assessed by both nurses:    Head, Face, Ears, Shoulders, Back, Chest, Arms, Elbows, Hands, Sacrum. Buttock, Coccyx, Ischium, Legs. Feet and Heels, and Under Medical Devices         Does the Patient have a Wound? Yes wound(s) were present on assessment. LDA wound assessment was Initiated and completed by RN       Jakob Prevention initiated by RN: Yes  Wound Care Orders initiated by RN: Yes    Pressure Injury (Stage 3,4, Unstageable, DTI, NWPT, and Complex wounds) if present, place Wound referral order by RN under : Yes  Skin tear on left gluteal fold.     New Ostomies, if present place, Ostomy referral order under : No     Nurse 1 eSignature: Electronically signed by Lindsey Stephenson RN on 1/24/24 at 10:50 PM EST    **SHARE this note so that the co-signing nurse can place an eSignature**    Nurse 2 eSignature: Electronically signed by Dwayne Edward RN on 1/25/24 at 5:00 AM EST   
CT 1/26 with stable left occipital ICH  Recent bronchoscopy with removal of mucus plugging has resulted in improved resp function  Hopefully patient will be able to tolerate lying flat for MRI  Neurosurgical plan is for MRI brain with and without contrast to look for underlying lesion that have caused hemorrhage.   Will review MRI when completed  
I participated in the IDT meeting where the plan of care for Radha Oro was discussed on [facility] 7S2 INTENSIVE CARE. I reviewed the patient's medical record prior to this meeting.    We will continue to follow and assess for spiritual/emotional support during this hospitalization.    Please contact  paging service for any immediate needs.      _____________________________  Anya Crane M.Div., M.S., B.C.C.  Staff   
Routine EEG completed.  
SLP Contact Note    SLP evaluation attempted. Pt currently on BiPAP. Will hold for now.      Ruthann Rosenthal M.Ed, PhD(c), CCC-SLP  Speech-Language Pathologist    
Speech Pathology Note    Chart reviewed for SLP evaluation.  Patient remains on BiPap.  Will continue to defer SLP evaluation at this time.    Thank you,  Mildred Ny, SLP    
Spiritual Care Assessment/Progress Note  [facility]    Name: Radha Oro MRN: [id number]    Age: 73 y.o.     Sex: female   Language: English     Date: 1/25/2024            Total Time Calculated: 20 min              Spiritual Assessment begun in [facility] 7S2 INTENSIVE CARE  Service Provided For:: Patient and family together  Referral/Consult From:: Rounding  Encounter Overview/Reason : Spiritual/Emotional Needs    Spiritual beliefs:      [x] Involved in a jossie tradition/spiritual practice: Latter-day     [] Supported by a jossie community:      [] Claims no spiritual orientation:      [] Seeking spiritual identity:           [] Adheres to an individual form of spirituality:      [] Not able to assess:                Identified resources for coping and support system:   Support System: Family members       [x] Prayer                  [] Devotional reading               [] Music                  [] Guided Imagery     [] Pet visits                                        [] Other: (COMMENT)     Specific area/focus of visit   Encounter:    Crisis:    Spiritual/Emotional needs: Type: Spiritual Support  Ritual, Rites and Sacraments:    Grief, Loss, and Adjustments:    Ethics/Mediation:    Behavioral Health:    Palliative Care:    Advance Care Planning:      Plan/Referrals: Continue Support (comment) (As needed during rounds)    Narrative:     Initial assessment with Radha Oro. I introduced myself and role to Ms. Oro. She was receiving breathing support, but was able to verbalize her answers or nod her head. Her two cousins were present. Ms. Oro confirmed that she is a Latter-day and requested prayer. I led the Lord's Prayer and offered words of encouragement. Ms Oro and her family members expressed appreciation for my visit and care. They are aware of  availability and were encouraged to requested another visit as needed.    For additional spiritual care, please contact the  on-call at 
ARTERIAL      Site RIGHT RADIAL      Pantera Test YES      Critical Value Read Back Called to MD Fracisco on 01/25/2024 at 18:27    Arterial Blood Gas, POC    Collection Time: 01/25/24  9:24 PM   Result Value Ref Range    FIO2 30 %    POC pH 7.31 (L) 7.35 - 7.45      POC pCO2 61.1 (H) 35.0 - 45.0 MMHG    POC PO2 84 80 - 100 MMHG    POC HCO3 30.8 (H) 22 - 26 MMOL/L    POC O2 SAT 94.8 92 - 97 %    Base Excess 2.6 mmol/L    Site RIGHT RADIAL      DEVICE BIPAP MASK      POC PEEP 8 cmH2O    IPAP/PIP/High PEEP 20      POC Pantera's Test Positive      Specimen type: ARTERIAL     Comprehensive Metabolic Panel    Collection Time: 01/26/24  5:19 AM   Result Value Ref Range    Sodium 142 136 - 145 mmol/L    Potassium 4.0 3.5 - 5.1 mmol/L    Chloride 104 97 - 108 mmol/L    CO2 32 21 - 32 mmol/L    Anion Gap 6 5 - 15 mmol/L    Glucose 96 65 - 100 mg/dL    BUN 40 (H) 6 - 20 MG/DL    Creatinine 1.02 0.55 - 1.02 MG/DL    Bun/Cre Ratio 39 (H) 12 - 20      Est, Glom Filt Rate 58 (L) >60 ml/min/1.73m2    Calcium 9.0 8.5 - 10.1 MG/DL    Total Bilirubin 0.5 0.2 - 1.0 MG/DL    ALT 87 (H) 12 - 78 U/L    AST 57 (H) 15 - 37 U/L    Alk Phosphatase 96 45 - 117 U/L    Total Protein 6.3 (L) 6.4 - 8.2 g/dL    Albumin 3.2 (L) 3.5 - 5.0 g/dL    Globulin 3.1 2.0 - 4.0 g/dL    Albumin/Globulin Ratio 1.0 (L) 1.1 - 2.2     CBC    Collection Time: 01/26/24  5:19 AM   Result Value Ref Range    WBC 11.2 (H) 3.6 - 11.0 K/uL    RBC 4.58 3.80 - 5.20 M/uL    Hemoglobin 14.3 11.5 - 16.0 g/dL    Hematocrit 44.0 35.0 - 47.0 %    MCV 96.1 80.0 - 99.0 FL    MCH 31.2 26.0 - 34.0 PG    MCHC 32.5 30.0 - 36.5 g/dL    RDW 14.2 11.5 - 14.5 %    Platelets 229 150 - 400 K/uL    MPV 10.3 8.9 - 12.9 FL    Nucleated RBCs 0.2 (H) 0  WBC    nRBC 0.02 (H) 0.00 - 0.01 K/uL   Magnesium    Collection Time: 01/26/24  5:19 AM   Result Value Ref Range    Magnesium 2.1 1.6 - 2.4 mg/dL   Phosphorus    Collection Time: 01/26/24  5:19 AM   Result Value Ref Range    Phosphorus 3.4 
ext.   integumentary: Warm, Dry and flaking skin, Intact, No rash    LABS AND  DATA: Personally reviewed  Recent Labs     01/26/24 0519 01/27/24  0423   WBC 11.2* 5.8   HGB 14.3 15.0   HCT 44.0 48.9*    249       Recent Labs     01/26/24 0519 01/28/24  0130    146*   K 4.0 3.0*    111*   CO2 32 27   BUN 40* 38*   MG 2.1  --    PHOS 3.4  --        Recent Labs     01/26/24 0519   GLOB 3.1       Recent Labs     01/26/24 0519   INR 1.1   APTT 25.5        MEDS: Reviewed    Chest X-Ray: [facility]      CRITICAL CARE CONSULTANT NOTE  I had a face to face encounter with the patient, reviewed and interpreted patient data including clinical events, labs, images, vital signs, I/O's, and examined patient.  I have discussed the case and the plan and management of the patient's care with the consulting services, the bedside nurses and the respiratory therapist.      NOTE OF PERSONAL INVOLVEMENT IN CARE   This patient has a high probability of imminent, clinically significant deterioration, which requires the highest level of preparedness to intervene urgently. I participated in the decision-making and personally managed or directed the management of the following life and organ supporting interventions that required my frequent assessment to treat or prevent imminent deterioration.    I personally spent -- minutes of critical care time.  This is time spent at this critically ill patient's bedside actively involved in patient care as well as the coordination of care.  This does not include any procedural time which has been billed separately.    Mick England MD  [facility] Critical [facility]  [facility] 4th floor [facility] phone: [phone]  [facility] 7th floor [facility] phone: [phone]  [facility] phone: [phone]  1/28/2024  
replete prn to keep K+ 4 and magnesium 2  - pt on home magnesium supplement     Hypernatremia  - mild today at 146  - if worsening, may consider some IVD5  - follow labs     Prerenal azotemia  - Cr WNL  - follow labs     Hypocalcemia  - Ca 6.9  - not corrected for by hypoalbuminemia  - will give calcium gluconate 1g today     Afebrile leucocytosis (POA)  - likely due to aspiration PNA  - BC 1/24 NGTD x2  - resolved     Pulmonary HTN  - as per hx and TTE  - follow up outpatient     Anemia of chronic disease  - resume home iron + vitamin C supplements  - normal H/H     Anxiety  - chronic and stable  - no meds on PTA list     HTN  - BP elevated likely due to acute medical issues and steroid  - continue metoprolol started here  - no meds on PTA list     Gluteal cleft fissure  Sacral and bilateral heel blanchable erythema (POA)  - WOCN consulted and wound care orders placed     Hypoalbuminemia  - consult dietitian     Obesity s/p gastric bypass (30 years ago), BMI 30.71  -Appreciate dietitian consult and recommendations: Will add gastric bypass vitamins/minerals with daily MVI, vitamin B12, calcium citrate, and vitamin D;       CODE STATUS: Full Code   Central Line:   none  DVT PROPHYLAXIS: SCDs  ANTICIPATED DISPOSITION: TBD  EMERGENCY CONTACT/SURROGATE DECISION MAKER: wallace Hill    Care Plan discussed with: Patient, RN, neurology consultant, neurosurgery consultant, CM;  Anticipated Disposition: TBD       Principal Problem:    Stroke, hemorrhagic (HCC)  Active Problems:    Palliative care by specialist    Acute respiratory failure with hypoxia (HCC)    Aspiration pneumonia of left lung (HCC)    Chronic obstructive pulmonary disease with acute exacerbation (HCC)    Goals of care, counseling/discussion  Resolved Problems:    * No resolved hospital problems. *         Social Determinants of Health     Tobacco Use: Not on file   Alcohol Use: Not on file   Financial Resource Strain: Not on file   Food Insecurity: 
acute distress,   HEENT: PEERL, EOMI, moist mucus membrane, TM clear  Neck: supple, no JVD, no meningeal signs  Chest: Clear to auscultation bilaterally   CVS: S1 S2 heard, Capillary refill less than 2 seconds  Abd: soft/ non tender, non distended, BS physiological,   Ext: no clubbing, no cyanosis, no edema, brisk 2+ DP pulses  Neuro/Psych: pleasant mood and affect, CN 2-12 grossly intact, sensory grossly within normal limit, Strength 5/5 in all extremities, DTR 1+ x 4  Skin: warm     Data Review:    Review and/or order of clinical lab test  Review and/or order of tests in the radiology section of CPT  Review and/or order of tests in the medicine section of CPT      I have personally and independently reviewed all pertinent labs, diagnostic studies, imaging, and have provided independent interpretation of the same.     Labs:     Recent Labs     01/30/24 0148   WBC 12.2*   HGB 14.6   HCT 45.0          Recent Labs     01/28/24  0130 01/29/24  0150 01/30/24 0148   * 141 138   K 3.0* 5.0 5.9*   * 103 105   CO2 27 32 30   BUN 38* 39* 35*   MG 1.4*  --  2.0   PHOS  --   --  2.5*       Recent Labs     01/28/24 0130 01/30/24 0148   ALT 41 32   GLOB 2.2 2.8       No results for input(s): \"INR\", \"APTT\" in the last 72 hours.    Invalid input(s): \"PTP\"   Recent Labs     01/30/24 0148   TIBC 237*      No results found for: \"FOL\", \"RBCF\"   No results for input(s): \"PH\", \"PCO2\", \"PO2\" in the last 72 hours.  No results for input(s): \"CPK\" in the last 72 hours.    Invalid input(s): \"CPKMB\", \"CKNDX\", \"TROIQ\"  Lab Results   Component Value Date/Time    CHOL 177 01/30/2024 01:48 AM    HDL 76 01/30/2024 01:48 AM     No results found for: \"GLUCPOC\"  [unfilled]    Notes reviewed from all clinical/nonclinical/nursing services involved in patient's clinical care. Care coordination discussions were held with appropriate clinical/nonclinical/ nursing providers based on care coordination needs.         Patients current 
mEq IntraVENous PRN    magnesium sulfate 2000 mg in 50 mL IVPB premix  2,000 mg IntraVENous PRN    ondansetron (ZOFRAN-ODT) disintegrating tablet 4 mg  4 mg Oral Q8H PRN    Or    ondansetron (ZOFRAN) injection 4 mg  4 mg IntraVENous Q6H PRN    polyethylene glycol (GLYCOLAX) packet 17 g  17 g Oral Daily PRN    acetaminophen (TYLENOL) tablet 650 mg  650 mg Oral Q6H PRN    Or    acetaminophen (TYLENOL) suppository 650 mg  650 mg Rectal Q6H PRN     ______________________________________________________________________  EXPECTED LENGTH OF STAY: Unable to retrieve estimated LOS  ACTUAL LENGTH OF STAY:          7                 Mari Baker MD

## 2024-02-01 NOTE — DISCHARGE SUMMARY
distended.  Bowel sounds normal  Extremities:     No cyanosis.  No clubbing,                            Skin turgor normal, Capillary refill normal  Skin:                Not pale.  Not Jaundiced  No rashes   Psych:             Not anxious or agitated.  Neurologic:      Alert, moves all extremities, answers questions appropriately and responds to commands       CHRONIC MEDICAL DIAGNOSES:      Greater than 60 minutes were spent with the patient on counseling and coordination of care    Signed:   Fab Siu MD  2/1/2024  11:28 AM

## 2024-02-20 ENCOUNTER — HOSPITAL ENCOUNTER (OUTPATIENT)
Facility: HOSPITAL | Age: 74
Discharge: HOME | End: 2024-02-23
Payer: MEDICARE

## 2024-02-20 DIAGNOSIS — I61.9 INTRAPARENCHYMAL HEMORRHAGE OF BRAIN (HCC): ICD-10-CM

## 2024-02-20 PROCEDURE — 70553 MRI BRAIN STEM W/O & W/DYE: CPT

## 2024-02-20 PROCEDURE — 6360000004 HC RX CONTRAST MEDICATION: Performed by: NEUROLOGICAL SURGERY

## 2024-02-20 PROCEDURE — A9577 INJ MULTIHANCE: HCPCS | Performed by: NEUROLOGICAL SURGERY

## 2024-02-20 RX ADMIN — GADOBENATE DIMEGLUMINE 17 ML: 529 INJECTION, SOLUTION INTRAVENOUS at 15:27

## 2024-03-25 ENCOUNTER — TELEMEDICINE (OUTPATIENT)
Age: 74
End: 2024-03-25
Payer: MEDICARE

## 2024-03-25 DIAGNOSIS — R56.9 SEIZURE (HCC): ICD-10-CM

## 2024-03-25 DIAGNOSIS — I61.9 STROKE, HEMORRHAGIC (HCC): Primary | ICD-10-CM

## 2024-03-25 PROCEDURE — G8484 FLU IMMUNIZE NO ADMIN: HCPCS | Performed by: NURSE PRACTITIONER

## 2024-03-25 PROCEDURE — 1123F ACP DISCUSS/DSCN MKR DOCD: CPT | Performed by: NURSE PRACTITIONER

## 2024-03-25 PROCEDURE — 3017F COLORECTAL CA SCREEN DOC REV: CPT | Performed by: NURSE PRACTITIONER

## 2024-03-25 PROCEDURE — 99215 OFFICE O/P EST HI 40 MIN: CPT | Performed by: NURSE PRACTITIONER

## 2024-03-25 PROCEDURE — 1036F TOBACCO NON-USER: CPT | Performed by: NURSE PRACTITIONER

## 2024-03-25 PROCEDURE — G8427 DOCREV CUR MEDS BY ELIG CLIN: HCPCS | Performed by: NURSE PRACTITIONER

## 2024-03-25 PROCEDURE — G8400 PT W/DXA NO RESULTS DOC: HCPCS | Performed by: NURSE PRACTITIONER

## 2024-03-25 PROCEDURE — G8417 CALC BMI ABV UP PARAM F/U: HCPCS | Performed by: NURSE PRACTITIONER

## 2024-03-25 PROCEDURE — 1090F PRES/ABSN URINE INCON ASSESS: CPT | Performed by: NURSE PRACTITIONER

## 2024-03-25 RX ORDER — TORSEMIDE 20 MG/1
20 TABLET ORAL DAILY
COMMUNITY

## 2024-03-25 NOTE — PROGRESS NOTES
mood and affect       No data to display                 NEUROLOGICAL EXAMINATION:     Mental Status:   Formal testing was not completed. There was nothing concerning on general observation and discussion.  Patient is awake, alert, oriented x 3.  Speech is clear.  Speech pattern is fluent.  She is reliable historian following simple one-step as well as more complex two-step commands.  Pleasant mood and affect.  Good insight with regards to hospitalization and present medical condition.  Asked appropriate questions.    Cranial Nerves:  I: smell Not tested   II: visual fields Not assessed   II: pupils Equal, round, reactive to light   II: optic disc Not assessed   III,VII: ptosis none noted   III,IV,VI: extraocular muscles  Full ROM, gaze is conjugate   V: mastication Normal, speech is clear, swallowing without difficulty   V: facial light touch sensation  Denies any lateralizing sensory deficit or paresthesias to the face   VII: facial muscle function   symmetric   VIII: hearing symmetric   IX: soft palate elevation  normal   XI: trapezius strength  Not assessed   XI: sternocleidomastoid strength Not assessed   XI: neck flexion strength  Not assessed   XII: tongue  midline    -Motor: Not assessed as this is a telehealth visit.  Denies any lateralizing motor deficit or generalized weakness. Upon observation: normal bulk and tone is noted, do not appreciate tremor.     -Sensation: Patient denies any numbness or tingling in the bilateral upper extremities as well as bilateral lower extremities.   -Cerebellar Testing:  Patient's movements appear to be smooth and purposeful   -DTRs: Unable to assess due to telehealth visit   -Gait: Ambulates without assist device.  No falls.    I spent at least 40 minutes on this visit with this established patient    We discussed the expected course, resolution and complications of the diagnosis(es) in detail.  Medication risks, benefits, costs, interactions, and alternatives were

## 2024-03-26 ENCOUNTER — APPOINTMENT (OUTPATIENT)
Facility: HOSPITAL | Age: 74
DRG: 871 | End: 2024-03-26
Payer: MEDICARE

## 2024-03-26 ENCOUNTER — HOSPITAL ENCOUNTER (INPATIENT)
Facility: HOSPITAL | Age: 74
LOS: 2 days | Discharge: HOME HEALTH | DRG: 871 | End: 2024-03-28
Attending: STUDENT IN AN ORGANIZED HEALTH CARE EDUCATION/TRAINING PROGRAM | Admitting: HOSPITALIST
Payer: MEDICARE

## 2024-03-26 DIAGNOSIS — J44.1 COPD EXACERBATION (HCC): Primary | ICD-10-CM

## 2024-03-26 PROBLEM — I50.22 CHRONIC SYSTOLIC (CONGESTIVE) HEART FAILURE (HCC): Status: ACTIVE | Noted: 2024-03-26

## 2024-03-26 PROBLEM — R56.9 SEIZURE (HCC): Status: ACTIVE | Noted: 2024-03-26

## 2024-03-26 PROBLEM — R94.01 ABNORMAL ELECTROENCEPHALOGRAM (EEG): Status: ACTIVE | Noted: 2024-03-26

## 2024-03-26 PROBLEM — J96.00 ACUTE RESPIRATORY FAILURE (HCC): Status: ACTIVE | Noted: 2024-03-26

## 2024-03-26 LAB
ALBUMIN SERPL-MCNC: 4 G/DL (ref 3.5–5)
ALBUMIN/GLOB SERPL: 1.3 (ref 1.1–2.2)
ALP SERPL-CCNC: 109 U/L (ref 45–117)
ALT SERPL-CCNC: 19 U/L (ref 12–78)
ANION GAP SERPL CALC-SCNC: 7 MMOL/L (ref 5–15)
APPEARANCE UR: CLEAR
AST SERPL W P-5'-P-CCNC: 24 U/L (ref 15–37)
BACTERIA URNS QL MICRO: NEGATIVE /HPF
BASOPHILS # BLD: 0.1 K/UL (ref 0–0.1)
BASOPHILS NFR BLD: 0 % (ref 0–1)
BILIRUB SERPL-MCNC: 1 MG/DL (ref 0.2–1)
BILIRUB UR QL: NEGATIVE
BNP SERPL-MCNC: 938 PG/ML
BUN SERPL-MCNC: 29 MG/DL (ref 6–20)
BUN/CREAT SERPL: 41 (ref 12–20)
CA-I BLD-MCNC: 10 MG/DL (ref 8.5–10.1)
CHLORIDE SERPL-SCNC: 101 MMOL/L (ref 97–108)
CO2 SERPL-SCNC: 30 MMOL/L (ref 21–32)
COLOR UR: ABNORMAL
CREAT SERPL-MCNC: 0.71 MG/DL (ref 0.55–1.02)
DIFFERENTIAL METHOD BLD: ABNORMAL
EKG ATRIAL RATE: 94 BPM
EKG DIAGNOSIS: NORMAL
EKG Q-T INTERVAL: 326 MS
EKG QRS DURATION: 70 MS
EKG QTC CALCULATION (BAZETT): 447 MS
EKG R AXIS: 38 DEGREES
EKG T AXIS: 88 DEGREES
EKG VENTRICULAR RATE: 113 BPM
EOSINOPHIL # BLD: 0.1 K/UL (ref 0–0.4)
EOSINOPHIL NFR BLD: 0 % (ref 0–7)
EPITH CASTS URNS QL MICRO: ABNORMAL /LPF
ERYTHROCYTE [DISTWIDTH] IN BLOOD BY AUTOMATED COUNT: 14.8 % (ref 11.5–14.5)
FLUAV AG NPH QL IA: NEGATIVE
FLUBV AG NOSE QL IA: NEGATIVE
GLOBULIN SER CALC-MCNC: 3.2 G/DL (ref 2–4)
GLUCOSE SERPL-MCNC: 134 MG/DL (ref 65–100)
GLUCOSE UR STRIP.AUTO-MCNC: NEGATIVE MG/DL
HCT VFR BLD AUTO: 42.8 % (ref 35–47)
HGB BLD-MCNC: 13.6 G/DL (ref 11.5–16)
HGB UR QL STRIP: NEGATIVE
IMM GRANULOCYTES # BLD AUTO: 0.1 K/UL (ref 0–0.04)
IMM GRANULOCYTES NFR BLD AUTO: 1 % (ref 0–0.5)
KETONES UR QL STRIP.AUTO: 5 MG/DL
LEUKOCYTE ESTERASE UR QL STRIP.AUTO: NEGATIVE
LYMPHOCYTES # BLD: 1.8 K/UL (ref 0.8–3.5)
LYMPHOCYTES NFR BLD: 9 % (ref 12–49)
MAGNESIUM SERPL-MCNC: 1.8 MG/DL (ref 1.6–2.4)
MCH RBC QN AUTO: 29.6 PG (ref 26–34)
MCHC RBC AUTO-ENTMCNC: 31.8 G/DL (ref 30–36.5)
MCV RBC AUTO: 93.2 FL (ref 80–99)
MONOCYTES # BLD: 0.5 K/UL (ref 0–1)
MONOCYTES NFR BLD: 2 % (ref 5–13)
MRSA DNA SPEC QL NAA+PROBE: NOT DETECTED
NEUTS SEG # BLD: 17.7 K/UL (ref 1.8–8)
NEUTS SEG NFR BLD: 88 % (ref 32–75)
NITRITE UR QL STRIP.AUTO: NEGATIVE
NRBC # BLD: 0 K/UL (ref 0–0.01)
NRBC BLD-RTO: 0 PER 100 WBC
PH UR STRIP: 7 (ref 5–8)
PLATELET # BLD AUTO: 360 K/UL (ref 150–400)
PMV BLD AUTO: 9.8 FL (ref 8.9–12.9)
POTASSIUM SERPL-SCNC: 3.4 MMOL/L (ref 3.5–5.1)
PROCALCITONIN SERPL-MCNC: 0.06 NG/ML
PROT SERPL-MCNC: 7.2 G/DL (ref 6.4–8.2)
PROT UR STRIP-MCNC: NEGATIVE MG/DL
RBC # BLD AUTO: 4.59 M/UL (ref 3.8–5.2)
RBC #/AREA URNS HPF: ABNORMAL /HPF (ref 0–5)
SARS-COV-2 RDRP RESP QL NAA+PROBE: NOT DETECTED
SODIUM SERPL-SCNC: 138 MMOL/L (ref 136–145)
SP GR UR REFRACTOMETRY: 1.02 (ref 1–1.03)
TROPONIN I SERPL HS-MCNC: 17 NG/L (ref 0–51)
TROPONIN I SERPL HS-MCNC: 19 NG/L (ref 0–51)
URINE CULTURE IF INDICATED: ABNORMAL
UROBILINOGEN UR QL STRIP.AUTO: 0.1 EU/DL (ref 0.1–1)
WBC # BLD AUTO: 20.3 K/UL (ref 3.6–11)
WBC URNS QL MICRO: ABNORMAL /HPF (ref 0–4)

## 2024-03-26 PROCEDURE — 83880 ASSAY OF NATRIURETIC PEPTIDE: CPT

## 2024-03-26 PROCEDURE — 84145 PROCALCITONIN (PCT): CPT

## 2024-03-26 PROCEDURE — 6360000002 HC RX W HCPCS: Performed by: STUDENT IN AN ORGANIZED HEALTH CARE EDUCATION/TRAINING PROGRAM

## 2024-03-26 PROCEDURE — 2580000003 HC RX 258: Performed by: STUDENT IN AN ORGANIZED HEALTH CARE EDUCATION/TRAINING PROGRAM

## 2024-03-26 PROCEDURE — 6370000000 HC RX 637 (ALT 250 FOR IP): Performed by: INTERNAL MEDICINE

## 2024-03-26 PROCEDURE — 36415 COLL VENOUS BLD VENIPUNCTURE: CPT

## 2024-03-26 PROCEDURE — 2580000003 HC RX 258: Performed by: HOSPITALIST

## 2024-03-26 PROCEDURE — 83735 ASSAY OF MAGNESIUM: CPT

## 2024-03-26 PROCEDURE — 6360000002 HC RX W HCPCS: Performed by: HOSPITALIST

## 2024-03-26 PROCEDURE — 81001 URINALYSIS AUTO W/SCOPE: CPT

## 2024-03-26 PROCEDURE — 85025 COMPLETE CBC W/AUTO DIFF WBC: CPT

## 2024-03-26 PROCEDURE — 2500000003 HC RX 250 WO HCPCS: Performed by: HOSPITALIST

## 2024-03-26 PROCEDURE — 1100000000 HC RM PRIVATE

## 2024-03-26 PROCEDURE — 6370000000 HC RX 637 (ALT 250 FOR IP): Performed by: HOSPITALIST

## 2024-03-26 PROCEDURE — 80053 COMPREHEN METABOLIC PANEL: CPT

## 2024-03-26 PROCEDURE — 71275 CT ANGIOGRAPHY CHEST: CPT

## 2024-03-26 PROCEDURE — 87040 BLOOD CULTURE FOR BACTERIA: CPT

## 2024-03-26 PROCEDURE — 5A09357 ASSISTANCE WITH RESPIRATORY VENTILATION, LESS THAN 24 CONSECUTIVE HOURS, CONTINUOUS POSITIVE AIRWAY PRESSURE: ICD-10-PCS | Performed by: STUDENT IN AN ORGANIZED HEALTH CARE EDUCATION/TRAINING PROGRAM

## 2024-03-26 PROCEDURE — 99285 EMERGENCY DEPT VISIT HI MDM: CPT

## 2024-03-26 PROCEDURE — 94761 N-INVAS EAR/PLS OXIMETRY MLT: CPT

## 2024-03-26 PROCEDURE — 87635 SARS-COV-2 COVID-19 AMP PRB: CPT

## 2024-03-26 PROCEDURE — 6370000000 HC RX 637 (ALT 250 FOR IP): Performed by: STUDENT IN AN ORGANIZED HEALTH CARE EDUCATION/TRAINING PROGRAM

## 2024-03-26 PROCEDURE — 87641 MR-STAPH DNA AMP PROBE: CPT

## 2024-03-26 PROCEDURE — 2700000000 HC OXYGEN THERAPY PER DAY

## 2024-03-26 PROCEDURE — 93005 ELECTROCARDIOGRAM TRACING: CPT | Performed by: STUDENT IN AN ORGANIZED HEALTH CARE EDUCATION/TRAINING PROGRAM

## 2024-03-26 PROCEDURE — 94640 AIRWAY INHALATION TREATMENT: CPT

## 2024-03-26 PROCEDURE — 87804 INFLUENZA ASSAY W/OPTIC: CPT

## 2024-03-26 PROCEDURE — 6360000004 HC RX CONTRAST MEDICATION

## 2024-03-26 PROCEDURE — 71045 X-RAY EXAM CHEST 1 VIEW: CPT

## 2024-03-26 PROCEDURE — 84484 ASSAY OF TROPONIN QUANT: CPT

## 2024-03-26 RX ORDER — SODIUM CHLORIDE 0.9 % (FLUSH) 0.9 %
5-40 SYRINGE (ML) INJECTION PRN
Status: DISCONTINUED | OUTPATIENT
Start: 2024-03-26 | End: 2024-03-28 | Stop reason: HOSPADM

## 2024-03-26 RX ORDER — SODIUM CHLORIDE 9 MG/ML
INJECTION, SOLUTION INTRAVENOUS PRN
Status: DISCONTINUED | OUTPATIENT
Start: 2024-03-26 | End: 2024-03-28 | Stop reason: HOSPADM

## 2024-03-26 RX ORDER — ONDANSETRON 2 MG/ML
4 INJECTION INTRAMUSCULAR; INTRAVENOUS EVERY 6 HOURS PRN
Status: DISCONTINUED | OUTPATIENT
Start: 2024-03-26 | End: 2024-03-28 | Stop reason: HOSPADM

## 2024-03-26 RX ORDER — ACETAMINOPHEN 325 MG/1
650 TABLET ORAL EVERY 6 HOURS PRN
Status: DISCONTINUED | OUTPATIENT
Start: 2024-03-26 | End: 2024-03-28 | Stop reason: HOSPADM

## 2024-03-26 RX ORDER — IPRATROPIUM BROMIDE AND ALBUTEROL SULFATE 2.5; .5 MG/3ML; MG/3ML
1 SOLUTION RESPIRATORY (INHALATION)
Status: COMPLETED | OUTPATIENT
Start: 2024-03-26 | End: 2024-03-26

## 2024-03-26 RX ORDER — LEVETIRACETAM 500 MG/1
500 TABLET ORAL 2 TIMES DAILY
Qty: 180 TABLET | Refills: 3 | Status: SHIPPED | OUTPATIENT
Start: 2024-03-26

## 2024-03-26 RX ORDER — POLYETHYLENE GLYCOL 3350 17 G/17G
17 POWDER, FOR SOLUTION ORAL DAILY PRN
Status: DISCONTINUED | OUTPATIENT
Start: 2024-03-26 | End: 2024-03-28 | Stop reason: HOSPADM

## 2024-03-26 RX ORDER — GUAIFENESIN/DEXTROMETHORPHAN 100-10MG/5
5 SYRUP ORAL EVERY 4 HOURS PRN
Status: DISCONTINUED | OUTPATIENT
Start: 2024-03-26 | End: 2024-03-28 | Stop reason: HOSPADM

## 2024-03-26 RX ORDER — SODIUM CHLORIDE 0.9 % (FLUSH) 0.9 %
5-40 SYRINGE (ML) INJECTION EVERY 12 HOURS SCHEDULED
Status: DISCONTINUED | OUTPATIENT
Start: 2024-03-26 | End: 2024-03-28 | Stop reason: HOSPADM

## 2024-03-26 RX ORDER — FUROSEMIDE 10 MG/ML
40 INJECTION INTRAMUSCULAR; INTRAVENOUS ONCE
Status: COMPLETED | OUTPATIENT
Start: 2024-03-26 | End: 2024-03-26

## 2024-03-26 RX ORDER — MAGNESIUM HYDROXIDE/ALUMINUM HYDROXICE/SIMETHICONE 120; 1200; 1200 MG/30ML; MG/30ML; MG/30ML
30 SUSPENSION ORAL EVERY 6 HOURS PRN
Status: DISCONTINUED | OUTPATIENT
Start: 2024-03-26 | End: 2024-03-28 | Stop reason: HOSPADM

## 2024-03-26 RX ORDER — TORSEMIDE 10 MG/1
20 TABLET ORAL DAILY
Status: DISCONTINUED | OUTPATIENT
Start: 2024-03-26 | End: 2024-03-28 | Stop reason: HOSPADM

## 2024-03-26 RX ORDER — LANOLIN ALCOHOL/MO/W.PET/CERES
400 CREAM (GRAM) TOPICAL DAILY
Status: DISCONTINUED | OUTPATIENT
Start: 2024-03-26 | End: 2024-03-28 | Stop reason: HOSPADM

## 2024-03-26 RX ORDER — METOPROLOL SUCCINATE 50 MG/1
50 TABLET, EXTENDED RELEASE ORAL DAILY
Status: DISCONTINUED | OUTPATIENT
Start: 2024-03-26 | End: 2024-03-28 | Stop reason: HOSPADM

## 2024-03-26 RX ORDER — ACETAMINOPHEN 650 MG/1
650 SUPPOSITORY RECTAL EVERY 6 HOURS PRN
Status: DISCONTINUED | OUTPATIENT
Start: 2024-03-26 | End: 2024-03-28 | Stop reason: HOSPADM

## 2024-03-26 RX ORDER — LEVETIRACETAM 500 MG/1
500 TABLET ORAL 2 TIMES DAILY
Status: DISCONTINUED | OUTPATIENT
Start: 2024-03-26 | End: 2024-03-28 | Stop reason: HOSPADM

## 2024-03-26 RX ORDER — POTASSIUM CHLORIDE 7.45 MG/ML
10 INJECTION INTRAVENOUS ONCE
Status: COMPLETED | OUTPATIENT
Start: 2024-03-26 | End: 2024-03-26

## 2024-03-26 RX ORDER — ONDANSETRON 4 MG/1
4 TABLET, ORALLY DISINTEGRATING ORAL EVERY 8 HOURS PRN
Status: DISCONTINUED | OUTPATIENT
Start: 2024-03-26 | End: 2024-03-28 | Stop reason: HOSPADM

## 2024-03-26 RX ORDER — PREDNISONE 20 MG/1
40 TABLET ORAL DAILY
Status: DISCONTINUED | OUTPATIENT
Start: 2024-03-28 | End: 2024-03-28 | Stop reason: HOSPADM

## 2024-03-26 RX ORDER — IPRATROPIUM BROMIDE AND ALBUTEROL SULFATE 2.5; .5 MG/3ML; MG/3ML
1 SOLUTION RESPIRATORY (INHALATION)
Status: DISCONTINUED | OUTPATIENT
Start: 2024-03-26 | End: 2024-03-28 | Stop reason: HOSPADM

## 2024-03-26 RX ORDER — ATORVASTATIN CALCIUM 20 MG/1
20 TABLET, FILM COATED ORAL NIGHTLY
Status: DISCONTINUED | OUTPATIENT
Start: 2024-03-26 | End: 2024-03-28 | Stop reason: HOSPADM

## 2024-03-26 RX ORDER — IPRATROPIUM BROMIDE AND ALBUTEROL SULFATE 2.5; .5 MG/3ML; MG/3ML
1 SOLUTION RESPIRATORY (INHALATION)
Status: DISCONTINUED | OUTPATIENT
Start: 2024-03-26 | End: 2024-03-26

## 2024-03-26 RX ORDER — BUDESONIDE AND FORMOTEROL FUMARATE DIHYDRATE 160; 4.5 UG/1; UG/1
2 AEROSOL RESPIRATORY (INHALATION)
Status: DISCONTINUED | OUTPATIENT
Start: 2024-03-26 | End: 2024-03-28 | Stop reason: HOSPADM

## 2024-03-26 RX ORDER — HYDRALAZINE HYDROCHLORIDE 20 MG/ML
10 INJECTION INTRAMUSCULAR; INTRAVENOUS EVERY 4 HOURS PRN
Status: DISCONTINUED | OUTPATIENT
Start: 2024-03-26 | End: 2024-03-28 | Stop reason: HOSPADM

## 2024-03-26 RX ORDER — METHYLPREDNISOLONE SODIUM SUCCINATE 40 MG/ML
40 INJECTION, POWDER, LYOPHILIZED, FOR SOLUTION INTRAMUSCULAR; INTRAVENOUS EVERY 6 HOURS
Status: COMPLETED | OUTPATIENT
Start: 2024-03-26 | End: 2024-03-27

## 2024-03-26 RX ADMIN — IPRATROPIUM BROMIDE AND ALBUTEROL SULFATE 1 DOSE: .5; 2.5 SOLUTION RESPIRATORY (INHALATION) at 03:03

## 2024-03-26 RX ADMIN — Medication 400 MG: at 07:42

## 2024-03-26 RX ADMIN — ATORVASTATIN CALCIUM 20 MG: 20 TABLET, FILM COATED ORAL at 21:14

## 2024-03-26 RX ADMIN — LEVETIRACETAM 500 MG: 500 TABLET, FILM COATED ORAL at 21:14

## 2024-03-26 RX ADMIN — METHYLPREDNISOLONE SODIUM SUCCINATE 40 MG: 40 INJECTION INTRAMUSCULAR; INTRAVENOUS at 17:57

## 2024-03-26 RX ADMIN — IPRATROPIUM BROMIDE AND ALBUTEROL SULFATE 1 DOSE: .5; 2.5 SOLUTION RESPIRATORY (INHALATION) at 09:17

## 2024-03-26 RX ADMIN — METHYLPREDNISOLONE SODIUM SUCCINATE 40 MG: 40 INJECTION INTRAMUSCULAR; INTRAVENOUS at 10:51

## 2024-03-26 RX ADMIN — Medication 2 PUFF: at 20:43

## 2024-03-26 RX ADMIN — POTASSIUM CHLORIDE 10 MEQ: 7.46 INJECTION, SOLUTION INTRAVENOUS at 06:11

## 2024-03-26 RX ADMIN — IPRATROPIUM BROMIDE AND ALBUTEROL SULFATE 1 DOSE: .5; 3 SOLUTION RESPIRATORY (INHALATION) at 13:44

## 2024-03-26 RX ADMIN — FUROSEMIDE 40 MG: 10 INJECTION, SOLUTION INTRAMUSCULAR; INTRAVENOUS at 05:05

## 2024-03-26 RX ADMIN — PIPERACILLIN AND TAZOBACTAM 4500 MG: 4; .5 INJECTION, POWDER, FOR SOLUTION INTRAVENOUS at 05:05

## 2024-03-26 RX ADMIN — METHYLPREDNISOLONE SODIUM SUCCINATE 40 MG: 40 INJECTION INTRAMUSCULAR; INTRAVENOUS at 21:14

## 2024-03-26 RX ADMIN — SODIUM CHLORIDE, PRESERVATIVE FREE 20 MG: 5 INJECTION INTRAVENOUS at 21:14

## 2024-03-26 RX ADMIN — TORSEMIDE 20 MG: 10 TABLET ORAL at 10:47

## 2024-03-26 RX ADMIN — PIPERACILLIN AND TAZOBACTAM 3375 MG: 3; .375 INJECTION, POWDER, LYOPHILIZED, FOR SOLUTION INTRAVENOUS at 21:14

## 2024-03-26 RX ADMIN — VANCOMYCIN HYDROCHLORIDE 2000 MG: 1 INJECTION, POWDER, LYOPHILIZED, FOR SOLUTION INTRAVENOUS at 06:00

## 2024-03-26 RX ADMIN — IPRATROPIUM BROMIDE AND ALBUTEROL SULFATE 1 DOSE: .5; 3 SOLUTION RESPIRATORY (INHALATION) at 20:43

## 2024-03-26 RX ADMIN — METOPROLOL SUCCINATE 50 MG: 50 TABLET, EXTENDED RELEASE ORAL at 07:42

## 2024-03-26 RX ADMIN — Medication 2 PUFF: at 09:49

## 2024-03-26 RX ADMIN — LEVETIRACETAM 500 MG: 500 TABLET, FILM COATED ORAL at 08:43

## 2024-03-26 RX ADMIN — SODIUM CHLORIDE, PRESERVATIVE FREE 10 ML: 5 INJECTION INTRAVENOUS at 07:43

## 2024-03-26 RX ADMIN — VANCOMYCIN HYDROCHLORIDE 750 MG: 750 INJECTION, POWDER, LYOPHILIZED, FOR SOLUTION INTRAVENOUS at 18:01

## 2024-03-26 RX ADMIN — IPRATROPIUM BROMIDE AND ALBUTEROL SULFATE 1 DOSE: .5; 2.5 SOLUTION RESPIRATORY (INHALATION) at 03:01

## 2024-03-26 RX ADMIN — IOPAMIDOL 100 ML: 755 INJECTION, SOLUTION INTRAVENOUS at 05:03

## 2024-03-26 RX ADMIN — SODIUM CHLORIDE, PRESERVATIVE FREE 20 MG: 5 INJECTION INTRAVENOUS at 07:43

## 2024-03-26 RX ADMIN — METHYLPREDNISOLONE SODIUM SUCCINATE 40 MG: 40 INJECTION INTRAMUSCULAR; INTRAVENOUS at 06:12

## 2024-03-26 RX ADMIN — PIPERACILLIN AND TAZOBACTAM 3375 MG: 3; .375 INJECTION, POWDER, LYOPHILIZED, FOR SOLUTION INTRAVENOUS at 10:51

## 2024-03-26 RX ADMIN — IPRATROPIUM BROMIDE AND ALBUTEROL SULFATE 1 DOSE: .5; 2.5 SOLUTION RESPIRATORY (INHALATION) at 03:02

## 2024-03-26 ASSESSMENT — PAIN - FUNCTIONAL ASSESSMENT: PAIN_FUNCTIONAL_ASSESSMENT: 0-10

## 2024-03-26 ASSESSMENT — PAIN SCALES - GENERAL: PAINLEVEL_OUTOF10: 0

## 2024-03-26 NOTE — ASSESSMENT & PLAN NOTE
Ms. Oro as a PMH significant for COPD, CHF, A-fib (on Xarelto 20 mg daily), hyperlipidemia, hypertension, GERD.  Patient was admitted to the hospital January 24- February 1, 2024 after being found at home with altered mental status.  CT scan of the head showed a left occipital ICH with mild edema with punctate right occipital lobe infarct.  Patient was evaluated by neurosurgery, there was no neurosurgical intervention.  She was also evaluated by neurology.  Recommendations: Left occipital spontaneous IPH with right parietal infarct holding Xarelto and aspirin.  Goal systolic blood pressure less than 140/90.    Studies Reviewed:  -2/20/2024: Impression: Left occipital lobe late subacute intraparenchymal hematoma measuring 1.1 x 0.7 cm without significant mass effect on the subjacent structures. No intracranial enhancing mass or vascular malformation. No acute brain infarct. Mild chronic microangiopathic white matter changes and nonspecific brain atrophy.  -1/28/2024: Impression: MRI brain w wo contrast cannot exclude underlying lesion and there is a punctate R occipital infarct. Occipital lesion circumferential with unusual appearance cannot exclude underlying vascular anomaly or area of mass lesion. L medial temporal area of enhancement less concern for infection or autoimmune encephalitis, however, more likely related to seizure.  -CTA Head/Neck: IMPRESSION: 1. No acute vascular abnormality, no large vessel occlusion. 2. Moderate atherosclerotic disease of the bilateral carotid bifurcations,  without stenosis or thrombus.    Ms. Oro was admitted with spontaneous left occipital occipital ICH.  MRI scan (1/2024) of the brain does show a right punctate acute occipital infarct raising the possibility that the patient also had a left occipital ischemic infarct with hemorrhagic transformation.  Fortunately follow-up MRI (2/2024) shows no evidence of enhancing mass and the left medial temporal area of enhancement

## 2024-03-26 NOTE — CONSULTS
IMPRESSION:   Acute hypoxic respiratory failure  Acute exacerbation of COPD  Congestive heart failure fluid overload  Hypokalemia  Pulmonary hypertension  Obstructive sleep apnea  Additional workup outlined below  Pt is at high risk of sudden decline and decompensation with life threatening consequenses and continued end organ dysfunction and failure  Pt is critically ill. Time spent with pt and staff actively rendering care, managing pt and coordinating care as stated below; 55 minutes, exclusive of any procedures      RECOMMENDATIONS/PLAN:   ICU monitoring  73-year-old lady came in because of shortness of breath and dyspnea  BIPAP for non invasive ventilatory life support to avoid worsening respiratory acidosis, hypercacarbic or hypoxic respiratory arrest  She is noncompliant to her CPAP machine she is not wearing the BiPAP now on room air feeling better still has wheezing  Replace potassium her BNP is 938 previous 2D echo shows ejection fraction of 50 to 55% mildly elevated RVSP  IV Solu-Medrol nebulizer treatment and will give Lasix  Intubate and place on vent if NIV fails  Agree with Empiric IV antibiotics pending culture results   Follow culture results on vancomycin and Zosyn cultures negative DC vancomycin  CVP monitoring  IV vasopressors for circulatory shock refractory to fluids to maintain SBP> 90  Transfuse prn to maintain Hgb > 7  Labs to follow electrolytes, renal function and and blood counts  Bronchial hygiene with respiratory therapy techniques, bronchodilators  Pt needs IV fluids with additives and Drug therapy requiring intensive monitoring for toxicity  Prescription drug management with home med reconciliation reviewed  DVT, SUP prophylaxis  Will be available to assist in medical management while in the CCU pending disposition     [x] High complexity decision making was performed  [x] See my orders for details  HPI  73-year-old lady came in because of shortness of breath and dyspnea past

## 2024-03-26 NOTE — CARE COORDINATION
03/26/24 1146   Service Assessment   Patient Orientation Alert and Oriented   Cognition Alert   History Provided By Patient   Primary Caregiver Self   Support Systems Family Members   Patient's Healthcare Decision Maker is: Named in Scanned ACP Document   PCP Verified by CM Yes   Last Visit to PCP Within last 3 months   Prior Functional Level Independent in ADLs/IADLs   Current Functional Level Independent in ADLs/IADLs   Can patient return to prior living arrangement Yes   Ability to make needs known: Fair   Family able to assist with home care needs: Yes   Would you like for me to discuss the discharge plan with any other family members/significant others, and if so, who? Yes     Advance Care Planning     General Advance Care Planning (ACP) Conversation    Date of Conversation: 3/26/2024  Conducted with: Patient with Decision Making Capacity    Healthcare Decision Maker:    Primary Decision Maker: Zuri Hill - Niece/Nephew - [phone]    Primary Decision Maker: Rika Mullins - Niece/Nephew - [phone]  Click here to complete Healthcare Decision Makers including selection of the Healthcare Decision Maker Relationship (ie \"Primary\").   Today we documented Decision Maker(s) consistent with Legal Next of Kin hierarchy.    Length of Voluntary ACP Conversation in minutes:  <16 minutes (Non-Billable)    London Quarles RN CM met with patient at bedside to discuss discharge planning assessment. Patient states she lives at home alone, she is independent and does not use any DME, she states she does have a walker, but does not use it.  Patient reports having home health but cannot remember the company name and for CM to call her NELLIE Keating.     CM called NELLIE Hill [phone], She stated that patient is current with [facility]. She states that the patient doesn't need to be going home because the patient cannot care for herself.  She states the patient cannot remember thing since

## 2024-03-26 NOTE — ASSESSMENT & PLAN NOTE
with a past medical history significant for COPD, CHF, A-fib (on Xarelto 20 mg daily), hyperlipidemia, hypertension, GERD.  Patient was admitted to the hospital January 24- February 1, 2024 after being found at home with altered mental status.  CT scan of the head showed a left occipital ICH with mild edema with punctate right occipital lobe infarct.  Patient was evaluated by neurosurgery, there was no neurosurgical intervention.  She was also evaluated by neurology.  Recommendations: Left occipital spontaneous IPH with right parietal infarct holding Xarelto and aspirin.  Goal systolic blood pressure less than 140/90.    -2/20/2024: Impression: Left occipital lobe late subacute intraparenchymal hematoma measuring 1.1 x 0.7 cm without significant mass effect on the subjacent structures. No intracranial enhancing mass or vascular malformation. No acute brain infarct. Mild chronic microangiopathic white matter changes and nonspecific brain atrophy.  -1/28/2024: Impression: MRI brain w wo contrast cannot exclude underlying lesion and there is a punctate R occipital infarct. Occipital lesion circumferential with unusual appearance cannot exclude underlying vascular anomaly or area of mass lesion. L medial temporal area of enhancement less concern for infection or autoimmune encephalitis, however, more likely related to seizure.  -CTA Head/Neck: IMPRESSION: 1. No acute vascular abnormality, no large vessel occlusion. 2. Moderate atherosclerotic disease of the bilateral carotid bifurcations,  without stenosis or thrombus.  - 1/29/2024: EEG: INTERPRETATION: Abnormal EEG due to 1) intermittent delta and theta slowing, most prominent in frontal regions, 2) triphasic appearing wave occasionally, and 3) left frontotemporal sharp waves  CLINICAL CORRELATION: Findings are c/w a mild encephalopathy and potential underlying left FT epileptogenic focus  -TTE EF 50-55%, L severely dilated, bubble study negative.

## 2024-03-26 NOTE — ASSESSMENT & PLAN NOTE
Ms. Oro was admitted with spontaneous left occipital ICH.  MRI scan (1/2024) of the brain does showed left medial temporal area of enhancement less concerning for infection or autoimmune encephalitis however more likely related to seizure. Fortunately follow-up MRI (2/2024) shows no evidence of enhancing mass and the left medial temporal area of enhancement has resolved, suggesting finding seen on initial MRI (1/2024) with changes related to seizure.     Studies Reviewed  -2/20/2024: Impression: Left occipital lobe late subacute intraparenchymal hematoma measuring 1.1 x 0.7 cm without significant mass effect on the subjacent structures. No intracranial enhancing mass or vascular malformation. No acute brain infarct. Mild chronic microangiopathic white matter changes and nonspecific brain atrophy.  -1/28/2024: Impression: MRI brain w wo contrast cannot exclude underlying lesion and there is a punctate R occipital infarct. Occipital lesion circumferential with unusual appearance cannot exclude underlying vascular anomaly or area of mass lesion. L medial temporal area of enhancement less concern for infection or autoimmune encephalitis, however, more likely related to seizure.  - 1/29/2024: EEG: INTERPRETATION: Abnormal EEG due to 1) intermittent delta and theta slowing, most prominent in frontal regions, 2) triphasic appearing wave occasionally, and 3) left frontotemporal sharp waves    Ms. Oro did not have witnessed convulsions.  She was noted to have altered mental status and changes on MRI suggesting she was having subclinical seizures.    Continue Keppra 500 mg BID  Patient needs to enact seizure precautions including: Seizure Precautions: No driving, height/ladders, power tools until seizure free x 6 months. Avoid tub baths, pools, bodies of water unless she is under direct supervision until seizure free x 6 months. If you have another seizure and the shaking last under 5 minutes, you are not injured, and

## 2024-03-26 NOTE — H&P
related to seizure.  -CTA Head/Neck: IMPRESSION: 1. No acute vascular abnormality, no large vessel occlusion. 2. Moderate atherosclerotic disease of the bilateral carotid bifurcations,  without stenosis or thrombus.  - 1/29/2024: EEG: INTERPRETATION: Abnormal EEG due to 1) intermittent delta and theta slowing, most prominent in frontal regions, 2) triphasic appearing wave occasionally, and 3) left frontotemporal sharp waves  CLINICAL CORRELATION: Findings are c/w a mild encephalopathy and potential underlying left FT epileptogenic focus  -TTE EF 50-55%, L severely dilated, bubble study negative.    01/24/24  ECHO (TTE) COMPLETE (PRN CONTRAST/BUBBLE/STRAIN/3D) 01/25/2024  4:54 PM (Final)  Interpretation Summary    Left Ventricle: Normal left ventricular systolic function with a visually estimated EF of 50 - 55%. Left ventricle size is normal. Normal wall thickness. Normal wall motion. Normal diastolic function.    Aortic Valve: Not well visualized. Mild sclerosis of the aortic valve cusp. No regurgitation. No stenosis.    Mitral Valve: Mild annular calcification of the mitral valve. Mild regurgitation.    Tricuspid Valve: Mildly elevated RVSP, consistent with mild pulmonary hypertension.    Left Atrium: Left atrium is severely dilated.    Interatrial Septum: Agitated saline study was negative with and without provocation.    Right Atrium: Right atrium is dilated.    Image quality is fair.      Current Outpatient Medications  Medication  Instructions    albuterol sulfate HFA (VENTOLIN HFA) 108 (90 Base) MCG/ACT inhaler  2 puffs, Inhalation, 4 TIMES DAILY PRN    Ascorbic Acid (VITAMIN C) 500 MG CAPS  1 tablet, Oral, Every Day    atorvastatin (LIPITOR)  20 mg, Oral, NIGHTLY    levETIRAcetam (KEPPRA)  500 mg, Oral, 2 TIMES DAILY    Magnesium 400 MG CAPS  1 tablet, Oral, DAILY, Daily with meal    metoprolol succinate (TOPROL XL)  50 mg, Oral, DAILY    Multiple Vitamin (MULTIVITAMINS PO)  1 tablet, Oral, DAILY    Vitamin

## 2024-03-26 NOTE — ED PROVIDER NOTES
ondansetron (ZOFRAN-ODT) disintegrating tablet 4 mg (has no administration in time range)     Or   ondansetron (ZOFRAN) injection 4 mg (has no administration in time range)   polyethylene glycol (GLYCOLAX) packet 17 g (has no administration in time range)   acetaminophen (TYLENOL) tablet 650 mg (has no administration in time range)     Or   acetaminophen (TYLENOL) suppository 650 mg (has no administration in time range)   famotidine (PEPCID) 20 mg in sodium chloride (PF) 0.9 % 10 mL injection (has no administration in time range)   aluminum & magnesium hydroxide-simethicone (MAALOX) 200-200-20 MG/5ML suspension 30 mL (has no administration in time range)   ipratropium 0.5 mg-albuterol 2.5 mg (DUONEB) nebulizer solution 1 Dose ( Inhalation Canceled Entry 3/26/24 0609)   budesonide-formoterol (SYMBICORT) 160-4.5 MCG/ACT inhaler 2 puff (has no administration in time range)   guaiFENesin-dextromethorphan (ROBITUSSIN DM) 100-10 MG/5ML syrup 5 mL (has no administration in time range)   methylPREDNISolone sodium succ (SOLU-MEDROL) injection 40 mg (40 mg IntraVENous Given 3/26/24 0612)     Followed by   predniSONE (DELTASONE) tablet 40 mg (has no administration in time range)   atorvastatin (LIPITOR) tablet 20 mg (has no administration in time range)   levETIRAcetam (KEPPRA) tablet 500 mg (has no administration in time range)   magnesium oxide (MAG-OX) tablet 400 mg (has no administration in time range)   metoprolol succinate (TOPROL XL) extended release tablet 50 mg (has no administration in time range)   torsemide (DEMADEX) tablet 20 mg (has no administration in time range)   hydrALAZINE (APRESOLINE) injection 10 mg (has no administration in time range)   piperacillin-tazobactam (ZOSYN) 3,375 mg in sodium chloride 0.9 % 50 mL IVPB (mini-bag) (has no administration in time range)   vancomycin (VANCOCIN) intermittent dosing (placeholder) (has no administration in time range)   ipratropium 0.5 mg-albuterol 2.5 mg (DUONEB)

## 2024-03-26 NOTE — ED TRIAGE NOTES
Pt arrives via EMS for sob. Per EMS pt has extensive respiratory history. Pt arrives on duoneb, decadron given en route as well. Pt satting 85% on RA prior to duoneb. Pt reports use of breathing treatments at home with no relief before calling ems.

## 2024-03-26 NOTE — ED NOTES
Pt asked writer to call Niece (ZURI). Zuri updated on patient letting her know she was admitted and going to the ICU.

## 2024-03-27 LAB
ANION GAP SERPL CALC-SCNC: 5 MMOL/L (ref 5–15)
BASOPHILS # BLD: 0 K/UL (ref 0–0.1)
BASOPHILS NFR BLD: 0 % (ref 0–1)
BUN SERPL-MCNC: 28 MG/DL (ref 6–20)
BUN/CREAT SERPL: 28 (ref 12–20)
CA-I BLD-MCNC: 10.2 MG/DL (ref 8.5–10.1)
CHLORIDE SERPL-SCNC: 103 MMOL/L (ref 97–108)
CO2 SERPL-SCNC: 32 MMOL/L (ref 21–32)
CREAT SERPL-MCNC: 1.01 MG/DL (ref 0.55–1.02)
DIFFERENTIAL METHOD BLD: ABNORMAL
EOSINOPHIL # BLD: 0 K/UL (ref 0–0.4)
EOSINOPHIL NFR BLD: 0 % (ref 0–7)
ERYTHROCYTE [DISTWIDTH] IN BLOOD BY AUTOMATED COUNT: 14.9 % (ref 11.5–14.5)
GLUCOSE SERPL-MCNC: 131 MG/DL (ref 65–100)
HCT VFR BLD AUTO: 39.9 % (ref 35–47)
HGB BLD-MCNC: 12.8 G/DL (ref 11.5–16)
IMM GRANULOCYTES # BLD AUTO: 0.1 K/UL (ref 0–0.04)
IMM GRANULOCYTES NFR BLD AUTO: 0 % (ref 0–0.5)
LYMPHOCYTES # BLD: 1 K/UL (ref 0.8–3.5)
LYMPHOCYTES NFR BLD: 7 % (ref 12–49)
MCH RBC QN AUTO: 29.6 PG (ref 26–34)
MCHC RBC AUTO-ENTMCNC: 32.1 G/DL (ref 30–36.5)
MCV RBC AUTO: 92.1 FL (ref 80–99)
MONOCYTES # BLD: 0.5 K/UL (ref 0–1)
MONOCYTES NFR BLD: 4 % (ref 5–13)
NEUTS SEG # BLD: 11.4 K/UL (ref 1.8–8)
NEUTS SEG NFR BLD: 89 % (ref 32–75)
NRBC # BLD: 0 K/UL (ref 0–0.01)
NRBC BLD-RTO: 0 PER 100 WBC
PLATELET # BLD AUTO: 332 K/UL (ref 150–400)
PMV BLD AUTO: 10.1 FL (ref 8.9–12.9)
POTASSIUM SERPL-SCNC: 3.4 MMOL/L (ref 3.5–5.1)
RBC # BLD AUTO: 4.33 M/UL (ref 3.8–5.2)
SODIUM SERPL-SCNC: 140 MMOL/L (ref 136–145)
WBC # BLD AUTO: 12.9 K/UL (ref 3.6–11)

## 2024-03-27 PROCEDURE — 6370000000 HC RX 637 (ALT 250 FOR IP): Performed by: HOSPITALIST

## 2024-03-27 PROCEDURE — 1100000000 HC RM PRIVATE

## 2024-03-27 PROCEDURE — 85025 COMPLETE CBC W/AUTO DIFF WBC: CPT

## 2024-03-27 PROCEDURE — 6370000000 HC RX 637 (ALT 250 FOR IP): Performed by: INTERNAL MEDICINE

## 2024-03-27 PROCEDURE — 97161 PT EVAL LOW COMPLEX 20 MIN: CPT

## 2024-03-27 PROCEDURE — 2580000003 HC RX 258: Performed by: HOSPITALIST

## 2024-03-27 PROCEDURE — 94640 AIRWAY INHALATION TREATMENT: CPT

## 2024-03-27 PROCEDURE — 2500000003 HC RX 250 WO HCPCS: Performed by: HOSPITALIST

## 2024-03-27 PROCEDURE — 6360000002 HC RX W HCPCS: Performed by: HOSPITALIST

## 2024-03-27 PROCEDURE — 80048 BASIC METABOLIC PNL TOTAL CA: CPT

## 2024-03-27 PROCEDURE — 97530 THERAPEUTIC ACTIVITIES: CPT

## 2024-03-27 PROCEDURE — 36415 COLL VENOUS BLD VENIPUNCTURE: CPT

## 2024-03-27 PROCEDURE — 94010 BREATHING CAPACITY TEST: CPT

## 2024-03-27 PROCEDURE — 94761 N-INVAS EAR/PLS OXIMETRY MLT: CPT

## 2024-03-27 RX ORDER — LEVOFLOXACIN 500 MG/1
500 TABLET, FILM COATED ORAL DAILY
Status: DISCONTINUED | OUTPATIENT
Start: 2024-03-27 | End: 2024-03-28 | Stop reason: HOSPADM

## 2024-03-27 RX ADMIN — PIPERACILLIN AND TAZOBACTAM 3375 MG: 3; .375 INJECTION, POWDER, LYOPHILIZED, FOR SOLUTION INTRAVENOUS at 02:26

## 2024-03-27 RX ADMIN — METOPROLOL SUCCINATE 50 MG: 50 TABLET, EXTENDED RELEASE ORAL at 10:30

## 2024-03-27 RX ADMIN — Medication 2 PUFF: at 20:40

## 2024-03-27 RX ADMIN — SODIUM CHLORIDE, PRESERVATIVE FREE 10 ML: 5 INJECTION INTRAVENOUS at 21:16

## 2024-03-27 RX ADMIN — IPRATROPIUM BROMIDE AND ALBUTEROL SULFATE 1 DOSE: .5; 3 SOLUTION RESPIRATORY (INHALATION) at 20:40

## 2024-03-27 RX ADMIN — METHYLPREDNISOLONE SODIUM SUCCINATE 40 MG: 40 INJECTION INTRAMUSCULAR; INTRAVENOUS at 21:15

## 2024-03-27 RX ADMIN — LEVETIRACETAM 500 MG: 500 TABLET, FILM COATED ORAL at 10:33

## 2024-03-27 RX ADMIN — ATORVASTATIN CALCIUM 20 MG: 20 TABLET, FILM COATED ORAL at 21:15

## 2024-03-27 RX ADMIN — IPRATROPIUM BROMIDE AND ALBUTEROL SULFATE 1 DOSE: .5; 3 SOLUTION RESPIRATORY (INHALATION) at 14:59

## 2024-03-27 RX ADMIN — Medication 2 PUFF: at 08:30

## 2024-03-27 RX ADMIN — LEVOFLOXACIN 500 MG: 500 TABLET, FILM COATED ORAL at 10:33

## 2024-03-27 RX ADMIN — METHYLPREDNISOLONE SODIUM SUCCINATE 40 MG: 40 INJECTION INTRAMUSCULAR; INTRAVENOUS at 10:31

## 2024-03-27 RX ADMIN — METHYLPREDNISOLONE SODIUM SUCCINATE 40 MG: 40 INJECTION INTRAMUSCULAR; INTRAVENOUS at 05:51

## 2024-03-27 RX ADMIN — SODIUM CHLORIDE, PRESERVATIVE FREE 20 MG: 5 INJECTION INTRAVENOUS at 21:15

## 2024-03-27 RX ADMIN — LEVETIRACETAM 500 MG: 500 TABLET, FILM COATED ORAL at 21:15

## 2024-03-27 RX ADMIN — VANCOMYCIN HYDROCHLORIDE 750 MG: 750 INJECTION, POWDER, LYOPHILIZED, FOR SOLUTION INTRAVENOUS at 05:51

## 2024-03-27 RX ADMIN — TORSEMIDE 20 MG: 10 TABLET ORAL at 10:30

## 2024-03-27 RX ADMIN — SODIUM CHLORIDE, PRESERVATIVE FREE 20 MG: 5 INJECTION INTRAVENOUS at 10:30

## 2024-03-27 RX ADMIN — SODIUM CHLORIDE, PRESERVATIVE FREE 10 ML: 5 INJECTION INTRAVENOUS at 10:31

## 2024-03-27 RX ADMIN — METHYLPREDNISOLONE SODIUM SUCCINATE 40 MG: 40 INJECTION INTRAMUSCULAR; INTRAVENOUS at 16:43

## 2024-03-27 RX ADMIN — Medication 400 MG: at 10:30

## 2024-03-27 RX ADMIN — IPRATROPIUM BROMIDE AND ALBUTEROL SULFATE 1 DOSE: .5; 3 SOLUTION RESPIRATORY (INHALATION) at 08:30

## 2024-03-27 ASSESSMENT — COPD QUESTIONNAIRES
QUESTION5_HOMEACTIVITIES: 1
QUESTION6_LEAVINGHOUSE: 1
TOTAL_EXACERBATIONS_PASTYEAR: 2
QUESTION7_SLEEPQUALITY: 3
QUESTION3_CHESTTIGHTNESS: 3
QUESTION1_COUGHFREQUENCY: 4
QUESTION4_WALKINCLINE: 2
QUESTION2_CHESTPHLEGM: 3
CAT_TOTALSCORE: 20
QUESTION8_ENERGYLEVEL: 3

## 2024-03-27 ASSESSMENT — PAIN SCALES - GENERAL
PAINLEVEL_OUTOF10: 0
PAINLEVEL_OUTOF10: 0

## 2024-03-27 NOTE — PLAN OF CARE
Problem: Discharge Planning  Goal: Discharge to home or other facility with appropriate resources  Outcome: Progressing     Problem: Neurosensory - Adult  Goal: Achieves stable or improved neurological status  Outcome: Progressing  Goal: Absence of seizures  Outcome: Progressing  Goal: Remains free of injury related to seizures activity  Outcome: Progressing  Goal: Achieves maximal functionality and self care  Outcome: Progressing     Problem: Respiratory - Adult  Goal: Achieves optimal ventilation and oxygenation  Outcome: Progressing     Problem: Cardiovascular - Adult  Goal: Maintains optimal cardiac output and hemodynamic stability  Outcome: Progressing  Goal: Absence of cardiac dysrhythmias or at baseline  Outcome: Progressing  Flowsheets (Taken 3/26/2024 2300)  Absence of cardiac dysrhythmias or at baseline: Monitor cardiac rate and rhythm     Problem: Infection - Adult  Goal: Absence of infection at discharge  Outcome: Progressing  Goal: Absence of infection during hospitalization  Outcome: Progressing  Goal: Absence of fever/infection during anticipated neutropenic period  Outcome: Progressing     Problem: Skin/Tissue Integrity - Adult  Goal: Incisions, wounds, or drain sites healing without S/S of infection  Outcome: Progressing     Problem: Genitourinary - Adult  Goal: Absence of urinary retention  Outcome: Progressing     Problem: Chronic Conditions and Co-morbidities  Goal: Patient's chronic conditions and co-morbidity symptoms are monitored and maintained or improved  Outcome: Progressing     Problem: Safety - Adult  Goal: Free from fall injury  Outcome: Progressing     Problem: Pain  Goal: Verbalizes/displays adequate comfort level or baseline comfort level  Outcome: Progressing     Problem: ABCDS Injury Assessment  Goal: Absence of physical injury  Outcome: Progressing     Problem: Chronic Conditions and Co-morbidities  Goal: Patient's chronic conditions and co-morbidity symptoms are monitored and

## 2024-03-28 VITALS
HEART RATE: 76 BPM | DIASTOLIC BLOOD PRESSURE: 77 MMHG | BODY MASS INDEX: 30.86 KG/M2 | TEMPERATURE: 97.5 F | OXYGEN SATURATION: 96 % | SYSTOLIC BLOOD PRESSURE: 148 MMHG | WEIGHT: 180.78 LBS | RESPIRATION RATE: 18 BRPM | HEIGHT: 64 IN

## 2024-03-28 LAB
ANION GAP SERPL CALC-SCNC: 8 MMOL/L (ref 5–15)
BUN SERPL-MCNC: 37 MG/DL (ref 6–20)
BUN/CREAT SERPL: 32 (ref 12–20)
CA-I BLD-MCNC: 9.8 MG/DL (ref 8.5–10.1)
CHLORIDE SERPL-SCNC: 101 MMOL/L (ref 97–108)
CO2 SERPL-SCNC: 32 MMOL/L (ref 21–32)
CREAT SERPL-MCNC: 1.17 MG/DL (ref 0.55–1.02)
GLUCOSE SERPL-MCNC: 128 MG/DL (ref 65–100)
POTASSIUM SERPL-SCNC: 3.2 MMOL/L (ref 3.5–5.1)
SODIUM SERPL-SCNC: 141 MMOL/L (ref 136–145)

## 2024-03-28 PROCEDURE — 6370000000 HC RX 637 (ALT 250 FOR IP): Performed by: INTERNAL MEDICINE

## 2024-03-28 PROCEDURE — 2500000003 HC RX 250 WO HCPCS: Performed by: HOSPITALIST

## 2024-03-28 PROCEDURE — 80048 BASIC METABOLIC PNL TOTAL CA: CPT

## 2024-03-28 PROCEDURE — 6370000000 HC RX 637 (ALT 250 FOR IP): Performed by: HOSPITALIST

## 2024-03-28 PROCEDURE — 94640 AIRWAY INHALATION TREATMENT: CPT

## 2024-03-28 PROCEDURE — 2580000003 HC RX 258: Performed by: HOSPITALIST

## 2024-03-28 PROCEDURE — 36415 COLL VENOUS BLD VENIPUNCTURE: CPT

## 2024-03-28 RX ORDER — POTASSIUM CHLORIDE 20 MEQ/1
40 TABLET, EXTENDED RELEASE ORAL ONCE
Status: COMPLETED | OUTPATIENT
Start: 2024-03-28 | End: 2024-03-28

## 2024-03-28 RX ORDER — FLUTICASONE FUROATE, UMECLIDINIUM BROMIDE AND VILANTEROL TRIFENATATE 100; 62.5; 25 UG/1; UG/1; UG/1
1 POWDER RESPIRATORY (INHALATION) DAILY
Qty: 1 EACH | Refills: 0 | Status: SHIPPED | OUTPATIENT
Start: 2024-03-28

## 2024-03-28 RX ORDER — PREDNISONE 20 MG/1
40 TABLET ORAL DAILY
Qty: 6 TABLET | Refills: 0 | Status: SHIPPED | OUTPATIENT
Start: 2024-03-28 | End: 2024-03-31

## 2024-03-28 RX ORDER — MOXIFLOXACIN HYDROCHLORIDE 400 MG/1
400 TABLET ORAL DAILY
Qty: 5 TABLET | Refills: 0 | Status: SHIPPED | OUTPATIENT
Start: 2024-03-28 | End: 2024-04-02

## 2024-03-28 RX ADMIN — METOPROLOL SUCCINATE 50 MG: 50 TABLET, EXTENDED RELEASE ORAL at 09:36

## 2024-03-28 RX ADMIN — TORSEMIDE 20 MG: 10 TABLET ORAL at 09:34

## 2024-03-28 RX ADMIN — PREDNISONE 40 MG: 20 TABLET ORAL at 09:35

## 2024-03-28 RX ADMIN — POTASSIUM CHLORIDE 40 MEQ: 1500 TABLET, EXTENDED RELEASE ORAL at 09:35

## 2024-03-28 RX ADMIN — IPRATROPIUM BROMIDE AND ALBUTEROL SULFATE 1 DOSE: .5; 3 SOLUTION RESPIRATORY (INHALATION) at 08:50

## 2024-03-28 RX ADMIN — Medication 400 MG: at 09:35

## 2024-03-28 RX ADMIN — SODIUM CHLORIDE, PRESERVATIVE FREE 10 ML: 5 INJECTION INTRAVENOUS at 09:39

## 2024-03-28 RX ADMIN — Medication 2 PUFF: at 08:50

## 2024-03-28 RX ADMIN — SODIUM CHLORIDE, PRESERVATIVE FREE 20 MG: 5 INJECTION INTRAVENOUS at 09:34

## 2024-03-28 RX ADMIN — LEVETIRACETAM 500 MG: 500 TABLET, FILM COATED ORAL at 09:36

## 2024-03-28 RX ADMIN — LEVOFLOXACIN 500 MG: 500 TABLET, FILM COATED ORAL at 09:36

## 2024-03-28 ASSESSMENT — PAIN SCALES - GENERAL
PAINLEVEL_OUTOF10: 0
PAINLEVEL_OUTOF10: 0

## 2024-03-28 NOTE — PROGRESS NOTES
3/26/2024      Patient seen for same-day follow-up.  Patient admitted earlier this morning with shortness of breath, found to be hypoxic.  Patient started on noninvasive ventilation.  Chest x-ray showed interstitial edema.  Pertinent recent history includes a recent left occipital intracranial hemorrhage in late January.  Agree with assessment and plan of admitting physician.  Discussed with pulmonologist who recommends transfer to floor                  Cole Mathews MD   
    Hospitalist Progress Note               Daily Progress Note: 3/27/2024      Chief complaint:   Chief Complaint   Patient presents with    Shortness of Breath        Subjective:   Hospital course to date:    73-year-old female with COPD, chronic hypoxic respiratory failure, sleep apnea, pulm hypertension, anemia, anxiety, history of stroke, hypertension, recent intracranial bleed left occipital about 2 months ago who presented to the ED on 3/26 with worsening shortness of breath.  She had oxygen saturations of 85% on room air.      She was started on noninvasive ventilation and admitted to the ICU.  Her chest x-ray showed mild interstitial edema.  Patient met sepsis criteria and was started on Zosyn and vancomycin.  MRSA screen negative.  She was seen by pulmonology and felt stable for transfer to the floor      --------  Patient is seen today for follow-up.  She is doing much better today.  Breathing company on room air presently        Medications reviewed  Current Facility-Administered Medications   Medication Dose Route Frequency    sodium chloride flush 0.9 % injection 5-40 mL  5-40 mL IntraVENous 2 times per day    sodium chloride flush 0.9 % injection 5-40 mL  5-40 mL IntraVENous PRN    0.9 % sodium chloride infusion   IntraVENous PRN    ondansetron (ZOFRAN-ODT) disintegrating tablet 4 mg  4 mg Oral Q8H PRN    Or    ondansetron (ZOFRAN) injection 4 mg  4 mg IntraVENous Q6H PRN    polyethylene glycol (GLYCOLAX) packet 17 g  17 g Oral Daily PRN    acetaminophen (TYLENOL) tablet 650 mg  650 mg Oral Q6H PRN    Or    acetaminophen (TYLENOL) suppository 650 mg  650 mg Rectal Q6H PRN    famotidine (PEPCID) 20 mg in sodium chloride (PF) 0.9 % 10 mL injection  20 mg IntraVENous BID    aluminum & magnesium hydroxide-simethicone (MAALOX) 200-200-20 MG/5ML suspension 30 mL  30 mL Oral Q6H PRN    budesonide-formoterol (SYMBICORT) 160-4.5 MCG/ACT inhaler 2 puff  2 puff Inhalation BID RT    guaiFENesin-dextromethorphan 
    Pulmonary Disease Navigator Note  [facility]    Current GOLD classification for Radha Oro    Patient's chart was reviewed by Pulmonary Disease Navigator for compliance with prescribed treatment with Global Initiative For Chronic Obstructive Lung Disease (GOLD).    Please, review beneath recommendations for pharmacological treatment for patient with obstructive lung disease.        Current Pharmacological Treatment:   Patient currently receiving Duoneb Q6h, Symbicort  /4.5 BID   Patient stated that she has a nebulizer machine at home, but cannot recall the medication she uses, only that she uses it up to 4 times a day.   Current eosinophil count: 0  Recorded domestic exacerbations past 12 months: 2        Observed PIF:      Combination  ICS-LABA Inhaler Acceptable   Therapy  Device For Use   Salmeterol/fluticasone Advair  Diskus    Vilanterol/fluticasone  Breo  Ellipta    Formoterol/mometasone  Dulera MDI Yes   Formoterol/budesonide  Symbicort MDI Yes   Triple Therapy Recommended (For Group E) NJC-MDEA-OODY     Fluticasone/umeclidinium/vilanterol  Trelegy  Ellipta    Budesonide/glycopyrrolate/formoterol fumarate  Breztri  MDI Yes   Recommended (For Group A & B) LAMA/LABA     Vilanterol/umeclidinium  Anoro  Ellipta    Olodaterol/tiotropium  Stiolto  Respimat Yes   Formoterol/glycopyrronium  Bevespi  MDI Yes   Aclidinium/formoterol Duaklir  Pressair      *Nebulizer Options    LAMA LABA ICS   Saul  Brovanna Budesonide    Performist      The CAT provides a reliable measure of the impact of COPD on a patient's health status. Range of CAT scores from 0-40. Higher scores denote a more severe impact of COPD on a patient’s life. Scores <10 have a low impact, 10-20 medium, 21-30 high and >30 very high impact, requiring gradually more interventions.     Current recorded COPD Assessment Tool (CAT) score of Cough Assessment: 4  Phlegm Assessment: 3  Chest tightness: 
  IMPRESSION:   Acute hypoxic respiratory failure  Acute exacerbation of COPD  Congestive heart failure fluid overload  Hypokalemia  Pulmonary hypertension  Obstructive sleep apnea      RECOMMENDATIONS/PLAN:   73-year-old lady came in because of shortness of breath and dyspnea  BIPAP for non invasive ventilatory life support to avoid worsening respiratory acidosis, hypercacarbic or hypoxic respiratory arrest  She is noncompliant to her CPAP machine she is not wearing the BiPAP now on room air feeling better still has wheezing  Replace potassium her BNP is 938 previous 2D echo shows ejection fraction of 50 to 55% mildly elevated RVSP  IV Solu-Medrol nebulizer treatment and will give Lasix     [x] High complexity decision making was performed  [x] See my orders for details  HPI  73-year-old lady came in because of shortness of breath and dyspnea past medical history of COPD, chronic A-fib HERMILO not on CPAP machine at home pulmonary hypertension status post gastric bypass chest x-ray shows CHF fluid overload denies any history of chest pain she is noncompliant to CPAP machine.    PMH:  has a past medical history of AF (paroxysmal atrial fibrillation) (HCC), Anemia, Anxiety, Chronic obstructive pulmonary disease (HCC), COPD (chronic obstructive pulmonary disease) (HCC), Heart failure (HCC), HTN (hypertension), Obesity, HERMILO (obstructive sleep apnea), Pulmonary hypertension (HCC), and Stroke (HCC).    PSH:   has a past surgical history that includes Gastric bypass surgery; IR GUIDED THORACENTESIS PLEURAL (10/1/2020); ECHO 2D W DOPPLER W COLOR LIMITED (11/23/2009); and NM MYOCARDIAL SPECT REST EXERCISE OR RX (08/2006).     FHX: family history includes COPD in her mother; Colon Cancer in her father; Diabetes in her mother; Stroke in her brother.     SHX:  reports that she has never smoked. She has been exposed to tobacco smoke. She has never used smokeless tobacco. She reports that she does not currently use alcohol. She 
..4 Eyes Skin Assessment     NAME:  Radha Oro  YOB: 1950  MEDICAL RECORD NUMBER:  [id number]    The patient is being assessed for  Transfer to New Unit    I agree that at least one RN has performed a thorough Head to Toe Skin Assessment on the patient. ALL assessment sites listed below have been assessed.      Areas assessed by both nurses:    Head, Face, Ears, Shoulders, Back, Chest, Arms, Elbows, Hands, Sacrum. Buttock, Coccyx, Ischium, Legs. Feet and Heels, and Under Medical Devices         Does the Patient have a Wound? Yes wound(s) were present on assessment. LDA wound assessment was Initiated and completed by RN       Jakob Prevention initiated by RN: Yes  Wound Care Orders initiated by RN: Yes    Pressure Injury (Stage 3,4, Unstageable, DTI, NWPT, and Complex wounds) if present, place Wound referral order by RN under : No    New Ostomies, if present place, Ostomy referral order under : No     Nurse 1 eSignature: Electronically signed by Michael Arroyo RN on 3/26/24 at 3:52 PM EDT    **SHARE this note so that the co-signing nurse can place an eSignature**    Nurse 2 eSignature: {Esignature:[id number]}   
4 Eyes Skin Assessment     NAME:  Radha Oro  YOB: 1950  MEDICAL RECORD NUMBER:  [id number]    The patient is being assessed for  Other weekly skin assessment    I agree that at least one RN has performed a thorough Head to Toe Skin Assessment on the patient. ALL assessment sites listed below have been assessed.      Areas assessed by both nurses:            Does the Patient have a Wound? Yes wound(s) were present on assessment. LDA wound assessment was Initiated and completed by RN       Jakob Prevention initiated by RN: Yes  Wound Care Orders initiated by RN: Yes,already being followed by wound care    Pressure Injury (Stage 3,4, Unstageable, DTI, NWPT, and Complex wounds) if present, place Wound referral order by RN under : Yes    New Ostomies, if present place, Ostomy referral order under : No     Nurse 1 eSignature: Electronically signed by Jacqueline Canales RN on 3/27/24 at 5:36 AM EDT    **SHARE this note so that the co-signing nurse can place an eSignature**    Nurse 2 eSignature: Electronically signed by Sweetie Resendiz RN on 3/27/24 at 5:37 AM EDT   
Consult received. Spoke w/ MD. Dr. Fitzgerald. ST evaluation is not warranted at this time. Will D/C consult.   
OT eval order received and acknowledged. Pt screened and demonstrating baseline independence for self care tasks and functional mobility/transfers. Pt reports no need for skilled OT services at this time. OT evaluation order will therefore be discontinued this pt has no acute OT needs. Please reorder OT if pt's functional status changes. Thank you.         Functional Measure:  [facility] AM-PACTM \"6 Clicks\"                                                       Daily Activity Inpatient Short Form  How much help from another person does the patient currently need... Total; A Lot A Little None   1.  Putting on and taking off regular lower body clothing? []  1 []  2 []  3 [x]  4   2.  Bathing (including washing, rinsing, drying)? []  1 []  2 []  3 [x]  4   3.  Toileting, which includes using toilet, bedpan or urinal? [] 1 []  2 []  3 [x]  4   4.  Putting on and taking off regular upper body clothing? []  1 []  2 []  3 [x]  4   5.  Taking care of personal grooming such as brushing teeth? []  1 []  2 []  3 [x]  4   6.  Eating meals? []  1 []  2 []  3 [x]  4   © 2007, Trustees of [facility], under license to [employer]. All rights reserved     Score: 24/24     Interpretation of Tool:  Represents clinically-significant functional categories (i.e. Activities of daily living).  Percentage of Impairment CH    0%   CI    1-19% CJ    20-39% CK    40-59% CL    60-79% CM    80-99% CN     100%   [facility]  Score 6-24 24 23 20-22 15-19 10-14 7-9 6      
Vancomycin Dosing Consult  Radha Oro is a 73 y.o. female with COPD Exacerbation/CAP/HAP. Pharmacy was consulted by Dr. Barlow to dose and monitor Vancomycin. Today is day 1.    Antibiotic regimen: Vancomycin + Zosyn      Temp (24hrs), Av °F (36.7 °C), Min:98 °F (36.7 °C), Max:98 °F (36.7 °C)    Recent Labs     24  0258   WBC 20.3*     Recent Labs     24  0258   CREATININE 0.71   BUN 29*       No results for input(s): \"CRP\" in the last 72 hours.  No results for input(s): \"PROCAL\" in the last 72 hours.    Estimated Creatinine Clearance: 73 mL/min (based on SCr of 0.71 mg/dL). ml/min  Concomitant nephrotoxic drugs: None      Cultures:   3/26 Blood x 2 - Pending    MRSA Swab: N/A    Target range: AUC/HARVINDER 400-600       Assessment/Plan:   Leukocytosis; Afebrile; Start a Vancomycin LD of 2000 mg iv x 1 dose this morning and then follow up later today with a MD of 750 mg iv q 12 hrs for a projected AUC of 438. A level has been scheduled for tomorrow (3/27) @ 1600  Antimicrobial stop date 14 days      
chair cushion to chair / recliner when sitting to reduce pressure to buttocks and ischials.  Float heels with 1-2 pillows lengthwise while in bed for offloading of the heels.  Maintain HOB at 30 degrees or less, if not contraindicated, to reduce pressure to buttocks and sacrum.  Raise foot of bed to help prevent friction and shear injury from sliding down in the bed.  Re-consult [facility] for other skin / wound care concerns.    Discharge Wound Care Needs: TBD    Teaching completed with:   [] Patient           [] Family member       [] Caregiver          [] Nursing  [] Other    Patient/Caregiver Teaching:  Level of patient/caregiver understanding able to:   [] Indicates understanding       [] Needs reinforcement  [] Unsuccessful      [] Verbal Understanding  [] Demonstrated understanding       [] No evidence of learning  [] Refused teaching         [] N/A       Electronically signed by Judith Unger RN on 3/26/2024 at 3:31 PM           
understanding. Denied need for handouts as participated in PT in the past. Able to verbalize fall prevention strategies.  Education Method: Demonstration;Verbal;Teach Back  Barriers to Learning: None  Education Outcome: Verbalized understanding;Demonstrated understanding      Thank you for this referral.  Richelle Antonio, PT  Minutes: 25       
  CREATININE 0.71 1.01 1.17*   CALCIUM 10.0 10.2* 9.8   MG 1.8  --   --    LABALBU 4.0  --   --    BILITOT 1.0  --   --    AST 24  --   --    ALT 19  --   --        No results for input(s): \"CKTOTAL\", \"CKMB\", \"CKMBINDEX\", \"TROPONINI\" in the last 72 hours.  Lab Results   Component Value Date/Time    BNP 4,891 03/10/2023 09:17 AM      No results found for: \"TSH\"   Results       Procedure Component Value Units Date/Time    MRSA by PCR [[id number]] Collected: 03/26/24 0605    Order Status: Completed Specimen: Nares Updated: 03/26/24 0836     MRSA by PCR Not Detected       Blood Culture 1 [[id number]] Collected: 03/26/24 0428    Order Status: Completed Specimen: Blood Updated: 03/27/24 0943     Special Requests --        No Special Requests  Right  Hand       Culture No growth 1 day       Blood Culture 2 [[id number]] Collected: 03/26/24 0428    Order Status: Completed Specimen: Blood Updated: 03/27/24 0943     Special Requests No Special Requests        Culture No growth 1 day       COVID-19, Rapid [[id number]] Collected: 03/26/24 0428    Order Status: Completed Specimen: Nasopharyngeal Updated: 03/26/24 0558     SARS-CoV-2, Rapid Not Detected        Comment: Rapid Abbott ID Now   The specimen is NEGATIVE for SARS-CoV2, the novel coronavirus associated with COVID-19. A negative result does not rule out COVID-19.   This test has been authorized by the FDA under an Emergency Use Authorization (EUA) for use by authorized laboratories.   Fact sheet for Healthcare Providers:  https://www.fda.gov/media/684926/download Fact sheet for Patients: https://www.fda.gov/media/390763/download   Methodology: Isothermal Nucleic Acid Amplification       Rapid influenza A/B antigens [[id number]] Collected: 03/26/24 0428    Order Status: Completed Specimen: Nasal Washing Updated: 03/26/24 0558     Influenza A Ag Negative        Influenza B Ag Negative              Imaging:  CTA CHEST W WO CONTRAST   Final Result   No pulmonary embolus.

## 2024-03-28 NOTE — CARE COORDINATION
DC Plan: [facility] ([facility] for SN)    Resumption orders uploaded via [employer].     Cm met with pt at the bedside. PT recommends home self care. Pt is aware of discharge home today. Pt wants to return home. Cm discussed reaching out to Zuri Hill. Pt indicated writer did not need to contact her. Pt reports she already spoke with her. Pt indicated Zuri will pick her up and take her home.     Transition of Care Plan:    RUR: 17%  Prior Level of Functioning: independent  Disposition: home health  If SNF or IPR: Date FOC offered: N/A  Date FOC received: N/A  Accepting facility: N/A  Date authorization started with reference number: N/A  Date authorization received and expires: N/A  Follow up appointments: Yes  DME needed: None  Transportation at discharge: family  IM/IMM Medicare/ letter given: Yes  Is patient a [place] and connected with [facility]?    If yes, was [place] transfer form completed and [facility] notified? No  Caregiver Contact: N/A  Discharge Caregiver contacted prior to discharge? N/A  Care Conference needed? No  Barriers to discharge: None

## 2024-03-28 NOTE — DISCHARGE SUMMARY
Physician Discharge Summary     Patient ID:    Radha Oro  [id number]  73 y.o.  1950    Admit date: 3/26/2024    Discharge date : 3/28/2024      Final Diagnoses:   Acute respiratory failure (HCC) [J96.00]  COPD exacerbation (HCC) [J44.1]  Acute exacerbation of COPD   -Much improved     Acute on chronic hypoxic respiratory failure     Acute on chronic heart failure with preserved ejection fraction     Sepsis with tachycardia and leukocytosis, present on admission    -Likely due to acute bronchitis.  No evidence for pneumonia.  Blood cultures negative    Recent left occipital intracranial hemorrhage   -Avoid antithrombotics and heparin/Lovenox    Severe short-term memory loss/cognitive dysfunction due to recent ICH     Obstructive sleep apnea, intolerant of CPAP     Paroxysmal atrial fibrillation     Secondary hypercoagulable state due to A-fib     Pulmonary hypertension     Essential hypertension    Reason for Hospitalization:    73-year-old female with COPD, chronic hypoxic respiratory failure, sleep apnea, pulm hypertension, anemia, anxiety, history of stroke, hypertension, recent intracranial bleed left occipital about 2 months ago who presented to the ED on 3/26 with worsening shortness of breath. She had oxygen saturations of 85% on room air.     Hospital Course:   She was started on noninvasive ventilation and admitted to the ICU. Her chest x-ray showed mild interstitial edema. Patient met sepsis criteria and was started on Zosyn and vancomycin. MRSA screen negative. She was seen by pulmonology and felt stable for transfer to the floor     Blood cultures were negative.  I transitioned her to oral moxifloxacin    She was felt appropriate for discharge home on 3/28.  Of note is that her niece was very upset about patient being discharged.  She felt like we should be doing all of the outpatient tests that she is scheduled for in the near future (venous duplex, stress test) while she was

## 2024-03-31 LAB
BACTERIA SPEC CULT: NORMAL
BACTERIA SPEC CULT: NORMAL
Lab: NORMAL
Lab: NORMAL

## 2024-04-01 ENCOUNTER — CARE COORDINATION (OUTPATIENT)
Dept: OTHER | Facility: CLINIC | Age: 74
End: 2024-04-01

## 2024-04-01 LAB
BACTERIA SPEC CULT: NORMAL
BACTERIA SPEC CULT: NORMAL
Lab: NORMAL
Lab: NORMAL

## 2024-04-01 NOTE — CARE COORDINATION
Patient assigned from [facility] discharge list after IP visit for acute renal failure, COPD exacerbation, sepsis. Patient actively working with CTN. Captora message sent to request hand off from CTN after VIK episode, if appropriate.     NANI Dangelo RN  Ambulatory Care Manager  Phone: [phone]  Email: [email or web address]

## 2025-01-28 ENCOUNTER — HOSPITAL ENCOUNTER (OUTPATIENT)
Facility: HOSPITAL | Age: 75
Discharge: HOME | End: 2025-01-31
Payer: MEDICARE

## 2025-01-28 ENCOUNTER — TRANSCRIBE ORDERS (OUTPATIENT)
Facility: HOSPITAL | Age: 75
End: 2025-01-28

## 2025-01-28 DIAGNOSIS — J44.9 CHRONIC OBSTRUCTIVE PULMONARY DISEASE, UNSPECIFIED COPD TYPE (HCC): Primary | ICD-10-CM

## 2025-01-28 DIAGNOSIS — J44.9 CHRONIC OBSTRUCTIVE PULMONARY DISEASE, UNSPECIFIED COPD TYPE (HCC): ICD-10-CM

## 2025-01-28 PROCEDURE — 71046 X-RAY EXAM CHEST 2 VIEWS: CPT

## 2025-03-12 ENCOUNTER — HOSPITAL ENCOUNTER (EMERGENCY)
Facility: HOSPITAL | Age: 75
Discharge: HOME | End: 2025-03-12
Payer: MEDICARE

## 2025-03-12 ENCOUNTER — APPOINTMENT (OUTPATIENT)
Facility: HOSPITAL | Age: 75
End: 2025-03-12
Payer: MEDICARE

## 2025-03-12 VITALS
HEIGHT: 64 IN | BODY MASS INDEX: 30.86 KG/M2 | HEART RATE: 83 BPM | RESPIRATION RATE: 16 BRPM | DIASTOLIC BLOOD PRESSURE: 86 MMHG | TEMPERATURE: 97.7 F | OXYGEN SATURATION: 98 % | SYSTOLIC BLOOD PRESSURE: 131 MMHG | WEIGHT: 180.78 LBS

## 2025-03-12 DIAGNOSIS — R52 PAIN: Primary | ICD-10-CM

## 2025-03-12 DIAGNOSIS — L03.119 CELLULITIS OF LOWER EXTREMITY, UNSPECIFIED LATERALITY: ICD-10-CM

## 2025-03-12 LAB
ALBUMIN SERPL-MCNC: 3.5 G/DL (ref 3.5–5)
ALBUMIN/GLOB SERPL: 1 (ref 1.1–2.2)
ALP SERPL-CCNC: 128 U/L (ref 45–117)
ALT SERPL-CCNC: 15 U/L (ref 12–78)
ANION GAP SERPL CALC-SCNC: 4 MMOL/L (ref 2–12)
AST SERPL W P-5'-P-CCNC: 17 U/L (ref 15–37)
BASOPHILS # BLD: 0.07 K/UL (ref 0–0.1)
BASOPHILS NFR BLD: 1 % (ref 0–1)
BILIRUB SERPL-MCNC: 0.5 MG/DL (ref 0.2–1)
BNP SERPL-MCNC: 1532 PG/ML
BUN SERPL-MCNC: 10 MG/DL (ref 6–20)
BUN/CREAT SERPL: 14 (ref 12–20)
CA-I BLD-MCNC: 9.9 MG/DL (ref 8.5–10.1)
CHLORIDE SERPL-SCNC: 105 MMOL/L (ref 97–108)
CO2 SERPL-SCNC: 28 MMOL/L (ref 21–32)
CREAT SERPL-MCNC: 0.72 MG/DL (ref 0.55–1.02)
DIFFERENTIAL METHOD BLD: ABNORMAL
EKG ATRIAL RATE: 49 BPM
EKG DIAGNOSIS: NORMAL
EKG Q-T INTERVAL: 410 MS
EKG QRS DURATION: 132 MS
EKG QTC CALCULATION (BAZETT): 467 MS
EKG R AXIS: 104 DEGREES
EKG T AXIS: 12 DEGREES
EKG VENTRICULAR RATE: 78 BPM
EOSINOPHIL # BLD: 0.13 K/UL (ref 0–0.4)
EOSINOPHIL NFR BLD: 1.8 % (ref 0–7)
ERYTHROCYTE [DISTWIDTH] IN BLOOD BY AUTOMATED COUNT: 15.6 % (ref 11.5–14.5)
GLOBULIN SER CALC-MCNC: 3.6 G/DL (ref 2–4)
GLUCOSE SERPL-MCNC: 84 MG/DL (ref 65–100)
HCT VFR BLD AUTO: 34.8 % (ref 35–47)
HGB BLD-MCNC: 11.2 G/DL (ref 11.5–16)
IMM GRANULOCYTES # BLD AUTO: 0.02 K/UL (ref 0–0.04)
IMM GRANULOCYTES NFR BLD AUTO: 0.3 % (ref 0–0.5)
LYMPHOCYTES # BLD: 1.42 K/UL (ref 0.8–3.5)
LYMPHOCYTES NFR BLD: 20.1 % (ref 12–49)
MCH RBC QN AUTO: 30.2 PG (ref 26–34)
MCHC RBC AUTO-ENTMCNC: 32.2 G/DL (ref 30–36.5)
MCV RBC AUTO: 93.8 FL (ref 80–99)
MONOCYTES # BLD: 0.85 K/UL (ref 0–1)
MONOCYTES NFR BLD: 12 % (ref 5–13)
NEUTS SEG # BLD: 4.58 K/UL (ref 1.8–8)
NEUTS SEG NFR BLD: 64.8 % (ref 32–75)
NRBC # BLD: 0 K/UL (ref 0–0.01)
NRBC BLD-RTO: 0 PER 100 WBC
PLATELET # BLD AUTO: 460 K/UL (ref 150–400)
PMV BLD AUTO: 9.3 FL (ref 8.9–12.9)
POTASSIUM SERPL-SCNC: 3.8 MMOL/L (ref 3.5–5.1)
PROT SERPL-MCNC: 7.1 G/DL (ref 6.4–8.2)
RBC # BLD AUTO: 3.71 M/UL (ref 3.8–5.2)
SODIUM SERPL-SCNC: 137 MMOL/L (ref 136–145)
WBC # BLD AUTO: 7.1 K/UL (ref 3.6–11)

## 2025-03-12 PROCEDURE — 36415 COLL VENOUS BLD VENIPUNCTURE: CPT

## 2025-03-12 PROCEDURE — 71045 X-RAY EXAM CHEST 1 VIEW: CPT

## 2025-03-12 PROCEDURE — 2580000003 HC RX 258: Performed by: NURSE PRACTITIONER

## 2025-03-12 PROCEDURE — 96366 THER/PROPH/DIAG IV INF ADDON: CPT

## 2025-03-12 PROCEDURE — 99285 EMERGENCY DEPT VISIT HI MDM: CPT

## 2025-03-12 PROCEDURE — 80053 COMPREHEN METABOLIC PANEL: CPT

## 2025-03-12 PROCEDURE — 6360000002 HC RX W HCPCS: Performed by: NURSE PRACTITIONER

## 2025-03-12 PROCEDURE — 93922 UPR/L XTREMITY ART 2 LEVELS: CPT

## 2025-03-12 PROCEDURE — 85025 COMPLETE CBC W/AUTO DIFF WBC: CPT

## 2025-03-12 PROCEDURE — 93005 ELECTROCARDIOGRAM TRACING: CPT | Performed by: NURSE PRACTITIONER

## 2025-03-12 PROCEDURE — 96367 TX/PROPH/DG ADDL SEQ IV INF: CPT

## 2025-03-12 PROCEDURE — 83880 ASSAY OF NATRIURETIC PEPTIDE: CPT

## 2025-03-12 PROCEDURE — 93970 EXTREMITY STUDY: CPT

## 2025-03-12 PROCEDURE — 87040 BLOOD CULTURE FOR BACTERIA: CPT

## 2025-03-12 PROCEDURE — 96365 THER/PROPH/DIAG IV INF INIT: CPT

## 2025-03-12 RX ORDER — CEPHALEXIN 500 MG/1
500 CAPSULE ORAL 4 TIMES DAILY
Qty: 40 CAPSULE | Refills: 0 | Status: SHIPPED | OUTPATIENT
Start: 2025-03-12 | End: 2025-03-22

## 2025-03-12 RX ORDER — SULFAMETHOXAZOLE AND TRIMETHOPRIM 800; 160 MG/1; MG/1
1 TABLET ORAL 2 TIMES DAILY
Qty: 20 TABLET | Refills: 0 | Status: SHIPPED | OUTPATIENT
Start: 2025-03-12 | End: 2025-03-22

## 2025-03-12 RX ADMIN — PIPERACILLIN AND TAZOBACTAM 4500 MG: 4; .5 INJECTION, POWDER, FOR SOLUTION INTRAVENOUS at 12:00

## 2025-03-12 RX ADMIN — VANCOMYCIN HYDROCHLORIDE 2000 MG: 1 INJECTION, POWDER, LYOPHILIZED, FOR SOLUTION INTRAVENOUS at 12:48

## 2025-03-12 ASSESSMENT — PAIN - FUNCTIONAL ASSESSMENT: PAIN_FUNCTIONAL_ASSESSMENT: NONE - DENIES PAIN

## 2025-03-13 ENCOUNTER — CARE COORDINATION (OUTPATIENT)
Dept: OTHER | Facility: CLINIC | Age: 75
End: 2025-03-13

## 2025-03-13 ENCOUNTER — RESULTS FOLLOW-UP (OUTPATIENT)
Facility: HOSPITAL | Age: 75
End: 2025-03-13

## 2025-03-13 LAB
ECHO BSA: 1.92 M2
ECHO BSA: 1.92 M2
VAS LEFT ARM BP: 136 MMHG
VAS RIGHT ABI: 1.37
VAS RIGHT ARM BP: 138 MMHG
VAS RIGHT DORSALIS PEDIS BP: 160 MMHG
VAS RIGHT PTA BP: 189 MMHG

## 2025-03-13 PROCEDURE — 93922 UPR/L XTREMITY ART 2 LEVELS: CPT | Performed by: SURGERY

## 2025-03-13 PROCEDURE — 93970 EXTREMITY STUDY: CPT | Performed by: SURGERY

## 2025-03-13 NOTE — CARE COORDINATION
Ambulatory Care Coordination Note     3/13/2025 12:01 PM     Patient outreach attempt by this ACM today to offer care management services. ACM was unable to reach the patient by telephone today;   left voice message requesting a return phone call to this ACM.     ACM: Sharon Martinez RN           Follow Up:   Plan for next ACM outreach in approximately 1-2 days  to complete:  - outreach attempt to offer care management services.           Sharon Martinez RN  Ambulatory Care Manager  [phone]  [email or web address]

## 2025-03-14 ENCOUNTER — CARE COORDINATION (OUTPATIENT)
Dept: OTHER | Facility: CLINIC | Age: 75
End: 2025-03-14

## 2025-03-14 NOTE — CARE COORDINATION
Ambulatory Care Coordination Note     3/14/2025 9:58 AM     Patient outreach attempt by this ACM today to offer care management services. ACM was unable to reach the patient by telephone today;   left voice message requesting a return phone call to this ACM.     ACM: Sharon Martinez RN           Follow Up:   Plan for next ACM outreach in approximately 1 week to complete:  - outreach attempt to offer care management services.           Sharon Martinez RN  Ambulatory Care Manager  [phone]  [email or web address]

## 2025-03-14 NOTE — ED PROVIDER NOTES
[facility] EMERGENCY DEPT  EMERGENCY DEPARTMENT HISTORY AND PHYSICAL EXAM      Date: 3/12/2025  Patient Name: Radha Oro  MRN: [id number]  YOB: 1950  Date of evaluation: 3/12/2025  Provider: MOON Melendez NP   Note Started: 2:34 PM EDT 3/14/25    HISTORY OF PRESENT ILLNESS     Chief Complaint   Patient presents with    Leg Swelling     Bilateral leg swelling, recently discharged from hospital March 2nd had follow up appt today sent due to swelling and redness to legs.        History Provided By: Patient    HPI: Radha Oro is a 74 y.o. female with a past medical history as listed below presents to the ER for bilateral leg swelling.  Patient comes in to bilateral leg swelling and redness.  Patient denies any constitutional symptoms.    PAST MEDICAL HISTORY   Past Medical History:  Past Medical History:   Diagnosis Date    AF (paroxysmal atrial fibrillation) (HCC)     d/c cardioversion 2006, rythmol started 2007    Anemia     Anxiety     Chronic obstructive pulmonary disease (HCC)     COPD (chronic obstructive pulmonary disease) (HCC)     Heart failure (HCC)     Diastolic    HTN (hypertension)     Obesity     gastric bypass    HERMILO (obstructive sleep apnea)     non compliant with cpap    Pulmonary hypertension (HCC)     Stroke (HCC)        Past Surgical History:  Past Surgical History:   Procedure Laterality Date    ECHOCARDIOGRAM 2D W DOPPLER W COLOR LIMITED  11/23/2009    normal LV wall motion and EF 60-65%, LVH, left atrial enlargement, mild tricuspid regurg, RVSP 36mmhg , no change from jan 2008    GASTRIC BYPASS SURGERY      about 30 years ago.    IR GUIDED THORACENTESIS PLEURAL  10/1/2020    NM MYOCARDIAL SPECT REST EXERCISE OR RX  08/2006    no fixed or reversible defects       Family History:  Family History   Problem Relation Age of Onset    Colon Cancer Father     Stroke Brother     Diabetes Mother     COPD Mother        Social History:  Social History     Tobacco Use    Smoking status:

## 2025-03-16 LAB
BACTERIA SPEC CULT: NORMAL
BACTERIA SPEC CULT: NORMAL
Lab: NORMAL
Lab: NORMAL

## 2025-03-19 LAB
BACTERIA SPEC CULT: NORMAL
BACTERIA SPEC CULT: NORMAL
Lab: NORMAL
Lab: NORMAL

## 2025-03-21 ENCOUNTER — CARE COORDINATION (OUTPATIENT)
Dept: OTHER | Facility: CLINIC | Age: 75
End: 2025-03-21

## 2025-03-21 NOTE — CARE COORDINATION
Ambulatory Care Coordination Note     3/21/2025 2:16 PM     patient outreach attempt by this ACM today to offer care management services. ACM was unable to reach the patient by telephone today;   left voice message requesting a return phone call to this ACM.     Patient closed (unable to reach patient) from the High Risk Care Management program on 3/21/2025.  No further Ambulatory Care Manager follow up scheduled.      Sharon Martinez RN  Ambulatory Care Manager  [phone]  [email or web address]

## 2025-03-29 PROBLEM — R52 PAIN: Status: ACTIVE | Noted: 2025-03-29

## 2025-03-29 PROBLEM — L03.119 CELLULITIS OF LOWER EXTREMITY: Status: ACTIVE | Noted: 2025-03-29

## 2025-04-28 PROBLEM — R52 PAIN: Status: RESOLVED | Noted: 2025-03-29 | Resolved: 2025-04-28

## 2025-04-29 DIAGNOSIS — R56.9 SEIZURE (HCC): ICD-10-CM

## 2025-04-29 RX ORDER — LEVETIRACETAM 500 MG/1
500 TABLET ORAL 2 TIMES DAILY
Qty: 180 TABLET | Refills: 0 | Status: SHIPPED | OUTPATIENT
Start: 2025-04-29

## 2025-04-29 NOTE — TELEPHONE ENCOUNTER
Received fax from [facility] requesting refill of patient's levetiracetam 500 mg tab    Last office visit 03/25/2024  Last med refill 03/26/2024

## 2025-05-23 NOTE — PROGRESS NOTES
Reason for Admission:  COPD                     RUR Score:  10%                   Plan for utilizing home health:    Not at this time      PCP: First and Last name:  Lam Enrique MD     Name of Practice:    Are you a current patient: Yes/No:    Approximate date of last visit: 6 months ago   Can you participate in a virtual visit with your PCP:                     Current Advanced Directive/Advance Care Plan: Full Code      Healthcare Decision Maker:   Click here to complete [address] including selection of the [address] Relationship (ie \"Primary\")    Miroslava Hernandez, friend, [phone], pt stated that Mildred Moreno can make decision for her should she not be able to                         Transition of Care Plan:      Met f/f with Pt, she confirmed that the information on the face sheet is correct. Pt stated that she lives alone. No HH, no DME and independent with ADL    Send RX to [facility] will transport home    CM dispo: home with no needs. No